# Patient Record
Sex: MALE | Race: BLACK OR AFRICAN AMERICAN | NOT HISPANIC OR LATINO | Employment: UNEMPLOYED | URBAN - METROPOLITAN AREA
[De-identification: names, ages, dates, MRNs, and addresses within clinical notes are randomized per-mention and may not be internally consistent; named-entity substitution may affect disease eponyms.]

---

## 2018-02-18 ENCOUNTER — HOSPITAL ENCOUNTER (INPATIENT)
Facility: HOSPITAL | Age: 58
LOS: 2 days | Discharge: HOME | DRG: 897 | End: 2018-02-21
Attending: FAMILY MEDICINE | Admitting: FAMILY MEDICINE

## 2018-02-18 DIAGNOSIS — D72.829 LEUKOCYTOSIS, UNSPECIFIED TYPE: ICD-10-CM

## 2018-02-18 DIAGNOSIS — I50.9 CONGESTIVE HEART FAILURE, UNSPECIFIED CONGESTIVE HEART FAILURE CHRONICITY, UNSPECIFIED CONGESTIVE HEART FAILURE TYPE: ICD-10-CM

## 2018-02-18 DIAGNOSIS — N17.9 AKI (ACUTE KIDNEY INJURY) (HCC): ICD-10-CM

## 2018-02-18 DIAGNOSIS — R56.9 SEIZURE (HCC): Primary | ICD-10-CM

## 2018-02-18 DIAGNOSIS — R56.9 SEIZURE-LIKE ACTIVITY (HCC): ICD-10-CM

## 2018-02-18 DIAGNOSIS — I10 HYPERTENSION, UNSPECIFIED TYPE: ICD-10-CM

## 2018-02-18 DIAGNOSIS — R74.8 ELEVATED CREATINE KINASE: ICD-10-CM

## 2018-02-18 DIAGNOSIS — I10 BENIGN ESSENTIAL HTN: ICD-10-CM

## 2018-02-18 DIAGNOSIS — Z72.89 ALCOHOL USE: ICD-10-CM

## 2018-02-18 DIAGNOSIS — E78.5 HYPERLIPIDEMIA, UNSPECIFIED HYPERLIPIDEMIA TYPE: ICD-10-CM

## 2018-02-18 PROCEDURE — 36415 COLL VENOUS BLD VENIPUNCTURE: CPT | Performed by: NURSE PRACTITIONER

## 2018-02-18 PROCEDURE — 93005 ELECTROCARDIOGRAM TRACING: CPT | Performed by: NURSE PRACTITIONER

## 2018-02-18 PROCEDURE — 85025 COMPLETE CBC W/AUTO DIFF WBC: CPT | Performed by: NURSE PRACTITIONER

## 2018-02-18 PROCEDURE — 80320 DRUG SCREEN QUANTALCOHOLS: CPT | Performed by: NURSE PRACTITIONER

## 2018-02-18 PROCEDURE — 85730 THROMBOPLASTIN TIME PARTIAL: CPT | Performed by: NURSE PRACTITIONER

## 2018-02-18 PROCEDURE — 85610 PROTHROMBIN TIME: CPT | Performed by: NURSE PRACTITIONER

## 2018-02-19 ENCOUNTER — APPOINTMENT (EMERGENCY)
Dept: CT IMAGING | Facility: HOSPITAL | Age: 58
DRG: 897 | End: 2018-02-19

## 2018-02-19 PROBLEM — D72.829 LEUKOCYTOSIS: Status: ACTIVE | Noted: 2018-02-19

## 2018-02-19 PROBLEM — E78.5 HYPERLIPIDEMIA: Status: ACTIVE | Noted: 2018-02-19

## 2018-02-19 PROBLEM — E86.0 DEHYDRATION: Status: ACTIVE | Noted: 2018-02-19

## 2018-02-19 PROBLEM — I10 HYPERTENSION: Status: ACTIVE | Noted: 2018-02-19

## 2018-02-19 PROBLEM — F12.90 MARIJUANA USE: Status: ACTIVE | Noted: 2018-02-19

## 2018-02-19 PROBLEM — E87.6 HYPOKALEMIA: Status: ACTIVE | Noted: 2018-02-19

## 2018-02-19 PROBLEM — N17.9 AKI (ACUTE KIDNEY INJURY) (HCC): Status: ACTIVE | Noted: 2018-02-19

## 2018-02-19 PROBLEM — I50.9 CHF (CONGESTIVE HEART FAILURE) (HCC): Status: ACTIVE | Noted: 2018-02-19

## 2018-02-19 PROBLEM — F10.10 ETOH ABUSE: Status: ACTIVE | Noted: 2018-02-19

## 2018-02-19 PROBLEM — R56.9 SEIZURE-LIKE ACTIVITY (HCC): Status: ACTIVE | Noted: 2018-02-19

## 2018-02-19 PROBLEM — Z72.0 TOBACCO ABUSE: Status: ACTIVE | Noted: 2018-02-19

## 2018-02-19 LAB
ALBUMIN SERPL BCP-MCNC: 3.6 G/DL (ref 3.5–5)
ALP SERPL-CCNC: 88 U/L (ref 46–116)
ALT SERPL W P-5'-P-CCNC: 74 U/L (ref 12–78)
AMPHETAMINES SERPL QL SCN: NEGATIVE
ANION GAP SERPL CALCULATED.3IONS-SCNC: 11 MMOL/L (ref 4–13)
ANION GAP SERPL CALCULATED.3IONS-SCNC: 13 MMOL/L (ref 4–13)
APAP SERPL-MCNC: <2 UG/ML (ref 10–30)
APTT PPP: 26 SECONDS (ref 23–35)
AST SERPL W P-5'-P-CCNC: 69 U/L (ref 5–45)
ATRIAL RATE: 87 BPM
BARBITURATES UR QL: NEGATIVE
BASOPHILS # BLD AUTO: 0.04 THOUSANDS/ΜL (ref 0–0.1)
BASOPHILS NFR BLD AUTO: 0 % (ref 0–1)
BENZODIAZ UR QL: NEGATIVE
BILIRUB SERPL-MCNC: 0.46 MG/DL (ref 0.2–1)
BUN SERPL-MCNC: 24 MG/DL (ref 5–25)
BUN SERPL-MCNC: 27 MG/DL (ref 5–25)
CALCIUM SERPL-MCNC: 8.5 MG/DL (ref 8.3–10.1)
CALCIUM SERPL-MCNC: 9 MG/DL (ref 8.3–10.1)
CHLORIDE SERPL-SCNC: 100 MMOL/L (ref 100–108)
CHLORIDE SERPL-SCNC: 95 MMOL/L (ref 100–108)
CK MB SERPL-MCNC: 1 NG/ML (ref 0–5)
CK MB SERPL-MCNC: <1 % (ref 0–2.5)
CK SERPL-CCNC: 484 U/L (ref 39–308)
CO2 SERPL-SCNC: 23 MMOL/L (ref 21–32)
CO2 SERPL-SCNC: 25 MMOL/L (ref 21–32)
COCAINE UR QL: NEGATIVE
CREAT SERPL-MCNC: 1.91 MG/DL (ref 0.6–1.3)
CREAT SERPL-MCNC: 2.24 MG/DL (ref 0.6–1.3)
EOSINOPHIL # BLD AUTO: 0.12 THOUSAND/ΜL (ref 0–0.61)
EOSINOPHIL NFR BLD AUTO: 1 % (ref 0–6)
ERYTHROCYTE [DISTWIDTH] IN BLOOD BY AUTOMATED COUNT: 13.1 % (ref 11.6–15.1)
ERYTHROCYTE [DISTWIDTH] IN BLOOD BY AUTOMATED COUNT: 13.2 % (ref 11.6–15.1)
ETHANOL SERPL-MCNC: 30 MG/DL (ref 0–3)
FOLATE SERPL-MCNC: >20 NG/ML (ref 3.1–17.5)
GFR SERPL CREATININE-BSD FRML MDRD: 37 ML/MIN/1.73SQ M
GFR SERPL CREATININE-BSD FRML MDRD: 44 ML/MIN/1.73SQ M
GLUCOSE SERPL-MCNC: 109 MG/DL (ref 65–140)
GLUCOSE SERPL-MCNC: 98 MG/DL (ref 65–140)
HCT VFR BLD AUTO: 39.3 % (ref 36.5–49.3)
HCT VFR BLD AUTO: 41.2 % (ref 36.5–49.3)
HGB BLD-MCNC: 13.7 G/DL (ref 12–17)
HGB BLD-MCNC: 14.6 G/DL (ref 12–17)
INR PPP: 0.98 (ref 0.86–1.16)
LDLC SERPL DIRECT ASSAY-MCNC: 125 MG/DL (ref 0–100)
LYMPHOCYTES # BLD AUTO: 2.74 THOUSANDS/ΜL (ref 0.6–4.47)
LYMPHOCYTES NFR BLD AUTO: 20 % (ref 14–44)
MAGNESIUM SERPL-MCNC: 2.1 MG/DL (ref 1.6–2.6)
MCH RBC QN AUTO: 32.2 PG (ref 26.8–34.3)
MCH RBC QN AUTO: 33.2 PG (ref 26.8–34.3)
MCHC RBC AUTO-ENTMCNC: 34.9 G/DL (ref 31.4–37.4)
MCHC RBC AUTO-ENTMCNC: 35.4 G/DL (ref 31.4–37.4)
MCV RBC AUTO: 93 FL (ref 82–98)
MCV RBC AUTO: 94 FL (ref 82–98)
METHADONE UR QL: NEGATIVE
MONOCYTES # BLD AUTO: 0.96 THOUSAND/ΜL (ref 0.17–1.22)
MONOCYTES NFR BLD AUTO: 7 % (ref 4–12)
NEUTROPHILS # BLD AUTO: 9.67 THOUSANDS/ΜL (ref 1.85–7.62)
NEUTS SEG NFR BLD AUTO: 72 % (ref 43–75)
NRBC BLD AUTO-RTO: 0 /100 WBCS
NT-PROBNP SERPL-MCNC: 76 PG/ML
OPIATES UR QL SCN: NEGATIVE
P AXIS: 40 DEGREES
PCP UR QL: NEGATIVE
PLATELET # BLD AUTO: 173 THOUSANDS/UL (ref 149–390)
PLATELET # BLD AUTO: 188 THOUSANDS/UL (ref 149–390)
PMV BLD AUTO: 10.2 FL (ref 8.9–12.7)
PMV BLD AUTO: 10.2 FL (ref 8.9–12.7)
POTASSIUM SERPL-SCNC: 3.2 MMOL/L (ref 3.5–5.3)
POTASSIUM SERPL-SCNC: 3.5 MMOL/L (ref 3.5–5.3)
PR INTERVAL: 170 MS
PROT SERPL-MCNC: 7.5 G/DL (ref 6.4–8.2)
PROTHROMBIN TIME: 13 SECONDS (ref 12.1–14.4)
QRS AXIS: 48 DEGREES
QRSD INTERVAL: 88 MS
QT INTERVAL: 356 MS
QTC INTERVAL: 428 MS
RBC # BLD AUTO: 4.25 MILLION/UL (ref 3.88–5.62)
RBC # BLD AUTO: 4.4 MILLION/UL (ref 3.88–5.62)
SALICYLATES SERPL-MCNC: 3.9 MG/DL (ref 3–20)
SODIUM SERPL-SCNC: 133 MMOL/L (ref 136–145)
SODIUM SERPL-SCNC: 134 MMOL/L (ref 136–145)
T WAVE AXIS: 22 DEGREES
THC UR QL: POSITIVE
TSH SERPL DL<=0.05 MIU/L-ACNC: 0.66 UIU/ML (ref 0.36–3.74)
VENTRICULAR RATE: 87 BPM
VIT B12 SERPL-MCNC: 533 PG/ML (ref 100–900)
WBC # BLD AUTO: 10.94 THOUSAND/UL (ref 4.31–10.16)
WBC # BLD AUTO: 13.53 THOUSAND/UL (ref 4.31–10.16)

## 2018-02-19 PROCEDURE — 99254 IP/OBS CNSLTJ NEW/EST MOD 60: CPT | Performed by: NURSE PRACTITIONER

## 2018-02-19 PROCEDURE — 82746 ASSAY OF FOLIC ACID SERUM: CPT | Performed by: NURSE PRACTITIONER

## 2018-02-19 PROCEDURE — 80329 ANALGESICS NON-OPIOID 1 OR 2: CPT | Performed by: NURSE PRACTITIONER

## 2018-02-19 PROCEDURE — 99285 EMERGENCY DEPT VISIT HI MDM: CPT

## 2018-02-19 PROCEDURE — 36415 COLL VENOUS BLD VENIPUNCTURE: CPT | Performed by: NURSE PRACTITIONER

## 2018-02-19 PROCEDURE — 84443 ASSAY THYROID STIM HORMONE: CPT | Performed by: NURSE PRACTITIONER

## 2018-02-19 PROCEDURE — 83735 ASSAY OF MAGNESIUM: CPT | Performed by: NURSE PRACTITIONER

## 2018-02-19 PROCEDURE — 83880 ASSAY OF NATRIURETIC PEPTIDE: CPT | Performed by: NURSE PRACTITIONER

## 2018-02-19 PROCEDURE — 83721 ASSAY OF BLOOD LIPOPROTEIN: CPT | Performed by: NURSE PRACTITIONER

## 2018-02-19 PROCEDURE — 82550 ASSAY OF CK (CPK): CPT | Performed by: NURSE PRACTITIONER

## 2018-02-19 PROCEDURE — 80307 DRUG TEST PRSMV CHEM ANLYZR: CPT | Performed by: NURSE PRACTITIONER

## 2018-02-19 PROCEDURE — 99233 SBSQ HOSP IP/OBS HIGH 50: CPT | Performed by: INTERNAL MEDICINE

## 2018-02-19 PROCEDURE — 80048 BASIC METABOLIC PNL TOTAL CA: CPT | Performed by: PHYSICIAN ASSISTANT

## 2018-02-19 PROCEDURE — 93010 ELECTROCARDIOGRAM REPORT: CPT | Performed by: INTERNAL MEDICINE

## 2018-02-19 PROCEDURE — 82607 VITAMIN B-12: CPT | Performed by: NURSE PRACTITIONER

## 2018-02-19 PROCEDURE — 80053 COMPREHEN METABOLIC PANEL: CPT | Performed by: NURSE PRACTITIONER

## 2018-02-19 PROCEDURE — 99223 1ST HOSP IP/OBS HIGH 75: CPT | Performed by: PHYSICIAN ASSISTANT

## 2018-02-19 PROCEDURE — 99254 IP/OBS CNSLTJ NEW/EST MOD 60: CPT | Performed by: INTERNAL MEDICINE

## 2018-02-19 PROCEDURE — 82553 CREATINE MB FRACTION: CPT | Performed by: NURSE PRACTITIONER

## 2018-02-19 PROCEDURE — 85027 COMPLETE CBC AUTOMATED: CPT | Performed by: PHYSICIAN ASSISTANT

## 2018-02-19 PROCEDURE — 70450 CT HEAD/BRAIN W/O DYE: CPT

## 2018-02-19 RX ORDER — PANTOPRAZOLE SODIUM 40 MG/1
40 TABLET, DELAYED RELEASE ORAL
Status: DISCONTINUED | OUTPATIENT
Start: 2018-02-20 | End: 2018-02-21 | Stop reason: HOSPADM

## 2018-02-19 RX ORDER — SPIRONOLACTONE 25 MG/1
25 TABLET ORAL DAILY
COMMUNITY
End: 2018-02-21 | Stop reason: HOSPADM

## 2018-02-19 RX ORDER — ASPIRIN 81 MG/1
81 TABLET ORAL DAILY
Status: DISCONTINUED | OUTPATIENT
Start: 2018-02-19 | End: 2018-02-21 | Stop reason: HOSPADM

## 2018-02-19 RX ORDER — LISINOPRIL 20 MG/1
20 TABLET ORAL DAILY
Status: ON HOLD | COMMUNITY
End: 2018-02-21

## 2018-02-19 RX ORDER — THIAMINE MONONITRATE (VIT B1) 100 MG
100 TABLET ORAL DAILY
Status: DISCONTINUED | OUTPATIENT
Start: 2018-02-20 | End: 2018-02-21 | Stop reason: HOSPADM

## 2018-02-19 RX ORDER — FOLIC ACID 1 MG/1
1 TABLET ORAL DAILY
Status: DISCONTINUED | OUTPATIENT
Start: 2018-02-20 | End: 2018-02-21 | Stop reason: HOSPADM

## 2018-02-19 RX ORDER — HEPARIN SODIUM 5000 [USP'U]/ML
5000 INJECTION, SOLUTION INTRAVENOUS; SUBCUTANEOUS EVERY 8 HOURS SCHEDULED
Status: DISCONTINUED | OUTPATIENT
Start: 2018-02-19 | End: 2018-02-21 | Stop reason: HOSPADM

## 2018-02-19 RX ORDER — AMLODIPINE BESYLATE 5 MG/1
5 TABLET ORAL DAILY
Status: DISCONTINUED | OUTPATIENT
Start: 2018-02-19 | End: 2018-02-21 | Stop reason: HOSPADM

## 2018-02-19 RX ORDER — SODIUM CHLORIDE 9 MG/ML
125 INJECTION, SOLUTION INTRAVENOUS CONTINUOUS
Status: DISCONTINUED | OUTPATIENT
Start: 2018-02-19 | End: 2018-02-20

## 2018-02-19 RX ORDER — SIMVASTATIN 40 MG
40 TABLET ORAL
COMMUNITY
End: 2018-02-21 | Stop reason: HOSPADM

## 2018-02-19 RX ORDER — ACETAMINOPHEN 325 MG/1
650 TABLET ORAL EVERY 6 HOURS PRN
Status: DISCONTINUED | OUTPATIENT
Start: 2018-02-19 | End: 2018-02-21 | Stop reason: HOSPADM

## 2018-02-19 RX ORDER — POTASSIUM CHLORIDE 20 MEQ/1
40 TABLET, EXTENDED RELEASE ORAL ONCE
Status: COMPLETED | OUTPATIENT
Start: 2018-02-19 | End: 2018-02-19

## 2018-02-19 RX ORDER — CARVEDILOL 25 MG/1
25 TABLET ORAL 2 TIMES DAILY WITH MEALS
Status: ON HOLD | COMMUNITY
End: 2022-02-04 | Stop reason: SDUPTHER

## 2018-02-19 RX ORDER — NICOTINE 21 MG/24HR
1 PATCH, TRANSDERMAL 24 HOURS TRANSDERMAL DAILY
Status: DISCONTINUED | OUTPATIENT
Start: 2018-02-19 | End: 2018-02-21 | Stop reason: HOSPADM

## 2018-02-19 RX ORDER — LISINOPRIL 20 MG/1
20 TABLET ORAL DAILY
Status: DISCONTINUED | OUTPATIENT
Start: 2018-02-19 | End: 2018-02-21 | Stop reason: HOSPADM

## 2018-02-19 RX ORDER — AMLODIPINE BESYLATE 5 MG/1
5 TABLET ORAL DAILY
Status: ON HOLD | COMMUNITY
End: 2018-02-21

## 2018-02-19 RX ORDER — CARVEDILOL 25 MG/1
25 TABLET ORAL 2 TIMES DAILY WITH MEALS
Status: DISCONTINUED | OUTPATIENT
Start: 2018-02-19 | End: 2018-02-21 | Stop reason: HOSPADM

## 2018-02-19 RX ORDER — MAGNESIUM HYDROXIDE/ALUMINUM HYDROXICE/SIMETHICONE 120; 1200; 1200 MG/30ML; MG/30ML; MG/30ML
30 SUSPENSION ORAL EVERY 6 HOURS PRN
Status: DISCONTINUED | OUTPATIENT
Start: 2018-02-19 | End: 2018-02-21 | Stop reason: HOSPADM

## 2018-02-19 RX ORDER — LORAZEPAM 2 MG/ML
1 INJECTION INTRAMUSCULAR EVERY 4 HOURS PRN
Status: DISCONTINUED | OUTPATIENT
Start: 2018-02-19 | End: 2018-02-21 | Stop reason: HOSPADM

## 2018-02-19 RX ORDER — ONDANSETRON 2 MG/ML
4 INJECTION INTRAMUSCULAR; INTRAVENOUS EVERY 6 HOURS PRN
Status: DISCONTINUED | OUTPATIENT
Start: 2018-02-19 | End: 2018-02-21 | Stop reason: HOSPADM

## 2018-02-19 RX ORDER — ASPIRIN 81 MG/1
81 TABLET ORAL DAILY
Status: ON HOLD | COMMUNITY
End: 2022-02-04 | Stop reason: SDUPTHER

## 2018-02-19 RX ORDER — PRAVASTATIN SODIUM 80 MG/1
80 TABLET ORAL
Status: DISCONTINUED | OUTPATIENT
Start: 2018-02-19 | End: 2018-02-21 | Stop reason: HOSPADM

## 2018-02-19 RX ORDER — FUROSEMIDE 40 MG/1
40 TABLET ORAL DAILY
Status: ON HOLD | COMMUNITY
End: 2018-02-21

## 2018-02-19 RX ADMIN — LISINOPRIL 20 MG: 20 TABLET ORAL at 09:18

## 2018-02-19 RX ADMIN — SODIUM CHLORIDE 125 ML/HR: 0.9 INJECTION, SOLUTION INTRAVENOUS at 21:35

## 2018-02-19 RX ADMIN — CARVEDILOL 25 MG: 25 TABLET, FILM COATED ORAL at 09:18

## 2018-02-19 RX ADMIN — SODIUM CHLORIDE 125 ML/HR: 0.9 INJECTION, SOLUTION INTRAVENOUS at 14:02

## 2018-02-19 RX ADMIN — HEPARIN SODIUM 5000 UNITS: 5000 INJECTION, SOLUTION INTRAVENOUS; SUBCUTANEOUS at 05:40

## 2018-02-19 RX ADMIN — ASPIRIN 81 MG: 81 TABLET, COATED ORAL at 09:18

## 2018-02-19 RX ADMIN — AMLODIPINE BESYLATE 5 MG: 5 TABLET ORAL at 09:18

## 2018-02-19 RX ADMIN — PRAVASTATIN SODIUM 80 MG: 80 TABLET ORAL at 17:44

## 2018-02-19 RX ADMIN — CARVEDILOL 25 MG: 25 TABLET, FILM COATED ORAL at 17:44

## 2018-02-19 RX ADMIN — THIAMINE HYDROCHLORIDE 100 MG: 100 INJECTION, SOLUTION INTRAMUSCULAR; INTRAVENOUS at 05:21

## 2018-02-19 RX ADMIN — FOLIC ACID 1 MG: 5 INJECTION, SOLUTION INTRAMUSCULAR; INTRAVENOUS; SUBCUTANEOUS at 04:36

## 2018-02-19 RX ADMIN — SODIUM CHLORIDE 1000 ML: 0.9 INJECTION, SOLUTION INTRAVENOUS at 02:38

## 2018-02-19 RX ADMIN — SODIUM CHLORIDE 125 ML/HR: 0.9 INJECTION, SOLUTION INTRAVENOUS at 04:36

## 2018-02-19 RX ADMIN — POTASSIUM CHLORIDE 40 MEQ: 1500 TABLET, EXTENDED RELEASE ORAL at 11:18

## 2018-02-19 NOTE — CONSULTS
2           Consultation - Nephrology   Kristin Joya 65 y o  male MRN: [id number]  Unit/Bed#: E4 -01 Encounter: [id number]      Assessment/Plan     Assessment / Plan:  1  Renal     Patient was admitted had acute renal failure and from the history it sounds as if he had not been feeling now well for few days may not been eating that great  With IV fluids overnight creatinine is actually declined some so that confirm some sort of pre renal component  Urinalysis is pending  This is important because if it is normal with respect to no blood no protein will go against having intrinsic process and would spare significant serologic workup  Monitor on IV fluids and hopefully creatinine will declined to normal  Await urinalysis    2  Electrolyte abnormalities  The patient has mild hyponatremia which could be related to pre renal azotemia causing osmotic ADH release and then also had acute renal failure impairing ability to excrete water  Stable on isotonic fluids will continue to monitor his renal function improves  With respect to hypokalemia there appears to be a big history of alcohol use at home potentially has poor intake  Will replete and monitor  History of Present Illness   Physician Requesting Consult: Cinthya Hubbard MD  Reason for Consult / Principal Problem:  Acute renal failure  Hx and PE limited by:   HPI: Kristin Joya is a 65y o  year old male who presents with possible seizure  According to the patient and review of the chart the patient has been having some weird activity which is girlfriend thought related to seizures since December  Apparently he has been feeling well for if few days and at home he was sitting in a chair and his eyes rolled back in his head and he was not responsive  He was brought into the hospital for evaluation found have a creatinine above 2 with no history of kidney disease and we were asked to see him regarding this    History obtained from chart review and the patient  Inpatient consult to Nephrology  Consult performed by: Nathanael Hernandez ordered by: Maria A Gil          Review of Systems   Constitutional: Positive for activity change  Negative for chills and fever  HENT: Negative  Eyes: Negative  Respiratory: Negative  Negative for chest tightness and shortness of breath  Cardiovascular: Negative  Gastrointestinal: Negative  Genitourinary: Negative  Neurological: Negative for dizziness  Possible seizure       Historical Information   Patient Active Problem List   Diagnosis    [facility] ([facility] )    Hypertension    CHF (congestive heart failure) ([place] [address] )    ETOH abuse    ALEKS (acute kidney injury) ([facility] )     Past Medical History:   Diagnosis Date    CHF (congestive heart failure) ([place] [address] )     Hypertension      History reviewed  No pertinent surgical history  Social History   History   Alcohol Use    6 0 oz/week    10 Cans of beer per week     Comment: every day drinker     History   Drug Use    Types: Marijuana     Comment: occasionally     History   Smoking Status    Current Every Day Smoker    Packs/day: 1 00    Years: 30 00   Smokeless Tobacco    Never Used     History reviewed  No pertinent family history      Meds/Allergies   current meds:   Current Facility-Administered Medications   Medication Dose Route Frequency    acetaminophen (TYLENOL) tablet 650 mg  650 mg Oral Q6H PRN    aluminum-magnesium hydroxide-simethicone (MYLANTA) 200-200-20 mg/5 mL oral suspension 30 mL  30 mL Oral Q6H PRN    amLODIPine (NORVASC) tablet 5 mg  5 mg Oral Daily    aspirin (ECOTRIN LOW STRENGTH) EC tablet 81 mg  81 mg Oral Daily    carvedilol (COREG) tablet 25 mg  25 mg Oral BID With Meals    heparin (porcine) subcutaneous injection 5,000 Units  5,000 Units Subcutaneous Q8H [facility]    lisinopril (ZESTRIL) tablet 20 mg  20 mg Oral Daily    LORazepam (ATIVAN) 2 mg/mL injection 1 mg  1 mg Intravenous Q4H PRN    nicotine (NICODERM CQ) 14 mg/24hr TD 24 hr patch 1 patch  1 patch Transdermal Daily    ondansetron (ZOFRAN) injection 4 mg  4 mg Intravenous Q6H PRN    pravastatin (PRAVACHOL) tablet 80 mg  80 mg Oral Daily With Dinner    sodium chloride 0 9 % infusion  125 mL/hr Intravenous Continuous     No Known Allergies    Objective     Intake/Output Summary (Last 24 hours) at 02/19/18 1028  Last data filed at 02/19/18 6597   Gross per 24 hour   Intake          1322 91 ml   Output                0 ml   Net          1322 91 ml     Body mass index is 37 31 kg/m²  Invasive Devices:        PHYSICAL EXAM:  /89 (BP Location: Right arm)   Pulse 86   Temp 97 9 °F (36 6 °C) (Temporal)   Resp 17   Ht 5' 8" (1 727 m)   Wt 111 kg (245 lb 6 oz)   SpO2 98%   BMI 37 31 kg/m²     Physical Exam   Constitutional: He is oriented to person, place, and time  No distress  HENT:   Mouth/Throat: No oropharyngeal exudate  Eyes: No scleral icterus  Neck: Neck supple  No JVD present  Cardiovascular: Normal rate  Exam reveals no friction rub  No edema  Pulmonary/Chest: Effort normal and breath sounds normal  No respiratory distress  He has no wheezes  He has no rales  Abdominal: Soft  Bowel sounds are normal  He exhibits no distension  There is no tenderness  There is no rebound  Neurological: He is alert and oriented to person, place, and time     No focal motor findings         Current Weight: Weight - Scale: 111 kg (245 lb 6 oz)  First Weight: Weight - Scale: 111 kg (244 lb 7 8 oz)    Lab Results:      Results from last 7 days  Lab Units 02/19/18  0604   WBC Thousand/uL 10 94*   HEMOGLOBIN g/dL 13 7   HEMATOCRIT % 39 3   PLATELETS Thousands/uL 173       Results from last 7 days  Lab Units 02/19/18  0604   SODIUM mmol/L 134*   POTASSIUM mmol/L 3 2*   CHLORIDE mmol/L 100   CO2 mmol/L 23   BUN mg/dL 24   CREATININE mg/dL 1 91*   GLUCOSE RANDOM mg/dL 109   CALCIUM mg/dL 8 5       Results from last 7 days  Lab Units 02/19/18  0604 02/19/18  0128   SODIUM mmol/L 134* 133*   POTASSIUM mmol/L 3 2* 3 5   CHLORIDE mmol/L 100 95*   CO2 mmol/L 23 25   BUN mg/dL 24 27*   CREATININE mg/dL 1 91* 2 24*   CALCIUM mg/dL 8 5 9 0   TOTAL PROTEIN g/dL  --  7 5   BILIRUBIN TOTAL mg/dL  --  0 46   ALK PHOS U/L  --  88   ALT U/L  --  74   AST U/L  --  69*   GLUCOSE RANDOM mg/dL 109 98

## 2018-02-19 NOTE — CONSULTS
Consultation - Neurology   Modesta Keller 65 y o  male MRN: [id number]  Unit/Bed#: E4 -01 Encounter: [id number]      Physician Requesting Consult: Victor Hugo Koch MD  Reason for Consult / Principal Problem: seizure-like activity  Hx and PE limited by: none  Review of previous medical records  completed  Family, not present at the bedside for history and examination    Assessment/Plan:  1  Seizure-like activity: likely provoked  Etiology likely metabolic in the setting of alcohol withdraw and abuse, marijuana use,  recent infection, and dehydration  Patient reported recent illness with fever, chills, productive cough, and poor po intake including alcohol  Patient normally consumes 8-10 drinks a day and stopped for a few days prior to seizure like activity  UDS positive for THC  Ethanol 30  Can not rule out vascular cause given increased cardiac risk factors, HTN, HLD, obesity,  tobacco use, and alcohol abuse  CT Head demonstrating chronic sinusitis and microangiopathic changes but no acute intracranial abnormality  2  HTN: reported non compliance with home medication 2/2 no insurance  3  HLD  4  ETOH abuse: reported 10 cans of beer/day  5  Tobacco use    Plan:  -Seizure precautions  -EEG pending  -f/u labs B12, thiamine, folate, Lipid panel, HgA1C  -continue IV fluids  -monitor for alcohol withdraw and continue as per protocol  -ongoing smoking cessation education      HPI: Modesta Keller is a  65 y o  male With pmh of HTN, HLD, CHF, ETOH abuse, and recent URI x 3-4 days who was brought in via EMS on 2/18/18 for seizure like activity witnessed by his girlfriend  Patient reports that on Thursday 2/15/18 he started with an upper respiratory illness with productive cough, fever and chills   He made himself a home remedy of honey, bicardi, and lemon that he "heats on the stove and helps you sweat the cold out " He attributes his "shaking" to fever and chills but his girlfriend told him he had 3 episodes of "shaking" with his eyes rolled back in his head followed by confusion lasting about 10 minutes  She called EMS and made him go the hospital  He states that he does not recall the episodes, but does remember feeling "sweaty" just prior  He did not bite his tongue or lose control of his bladder  Of note, patient consumes 8-10 cans of beer a day, never hard alcohol  Since he became ill on Thursday, he had not drank any alcohol until Sunday afternoon when he had the first episode  Per report, His girlfriend reports other episodes of seizure-like episodes since christmas  When queried, patient reports those episodes happened during a time of binge drinking "my birthday is at the end of [birth year] and with the holidays I party a lot and have a good time "        ROS:  History obtained from chart review and the patient  General ROS: positive for  - chills, fever and diaphoresis from cold that started on 2/15/18  negative for - fatigue  Ophthalmic ROS: negative for - blurry vision, decreased vision, double vision, loss of vision or photophobia  Respiratory ROS: positive for - cough, shortness of breath and sputum changes (green/brown with recent respiratory illness)  Cardiovascular ROS: no chest pain or dyspnea on exertion  Gastrointestinal ROS: positive for- diarrhea and decreased appetite negative for- abdominal pain, nausea/ vomiting  Musculoskeletal ROS: negative for - gait disturbance or muscular weakness  Neurological ROS: positive for - seizure-like activity  negative for - confusion, dizziness, gait disturbance, memory loss, speech problems or visual changes  Dermatological ROS: negative for dry skin and rash        Historical Information     Past Medical History:   Diagnosis Date    [facility]estive [facility] failure) [facility] )     Hypertension      History reviewed  No pertinent surgical history      Social History   Current everyday smoker with 1 pack/day; 30 pack year history  Current alcohol use: 10 cans of beer/day  Occasional drug use: marijuana       Family History: History reviewed  No pertinent family history  No Known Allergies  Meds:  all current active meds have been reviewed and PTA meds:   Prior to Admission Medications   Prescriptions Last Dose Informant Patient Reported? Taking?    Multiple Vitamin (DAILY-ROB PO)   Yes Yes   Sig: Take 1 tablet by mouth daily   amLODIPine (NORVASC) 5 mg tablet   Yes Yes   Sig: Take 5 mg by mouth daily   aspirin (ECOTRIN LOW STRENGTH) 81 mg EC tablet   Yes Yes   Sig: Take 81 mg by mouth daily   carvedilol (COREG) 25 mg tablet   Yes Yes   Sig: Take 25 mg by mouth 2 (two) times a day with meals   furosemide (LASIX) 40 mg tablet   Yes Yes   Sig: Take 40 mg by mouth daily   lisinopril (ZESTRIL) 20 mg tablet   Yes Yes   Sig: Take 20 mg by mouth daily   simvastatin (ZOCOR) 40 mg tablet   Yes Yes   Sig: Take 40 mg by mouth daily at bedtime   spironolactone (ALDACTONE) 25 mg tablet   Yes Yes   Sig: Take 25 mg by mouth daily      Facility-Administered Medications: None       Scheduled Meds:  Current Facility-Administered Medications:  acetaminophen 650 mg Oral Q6H PRN Melissa Saldana PA-C    aluminum-magnesium hydroxide-simethicone 30 mL Oral Q6H PRN Melissa Saldana PA-C    amLODIPine 5 mg Oral Daily Melissa Saldana PA-C    aspirin 81 mg Oral Daily Melissa Saldana PA-C    carvedilol 25 mg Oral BID With Meals Melissa Saldana PA-C    heparin (porcine) 5,000 Units Subcutaneous Q8H [address] Melissa Saldana PA-C    lisinopril 20 mg Oral Daily Melissa Saldana PA-C    LORazepam 1 mg Intravenous Q4H PRN Melissa Saldana PA-C    nicotine 1 patch Transdermal Daily Melissa Saldana PA-C    ondansetron 4 mg Intravenous Q6H PRN Melissa Saldana PA-C    pravastatin 80 mg Oral Daily With [employer]    sodium chloride 125 mL/hr Intravenous Continuous Melissa Saldana PA-C Last Rate: 125 mL/hr (02/19/18 0551)     PRN Meds:   acetaminophen    aluminum-magnesium hydroxide-simethicone    LORazepam    ondansetron        Physical Exam:   Objective   Vitals:Blood pressure 142/89, pulse 86, temperature 97 9 °F (36 6 °C), temperature source Temporal, resp  rate 17, height 5' 8" (1 727 m), weight 111 kg (245 lb 6 oz), SpO2 98 %  ,Body mass index is 37 31 kg/m²  Physical Exam   Constitutional: He is oriented to person, place, and time  He appears well-nourished  No distress  HENT:   Head: Normocephalic and atraumatic  Mouth/Throat: Oropharynx is clear and moist    Eyes: EOM are normal  Pupils are equal, round, and reactive to light  Neck: Normal range of motion  Neck supple  Cardiovascular: Normal rate, regular rhythm and normal heart sounds  Pulmonary/Chest: Effort normal and breath sounds normal  No respiratory distress  Abdominal: Soft  Bowel sounds are normal  He exhibits no distension  Musculoskeletal: Normal range of motion  He exhibits no edema  Neurological: He is alert and oriented to person, place, and time  Reflex Scores:       Tricep reflexes are 1+ on the right side and 1+ on the left side  Bicep reflexes are 1+ on the right side and 1+ on the left side  Brachioradialis reflexes are 1+ on the right side and 1+ on the left side  Patellar reflexes are 1+ on the right side and 1+ on the left side  Achilles reflexes are 1+ on the right side and 1+ on the left side  Skin: Skin is warm and dry  Psychiatric: His speech is normal    Vitals reviewed  Neurologic Exam     Mental Status   Oriented to person, place, and time  Disoriented to person: states full name and   Oriented to city  ([address] [place] [address])  Oriented to year, month, date and day  Registration: recalls 3 of 3 objects  Recall at 5 minutes: recalls 3 of 3 objects  Follows 3 step commands  Attention: normal  Concentration: normal    Speech: speech is normal   Knowledge: good  Able to name object  Able to read  Able to repeat       Cranial Nerves   Cranial nerves II through XII intact  CN III, IV, VI   Pupils are equal, round, and reactive to light  Extraocular motions are normal    Right pupil: Size: 2 mm  Shape: regular  Reactivity: brisk  Left pupil: Size: 2 mm  Shape: regular  Reactivity: brisk  Motor Exam   Muscle bulk: normal  Overall muscle tone: normal    Strength   Right neck flexion: 5/5  Left neck flexion: 5/5  Right neck extension: 5/5  Left neck extension: 5/5  Right deltoid: 5/5  Left deltoid: 5/5  Right biceps: 5/5  Left biceps: 5/5  Right triceps: 5/5  Left triceps: 5/5  Right wrist flexion: 5/5  Left wrist flexion: 5/5  Right wrist extension: 5/5  Left wrist extension: 5/5  Right interossei: 5/5  Left interossei: 5/5  Right abdominals: 5/5  Left abdominals: 5/5  Right iliopsoas: 5/5  Left iliopsoas: 5/5  Right quadriceps: 5/5  Left quadriceps: 5/5  Right hamstrin/5  Left hamstrin/5  Right glutei: 5/5  Left glutei: 5/5  Right anterior tibial: 5/5  Left anterior tibial: 5/5  Right posterior tibial: 5/5  Left posterior tibial: 5/5  Right peroneal: 5/5  Left peroneal: 5/5  Right gastroc: 5/5  Left gastroc: 5/5    Sensory Exam   Light touch normal    Vibration normal    Proprioception normal      Gait, Coordination, and Reflexes     Reflexes   Right brachioradialis: 1+  Left brachioradialis: 1+  Right biceps: 1+  Left biceps: 1+  Right triceps: 1+  Left triceps: 1+  Right patellar: 1+  Left patellar: 1+  Right achilles: 1+  Left achilles: 1+  Right : 1+  Left : 1+  Right plantar: normal  Left plantar: normalBaseline tremor but no asterixis on exam         Lab Results:   I have personally reviewed pertinent reports    , CBC:   Results from last 7 days  Lab Units 18  0618  2358   WBC Thousand/uL 10 94* 13 53*   RBC Million/uL 4 25 4 40   HEMOGLOBIN g/dL 13 7 14 6   HEMATOCRIT % 39 3 41 2   MCV fL 93 94   PLATELETS Thousands/uL 173 188     BMP/CMP:   Results from last 7 days  Lab Units 18  0604 02/19/18  0128   SODIUM mmol/L 134* 133*   POTASSIUM mmol/L 3 2* 3 5   CHLORIDE mmol/L 100 95*   CO2 mmol/L 23 25   ANION GAP mmol/L 11 13   BUN mg/dL 24 27*   CREATININE mg/dL 1 91* 2 24*   GLUCOSE RANDOM mg/dL 109 98   CALCIUM mg/dL 8 5 9 0   AST U/L  --  69*   ALT U/L  --  74   ALK PHOS U/L  --  88   TOTAL PROTEIN g/dL  --  7 5   BILIRUBIN TOTAL mg/dL  --  0 46   EGFR ml/min/1 73sq m 44 37     TSH:   Results from last 7 days  Lab Units 02/19/18  0128   TSH 3RD GENERATON uIU/mL 0 663   , Coagulation:   Results from last 7 days  Lab Units 02/18/18  2358   INR  0 98       Imaging Studies: I have personally reviewed pertinent reports  and I have personally reviewed pertinent films in PACS    2/18/18 CT Head wo contrast: PARENCHYMA:  No intracranial mass, mass effect or midline shift  No CT signs of acute infarction  There is no parenchymal hemorrhage  Microangiopathic change  VENTRICLES AND EXTRA-AXIAL SPACES:  Normal for patient's age  VISUALIZED ORBITS AND PARANASAL SINUSES:  Chronic ethmoid, maxillary and sphenoid sinusitis  CALVARIUM AND EXTRACRANIAL SOFT TISSUES:   Normal  IMPRESSION:No acute intracranial abnormality  Microangiopathic change  Chronic sinusitis  Findings are consistent with the preliminary report from [facility] which was provided shortly after completion of the exam      EEG, Echo, Pathology, and Other Studies: I have personally reviewed pertinent reports     and I have personally reviewed pertinent films in PACS    VTE Prophylaxis: Sequential compression device (Venodyne)  and Heparin

## 2018-02-19 NOTE — PLAN OF CARE
Problem: Potential for Falls  Goal: Patient will remain free of falls  INTERVENTIONS:  - Assess patient frequently for physical needs  -  Identify cognitive and physical deficits and behaviors that affect risk of falls    -  [place] fall precautions as indicated by assessment   - Educate patient/family on patient safety including physical limitations  - Instruct patient to call for assistance with activity based on assessment  - Modify environment to reduce risk of injury  - Consider OT/PT consult to assist with strengthening/mobility   Outcome: Progressing      Problem: PAIN - ADULT  Goal: Verbalizes/displays adequate comfort level or baseline comfort level  Interventions:  - Encourage patient to monitor pain and request assistance  - Assess pain using appropriate pain scale  - Administer analgesics based on type and severity of pain and evaluate response  - Implement non-pharmacological measures as appropriate and evaluate response  - Consider cultural and social influences on pain and pain management  - Notify physician/advanced practitioner if interventions unsuccessful or patient reports new pain   Outcome: Progressing      Problem: SAFETY ADULT  Goal: Maintain or return to baseline ADL function  INTERVENTIONS:  -  Assess patient's ability to carry out ADLs; assess patient's baseline for ADL function and identify physical deficits which impact ability to perform ADLs (bathing, care of mouth/teeth, toileting, grooming, dressing, etc )  - Assess/evaluate cause of self-care deficits   - Assess range of motion  - Assess patient's mobility; develop plan if impaired  - Assess patient's need for assistive devices and provide as appropriate  - Encourage maximum independence but intervene and supervise when necessary  ¯ Provide patient education as appropriate   Outcome: Progressing    Goal: Maintain or return mobility status to optimal level  INTERVENTIONS:  - Assess patient's baseline mobility status (ambulation, transfers, stairs, etc )    - Identify cognitive and physical deficits and behaviors that affect mobility  - Identify mobility aids required to assist with transfers and/or ambulation (gait belt, sit-to-stand, lift, walker, cane, etc )  - [place] fall precautions as indicated by assessment  - Record patient progress and toleration of activity level on Mobility SBAR; progress patient to next Phase/Stage  - Instruct patient to call for assistance with activity based on assessment     Outcome: Progressing      Problem: DISCHARGE PLANNING  Goal: Discharge to home or other facility with appropriate resources  INTERVENTIONS:  - Identify barriers to discharge w/patient and caregiver  - Arrange for needed discharge resources and transportation as appropriate  - Identify discharge learning needs (meds, wound care, etc )  - Refer to Case Management Department for coordinating discharge planning if the patient needs post-hospital services based on physician/advanced practitioner order or complex needs related to functional status, cognitive ability, or social support system   Outcome: Progressing      Problem: Knowledge Deficit  Goal: Patient/family/caregiver demonstrates understanding of disease process, treatment plan, medications, and discharge instructions  Complete learning assessment and assess knowledge base  Interventions:  - Provide teaching at level of understanding  - Provide teaching via preferred learning methods   Outcome: Progressing      Problem: NEUROSENSORY - ADULT  Goal: Achieves stable or improved neurological status  INTERVENTIONS  - Monitor and report changes in neurological status  - Initiate measures to prevent increased intracranial pressure  - Maintain blood pressure and fluid volume within ordered parameters to optimize cerebral perfusion  - Monitor temperature, glucose, and sodium or any other associated labs   Initiate appropriate interventions as ordered  - Monitor for seizure activity   - Administer anti-seizure medications as ordered   Outcome: Progressing    Goal: Absence of seizures  INTERVENTIONS  - Monitor for seizure activity  - Administer anti-seizure medications as ordered  - Monitor neurological status   Outcome: Progressing    Goal: Remains free of injury related to seizures activity  INTERVENTIONS  - Maintain airway, patient safety  and administer oxygen as ordered  - Monitor patient for seizure activity, document and report duration and description of seizure to physician/advanced practitioner  - If seizure occurs,  ensure patient safety during seizure  - Reorient patient post seizure  - Seizure pads on all 4 side rails  - Instruct patient/family to notify RN of any seizure activity including if an aura is experienced  - Instruct patient/family to call for assistance with activity based on nursing assessment  - Administer anti-seizure medications as ordered  - Monitor fetal well being   Outcome: Progressing    Goal: Achieves maximal functionality and self care  INTERVENTIONS  - Monitor swallowing and airway patency with patient fatigue and changes in neurological status  - Encourage and assist patient to increase activity and self care with guidance from rehab services  - Encourage visually impaired, hearing impaired and aphasic patients to use assistive/communication devices   Outcome: Progressing

## 2018-02-19 NOTE — H&P
History and Physical - [facility] Internal Medicine    Patient Information: Stefanie Girard 65 y o  male MRN: [id number]  Unit/Bed#: E4 -01 Encounter: [id number]  Admitting Physician: Kobe Melendez PA-C  PCP: No primary care provider on file  Date of Admission:  02/19/18    Assessment/Plan:    Hospital Problem List:     Principal Problem:    Seizure-like activity [facility])  Active Problems:    Hypertension    CHF (congestive heart failure) ([facility])    ETOH abuse    ALEKS (acute kidney injury) ([facility] )      Plan for the Primary Problem(s):  · Seizure like activity  · Admit to med/surg  Seizure precautions ordered  Ativan ordered PRN  Consult neurology  Patient denies history of seizures in the past and is adamant that these are not seizures despite the report from his girlfriend  Check UDS    Plan for Additional Problems:   · HTN- continue norvasc, lisinopril and coreg  · HX CHF- continue lasix  · ETOH abuse- per girlfriend drinks 10 beers daily  Ativan ordered for withdrawal symptoms  ethanol level        30 on presentation to ED  ALEKS- continue IV fluids  Consult nephrology  VTE Prophylaxis: Heparin  / sequential compression device   Code Status: full code  POLST: There is no POLST form on file for this patient (pre-hospital)    Anticipated Length of Stay:  Patient will be admitted on an Inpatient basis with an anticipated length of stay of  Greater than 2 midnights  Justification for Hospital Stay: patient requires IV fluids and nephrology/neurology consult    Total Time for Visit, including Counseling / Coordination of Care: 45 minutes  Greater than 50% of this total time spent on direct patient counseling and coordination of care  Chief Complaint:   Seizure like activity    History of Present Illness:    Stefanie Girard is a 65 y o  male who presents with seizure like activity reported by girlfriend  Patient states that he has had a cold for the past 3-4 days and has had diaphoresis and chills  He states that the "shaking episodes are actually the chills"  His girlfriend on the other hand states that he has been having seizure like activity since Christmas but it occurred twice yesterday which concerned her  She had called EMS in the past as well but patient refused to come to the hospital  She states that the patient was sitting in a chair when he started shaking and his eyes rolled back in his head  He is confused afterward  No incontinence or tongue biting  Patient has not been seen by a medical doctor for at least a year due to lack of insurance  He states he gets his medications from his cousin who is a pharmacist  He routinely goes for weeks at a times without taking his medications because "he has to make them last longer"  Review of Systems:    Review of Systems   Constitutional: Positive for chills and diaphoresis  HENT: Negative  Eyes: Negative  Respiratory: Positive for cough  Cardiovascular: Negative  Gastrointestinal: Negative  Endocrine: Negative  Genitourinary: Negative  Musculoskeletal: Negative  Skin: Negative  Allergic/Immunologic: Negative  Neurological: Negative  Seizure like activity reported by girlfriend but patient denies   Hematological: Negative  Psychiatric/Behavioral: Negative  Past Medical and Surgical History:     Past Medical History:   Diagnosis Date    CHF (congestive heart failure) ([facility] )     Hypertension        History reviewed  No pertinent surgical history  Meds/Allergies:    Prior to Admission medications    Medication Sig Start Date End Date Taking?  Authorizing Provider   amLODIPine (NORVASC) 5 mg tablet Take 5 mg by mouth daily   Yes Historical Provider, MD   aspirin (ECOTRIN LOW STRENGTH) 81 mg EC tablet Take 81 mg by mouth daily   Yes Historical Provider, MD   carvedilol (COREG) 25 mg tablet Take 25 mg by mouth 2 (two) times a day with meals   Yes Historical Provider, MD   furosemide (LASIX) 40 mg tablet Take 40 mg by mouth daily   Yes Historical Provider, MD   lisinopril (ZESTRIL) 20 mg tablet Take 20 mg by mouth daily   Yes Historical Provider, MD   Multiple Vitamin (DAILY-ROB PO) Take 1 tablet by mouth daily   Yes Historical Provider, MD   simvastatin (ZOCOR) 40 mg tablet Take 40 mg by mouth daily at bedtime   Yes Historical Provider, MD   spironolactone (ALDACTONE) 25 mg tablet Take 25 mg by mouth daily   Yes Historical Provider, MD     I have reviewed home medications with patient personally  Allergies: No Known Allergies    Social History:     Marital Status: Significant Other   Occupation: unemployed  Patient Pre-hospital Living Situation: lives in [place], girlfriend lives in [place]  Patient Pre-hospital Level of Mobility: ambulatory  Patient Pre-hospital Diet Restrictions: none  Substance Use History:   History   Alcohol Use    6 0 oz/week    10 Cans of beer per week     Comment: every day drinker     History   Smoking Status    Current Every Day Smoker    Packs/day: 1 00    Years: 30 00   Smokeless Tobacco    Never Used     History   Drug Use    Types: Marijuana     Comment: occasionally       Family History:    non-contributory    Physical Exam:     Vitals:   Blood Pressure: 130/76 (02/19/18 0555)  Pulse: 86 (02/19/18 0555)  Temperature: 99 4 °F (37 4 °C) (02/19/18 0555)  Temp Source: Temporal (02/19/18 0555)  Respirations: 18 (02/19/18 0555)  Height: 5' 8" (172 7 cm) (02/19/18 0419)  Weight - Scale: 111 kg (244 lb 7 8 oz) (02/18/18 2339)  SpO2: 95 % (02/19/18 0555)    Physical Exam   Constitutional: He is oriented to person, place, and time  He appears well-developed and well-nourished  No distress  HENT:   Head: Normocephalic and atraumatic  Eyes: Conjunctivae are normal  Pupils are equal, round, and reactive to light  Neck: Normal range of motion  Neck supple  No JVD present  No tracheal deviation present  No thyromegaly present  Cardiovascular: Normal rate and regular rhythm  Pulmonary/Chest: Effort normal and breath sounds normal  No respiratory distress  He has no wheezes  Abdominal: Soft  Bowel sounds are normal  He exhibits no distension and no mass  There is no tenderness  There is no rebound and no guarding  Musculoskeletal: Normal range of motion  He exhibits no edema, tenderness or deformity  Lymphadenopathy:     He has no cervical adenopathy  Neurological: He is alert and oriented to person, place, and time  No cranial nerve deficit  Skin: Skin is warm and dry  No rash noted  He is not diaphoretic  No erythema  No pallor  Psychiatric: He has a normal mood and affect  His behavior is normal    Vitals reviewed  Additional Data:     Lab Results: I have personally reviewed pertinent reports  Results from last 7 days  Lab Units 02/18/18  2358   WBC Thousand/uL 13 53*   HEMOGLOBIN g/dL 14 6   HEMATOCRIT % 41 2   PLATELETS Thousands/uL 188   NEUTROS PCT % 72   LYMPHS PCT % 20   MONOS PCT % 7   EOS PCT % 1       Results from last 7 days  Lab Units 02/19/18  0128   SODIUM mmol/L 133*   POTASSIUM mmol/L 3 5   CHLORIDE mmol/L 95*   CO2 mmol/L 25   BUN mg/dL 27*   CREATININE mg/dL 2 24*   CALCIUM mg/dL 9 0   TOTAL PROTEIN g/dL 7 5   BILIRUBIN TOTAL mg/dL 0 46   ALK PHOS U/L 88   ALT U/L 74   AST U/L 69*   GLUCOSE RANDOM mg/dL 98       Results from last 7 days  Lab Units 02/18/18  2358   INR  0 98       Imaging: I have personally reviewed pertinent reports  No results found  EKG, Pathology, and Other Studies Reviewed on Admission:   · EKG: NSR    Allscripts / Epic Records Reviewed: Yes     ** Please Note: This note has been constructed using a voice recognition system   **

## 2018-02-19 NOTE — ED PROVIDER NOTES
History  Chief Complaint   Patient presents with    Seizure - New Onset     Per EMS Pt's girlfriend reports seizure like activity x3 over last 2 hours; Pt had 10 minute postdictal state each time following the seizure; Pt's girlfriend reports this has been occurring over last 2 months; Pt remembers feeling sweaty and does not recall anything after that; Pt states he did not see any other provider for symptoms d/t not having insurance     This is a 65year old male who girlfriend states is here in [facility] temporarily living with her and states pt has been having "seizure like activity" since Christmas  She states she had called EMS at that time and pt refused to come to ED  She states that tonight while sitting in a chair with wheels he began to "shake all over, his eyes rolled back in his head" and appeared to be having a seizure  GF and pt deny any incontinence or tongue biting  GF states that pt had 3 seizures in the last 2 hours  She states that pt is not seeing a PCP due to insurance issues  Pt tells me that he is not aware of seizures and states "that's what she tells me"  GF states he is confused after the seizures and takes a while to get back to normal   Pt states his brother is a pharmacist and gets his medications from him  Pt denies SOB, CP, weight gain  Pt denies any hx of head trauma or brain lesions or family hx of cancer  BS per   GF states pt drinks "10 beers per day" and has had at least 6 today    Pt admits to marijuana use    PMH CHF, hypertension           History provided by:  Patient, medical records and friend   used: No    Seizure - New Onset   Seizure type:  Unable to specify  Episode characteristics: abnormal movements and confusion    Postictal symptoms: confusion    Return to baseline: yes    Severity:  Unable to specify  Timing:  Intermittent  Context: drug use    Context: not previous head injury        Prior to Admission Medications Prescriptions Last Dose Informant Patient Reported? Taking? Multiple Vitamin (DAILY-ROB PO)   Yes Yes   Sig: Take 1 tablet by mouth daily   amLODIPine (NORVASC) 5 mg tablet   Yes Yes   Sig: Take 5 mg by mouth daily   aspirin (ECOTRIN LOW STRENGTH) 81 mg EC tablet   Yes Yes   Sig: Take 81 mg by mouth daily   carvedilol (COREG) 25 mg tablet   Yes Yes   Sig: Take 25 mg by mouth 2 (two) times a day with meals   furosemide (LASIX) 40 mg tablet   Yes Yes   Sig: Take 40 mg by mouth daily   lisinopril (ZESTRIL) 20 mg tablet   Yes Yes   Sig: Take 20 mg by mouth daily   simvastatin (ZOCOR) 40 mg tablet   Yes Yes   Sig: Take 40 mg by mouth daily at bedtime   spironolactone (ALDACTONE) 25 mg tablet   Yes Yes   Sig: Take 25 mg by mouth daily      Facility-Administered Medications: None       Past Medical History:   Diagnosis Date    [facility]) [facility])     Hypertension        History reviewed  No pertinent surgical history  History reviewed  No pertinent family history  I have reviewed and agree with the history as documented  Social History   Substance Use Topics    Smoking status: Current Every Day Smoker    Smokeless tobacco: Never Used    Alcohol use 6 0 oz/week     10 Cans of beer per week        Review of Systems   Constitutional: Negative  HENT: Negative  Eyes: Negative  Respiratory: Negative  Cardiovascular: Negative  Gastrointestinal: Negative  Endocrine: Negative  Genitourinary: Negative  Musculoskeletal: Negative  Skin: Negative  Allergic/Immunologic: Negative  Neurological: Positive for seizures  Hematological: Negative  Psychiatric/Behavioral: Negative          Physical Exam  ED Triage Vitals [02/18/18 2339]   Temperature Pulse Respirations Blood Pressure SpO2   98 °F (36 7 °C) 88 16 116/64 97 %      Temp Source Heart Rate Source Patient Position - Orthostatic VS BP Location FiO2 (%)   Oral Monitor -- -- --      Pain Score       No Pain Orthostatic Vital Signs  Vitals:    02/18/18 2339   BP: 116/64   Pulse: 88       Physical Exam   Constitutional: He is oriented to person, place, and time  He appears well-developed and well-nourished  No distress  HENT:   Head: Normocephalic and atraumatic  Right Ear: External ear normal    Left Ear: External ear normal    Nose: Nose normal    Mouth/Throat: Oropharynx is clear and moist  No oropharyngeal exudate  Eyes: EOM are normal  Pupils are equal, round, and reactive to light  Neck: Normal range of motion  Neck supple  Cardiovascular: Normal rate, regular rhythm, normal heart sounds and intact distal pulses  No murmur heard  Pulmonary/Chest: Effort normal    Decreased breath sounds t/o     Abdominal: Soft  Bowel sounds are normal    Musculoskeletal: Normal range of motion  Neurological: He is alert and oriented to person, place, and time  He displays normal reflexes  No cranial nerve deficit or sensory deficit  He exhibits normal muscle tone  Coordination normal    Skin: Skin is warm and dry  Capillary refill takes less than 2 seconds  He is not diaphoretic  Psychiatric: He has a normal mood and affect  His behavior is normal  Judgment and thought content normal    Nursing note and vitals reviewed        ED Medications  Medications   sodium chloride 0 9 % bolus 1,000 mL (not administered)       Diagnostic Studies  Results Reviewed     Procedure Component Value Units Date/Time    CKMB [[id number]]  (Normal) Collected:  02/19/18 0128    Lab Status:  Final result Specimen:  Blood from Hand, Right Updated:  02/19/18 0225     CK-MB Index <1 0 %      CK-MB FRACTION 1 0 ng/mL     Acetaminophen level [[id number]]  (Abnormal) Collected:  02/19/18 0128    Lab Status:  Final result Specimen:  Blood from Hand, Right Updated:  02/19/18 0224     Acetaminophen Level <2 (L) ug/mL     TSH [[id number]]  (Normal) Collected:  02/19/18 0128    Lab Status:  Final result Specimen:  Blood from Hand, Right Updated: 02/19/18 0216     TSH 3RD GENERATON 0 663 uIU/mL     Narrative:         Patients undergoing fluorescein dye angiography may retain small amounts of fluorescein in the body for 48-72 hours post procedure  Samples containing fluorescein can produce falsely depressed TSH values  If the patient had this procedure,a specimen should be resubmitted post fluorescein clearance  B-type natriuretic peptide [[id number]]  (Normal) Collected:  02/19/18 0128    Lab Status:  Final result Specimen:  Blood from Hand, Right Updated:  02/19/18 0212     NT-proBNP 76 pg/mL     Salicylate level [[id number]]  (Normal) Collected:  02/19/18 0128    Lab Status:  Final result Specimen:  Blood from Hand, Right Updated:  14/73/32 9079     Salicylate Lvl 3 9 mg/dL     CK Total with Reflex CKMB [[id number]]  (Abnormal) Collected:  02/19/18 0128    Lab Status:  Final result Specimen:  Blood from Hand, Right Updated:  02/19/18 0208     Total  (H) U/L     Magnesium [[id number]]  (Normal) Collected:  02/19/18 0128    Lab Status:  Final result Specimen:  Blood from Hand, Right Updated:  02/19/18 0208     Magnesium 2 1 mg/dL     Comprehensive metabolic panel [[id number]]  (Abnormal) Collected:  02/19/18 0128    Lab Status:  Final result Specimen:  Blood from Hand, Right Updated:  02/19/18 0208     Sodium 133 (L) mmol/L      Potassium 3 5 mmol/L      Chloride 95 (L) mmol/L      CO2 25 mmol/L      Anion Gap 13 mmol/L      BUN 27 (H) mg/dL      Creatinine 2 24 (H) mg/dL      Glucose 98 mg/dL      Calcium 9 0 mg/dL      AST 69 (H) U/L      ALT 74 U/L      Alkaline Phosphatase 88 U/L      Total Protein 7 5 g/dL      Albumin 3 6 g/dL      Total Bilirubin 0 46 mg/dL      eGFR 37 ml/min/1 73sq m     Narrative:         National Kidney Disease Education Program recommendations are as follows:  GFR calculation is accurate only with a steady state creatinine  Chronic Kidney disease less than 60 ml/min/1 73 sq  meters  Kidney failure less than 15 ml/min/1 73 sq  meters  Ethanol [[id number]]  (Abnormal) Collected:  02/18/18 2358    Lab Status:  Final result Specimen:  Blood from Arm, Right Updated:  02/19/18 0013     Ethanol Lvl 30 (H) mg/dL     Protime-INR [[id number]]  (Normal) Collected:  02/18/18 2358    Lab Status:  Final result Specimen:  Blood from Arm, Right Updated:  02/19/18 0012     Protime 13 0 seconds      INR 0 98    APTT [[id number]]  (Normal) Collected:  02/18/18 2358    Lab Status:  Final result Specimen:  Blood from Arm, Right Updated:  02/19/18 0012     PTT 26 seconds     Narrative: Therapeutic Heparin Range = 60-90 seconds    CBC and differential [[id number]]  (Abnormal) Collected:  02/18/18 2358    Lab Status:  Final result Specimen:  Blood from Arm, Right Updated:  02/19/18 0004     WBC 13 53 (H) Thousand/uL      RBC 4 40 Million/uL      Hemoglobin 14 6 g/dL      Hematocrit 41 2 %      MCV 94 fL      MCH 33 2 pg      MCHC 35 4 g/dL      RDW 13 1 %      MPV 10 2 fL      Platelets 858 Thousands/uL      nRBC 0 /100 WBCs      Neutrophils Relative 72 %      Lymphocytes Relative 20 %      Monocytes Relative 7 %      Eosinophils Relative 1 %      Basophils Relative 0 %      Neutrophils Absolute 9 67 (H) Thousands/µL      Lymphocytes Absolute 2 74 Thousands/µL      Monocytes Absolute 0 96 Thousand/µL      Eosinophils Absolute 0 12 Thousand/µL      Basophils Absolute 0 04 Thousands/µL     POCT urinalysis dipstick [[id number]]     Lab Status:  No result Specimen:  Urine     Rapid drug screen, urine [[id number]]     Lab Status:  No result Specimen:  Urine                  CT head without contrast    (Results Pending)              Procedures  Procedures       Phone Contacts  ED Phone Contact    ED Course  ED Course as of Feb 19 0237   Mon Feb 19, 2018   0224 Labs and radiology results reviewed and discussed with pt  WBV 13 53 which could be stress reaction from possible seizure, possible sinusitis or urine abnormality  Waiting on urine  Alcohol 30    Cr  2 24 unknown baseline    will admit to IM  Will give IVF       0227 Pt agrees with POC        8668 Spoke with [facility] and they will admit  MDM  Number of Diagnoses or Management Options  Diagnosis management comments: Differential diagnosis:   New onset seizure  Hx ETOH use ? Withdrawal  Brain cancer, mass, lesion, CVA    Labs  IV  UDS  UA  CT head        Amount and/or Complexity of Data Reviewed  Clinical lab tests: ordered and reviewed  Tests in the radiology section of CPT®: ordered and reviewed  Review and summarize past medical records: yes      CritCare Time    Disposition  Final diagnoses:   Seizure ([facility] )   Elevated creatine kinase   ALEKS (acute kidney injury) ([facility] )   Hypertension, unspecified type   Congestive heart failure, unspecified congestive heart failure chronicity, unspecified congestive heart failure type ([place] [address] )   Alcohol use     Time reflects when diagnosis was documented in both MDM as applicable and the Disposition within this note     Time User Action Codes Description Comment    2/19/2018  2:28 AM Fredda Homme Add [R56 9] Seizure ([facility] )     2/19/2018  2:28 AM Fredda Homme Add [R74 8] Elevated creatine kinase     2/19/2018  2:28 AM Fredda Homme Add [N17 9] ALEKS (acute kidney injury) ([facility] )     2/19/2018  2:29 AM Fredda Homme Add [I10] Hypertension, unspecified type     2/19/2018  2:29 AM Fredda Homme Add [I50 9] Congestive heart failure, unspecified congestive heart failure chronicity, unspecified congestive heart failure type ([facility] )     2/19/2018  2:29 AM Fredda Homme Add [Z78 9] Alcohol use       ED Disposition     ED Disposition Condition Comment    Admit  Case was discussed with EDILSON and the patient's admission status was agreed to be Admission Status: inpatient status to the service of Dr Laura Mendoza           Follow-up Information    None       Patient's Medications   Discharge Prescriptions    No medications on file     No discharge procedures on file  ED Provider  Electronically Signed by    [address]  Preliminary Radiology Report  Call: [phone]  assistance Online chat: [email or web address]  [employer]  Patient Name: Justin Hope MRN: [id number]   (Age): 1960 65 Gender: M  Date of Exam: 2018 Accession: [id number]  Referring Physician: Luz Maria Serrano # of Images: 197  Ordered As: CT HEAD WO  Page 1 of 2  EXAM:  CT Head Without Intravenous Contrast  CLINICAL HISTORY:  65years old, male; Condition or disease; Convulsions or seizures; Unspecified; Patient HX: New  onset seizure, fall  TECHNIQUE:  Axial computed tomography images of the head/brain without intravenous contrast   Coronal reformatted images were created and reviewed  COMPARISON:  No relevant prior studies available  FINDINGS:  Brain: No intracranial hemorrhage  Age related cerebral volume loss and findings likely related to  chronic white matter ischemic disease  Ventricles: Unremarkable  No ventriculomegaly  Bones/joints: Unremarkable  No acute fracture  Soft tissues: Unremarkable  Sinuses: Mucosal thickening/partial opacification of the maxillary, sphenoid and ethmoid sinuses  which may be on the basis of sinusitis  Mastoid air cells: Unremarkable as visualized  No mastoid effusion  IMPRESSION:  1  No intracranial hemorrhage  2  Age related cerebral volume loss and findings likely related to chronic white matter ischemic  disease  3  Mucosal thickening/partial opacification of the maxillary, sphenoid and ethmoid sinuses which may  be on the basis of sinusitis  Please note that a non-emergent brain MRI is recommended for further evaluation of first-time  seizures         BHANU Blanchard  [id number]

## 2018-02-19 NOTE — CASE MANAGEMENT
Initial Clinical Review    Admission: Date/Time/Statement: 2/19/18 @ 0237     Orders Placed This Encounter   Procedures    Inpatient Admission (expected length of stay for this patient is greater than two midnights)     Standing Status:   Standing     Number of Occurrences:   1     Order Specific Question:   Admitting Physician     Answer:   Ebony Sharma [[id number]]     Order Specific Question:   Level of Care     Answer:   Med Surg [16]     Order Specific Question:   Estimated length of stay     Answer:   More than 2 Midnights     Order Specific Question:   Certification     Answer:   I certify that inpatient services are medically necessary for this patient for a duration of greater than two midnights  See H&P and MD Progress Notes for additional information about the patient's course of treatment  ED: Date/Time/Mode of Arrival:   ED Arrival Information     Expected Arrival Acuity Means of Arrival Escorted By Service Admission Type    - 2/18/2018 23:35 Urgent Ambulance [facility] Urgent    Arrival Complaint    Shaking          Chief Complaint:   Chief Complaint   Patient presents with    Seizure - New Onset     Per EMS Pt's girlfriend reports seizure like activity x3 over last 2 hours; Pt had 10 minute postdictal state each time following the seizure; Pt's girlfriend reports this has been occurring over last 2 months; Pt remembers feeling sweaty and does not recall anything after that; Pt states he did not see any other provider for symptoms d/t not having insurance       History of Illness: 65year old male who girlfriend states is here in [facility] temporarily living with her and states pt has been having "seizure like activity" since Christmas  She states she had called EMS at that time and pt refused to come to ED  She states that tonight while sitting in a chair with wheels he began to "shake all over, his eyes rolled back in his head" and appeared to be having a seizure    GF and pt deny any incontinence or tongue biting  GF states that pt had 3 seizures in the last 2 hours  She states that pt is not seeing a PCP due to insurance issues  Pt tells me that he is not aware of seizures and states "that's what she tells me"  GF states he is confused after the seizures and takes a while to get back to normal   Pt states his brother is a pharmacist and gets his medications from him  Pt denies SOB, CP, weight gain  Pt denies any hx of head trauma or brain lesions or family hx of cancer  BS per   GF states pt drinks "10 beers per day" and has had at least 6 today  Pt admits to marijuana use     PMH CHF, hypertension     ED Vital Signs:   ED Triage Vitals   Temperature Pulse Respirations Blood Pressure SpO2   02/18/18 2339 02/18/18 2339 02/18/18 2339 02/18/18 2339 02/18/18 2339   98 °F (36 7 °C) 88 16 116/64 97 %      Temp Source Heart Rate Source Patient Position - Orthostatic VS BP Location FiO2 (%)   02/18/18 2339 02/18/18 2339 02/19/18 0555 02/19/18 0555 --   Oral Monitor Lying Left arm       Pain Score       02/18/18 2339       No Pain        Wt Readings from Last 1 Encounters:   02/19/18 111 kg (245 lb 6 oz)       Abnormal Labs/Diagnostic Test Results: Total ,   Na 133,   Cl 95,   BUN 27,   Cr 2 24,   AST 69,   WBC's 13 53,   ANC 9 67  Ethanol level  30    CT Head: No acute intracranial abnormality  Microangiopathic change  Chronic sinusitis  EKG:  EKG: NSR    ED Treatment:   Medication Administration from 02/18/2018 2334 to 02/19/2018 0340       Date/Time Order Dose Route Action Action by Comments     02/19/2018 0238 sodium chloride 0 9 % bolus 1,000 mL 1,000 mL Intravenous Prabhjot Lam RN           Past Medical/Surgical History:    Active Ambulatory Problems     Diagnosis Date Noted    No Active Ambulatory Problems     Resolved Ambulatory Problems     Diagnosis Date Noted    No Resolved Ambulatory Problems     Past Medical History:   Diagnosis Date    CHF (congestive heart failure) ([place] [address] )     Hypertension        Admitting Diagnosis: Seizure ([place] [address] ) [R56 9]  Shaking chills [R68 83]  Alcohol use [Z78 9]  Elevated creatine kinase [R74 8]  ALEKS (acute kidney injury) ([place] [address] ) [N17 9]  Seizure-like activity (HCC) [R56 9]  Congestive heart failure, unspecified congestive heart failure chronicity, unspecified congestive heart failure type ([place] [address] ) [I50 9]  Leukocytosis, unspecified type [D72 829]  Hypertension, unspecified type [I10]    Age/Sex: 65 y o  male    Assessment/Plan:   Principal Problem:    Seizure-like activity ([place] [address] )  Active Problems:    Hypertension    CHF (congestive heart failure) ([place] [address] )    ETOH abuse    ALEKS (acute kidney injury) ([place] [address] )     Plan for the Primary Problem(s):  · Seizure like activity  ? Admit to med/surg  Seizure precautions ordered  Ativan ordered PRN  Consult neurology  Patient denies history of seizures in the past and is adamant that these are not seizures despite the report from his girlfriend  Check UDS     Plan for Additional Problems:   · HTN- continue norvasc, lisinopril and coreg  · HX CHF- continue lasix  · ETOH abuse- per girlfriend drinks 10 beers daily  Ativan ordered for withdrawal symptoms  ethanol level        30 on presentation to ED  ALEKS- continue IV fluids  Consult nephrology       VTE Prophylaxis: Heparin  / sequential compression device   Code Status: full code  POLST: There is no POLST form on file for this patient (pre-hospital)     Anticipated Length of Stay:  Patient will be admitted on an Inpatient basis with an anticipated length of stay of  Greater than 2 midnights     Justification for Hospital Stay: patient requires IV fluids and nephrology/neurology consult    Admission Orders:  IP  Seizure Precautions  Neuro Checks Y3S  Folic acid IV X 1  Thiamine IV x 1  Consult Neuro  Consult Nephrology  SCD's  EEG  KDUR 40 po x 1  2/19    Scheduled Meds:   Current Facility-Administered Medications:  acetaminophen 650 mg Oral Q6H PRN Jagdish Malone PA-C    aluminum-magnesium hydroxide-simethicone 30 mL Oral Q6H PRN Jagdish Malone PA-C    amLODIPine 5 mg Oral Daily Jagdish Malone PA-C    aspirin 81 mg Oral Daily Jagdish Malone PA-C    carvedilol 25 mg Oral BID With Meals Jagdish Malone PA-C    heparin (porcine) 5,000 Units Subcutaneous Q8H [address] Jagdish Malone PA-C    lisinopril 20 mg Oral Daily Jagdish Malone PA-C    LORazepam 1 mg Intravenous Q4H PRN Jagdish Malone PA-C    nicotine 1 patch Transdermal Daily Jagdish Malone PA-C    ondansetron 4 mg Intravenous Q6H PRN Jagdish Malone PA-C    pravastatin 80 mg Oral Daily With ComcastMICHAEL    sodium chloride 125 mL/hr Intravenous Continuous Jagdish Malone PA-C Last Rate: 125 mL/hr (02/19/18 0551)     Continuous Infusions:   sodium chloride 125 mL/hr Last Rate: 125 mL/hr (02/19/18 0551)     PRN Meds:   acetaminophen    aluminum-magnesium hydroxide-simethicone    LORazepam    ondansetron     2/19:  Na 134,  k 3 2,   Cr 1 91,   Wbc's 120 94      Thank you,  [facility] in the [facility] by Arian Barclay for 2017  Network Utilization Review Department  Phone: [phone]; Fax [phone]  ATTENTION: The Network Utilization Review Department is now centralized for our 7 Facilities  Make a note that we have a new phone and fax numbers for our Department  Please call with any questions or concerns to [phone] and carefully follow the prompts so that you are directed to the right person  All voicemails are confidential  Fax any determinations, approvals, denials, and requests for initial or continue stay review clinical to [phone]  Due to HIGH CALL volume, it would be easier if you could please send faxed requests to expedite your requests and in part, help us provide discharge notifications faster

## 2018-02-19 NOTE — PROGRESS NOTES
Progress Note - Jez Browne 65 y o  male MRN: [id number]    Unit/Bed#: E4 -01 Encounter: [id number]      Assessment/Plan:  1-probable seizure:  Patient was admitted to the hospital for evaluation of a possible seizure episode  Patient was evaluated by the neurology service who relates that his episode was consistent with alcohol withdrawal seizure  However as underlying newly developed epilepsy could not be ruled out they recommend EEG  -EEG pending   -if any focal abnormalities, MRI would be recommended   -alcohol cessation recommended   -antiepileptics not recommended illnesses EEG revealed epileptiform focus    -continue seizure precautions  2-alcohol dependence with probable withdrawal seizures:  Patient relates a history of previous daily alcohol use, drinking 8-10 cans of beer a day  He stated he stopped drinking alcohol on Thursday, 3 days prior to admission, however on the day of admission he started feeling better so he drank 2 cans of beer  He had his 1st unresponsive episode, awakened, felt better, and drank a 3rd can of beer  He then had a 2nd unresponsive episode    -his alcohol level on admission was 30    -continue thiamine, folate  Continue Ativan p r n    -S10 folic acid levels without deficiency  -no current signs or symptoms of alcohol withdrawal   Patient had been absent for 3 days prior to yesterday    -his syncopal/unresponsive episodes are felt to be most likely secondary to alcohol withdrawal seizure  He is currently undergoing an evaluation as above  3-marijuana use:  Cessation counseled    4-dehydration:  Present on admission  Due to recent illness with decreased p o  intake  Continue IV fluids  Continue p o  hydration    5-infectious disease:  Patient relates recent illness with fever, chills, productive cough  He notes that lasted approximately 3 days and then resolved  He denies any current symptoms    Patient's symptoms are most likely secondary to a viral illness  As his symptoms have resolved will not check it is influenza screen at this time as he would not receive any benefit from Tamiflu  6-essential hypertension:  Continue home medications  Patient relates difficulty with access to care and medications  Will consult case management for further assistance  7-hyperlipidemia:  Continue statin    8-tobacco abuse:  Smoking cessation counseled  Patient refuses a patch    9-hypokalemia:  Replete and recheck    10-leukocytosis:  Patient presented with a white blood cell count of 13 5  He has had a recent viral upper respiratory tract like illness  His white blood cell count improved to 10 9 without intervention  11-history of congestive heart failure:  Unknown type  No previous records in the computer  No prior echocardiogram available for my review  Patient was currently hypovolemic so his Lasix and spironolactone were held  Continue Coreg  12-probable acute kidney injury:  Initial creatinine was 2 24  Had improved to 1 9 after admission  Continue to hold Lasix and spironolactone  Continue gentle IV fluids  Monitor closely with reported history of congestive heart failure    -appreciate Nephrology evaluation    -creatinine baseline unclear  13-chronic sinusitis:  Incidental finding on CT scan:  Patient denies any symptoms    14-family:  Called pt's CHAVO Pfeifferfo Lisa and gave update    VTE Pharmacologic Prophylaxis: Heparin  VTE Mechanical Prophylaxis: sequential compression device    Certification Statement: The patient will continue to require additional inpatient hospital stay due to need for further acute intervention for seizure eval   EEG    Status: inpatient     Total time spent including reviewing chart, records, interview/exam: 40 min     ===================================================================    Subjective:  Pt notes he feels back to normal   Denies any pain anywhere    Denies any HA, cp, back pain, abd pain or extremity pain  Denies any sob/cough/chest congestion  Notes last week he had fever, chills, productive cough which has all resolved  He notes he began to get sick Thursday night, so he did not drink his usual beers that night  He usually drinks 8-10 beers a day  He notes he did not drink any beer fri, or sat  He denies any alcohol withdrawal those days  He notes Sunday afternoon he began to feel better so he drank 2 beers  He then had an episode of unresponsiveness that was witnessed by his GF  He fell out of his computer chair  He then felt better and drank another beer  He then had a 2nd episode where he was unresponsive  His girlfriend then insisted he come to the ER for further evaluation  Patient denies any alcohol withdrawal symptoms at this time  He denies feeling jittery or anxious  He denies any tremors  He denies any shortness of breath, chest congestion or cough  He denies any nausea, vomiting, diarrhea  He denies any abdominal pain  He denies hurting himself or injuring himself when he slid out of his computer chair and had the 2 episodes of unresponsiveness  He denies any back pain  He denies any extremity pain  He denies any numbness or weakness  Patient notes he had a similar episode back in December, at that time he had also been drinking alcohol heavily and then abruptly stopped  Physical Exam:   Temp:  [97 9 °F (36 6 °C)-99 4 °F (37 4 °C)] 98 °F (36 7 °C)  HR:  [77-88] 77  Resp:  [16-18] 18  BP: (116-157)/(64-89) 157/85    Gen:  Pleasant, non-tachypnic, non-dyspnic  Conversant  Sitting up in bed  Smiling and joking  Heart: regular rate and rhythm, S1S2 present, no murmur, rub or gallop  Lungs: clear to ausculatation bilaterally  No wheezing, crackles, or rhonchi  No accessory muscle use or respiratory distress  Abd: soft, non-tender, non-distended  NABS, no guarding, rebound or peritoneal signs  Extremities: no clubbing, cyanosis or edema    2+pedal pulses bilaterally  Full range of motion  Neuro: awake, alert and oriented  Cranial nerves 2-12 intact  Strength grossly intact  Fluent speech  Skin: warm and dry: no petechiae, purpura and rash  LABS:     Results from last 7 days  Lab Units 02/19/18  0604 02/18/18  2358   WBC Thousand/uL 10 94* 13 53*   HEMOGLOBIN g/dL 13 7 14 6   HEMATOCRIT % 39 3 41 2   PLATELETS Thousands/uL 173 188       Results from last 7 days  Lab Units 02/19/18  0604 02/19/18  0128   SODIUM mmol/L 134* 133*   POTASSIUM mmol/L 3 2* 3 5   CHLORIDE mmol/L 100 95*   CO2 mmol/L 23 25   BUN mg/dL 24 27*   CREATININE mg/dL 1 91* 2 24*   GLUCOSE RANDOM mg/dL 109 98   CALCIUM mg/dL 8 5 9 0       Hospital Data:    Tylenol level less than 2  Salicylate level 3 9  TSH 0 663    Medical alcohol 30  Urine drug screen positive THC        CT brain:  No acute intracranial abnormality  Microangiopathic change  Chronic sinusitis    ---------------------------------------------------------------------------------------------------------------  This note has been constructed using a voice recognition system

## 2018-02-20 ENCOUNTER — APPOINTMENT (INPATIENT)
Dept: NEUROLOGY | Facility: HOSPITAL | Age: 58
DRG: 897 | End: 2018-02-20
Attending: PSYCHIATRY & NEUROLOGY

## 2018-02-20 LAB
ALBUMIN SERPL BCP-MCNC: 3 G/DL (ref 3.5–5)
ALP SERPL-CCNC: 72 U/L (ref 46–116)
ALT SERPL W P-5'-P-CCNC: 73 U/L (ref 12–78)
ANION GAP SERPL CALCULATED.3IONS-SCNC: 13 MMOL/L (ref 4–13)
AST SERPL W P-5'-P-CCNC: 63 U/L (ref 5–45)
BACTERIA UR QL AUTO: ABNORMAL /HPF
BILIRUB DIRECT SERPL-MCNC: 0.2 MG/DL (ref 0–0.2)
BILIRUB SERPL-MCNC: 0.76 MG/DL (ref 0.2–1)
BILIRUB UR QL STRIP: NEGATIVE
BUN SERPL-MCNC: 13 MG/DL (ref 5–25)
CALCIUM SERPL-MCNC: 8.3 MG/DL (ref 8.3–10.1)
CHLORIDE SERPL-SCNC: 107 MMOL/L (ref 100–108)
CHOLEST SERPL-MCNC: 185 MG/DL (ref 50–200)
CLARITY UR: CLEAR
CO2 SERPL-SCNC: 22 MMOL/L (ref 21–32)
COLOR UR: YELLOW
CREAT SERPL-MCNC: 1.31 MG/DL (ref 0.6–1.3)
ERYTHROCYTE [DISTWIDTH] IN BLOOD BY AUTOMATED COUNT: 13.2 % (ref 11.6–15.1)
EST. AVERAGE GLUCOSE BLD GHB EST-MCNC: 111 MG/DL
GFR SERPL CREATININE-BSD FRML MDRD: 70 ML/MIN/1.73SQ M
GLUCOSE SERPL-MCNC: 83 MG/DL (ref 65–140)
GLUCOSE UR STRIP-MCNC: NEGATIVE MG/DL
HBA1C MFR BLD: 5.5 % (ref 4.2–6.3)
HCT VFR BLD AUTO: 38.8 % (ref 36.5–49.3)
HDLC SERPL-MCNC: 41 MG/DL (ref 40–60)
HGB BLD-MCNC: 13.3 G/DL (ref 12–17)
HGB UR QL STRIP.AUTO: NEGATIVE
KETONES UR STRIP-MCNC: NEGATIVE MG/DL
LDLC SERPL CALC-MCNC: 123 MG/DL (ref 0–100)
LEUKOCYTE ESTERASE UR QL STRIP: NEGATIVE
MCH RBC QN AUTO: 32.4 PG (ref 26.8–34.3)
MCHC RBC AUTO-ENTMCNC: 34.3 G/DL (ref 31.4–37.4)
MCV RBC AUTO: 94 FL (ref 82–98)
NITRITE UR QL STRIP: NEGATIVE
NON-SQ EPI CELLS URNS QL MICRO: ABNORMAL /HPF
PH UR STRIP.AUTO: 6 [PH] (ref 4.5–8)
PLATELET # BLD AUTO: 177 THOUSANDS/UL (ref 149–390)
PMV BLD AUTO: 10.1 FL (ref 8.9–12.7)
POTASSIUM SERPL-SCNC: 3.5 MMOL/L (ref 3.5–5.3)
PROT SERPL-MCNC: 7.1 G/DL (ref 6.4–8.2)
PROT UR STRIP-MCNC: ABNORMAL MG/DL
RBC # BLD AUTO: 4.11 MILLION/UL (ref 3.88–5.62)
RBC #/AREA URNS AUTO: ABNORMAL /HPF
SODIUM SERPL-SCNC: 142 MMOL/L (ref 136–145)
SP GR UR STRIP.AUTO: 1.02 (ref 1–1.03)
TRIGL SERPL-MCNC: 106 MG/DL
UROBILINOGEN UR QL STRIP.AUTO: 0.2 E.U./DL
WBC # BLD AUTO: 7.53 THOUSAND/UL (ref 4.31–10.16)
WBC #/AREA URNS AUTO: ABNORMAL /HPF

## 2018-02-20 PROCEDURE — 81001 URINALYSIS AUTO W/SCOPE: CPT | Performed by: INTERNAL MEDICINE

## 2018-02-20 PROCEDURE — 80076 HEPATIC FUNCTION PANEL: CPT | Performed by: INTERNAL MEDICINE

## 2018-02-20 PROCEDURE — 84425 ASSAY OF VITAMIN B-1: CPT | Performed by: NURSE PRACTITIONER

## 2018-02-20 PROCEDURE — 95816 EEG AWAKE AND DROWSY: CPT

## 2018-02-20 PROCEDURE — 95816 EEG AWAKE AND DROWSY: CPT | Performed by: PSYCHIATRY & NEUROLOGY

## 2018-02-20 PROCEDURE — 85027 COMPLETE CBC AUTOMATED: CPT | Performed by: INTERNAL MEDICINE

## 2018-02-20 PROCEDURE — 99232 SBSQ HOSP IP/OBS MODERATE 35: CPT | Performed by: INTERNAL MEDICINE

## 2018-02-20 PROCEDURE — 80048 BASIC METABOLIC PNL TOTAL CA: CPT | Performed by: INTERNAL MEDICINE

## 2018-02-20 PROCEDURE — 80061 LIPID PANEL: CPT | Performed by: NURSE PRACTITIONER

## 2018-02-20 PROCEDURE — 83036 HEMOGLOBIN GLYCOSYLATED A1C: CPT | Performed by: NURSE PRACTITIONER

## 2018-02-20 RX ADMIN — ASPIRIN 81 MG: 81 TABLET, COATED ORAL at 08:04

## 2018-02-20 RX ADMIN — CARVEDILOL 25 MG: 25 TABLET, FILM COATED ORAL at 08:05

## 2018-02-20 RX ADMIN — CARVEDILOL 25 MG: 25 TABLET, FILM COATED ORAL at 17:23

## 2018-02-20 RX ADMIN — FOLIC ACID 1 MG: 1 TABLET ORAL at 08:05

## 2018-02-20 RX ADMIN — SODIUM CHLORIDE 125 ML/HR: 0.9 INJECTION, SOLUTION INTRAVENOUS at 13:46

## 2018-02-20 RX ADMIN — AMLODIPINE BESYLATE 5 MG: 5 TABLET ORAL at 08:04

## 2018-02-20 RX ADMIN — LISINOPRIL 20 MG: 20 TABLET ORAL at 08:04

## 2018-02-20 RX ADMIN — PRAVASTATIN SODIUM 80 MG: 80 TABLET ORAL at 17:23

## 2018-02-20 RX ADMIN — SODIUM CHLORIDE 125 ML/HR: 0.9 INJECTION, SOLUTION INTRAVENOUS at 05:51

## 2018-02-20 RX ADMIN — Medication 100 MG: at 08:04

## 2018-02-20 RX ADMIN — PANTOPRAZOLE SODIUM 40 MG: 40 TABLET, DELAYED RELEASE ORAL at 05:52

## 2018-02-20 NOTE — SOCIAL WORK
Rec a message from the HOST representative that pt refused the referral and started frightening with his spouse

## 2018-02-20 NOTE — SOCIAL WORK
Met with pt to complete assessment  Pt is currently out of work, has no PCP and insurance  Pt lives in [place] with his mother in apartment on the 6 th floor with elevator assess  Pt comes to [place] to stay with his girlfriend in a 2 story house with 4 steps to enter  Pt is alert, independent with ADLs, drives a car and amb with no devices  Pt uses [place]  Discuss with pt about [employer] $4 00 plan for 30 pills  Pt was not aware of this  Pt refused information about a Living Will  Pt's  is his girlfriend Elvie Curry at [phone]  Pt stated someone will pick him up when discharge

## 2018-02-20 NOTE — PLAN OF CARE
Problem: DISCHARGE PLANNING - CARE MANAGEMENT  Goal: Discharge to post-acute care or home with appropriate resources  INTERVENTIONS:  - Conduct assessment to determine patient/family and health care team treatment goals, and need for post-acute services based on payer coverage, community resources, and patient preferences, and barriers to discharge  - Address psychosocial, clinical, and financial barriers to discharge as identified in assessment in conjunction with the patient/family and health care team  - Arrange appropriate level of post-acute services according to patients   needs and preference and payer coverage in collaboration with the physician and health care team  - Communicate with and update the patient/family, physician, and health care team regarding progress on the discharge plan  - Arrange appropriate transportation to post-acute venues  Outcome: Progressing  It was reported in rounds that pt's seizure-like activity could be alcohol related  RN reports pt drinks 10 beers a day  A referral was made to HOST by phone and fax the following information to [phone] (face sheet, Med list and H&P)

## 2018-02-20 NOTE — PLAN OF CARE
Problem: DISCHARGE PLANNING - CARE MANAGEMENT  Goal: Discharge to post-acute care or home with appropriate resources  INTERVENTIONS:  - Conduct assessment to determine patient/family and health care team treatment goals, and need for post-acute services based on payer coverage, community resources, and patient preferences, and barriers to discharge  - Address psychosocial, clinical, and financial barriers to discharge as identified in assessment in conjunction with the patient/family and health care team  - Arrange appropriate level of post-acute services according to patients   needs and preference and payer coverage in collaboration with the physician and health care team  - Communicate with and update the patient/family, physician, and health care team regarding progress on the discharge plan  - Arrange appropriate transportation to post-acute venues   Outcome: Progressing  Met with pt to complete assessment  Pt is currently out of work, has no PCP and insurance  Pt lives in [place] with his mother in apartment on the 6 th floor with elevator assess  [place] comes to [place] to stay with his girlfriend in a 2 story house with 4 steps to enter  Pt is alert, independent with ADLs, drives a car and amb with no devices  Pt uses [employer]  Discuss with pt about [address] $4 00 plan for 30 pills  Pt was not aware of this  Pt refused information about a Living Will  Pt's  is his girlfriend Elvie Curry at [phone]  Pt stated someone will pick him up when discharge

## 2018-02-20 NOTE — SOCIAL WORK
It was reported in rounds that pt's seizure-like activity could be alcohol related  RN reports pt drinks 10 beers a day  A referral was made to [facility]OST by phone and fax the following information to [phone] (face sheet, Med list and H&P)

## 2018-02-20 NOTE — PHYSICIAN ADVISOR
Current patient class: Inpatient  The patient is currently on Hospital Day: 2      The patient was admitted to the hospital at [phone] on 2/19/18 for the following diagnosis:  [facility] ) [R56 9]  Shaking chills [R68 83]  Alcohol use [Z78 9]  Elevated creatine kinase [R74 8]  ALEKS (acute kidney injury) ([facility] ) [N17 9]  Seizure-like activity ([facility] ) [R56 9]  Congestive heart failure, unspecified congestive heart failure chronicity, unspecified congestive heart failure type ([facility] ) [I50 9]  Leukocytosis, unspecified type [D72 829]  Hypertension, unspecified type [I10]       There is documentation in the medical record of an expected length of stay of at least 2 midnights  The patient is therefore expected to satisfy the 2 midnight benchmark and given the 2 midnight presumption is appropriate for INPATIENT ADMISSION  Given this expectation of a satisfying stay, CMS instructs us that the patient is most often appropriate for inpatient admission under part A provided medical necessity is documented in the chart  After review of the relevant documentation, labs, vital signs and test results, the patient is appropriate for INPATIENT ADMISSION  Admission to the hospital as an inpatient is a complex decision making process which requires the practitioner to consider the patients presenting complaint, history and physical examination and all relevant testing  With this in mind, in this case, the patient was deemed appropriate for INPATIENT ADMISSION  After review of the documentation and testing available at the time of the admission I concur with this clinical determination of medical necessity  Rationale is as follows: The patient is a 65 yrs old Male who presented to the ED at 2/18/2018 11:35 PM with a chief complaint of Seizure - New Onset (Per EMS Pt's girlfriend reports seizure like activity x3 over last 2 hours;  Pt had 10 minute postdictal state each time following the seizure; Pt's girlfriend reports this has been occurring over last 2 months; Pt remembers feeling sweaty and does not recall anything after that; Pt states he did not see any other provider for symptoms d/t not having insurance)    The patients vitals on arrival were ED Triage Vitals   Temperature Pulse Respirations Blood Pressure SpO2   02/18/18 2339 02/18/18 2339 02/18/18 2339 02/18/18 2339 02/18/18 2339   98 °F (36 7 °C) 88 16 116/64 97 %      Temp Source Heart Rate Source Patient Position - Orthostatic VS BP Location FiO2 (%)   02/18/18 2339 02/18/18 2339 02/19/18 0555 02/19/18 0555 --   Oral Monitor Lying Left arm       Pain Score       02/18/18 2339       No Pain           Past Medical History:   Diagnosis Date    [facility]congestive [facility] failure) [facility] )     Hypertension      History reviewed  No pertinent surgical history  Consults have been placed to:   IP CONSULT TO NEUROLOGY  IP CONSULT TO CASE MANAGEMENT  IP CONSULT TO NEPHROLOGY    Vitals:    02/19/18 0419 02/19/18 0555 02/19/18 0731 02/19/18 1531   BP:  130/76 142/89 157/85   BP Location:  Left arm Right arm Left arm   Pulse:  86 86 77   Resp:  18 17 18   Temp:  99 4 °F (37 4 °C) 97 9 °F (36 6 °C) 98 °F (36 7 °C)   TempSrc:  Temporal Temporal Temporal   SpO2:  95% 98% 97%   Weight:  111 kg (245 lb 6 oz)     Height: 5' 8" (1 727 m) 5' 8" (1 727 m)         Most recent labs:    Recent Labs      02/18/18   2358   02/19/18   0128  02/19/18   0604   WBC  13 53*   --    --   10 94*   HGB  14 6   --    --   13 7   HCT  41 2   --    --   39 3   PLT  188   --    --   173   K   --    < >  3 5  3 2*   NA   --    < >  133*  134*   CALCIUM   --    < >  9 0  8 5   BUN   --    < >  27*  24   CREATININE   --    < >  2 24*  1 91*   INR  0 98   --    --    --    CKTOTAL   --    --   484*   --    AST   --    --   69*   --    ALT   --    --   74   --    ALKPHOS   --    --   88   --    BILITOT   --    --   0 46   --     < > = values in this interval not displayed         Scheduled Meds:  Current Facility-Administered Medications:  acetaminophen 650 mg Oral Q6H PRN Marcellina Shell, MICHAEL    aluminum-magnesium hydroxide-simethicone 30 mL Oral Q6H PRN Marcellina Shell, MICHAEL    amLODIPine 5 mg Oral Daily Marcellina Shell, MICHAEL    aspirin 81 mg Oral Daily Marcellina Shell, MICHAEL    carvedilol 25 mg Oral BID With Meals Marcenzoa Maribel, MICHAEL    [START ON 6/40/6860] folic acid 1 mg Oral Daily Tyshawn Hardwick MD    heparin (porcine) 5,000 Units Subcutaneous [facility] Marcellina Shell, MICHAEL    lisinopril 20 mg Oral Daily Marcellina Shell, MICHAEL    LORazepam 1 mg Intravenous Q4H PRN Marcellina Shell, MICHAEL    nicotine 1 patch Transdermal Daily Marcellina Shell, MICHAEL    ondansetron 4 mg Intravenous Q6H PRN Marcellina Shell, MICHAEL    [START ON 2/20/2018] pantoprazole 40 mg Oral Early Morning Tyshawn Hardwick MD    pravastatin 80 mg Oral Daily With [employer], MICHAEL    sodium chloride 125 mL/hr Intravenous Continuous Litzya Maribel, MICHAEL Last Rate: 125 mL/hr (02/19/18 1402)   [START ON 2/20/2018] thiamine 100 mg Oral Daily Tyshawn Hardwick MD      Continuous Infusions:  sodium chloride 125 mL/hr Last Rate: 125 mL/hr (02/19/18 1402)     PRN Meds:   acetaminophen    aluminum-magnesium hydroxide-simethicone    LORazepam    ondansetron    Surgical procedures (if appropriate):

## 2018-02-20 NOTE — PROGRESS NOTES
NEPHROLOGY PROGRESS NOTE    Lew Moore 65 y o  male MRN: [id number]  Unit/Bed#: E4 -01 Encounter: [id number]  Reason for Consult:  Acute renal failure    ASSESSMENT/PLAN:  1  Renal     Patient acute renal failure due to pre renal azotemia creatinine is now declined to 1 3  Sodium concentration is improving as well and electrolytes are normal   There is no active renal issue he is on some IV fluids which she can continue until his oral intake is back to normal     Will sign off please call if there is anything we can do in the future  SUBJECTIVE:  Review of Systems   Constitution: Negative for chills and fever  HENT: Negative  Eyes: Negative  Cardiovascular: Negative  Respiratory: Negative  Gastrointestinal: Negative  Genitourinary: Negative  OBJECTIVE:  Current Weight: Weight - Scale: 111 kg (245 lb 6 oz)  Vitals:Temp (24hrs), Av 5 °F (36 9 °C), Min:97 2 °F (36 2 °C), Max:100 5 °F (38 1 °C)  Current: Temperature: (!) 97 2 °F (36 2 °C)   Blood pressure 162/77, pulse 68, temperature (!) 97 2 °F (36 2 °C), temperature source Tympanic, resp  rate 18, height 5' 8" (1 727 m), weight 111 kg (245 lb 6 oz), SpO2 98 %  , Body mass index is 37 31 kg/m²  Intake/Output Summary (Last 24 hours) at 18 1123  Last data filed at 18 0900   Gross per 24 hour   Intake             1240 ml   Output             1000 ml   Net              240 ml       Physical Exam: /77 (BP Location: Left arm)   Pulse 68   Temp (!) 97 2 °F (36 2 °C) (Tympanic)   Resp 18   Ht 5' 8" (1 727 m)   Wt 111 kg (245 lb 6 oz)   SpO2 98%   BMI 37 31 kg/m²   Physical Exam   Constitutional: No distress  HENT:   Mouth/Throat: No oropharyngeal exudate  Eyes: No scleral icterus  Neck: Neck supple  No JVD present  Cardiovascular: Normal rate  Exam reveals no friction rub  Pulmonary/Chest: Effort normal and breath sounds normal  No respiratory distress  He has no wheezes  He has no rales  Abdominal: Soft  Bowel sounds are normal  He exhibits no distension  There is no tenderness  There is no rebound         Medications:    Current Facility-Administered Medications:     acetaminophen (TYLENOL) tablet 650 mg, 650 mg, Oral, Q6H PRN, Brandy Marie PA-C    aluminum-magnesium hydroxide-simethicone (MYLANTA) 200-200-20 mg/5 mL oral suspension 30 mL, 30 mL, Oral, Q6H PRN, Brandy Marie PA-C    amLODIPine (NORVASC) tablet 5 mg, 5 mg, Oral, Daily, Brandy Marie PA-C, 5 mg at 02/20/18 0804    aspirin (ECOTRIN LOW STRENGTH) EC tablet 81 mg, 81 mg, Oral, Daily, Brandy Marie PA-C, 81 mg at 02/20/18 0804    carvedilol (COREG) tablet 25 mg, 25 mg, Oral, BID With Meals, Brandy Marie PA-C, 25 mg at 69/35/88 9513    folic acid (FOLVITE) tablet 1 mg, 1 mg, Oral, Daily, Israel Gaviria MD, 1 mg at 02/20/18 0805    heparin (porcine) subcutaneous injection 5,000 Units, 5,000 Units, Subcutaneous, Q8H [address], 5,000 Units at 02/19/18 0540 **AND** Platelet count, , , Once, Brandy Marie PA-C    lisinopril (ZESTRIL) tablet 20 mg, 20 mg, Oral, Daily, Brandy Marie PA-C, 20 mg at 02/20/18 0804    LORazepam (ATIVAN) 2 mg/mL injection 1 mg, 1 mg, Intravenous, Q4H PRN, Brandy Marie PA-C    nicotine (NICODERM CQ) 14 mg/24hr TD 24 hr patch 1 patch, 1 patch, Transdermal, Daily, Brandy Marie PA-C    ondansetron (ZOFRAN) injection 4 mg, 4 mg, Intravenous, Q6H PRN, Brandy Marie PA-C    pantoprazole (PROTONIX) EC tablet 40 mg, 40 mg, Oral, Early Morning, Israel Gaviria MD, 40 mg at 02/20/18 8307    pravastatin (PRAVACHOL) tablet 80 mg, 80 mg, Oral, Daily With Dinner, Brandy Marie PA-C, 80 mg at 02/19/18 1744    sodium chloride 0 9 % infusion, 125 mL/hr, Intravenous, Continuous, Brandy Marie PA-C, Last Rate: 125 mL/hr at 02/20/18 0551, 125 mL/hr at 02/20/18 0551    thiamine (VITAMIN B1) tablet 100 mg, 100 mg, Oral, Daily, Israel Gaviria MD, 100 mg at 02/20/18 0804    Laboratory Results:  Lab Results   Component Value Date    WBC 7 53 02/20/2018    HGB 13 3 02/20/2018    HCT 38 8 02/20/2018    MCV 94 02/20/2018     02/20/2018     Lab Results   Component Value Date    GLUCOSE 83 02/20/2018    CALCIUM 8 3 02/20/2018     02/20/2018    K 3 5 02/20/2018    CO2 22 02/20/2018     02/20/2018    BUN 13 02/20/2018    CREATININE 1 31 (H) 02/20/2018     Lab Results   Component Value Date    CALCIUM 8 3 02/20/2018     No results found for: LABPROT

## 2018-02-20 NOTE — PLAN OF CARE
Problem: DISCHARGE PLANNING - CARE MANAGEMENT  Goal: Discharge to post-acute care or home with appropriate resources  INTERVENTIONS:  - Conduct assessment to determine patient/family and health care team treatment goals, and need for post-acute services based on payer coverage, community resources, and patient preferences, and barriers to discharge  - Address psychosocial, clinical, and financial barriers to discharge as identified in assessment in conjunction with the patient/family and health care team  - Arrange appropriate level of post-acute services according to patients   needs and preference and payer coverage in collaboration with the physician and health care team  - Communicate with and update the patient/family, physician, and health care team regarding progress on the discharge plan  - Arrange appropriate transportation to post-acute venues   Outcome: Progressing  This CM wrote name and phone number on whiteboard in pt's room

## 2018-02-20 NOTE — PLAN OF CARE
Problem: DISCHARGE PLANNING - CARE MANAGEMENT  Goal: Discharge to post-acute care or home with appropriate resources  INTERVENTIONS:  - Conduct assessment to determine patient/family and health care team treatment goals, and need for post-acute services based on payer coverage, community resources, and patient preferences, and barriers to discharge  - Address psychosocial, clinical, and financial barriers to discharge as identified in assessment in conjunction with the patient/family and health care team  - Arrange appropriate level of post-acute services according to patients   needs and preference and payer coverage in collaboration with the physician and health care team  - Communicate with and update the patient/family, physician, and health care team regarding progress on the discharge plan  - Arrange appropriate transportation to post-acute venues   Outcome: Progressing  Rec a message from the HOST representative that pt refused the referral and started frightening with his spouse

## 2018-02-21 VITALS
DIASTOLIC BLOOD PRESSURE: 90 MMHG | OXYGEN SATURATION: 98 % | SYSTOLIC BLOOD PRESSURE: 157 MMHG | BODY MASS INDEX: 37.19 KG/M2 | HEART RATE: 68 BPM | RESPIRATION RATE: 18 BRPM | HEIGHT: 68 IN | TEMPERATURE: 98.8 F | WEIGHT: 245.37 LBS

## 2018-02-21 PROBLEM — F10.230 SEIZURE DUE TO ALCOHOL WITHDRAWAL, UNCOMPLICATED (HCC): Status: ACTIVE | Noted: 2018-02-21

## 2018-02-21 PROBLEM — E86.0 DEHYDRATION: Status: RESOLVED | Noted: 2018-02-19 | Resolved: 2018-02-21

## 2018-02-21 PROBLEM — E87.6 HYPOKALEMIA: Status: RESOLVED | Noted: 2018-02-19 | Resolved: 2018-02-21

## 2018-02-21 PROBLEM — R56.9 SEIZURE DUE TO ALCOHOL WITHDRAWAL, UNCOMPLICATED (HCC): Status: ACTIVE | Noted: 2018-02-21

## 2018-02-21 PROBLEM — F10.930 SEIZURE DUE TO ALCOHOL WITHDRAWAL, UNCOMPLICATED (HCC): Status: ACTIVE | Noted: 2018-02-21

## 2018-02-21 PROBLEM — D72.829 LEUKOCYTOSIS: Status: RESOLVED | Noted: 2018-02-19 | Resolved: 2018-02-21

## 2018-02-21 PROBLEM — N17.9 AKI (ACUTE KIDNEY INJURY) (HCC): Status: RESOLVED | Noted: 2018-02-19 | Resolved: 2018-02-21

## 2018-02-21 PROCEDURE — 99239 HOSP IP/OBS DSCHRG MGMT >30: CPT | Performed by: INTERNAL MEDICINE

## 2018-02-21 RX ORDER — LISINOPRIL 40 MG/1
40 TABLET ORAL DAILY
Qty: 30 TABLET | Refills: 0 | Status: SHIPPED | OUTPATIENT
Start: 2018-02-21 | End: 2022-02-04 | Stop reason: HOSPADM

## 2018-02-21 RX ORDER — FUROSEMIDE 40 MG/1
20 TABLET ORAL DAILY
Qty: 30 TABLET | Refills: 0 | Status: SHIPPED | OUTPATIENT
Start: 2018-02-21 | End: 2022-02-04 | Stop reason: HOSPADM

## 2018-02-21 RX ORDER — PRAVASTATIN SODIUM 80 MG/1
80 TABLET ORAL
Qty: 30 TABLET | Refills: 0 | Status: ON HOLD | OUTPATIENT
Start: 2018-02-21 | End: 2022-02-04 | Stop reason: SDUPTHER

## 2018-02-21 RX ORDER — AMLODIPINE BESYLATE 5 MG/1
5 TABLET ORAL DAILY
Qty: 30 TABLET | Refills: 0 | Status: ON HOLD | OUTPATIENT
Start: 2018-02-21 | End: 2022-02-04 | Stop reason: SDUPTHER

## 2018-02-21 RX ADMIN — ASPIRIN 81 MG: 81 TABLET, COATED ORAL at 08:13

## 2018-02-21 RX ADMIN — Medication 100 MG: at 08:13

## 2018-02-21 RX ADMIN — LISINOPRIL 20 MG: 20 TABLET ORAL at 08:13

## 2018-02-21 RX ADMIN — CARVEDILOL 25 MG: 25 TABLET, FILM COATED ORAL at 08:13

## 2018-02-21 RX ADMIN — PANTOPRAZOLE SODIUM 40 MG: 40 TABLET, DELAYED RELEASE ORAL at 06:31

## 2018-02-21 RX ADMIN — AMLODIPINE BESYLATE 5 MG: 5 TABLET ORAL at 08:13

## 2018-02-21 RX ADMIN — FOLIC ACID 1 MG: 1 TABLET ORAL at 08:13

## 2018-02-21 NOTE — PROGRESS NOTES
Progress Note - Birgit Velasquez 65 y o  male MRN: [id number]    Unit/Bed#: E4 -01 Encounter: [id number]      Assessment/Plan:  1-probable seizure:  Patient was admitted to the hospital for evaluation of a possible seizure episode  Patient was evaluated by the neurology service who relates that his episode was consistent with alcohol withdrawal seizure  However as underlying newly developed epilepsy could not be ruled out they recommend EEG  -EEG pending-was performed this morning however apart not yet back              -if any focal abnormalities, MRI would be recommended              -alcohol cessation recommended              -antiepileptics not recommended illnesses EEG revealed epileptiform focus               -continue seizure precautions    -discussed with patient the need for complete alcohol cessation, as this may recur her if he goes to alcohol withdrawal again      2-alcohol dependence with probable withdrawal seizures:  Patient relates a history of previous daily alcohol use, drinking 8-10 cans of beer a day  He stated he stopped drinking alcohol on Thursday, 3 days prior to admission, however on the day of admission he started feeling better so he drank 2 cans of beer  He had his 1st unresponsive episode, awakened, felt better, and drank a 3rd can of beer  He then had a 2nd unresponsive episode               -his alcohol level on admission was 30               -continue thiamine, folate  Continue Ativan p r n               -K27 folic acid levels without deficiency  -no current signs or symptoms of alcohol withdrawal   Patient had been abstinent for 3 days prior to yesterday               -his syncopal/unresponsive episodes are felt to be most likely secondary to alcohol withdrawal seizure  He is currently undergoing an evaluation as above      3-marijuana use:  Cessation counseled      4-dehydration:  Present on admission    Due to recent illness with decreased p o  intake  patient is given IV fluids  His dehydration resolved  Currently tolerating p   O  We will discontinue his IV      5-infectious disease:  Patient relates recent illness with fever, chills, productive cough  He notes that lasted approximately 3 days and then resolved  He denies any current symptoms  Patient's symptoms are most likely secondary to a viral illness  As his symptoms have resolved will not check it is influenza screen at this time as he would not receive any benefit from Tamiflu      6-essential hypertension:  Continue home medications  blood pressure currently adequate with a systolic blood pressure ranging 150/100 60s     7-hyperlipidemia:  Continue statin     8-tobacco abuse:  Smoking cessation counseled  Patient refuses a patch     9-hypokalemia:  Replete and recheck     10-leukocytosis:  Patient presented with a white blood cell count of 13 5  He has had a recent viral upper respiratory tract like illness  His white blood cell count normalized without antibiotic intervention      11-history of congestive heart failure:  Unknown type  No previous records in the computer  No prior echocardiogram available for my review  Patient was currently hypovolemic so his Lasix and spironolactone were held  Continue Coreg    -anticipate restarting Lasix prior to discharge      12-probable acute kidney injury:  Initial creatinine was 2 24  Had improved to 1 9 after admission  Continue to hold Lasix and spironolactone  Continue gentle IV fluids    Monitor closely with reported history of congestive heart failure               -appreciate Nephrology evaluation               -creatinine baseline unclear, however creatinine improved to 1 3 today      13-chronic sinusitis:  Incidental finding on CT scan:  Patient denies any symptoms          VTE Pharmacologic Prophylaxis: Heparin  VTE Mechanical Prophylaxis: sequential compression device    Certification Statement: The patient will continue to require additional inpatient hospital stay due to need for further acute intervention for seizure workup:  Await EEG will and further workup    Status: inpatient     Discussed with neurolgist, dR Schuler:  Awaiting EEG    ===================================================================    Subjective:  Patient denies any pain anywhere at all  He denies any shortness of breath  He denies any chest congestion or cough  He denies any abdominal pain, nausea, vomiting, diarrhea  He denies any dizziness, lightheadedness, weakness, numbness, balance problems  He notes with the IV fluids he is urinating copious amounts of light yellow urine  He is ambulating without any difficulty  He notes he feels back to his baseline and is requesting discharge home  Physical Exam:   Temp:  [97 2 °F (36 2 °C)-100 5 °F (38 1 °C)] 98 4 °F (36 9 °C)  HR:  [68-76] 68  Resp:  [18] 18  BP: (151-162)/(77-92) 153/79    Gen:  Pleasant, non-tachypnic, non-dyspnic  Conversant  Sitting up at the bedside  Heart: regular rate and rhythm, S1S2 present, no murmur, rub or gallop  Lungs: clear to ausculatation bilaterally  No wheezing, crackles, or rhonchi  No accessory muscle use or respiratory distress  Abd: soft, non-tender, non-distended  NABS, no guarding, rebound or peritoneal signs  Extremities: no clubbing, cyanosis or edema  2+pedal pulses bilaterally  Full range of motion  Neuro: awake, alert and oriented  Cranial nerves 2-12 intact  Strength and sensation grossly intact  Skin: warm and dry: no petechiae, purpura and rash      LABS:     Results from last 7 days  Lab Units 02/20/18  0550 02/19/18  0604 02/18/18  2358   WBC Thousand/uL 7 53 10 94* 13 53*   HEMOGLOBIN g/dL 13 3 13 7 14 6   HEMATOCRIT % 38 8 39 3 41 2   PLATELETS Thousands/uL 177 173 188       Results from last 7 days  Lab Units 02/20/18  0551 02/19/18  0604 02/19/18  0128   SODIUM mmol/L 142 134* 133*   POTASSIUM mmol/L 3 5 3 2* 3 5   CHLORIDE mmol/L 107 100 95*   CO2 mmol/L 22 23 25   BUN mg/dL 13 24 27*   CREATININE mg/dL 1 31* 1 91* 2 24*   GLUCOSE RANDOM mg/dL 83 109 98   CALCIUM mg/dL 8 3 8 5 9 0       Hospital Data:    Tylenol level less than 2  Salicylate level 3 9  TSH 0 663     Medical alcohol 30  Urine drug screen positive THC          CT brain:  No acute intracranial abnormality  Microangiopathic change  Chronic sinusitis        ---------------------------------------------------------------------------------------------------------------  This note has been constructed using a voice recognition system

## 2018-02-21 NOTE — DISCHARGE INSTRUCTIONS
Please take your medication as directed  Please see your family doctor in [place], in 1 week for a checkup  If you wish to change her care to a local physician, you have been provided with the name of a local physician  Please return to the ER with any problems at all, especially with any pain, fever, chills, dizziness, difficulty breathing, or any other problems  Your episodes of passing out were likely due to alcohol withdrawal seizures  You must not drink any more alcohol at all, as this can happen in the future  You must never drive if you have been drinking alcohol, or if you have abruptly stopped drinking alcohol  Abuse of Alcohol   WHAT YOU NEED TO KNOW:   · Alcohol abuse is unhealthy drinking behavior  You may drink too much at one time once a week, or continue to drink too much daily  You continue to drink even though it causes problems  The problems can be alcohol related legal problems, or problems with work or relationships with family  · If you drink too much at one time, you are binge drinking  Binge drinking is when you have a large amount of alcohol in a short time  Your blood alcohol concentrations (SUKHJINDER) goes above 0 08 g/dLlevel during binge drinking  For men, this usually happens with more than 4 drinks in 2 hours  For women, it is more than 3 drinks in 2 hours  A drink is 12 ounces of beer, 4 ounces of wine, or 1½ ounces of liquor  DISCHARGE INSTRUCTIONS:   Call 911 for any of the following:   · You have sudden chest pain or trouble breathing  · You have a seizure or have shaking or trembling  · You were in an accident because of alcohol  Return to the emergency department if:   · You want to harm yourself or others  · You have hallucinations (you see or hear things that are not real)  · You cannot stop vomiting or you vomit blood  Contact your healthcare provider if:   · You need help to stop drinking alcohol       · You have questions or concerns about your condition or care  Medicines:   · Vitamin supplements  may be given to treat low vitamin levels  Alcohol can make it hard for your body to absorb enough vitamins such as B1  Vitamin supplements may also be given to prevent alcohol related brain damage  · Take your medicine as directed  Contact your healthcare provider if you think your medicine is not helping or if you have side effects  Tell him or her if you are allergic to any medicine  Keep a list of the medicines, vitamins, and herbs you take  Include the amounts, and when and why you take them  Bring the list or the pill bottles to follow-up visits  Carry your medicine list with you in case of an emergency  Treatments or therapies you may need:   · Detoxification (detox) and withdrawal  is a program that helps you to safely get alcohol out of your body  Detox can also help get rid of the physical need to drink  Healthcare providers monitor the physical symptoms of withdrawal  They may give you medicines to help decrease nausea, dehydration, and seizures  Healthcare providers will also monitor your blood pressure, heart and breathing rates, and your temperature  Symptoms of anxiety, depression, and suicidal thoughts are also monitored and managed during detox  Healthcare providers may give you medicines for these symptoms and therapy sessions will be available to you  Detox is usually done at a detox center or in a hospital  Healthcare providers do not recommend that you try to detox at home or by yourself  Withdrawal symptoms may become life-threatening  The center can help you find 12 step programs or an individual therapist to help with emotional support after detox  · Inpatient and outpatient treatment  focus on your personal needs to help you stop drinking  Treatment helps you understand the reasons you abuse alcohol  Counselors and therapists provide you with support and help you find ways to cope instead of drinking   You may need inpatient treatment to provide a controlled environment  You may need outpatient treatment after your inpatient treatment is complete  · Alcohol aversion therapy  takes away the desire to drink by causing a negative reaction when you drink  Healthcare providers may give you medicines that cause nausea and vomiting when you drink alcohol  They may instead give you a medicine that decreases your urge to drink alcohol  These medicines are used to help you stop drinking or reduce the amount you drink  They can also help you avoid relapse  Follow up with your healthcare provider as directed:  Write down your questions so you remember to ask them during your visits  Avoid alcohol:  You should stop drinking entirely  Alcohol can damage your brain, heart, and liver  It also increases your risk for injury, high blood pressure, and certain types of cancer  Alcohol is dangerous when you combine it with certain medicines  Do not drive if you have had alcohol:  Make sure someone who has not been drinking can help you get home  Get support:  Most people need support to stop drinking alcohol  Mental health providers, support groups, rehabilitation centers, and your healthcare provider can provide support  For more information:   · Alcoholics Anonymous  Web Address: http://www [employer]/  · Substance Abuse and [address] , [address] [place]  Web Address: https://[employer]/  © 2017 [facility] [place] Information is for End User's use only and may not be sold, redistributed or otherwise used for commercial purposes  All illustrations and images included in CareNotes® are the copyrighted property of [employer]  or Arian Barclay  The above information is an  only  It is not intended as medical advice for individual conditions or treatments   Talk to your doctor, nurse or pharmacist before following any medical regimen to see if it is safe and effective for you  How to Stop Smoking   AMBULATORY CARE:   You will improve your health and the health of others around you  if you stop smoking  Your risk for heart and lung disease, cancer, stroke, heart attack, and vision problems will also decrease  You can benefit from quitting no matter how long you have smoked  Prepare to stop smoking:  Nicotine is a highly addictive drug found in cigarettes  Withdrawal symptoms can happen when you stop smoking and make it hard to quit  These include anxiety, depression, irritability, trouble sleeping, and increased appetite  You increase your chances of success if you prepare to quit  · Set a quit date  Avelino Torres a date that is within the next 2 weeks  Do not pick a day that you think may be stressful or busy  Write down the day or [place] it on your calender  · Tell friends and family that you plan to quit  Explain that you may have withdrawal symptoms when you try to quit  Ask them to support you  They may be able to encourage you and help reduce your stress to make it easier for you to quit  · Make a list of your reasons for quitting  Put the list somewhere you will see it every day, such as your refrigerator  You can look at the list when you have a craving  · Remove all tobacco and nicotine products from your home, car, and workplace  Also, remove anything else that will tempt you to smoke, such as lighters, matches, or ashtrays  Clean your car, home, and places at work that smell like smoke  The smell of smoke can trigger a craving  · Identify triggers that make you want to smoke  This may include activities, feelings, or people  Also write down 1 way you can deal with each of your triggers  For example, if you want to smoke as soon as you wake up, plan another activity during this time, such as exercise  · Make a plan for how you will quit  Learn about the tools that can help you quit, such as medicine, counseling, or nicotine replacement therapy  Choose at least 2 options to help you quit  Tools to help you stop smoking:   · Counseling  from a trained healthcare provider can provide you with support and skills to quit smoking  The provider will also teach you to manage your withdrawal symptoms and cravings  You may receive counseling from one counselor, in group therapy, or through phone therapy called a quit line  · Nicotine replacement therapy (NRT)  such as nicotine patches, gum, or lozenges may help reduce your nicotine cravings  You may get these without a doctor's order  Do not use e-cigarettes or smokeless tobacco in place of cigarettes or to help you quit  They still contain nicotine  · Prescription medicines  such as nasal sprays or nicotine inhalers may help reduce your withdrawal symptoms  Other medicines may also be used to reduce your urge to smoke  Ask your healthcare provider about these medicines  You may need to start certain medicines 2 weeks before your quit date for them to work well  · Hypnosis  is a practice that helps guide you through thoughts and feelings  Hypnosis may help decrease your cravings and make you more willing to quit  · Acupuncture therapy  uses very thin needles to balance energy channels in the body  This is thought to help decrease cravings and symptoms of nicotine withdrawal      · Support groups  let you talk to others who are trying to quit or have already quit  It may be helpful to speak with others about how they quit  Manage your cravings:   · Avoid situations, people, and places that tempt you to smoke  Go to nonsmoking places, such as libraries or restaurants  Understand what tempts you and try to avoid these things  · Keep your hands busy  Hold things such as a stress ball or pen  · Put candy or toothpicks in your mouth  Keep lollipops, sugarless gum, or toothpicks with you at all times  · Do not have alcohol or caffeine  These drinks may tempt you to smoke   Drink healthy liquids such as water or juice instead  · Reward yourself when you resist your cravings  Rewards will motivate you and help you stay positive  · Do an activity that distracts you from your craving  Examples include going for a walk, exercising, or cleaning  Prevent weight gain after you quit:  You may gain a few pounds after you quit smoking  It is healthier for you to gain a few pounds than to continue to smoke  The following can help you prevent weight gain:  · Eat healthy foods  These include fruits, vegetables, whole-grain breads, low-fat dairy products, beans, lean meats, and fish  Eat healthy snacks, such as low-fat yogurt, if you get hungry between meals  · Drink water before, during, and between meals  This will make your stomach feel full and help prevent you from overeating  Ask your healthcare provider how much liquid to drink each day and which liquids are best for you  · Exercise  Take a walk or do some kind of exercise every day  Ask your healthcare provider what exercise is right for you  This may help reduce your cravings and reduce stress  For more support and information:   · [employer]  Phone: 3- 613 - 398-0918  Web Address: www [employer]  © 2017 [address] Information is for End User's use only and may not be sold, redistributed or otherwise used for commercial purposes  All illustrations and images included in CareNotes® are the copyrighted property of [employer]  or Arian Barclay  The above information is an  only  It is not intended as medical advice for individual conditions or treatments  Talk to your doctor, nurse or pharmacist before following any medical regimen to see if it is safe and effective for you

## 2018-02-21 NOTE — DISCHARGE SUMMARY
Discharge Summary - Irena Larose 65 y o  male MRN: [id number]    [address] [address] MED SURG Room / Bed: [facility] [address]/E4 -* Encounter: [id number]    BRIEF OVERVIEW      Admission Date: 2/18/2018       Discharge Date:  02/21/2018    Primary Diagnoses  Principal problem  Seizure due to alcohol withdrawal, uncomplicated  Secondary problem  Acute kidney injury  Marijuana use  Benign essential hypertension  Congestive heart failure:  Unknown type  Alcohol use  Hyperlipidemia  Tobacco abuse    Service:  [facility]  Consulting Providers   Neurology:  Dr Boris Shaikh    Procedures Performed  None    Hospital Studies:  Tylenol level less than 2  Salicylate level 3 9  TSH 0 663     Medical alcohol 30  Urine drug screen positive THC          CT brain:  No acute intracranial abnormality  Microangiopathic change  Chronic sinusitis    EEG:  Per the neurology service:  No epileptiform discharges      Results from last 7 days  Lab Units 02/20/18  0550 02/19/18  0604 02/18/18  2358   WBC Thousand/uL 7 53 10 94* 13 53*   HEMOGLOBIN g/dL 13 3 13 7 14 6   HEMATOCRIT % 38 8 39 3 41 2   PLATELETS Thousands/uL 177 173 188       Results from last 7 days  Lab Units 02/20/18  0551 02/19/18  0604 02/19/18  0128   SODIUM mmol/L 142 134* 133*   POTASSIUM mmol/L 3 5 3 2* 3 5   CHLORIDE mmol/L 107 100 95*   CO2 mmol/L 22 23 25   BUN mg/dL 13 24 27*   CREATININE mg/dL 1 31* 1 91* 2 24*   GLUCOSE RANDOM mg/dL 83 109 98   CALCIUM mg/dL 8 3 8 5 9 0       History and Physical Exam:  Please refer to the Admission H&P note    Hospital Course by Problem  1-probable alcohol withdrawal seizures:  Patient was admitted to the hospital for evaluation of possible seizure episodes   Patient was evaluated by the neurology service who relates that his episode was consistent with alcohol withdrawal seizure  Jacob Paiz as underlying newly developed epilepsy could not be ruled out they recommend EEG  Neurology service today related that the EEG was unremarkable  No further testing or MRI is recommended  Epileptic medication not currently indicated               -this diagnosis was discussed with patient in great detail  I discussed with him that he may not drink alcohol again, as he is likely to have an identical episode  Patient currently relates that he has been abstinent of alcohol for approximately 5 days  He denies any signs or symptoms of alcohol withdrawal   He notes he had been abstinent for 2 years in the past, and states that he intends to do that again  He refuses any rehab referrals      2-alcohol dependence with probable withdrawal seizures:  Patient relates a history of previous daily alcohol use, drinking 8-10 cans of beer a day   He stated he stopped drinking alcohol on Thursday, 3 days prior to admission, however on the day of admission he started feeling better so he drank 2 cans of beer  [facility] had his 1st unresponsive episode, awakened, felt better, and drank a 3rd can of beer   He then had a 2nd unresponsive episode               -his alcohol level on admission was 30               -he was placed on thiamine, folate                 -H70 folic acid levels without deficiency              -MAEGAN did not have any signs or symptoms of alcohol withdrawal during his hospital stay  He was evaluated by the case management team   Patient currently adamantly refuses any rehab referrals for inpatient out patient alcohol cessation      3-marijuana use:  Cessation counseled      4-dehydration:  Present on admission   Due to recent illness with decreased p o  intake    patient is given IV fluids  His dehydration resolved  Currently tolerating p   O    We will discontinue his IV      5-infectious disease:  Patient relates recent illness with fever, chills, productive cough   He notes that lasted approximately 3 days and then resolved  [facility] denies any current symptoms   Patient's symptoms are most likely secondary to a viral illness   As his symptoms have resolved will not check it is influenza screen at this time as he would not receive any benefit from Tamiflu      6-essential hypertension:  Patient's medications were titrated during his hospital stay  Patient relates that he has no insurance and pays cash for his medications, so that was also kept in mind at the time of discharge  Patient will be continued on his home Norvasc 5 mg tablet daily  His spironolactone is currently being held  He will restart his home Lasix at 20 mg daily, and increase his home lisinopril to 40 mg daily  He relates he still has 1 and half month supply of Coreg at home so he will continue this at his home dose of 25 mg b i d        7-hyperlipidemia:  Continue statin-changed to pravastatin at the time of discharge due to interaction with amlodipine      8-tobacco abuse:  Smoking cessation counseled  Ilia Bingham refuses a patch     9-hypokalemia:  Repleted and normalized     10-leukocytosis:  Patient presented with a white blood cell count of 13 5  [facility] has had a recent viral upper respiratory tract like illness   His white blood cell count normalized without antibiotic intervention      11-history of congestive heart failure:  Unknown type   No previous records in the computer   No prior echocardiogram available for my review  Patient's he follows with a physician [place]  Patient was previously on Lasix, spironolactone, and Coreg  His Lasix was held and he was hypovolemic  He is currently euvolemic, will restarted on his home medications at discharge      12-probable acute kidney injury:  Initial creatinine was 2 24   On admission patient is given gentle IV fluids as he was felt to be dehydrated  His Lasix, and spironolactone were held  Patient's creatinine improved to 1 31 prior to discharge, which is probably his baseline    Patient follow up with his primary care provider in [place]   His Lasix was restarted at 20 mg tablet daily at discharge  He will continue his Coreg 25 mg b i d  He will continue his home lisinopril at a higher dose of 40 mg daily  He will hold his spironolactone       13-chronic sinusitis:  Incidental finding on CT scan:  Patient denies any symptoms        VTE Pharmacologic Prophylaxis: Heparin  VTE Mechanical Prophylaxis: sequential compression device    Discharge Condition: Improved  Discharge Disposition:  Home    Discharge Note and Physical Exam:   Patient denies any complaints at this time  He denies any pain anywhere at all  He denies any headache, chest pain, back pain, abdominal pain  He denies any shortness of breath, chest congestion or cough  He denies any abdominal pain, nausea, vomiting, diarrhea  He is tolerating p   O  He denies any dizziness or lightheadedness  Patient relates he does not have any symptoms of alcohol withdrawal   He notes in the past he was abstinent for 2 years, after he was 1st diagnosed with congestive heart failure  He relates he does not wish any alcohol cessation referrals  He does not wish rehab  He relates he has been without alcohol for 5 days and he thinks he can stop drinking on his own  Vitals:    02/21/18 0750   BP: 157/90   Pulse: 68   Resp: 18   Temp: 98 8 °F (37 1 °C)   SpO2: 98%     General:  Pleasant, non-tachypnic, non-dyspnic  Conversant  Heart: Regular rate and rhythm, S1S2 present  No murmur, rub or gallop  Lungs: Clear to auscultation bilaterally, no wheezing, rhonchi, or crackles  Good air movement  No accessory muscle use or respiratory distress  Abdomen: soft, non-tender, non-distended, NABS  No rebound or guarding  No mass or peritoneal signs  Extremities: no clubbing, cyanosis or edema  2+ pedal pulses bilaterally  Neurologic: cranial nerves II-XII intact  Strength and sensation intact globally  Speech fluent and goal directed  Awake, alert and oriented x 3    Skin: warm and dry  No petechiae, purpura or rash  Discharge Medications   Please see Medical Reconciliation Discharge Form    Discharge Follow Up Appointments:   PCP in [place]:  1 week  Or Dr Serge Reynolds: if wishes local physician    Discharge  Statement   Total Time Spent today including physical exam, discussion with patient and Neurology team, and patient's nurse, as well as extensive counseling regarding the need for complete alcohol avoidance, discharge arrangements/care = 44 minutes      This note has been constructed using a voice recognition system

## 2018-02-22 LAB — VIT B1 BLD-SCNC: 152.1 NMOL/L (ref 66.5–200)

## 2022-01-31 ENCOUNTER — APPOINTMENT (EMERGENCY)
Dept: CT IMAGING | Facility: HOSPITAL | Age: 62
DRG: 115 | End: 2022-01-31
Payer: COMMERCIAL

## 2022-01-31 ENCOUNTER — HOSPITAL ENCOUNTER (INPATIENT)
Facility: HOSPITAL | Age: 62
LOS: 3 days | Discharge: HOME | DRG: 115 | End: 2022-02-04
Attending: EMERGENCY MEDICINE | Admitting: INTERNAL MEDICINE
Payer: COMMERCIAL

## 2022-01-31 DIAGNOSIS — N17.9 ACUTE RENAL FAILURE (ARF) (HCC): ICD-10-CM

## 2022-01-31 DIAGNOSIS — Z72.0 TOBACCO ABUSE: ICD-10-CM

## 2022-01-31 DIAGNOSIS — I50.9 CONGESTIVE HEART FAILURE (HCC): ICD-10-CM

## 2022-01-31 DIAGNOSIS — I10 CHRONIC HYPERTENSION: ICD-10-CM

## 2022-01-31 DIAGNOSIS — E78.5 HYPERLIPIDEMIA, UNSPECIFIED HYPERLIPIDEMIA TYPE: ICD-10-CM

## 2022-01-31 DIAGNOSIS — J39.2 PHARYNGEAL EDEMA: ICD-10-CM

## 2022-01-31 DIAGNOSIS — J02.9 PHARYNGITIS: Primary | ICD-10-CM

## 2022-01-31 DIAGNOSIS — I10 BENIGN ESSENTIAL HTN: ICD-10-CM

## 2022-01-31 DIAGNOSIS — E87.5 HYPERKALEMIA: ICD-10-CM

## 2022-01-31 LAB
ALBUMIN SERPL BCP-MCNC: 2.6 G/DL (ref 3.5–5)
ALP SERPL-CCNC: 120 U/L (ref 46–116)
ALT SERPL W P-5'-P-CCNC: 36 U/L (ref 12–78)
ANION GAP SERPL CALCULATED.3IONS-SCNC: 19 MMOL/L (ref 4–13)
AST SERPL W P-5'-P-CCNC: 43 U/L (ref 5–45)
BASOPHILS # BLD AUTO: 0.1 THOUSANDS/ΜL (ref 0–0.1)
BASOPHILS NFR BLD AUTO: 1 % (ref 0–1)
BILIRUB SERPL-MCNC: 0.89 MG/DL (ref 0.2–1)
BUN SERPL-MCNC: 35 MG/DL (ref 5–25)
CALCIUM ALBUM COR SERPL-MCNC: 8.5 MG/DL (ref 8.3–10.1)
CALCIUM SERPL-MCNC: 7.4 MG/DL (ref 8.3–10.1)
CHLORIDE SERPL-SCNC: 102 MMOL/L (ref 100–108)
CO2 SERPL-SCNC: 13 MMOL/L (ref 21–32)
CREAT SERPL-MCNC: 4.5 MG/DL (ref 0.6–1.3)
EOSINOPHIL # BLD AUTO: 0.18 THOUSAND/ΜL (ref 0–0.61)
EOSINOPHIL NFR BLD AUTO: 2 % (ref 0–6)
ERYTHROCYTE [DISTWIDTH] IN BLOOD BY AUTOMATED COUNT: 13.8 % (ref 11.6–15.1)
GFR SERPL CREATININE-BSD FRML MDRD: 13 ML/MIN/1.73SQ M
GLUCOSE SERPL-MCNC: 81 MG/DL (ref 65–140)
HCT VFR BLD AUTO: 35.2 % (ref 36.5–49.3)
HGB BLD-MCNC: 11.5 G/DL (ref 12–17)
IMM GRANULOCYTES # BLD AUTO: 0.06 THOUSAND/UL (ref 0–0.2)
IMM GRANULOCYTES NFR BLD AUTO: 1 % (ref 0–2)
LYMPHOCYTES # BLD AUTO: 2.69 THOUSANDS/ΜL (ref 0.6–4.47)
LYMPHOCYTES NFR BLD AUTO: 25 % (ref 14–44)
MCH RBC QN AUTO: 31.6 PG (ref 26.8–34.3)
MCHC RBC AUTO-ENTMCNC: 32.7 G/DL (ref 31.4–37.4)
MCV RBC AUTO: 97 FL (ref 82–98)
MONOCYTES # BLD AUTO: 0.71 THOUSAND/ΜL (ref 0.17–1.22)
MONOCYTES NFR BLD AUTO: 7 % (ref 4–12)
NEUTROPHILS # BLD AUTO: 6.95 THOUSANDS/ΜL (ref 1.85–7.62)
NEUTS SEG NFR BLD AUTO: 64 % (ref 43–75)
NRBC BLD AUTO-RTO: 0 /100 WBCS
PLATELET # BLD AUTO: 294 THOUSANDS/UL (ref 149–390)
PMV BLD AUTO: 11.3 FL (ref 8.9–12.7)
POTASSIUM SERPL-SCNC: 5.6 MMOL/L (ref 3.5–5.3)
PROT SERPL-MCNC: 7.3 G/DL (ref 6.4–8.2)
RBC # BLD AUTO: 3.64 MILLION/UL (ref 3.88–5.62)
SODIUM SERPL-SCNC: 134 MMOL/L (ref 136–145)
WBC # BLD AUTO: 10.69 THOUSAND/UL (ref 4.31–10.16)

## 2022-01-31 PROCEDURE — 70490 CT SOFT TISSUE NECK W/O DYE: CPT

## 2022-01-31 PROCEDURE — G1004 CDSM NDSC: HCPCS

## 2022-01-31 PROCEDURE — 85025 COMPLETE CBC W/AUTO DIFF WBC: CPT | Performed by: EMERGENCY MEDICINE

## 2022-01-31 PROCEDURE — P9017 PLASMA 1 DONOR FRZ W/IN 8 HR: HCPCS

## 2022-01-31 PROCEDURE — 80053 COMPREHEN METABOLIC PANEL: CPT | Performed by: EMERGENCY MEDICINE

## 2022-01-31 PROCEDURE — 96375 TX/PRO/DX INJ NEW DRUG ADDON: CPT

## 2022-01-31 PROCEDURE — 96361 HYDRATE IV INFUSION ADD-ON: CPT

## 2022-01-31 PROCEDURE — 99285 EMERGENCY DEPT VISIT HI MDM: CPT

## 2022-01-31 PROCEDURE — 99292 CRITICAL CARE ADDL 30 MIN: CPT | Performed by: EMERGENCY MEDICINE

## 2022-01-31 PROCEDURE — 99291 CRITICAL CARE FIRST HOUR: CPT | Performed by: EMERGENCY MEDICINE

## 2022-01-31 PROCEDURE — 36415 COLL VENOUS BLD VENIPUNCTURE: CPT | Performed by: EMERGENCY MEDICINE

## 2022-01-31 PROCEDURE — 30233K1 TRANSFUSION OF NONAUTOLOGOUS FROZEN PLASMA INTO PERIPHERAL VEIN, PERCUTANEOUS APPROACH: ICD-10-PCS | Performed by: EMERGENCY MEDICINE

## 2022-01-31 RX ORDER — ONDANSETRON 2 MG/ML
4 INJECTION INTRAMUSCULAR; INTRAVENOUS ONCE
Status: COMPLETED | OUTPATIENT
Start: 2022-01-31 | End: 2022-01-31

## 2022-01-31 RX ORDER — DIPHENHYDRAMINE HYDROCHLORIDE 50 MG/ML
25 INJECTION INTRAMUSCULAR; INTRAVENOUS ONCE
Status: COMPLETED | OUTPATIENT
Start: 2022-01-31 | End: 2022-01-31

## 2022-01-31 RX ORDER — DEXAMETHASONE SODIUM PHOSPHATE 4 MG/ML
10 INJECTION, SOLUTION INTRA-ARTICULAR; INTRALESIONAL; INTRAMUSCULAR; INTRAVENOUS; SOFT TISSUE ONCE
Status: COMPLETED | OUTPATIENT
Start: 2022-01-31 | End: 2022-01-31

## 2022-01-31 RX ADMIN — DEXAMETHASONE SODIUM PHOSPHATE 10 MG: 4 INJECTION INTRA-ARTICULAR; INTRALESIONAL; INTRAMUSCULAR; INTRAVENOUS; SOFT TISSUE at 22:22

## 2022-01-31 RX ADMIN — SODIUM CHLORIDE 1000 ML: 0.9 INJECTION, SOLUTION INTRAVENOUS at 22:21

## 2022-01-31 RX ADMIN — ONDANSETRON 4 MG: 2 INJECTION INTRAMUSCULAR; INTRAVENOUS at 22:21

## 2022-01-31 RX ADMIN — RACEPINEPHRINE HYDROCHLORIDE 0.5 ML: 11.25 SOLUTION RESPIRATORY (INHALATION) at 22:26

## 2022-01-31 RX ADMIN — DIPHENHYDRAMINE HYDROCHLORIDE 25 MG: 50 INJECTION, SOLUTION INTRAMUSCULAR; INTRAVENOUS at 23:42

## 2022-01-31 RX ADMIN — RACEPINEPHRINE HYDROCHLORIDE 0.5 ML: 11.25 SOLUTION RESPIRATORY (INHALATION) at 23:50

## 2022-02-01 ENCOUNTER — APPOINTMENT (INPATIENT)
Dept: NON INVASIVE DIAGNOSTICS | Facility: HOSPITAL | Age: 62
DRG: 115 | End: 2022-02-01
Payer: COMMERCIAL

## 2022-02-01 ENCOUNTER — APPOINTMENT (INPATIENT)
Dept: ULTRASOUND IMAGING | Facility: HOSPITAL | Age: 62
DRG: 115 | End: 2022-02-01
Payer: COMMERCIAL

## 2022-02-01 PROBLEM — E87.29 HIGH ANION GAP METABOLIC ACIDOSIS: Status: ACTIVE | Noted: 2022-02-01

## 2022-02-01 PROBLEM — J96.01 ACUTE RESPIRATORY FAILURE WITH HYPOXIA (HCC): Status: RESOLVED | Noted: 2022-02-01 | Resolved: 2022-02-01

## 2022-02-01 PROBLEM — E87.5 HYPERKALEMIA: Status: ACTIVE | Noted: 2022-02-01

## 2022-02-01 PROBLEM — E87.2 HIGH ANION GAP METABOLIC ACIDOSIS: Status: ACTIVE | Noted: 2022-02-01

## 2022-02-01 PROBLEM — R56.9 WITHDRAWAL SEIZURES (HCC): Status: ACTIVE | Noted: 2022-02-01

## 2022-02-01 PROBLEM — F19.939 WITHDRAWAL SEIZURES (HCC): Status: ACTIVE | Noted: 2022-02-01

## 2022-02-01 PROBLEM — J39.2 PHARYNGEAL EDEMA: Status: ACTIVE | Noted: 2022-02-01

## 2022-02-01 PROBLEM — F19.239 WITHDRAWAL SEIZURES (HCC): Status: ACTIVE | Noted: 2022-02-01

## 2022-02-01 PROBLEM — J96.01 ACUTE RESPIRATORY FAILURE WITH HYPOXIA (HCC): Status: ACTIVE | Noted: 2022-02-01

## 2022-02-01 LAB
ABO GROUP BLD: NORMAL
ABO GROUP BLD: NORMAL
ANION GAP SERPL CALCULATED.3IONS-SCNC: 14 MMOL/L (ref 4–13)
ANION GAP SERPL CALCULATED.3IONS-SCNC: 16 MMOL/L (ref 4–13)
ANION GAP SERPL CALCULATED.3IONS-SCNC: 18 MMOL/L (ref 4–13)
AORTIC ROOT: 3.6 CM
AORTIC VALVE MEAN VELOCITY: 11.4 M/S
APICAL FOUR CHAMBER EJECTION FRACTION: 68 %
ASCENDING AORTA: 3.5 CM (ref 2.16–3.24)
ATRIAL RATE: 86 BPM
AV LVOT MEAN GRADIENT: 2 MMHG
AV LVOT PEAK GRADIENT: 3 MMHG
AV MEAN GRADIENT: 6 MMHG
AV PEAK GRADIENT: 12 MMHG
AV VELOCITY RATIO: 0.47
BASOPHILS # BLD MANUAL: 0.11 THOUSAND/UL (ref 0–0.1)
BASOPHILS NFR MAR MANUAL: 1 % (ref 0–1)
BLD GP AB SCN SERPL QL: NEGATIVE
BUN SERPL-MCNC: 36 MG/DL (ref 5–25)
BUN SERPL-MCNC: 38 MG/DL (ref 5–25)
BUN SERPL-MCNC: 40 MG/DL (ref 5–25)
CALCIUM SERPL-MCNC: 7.6 MG/DL (ref 8.3–10.1)
CALCIUM SERPL-MCNC: 7.7 MG/DL (ref 8.3–10.1)
CALCIUM SERPL-MCNC: 8 MG/DL (ref 8.3–10.1)
CHLORIDE SERPL-SCNC: 101 MMOL/L (ref 100–108)
CHLORIDE SERPL-SCNC: 102 MMOL/L (ref 100–108)
CHLORIDE SERPL-SCNC: 103 MMOL/L (ref 100–108)
CO2 SERPL-SCNC: 14 MMOL/L (ref 21–32)
CO2 SERPL-SCNC: 15 MMOL/L (ref 21–32)
CO2 SERPL-SCNC: 17 MMOL/L (ref 21–32)
CREAT SERPL-MCNC: 4.34 MG/DL (ref 0.6–1.3)
CREAT SERPL-MCNC: 4.46 MG/DL (ref 0.6–1.3)
CREAT SERPL-MCNC: 4.72 MG/DL (ref 0.6–1.3)
DOP CALC AO PEAK VEL: 1.73 M/S
DOP CALC AO VTI: 35.52 CM
DOP CALC LVOT PEAK VEL VTI: 17.78 CM
DOP CALC LVOT PEAK VEL: 0.81 M/S
EOSINOPHIL # BLD MANUAL: 0 THOUSAND/UL (ref 0–0.4)
EOSINOPHIL NFR BLD MANUAL: 0 % (ref 0–6)
ERYTHROCYTE [DISTWIDTH] IN BLOOD BY AUTOMATED COUNT: 13.4 % (ref 11.6–15.1)
FLUAV RNA RESP QL NAA+PROBE: NEGATIVE
FLUBV RNA RESP QL NAA+PROBE: NEGATIVE
FRACTIONAL SHORTENING: 30 % (ref 28–44)
GFR SERPL CREATININE-BSD FRML MDRD: 12 ML/MIN/1.73SQ M
GFR SERPL CREATININE-BSD FRML MDRD: 13 ML/MIN/1.73SQ M
GFR SERPL CREATININE-BSD FRML MDRD: 13 ML/MIN/1.73SQ M
GLUCOSE SERPL-MCNC: 123 MG/DL (ref 65–140)
GLUCOSE SERPL-MCNC: 184 MG/DL (ref 65–140)
GLUCOSE SERPL-MCNC: 93 MG/DL (ref 65–140)
HCT VFR BLD AUTO: 31.9 % (ref 36.5–49.3)
HGB BLD-MCNC: 10.6 G/DL (ref 12–17)
INTERVENTRICULAR SEPTUM IN DIASTOLE (PARASTERNAL SHORT AXIS VIEW): 1.2 CM (ref 0.56–1.05)
LAAS-AP2: 16.9 CM2
LAAS-AP4: 18.5 CM2
LACTATE SERPL-SCNC: 0.8 MMOL/L (ref 0.5–2)
LEFT ATRIUM SIZE: 3.9 CM
LEFT INTERNAL DIMENSION IN SYSTOLE: 3.5 CM (ref 2.1–4)
LEFT VENTRICULAR INTERNAL DIMENSION IN DIASTOLE: 5 CM (ref 7.04–10.49)
LEFT VENTRICULAR POSTERIOR WALL IN END DIASTOLE: 1.3 CM (ref 0.55–1.04)
LEFT VENTRICULAR STROKE VOLUME: 68 ML
LG PLATELETS BLD QL SMEAR: PRESENT
LYMPHOCYTES # BLD AUTO: 0.44 THOUSAND/UL (ref 0.6–4.47)
LYMPHOCYTES # BLD AUTO: 4 % (ref 14–44)
MCH RBC QN AUTO: 32.1 PG (ref 26.8–34.3)
MCHC RBC AUTO-ENTMCNC: 33.2 G/DL (ref 31.4–37.4)
MCV RBC AUTO: 97 FL (ref 82–98)
MONOCYTES # BLD AUTO: 0.11 THOUSAND/UL (ref 0–1.22)
MONOCYTES NFR BLD: 1 % (ref 4–12)
MV E'TISSUE VEL-LAT: 6 CM/S
MV E'TISSUE VEL-SEP: 6 CM/S
MV PEAK A VEL: 0.96 M/S
MV PEAK E VEL: 57 CM/S
NEUTROPHILS # BLD MANUAL: 10.27 THOUSAND/UL (ref 1.85–7.62)
NEUTS BAND NFR BLD MANUAL: 3 % (ref 0–8)
NEUTS SEG NFR BLD AUTO: 90 % (ref 43–75)
P AXIS: 32 DEGREES
PLATELET # BLD AUTO: 266 THOUSANDS/UL (ref 149–390)
PLATELET BLD QL SMEAR: ADEQUATE
PMV BLD AUTO: 9.3 FL (ref 8.9–12.7)
POTASSIUM SERPL-SCNC: 4 MMOL/L (ref 3.5–5.3)
POTASSIUM SERPL-SCNC: 4.1 MMOL/L (ref 3.5–5.3)
POTASSIUM SERPL-SCNC: 4.1 MMOL/L (ref 3.5–5.3)
PR INTERVAL: 162 MS
PROCALCITONIN SERPL-MCNC: 0.24 NG/ML
PROCALCITONIN SERPL-MCNC: 0.26 NG/ML
QRS AXIS: 40 DEGREES
QRSD INTERVAL: 88 MS
QT INTERVAL: 396 MS
QTC INTERVAL: 473 MS
RBC # BLD AUTO: 3.3 MILLION/UL (ref 3.88–5.62)
RBC MORPH BLD: NORMAL
RH BLD: POSITIVE
RH BLD: POSITIVE
RIGHT ATRIAL 2D VOLUME: 41 ML
RIGHT ATRIUM AREA SYSTOLE A4C: 16.2 CM2
RIGHT VENTRICLE ID DIMENSION: 3.3 CM
RSV RNA RESP QL NAA+PROBE: NEGATIVE
SARS-COV-2 RNA RESP QL NAA+PROBE: NEGATIVE
SL CV LEFT ATRIUM LENGTH A2C: 5.1 CM
SL CV LV EF: 60
SL CV PED ECHO LEFT VENTRICLE DIASTOLIC VOLUME (MOD BIPLANE) 2D: 120 ML
SL CV PED ECHO LEFT VENTRICLE SYSTOLIC VOLUME (MOD BIPLANE) 2D: 52 ML
SODIUM SERPL-SCNC: 132 MMOL/L (ref 136–145)
SODIUM SERPL-SCNC: 133 MMOL/L (ref 136–145)
SODIUM SERPL-SCNC: 135 MMOL/L (ref 136–145)
SPECIMEN EXPIRATION DATE: NORMAL
T WAVE AXIS: 14 DEGREES
VARIANT LYMPHS # BLD AUTO: 1 %
VENTRICULAR RATE: 86 BPM
WBC # BLD AUTO: 11.04 THOUSAND/UL (ref 4.31–10.16)
Z-SCORE OF ASCENDING AORTA: 2.93
Z-SCORE OF INTERVENTRICULAR SEPTUM IN END DIASTOLE: 3.14
Z-SCORE OF LEFT VENTRICULAR DIMENSION IN END SYSTOLE: -5.41
Z-SCORE OF LEFT VENTRICULAR POSTERIOR WALL IN END DIASTOLE: 4.05

## 2022-02-01 PROCEDURE — 82785 ASSAY OF IGE: CPT | Performed by: INTERNAL MEDICINE

## 2022-02-01 PROCEDURE — 96375 TX/PRO/DX INJ NEW DRUG ADDON: CPT

## 2022-02-01 PROCEDURE — 86003 ALLG SPEC IGE CRUDE XTRC EA: CPT | Performed by: INTERNAL MEDICINE

## 2022-02-01 PROCEDURE — 80048 BASIC METABOLIC PNL TOTAL CA: CPT

## 2022-02-01 PROCEDURE — 76770 US EXAM ABDO BACK WALL COMP: CPT

## 2022-02-01 PROCEDURE — 80048 BASIC METABOLIC PNL TOTAL CA: CPT | Performed by: NURSE PRACTITIONER

## 2022-02-01 PROCEDURE — 93010 ELECTROCARDIOGRAM REPORT: CPT

## 2022-02-01 PROCEDURE — 99291 CRITICAL CARE FIRST HOUR: CPT | Performed by: NURSE PRACTITIONER

## 2022-02-01 PROCEDURE — 80048 BASIC METABOLIC PNL TOTAL CA: CPT | Performed by: EMERGENCY MEDICINE

## 2022-02-01 PROCEDURE — 0CJS8ZZ INSPECTION OF LARYNX, VIA NATURAL OR ARTIFICIAL OPENING ENDOSCOPIC: ICD-10-PCS | Performed by: OTOLARYNGOLOGY

## 2022-02-01 PROCEDURE — 93005 ELECTROCARDIOGRAM TRACING: CPT

## 2022-02-01 PROCEDURE — 85007 BL SMEAR W/DIFF WBC COUNT: CPT | Performed by: NURSE PRACTITIONER

## 2022-02-01 PROCEDURE — 84145 PROCALCITONIN (PCT): CPT | Performed by: NURSE PRACTITIONER

## 2022-02-01 PROCEDURE — 93306 TTE W/DOPPLER COMPLETE: CPT

## 2022-02-01 PROCEDURE — 86850 RBC ANTIBODY SCREEN: CPT | Performed by: EMERGENCY MEDICINE

## 2022-02-01 PROCEDURE — 86900 BLOOD TYPING SEROLOGIC ABO: CPT | Performed by: EMERGENCY MEDICINE

## 2022-02-01 PROCEDURE — 96365 THER/PROPH/DIAG IV INF INIT: CPT

## 2022-02-01 PROCEDURE — 93306 TTE W/DOPPLER COMPLETE: CPT | Performed by: INTERNAL MEDICINE

## 2022-02-01 PROCEDURE — 83605 ASSAY OF LACTIC ACID: CPT | Performed by: NURSE PRACTITIONER

## 2022-02-01 PROCEDURE — 86160 COMPLEMENT ANTIGEN: CPT | Performed by: OTOLARYNGOLOGY

## 2022-02-01 PROCEDURE — 85027 COMPLETE CBC AUTOMATED: CPT | Performed by: NURSE PRACTITIONER

## 2022-02-01 PROCEDURE — 36415 COLL VENOUS BLD VENIPUNCTURE: CPT | Performed by: EMERGENCY MEDICINE

## 2022-02-01 PROCEDURE — 86901 BLOOD TYPING SEROLOGIC RH(D): CPT | Performed by: EMERGENCY MEDICINE

## 2022-02-01 PROCEDURE — 0241U HB NFCT DS VIR RESP RNA 4 TRGT: CPT | Performed by: EMERGENCY MEDICINE

## 2022-02-01 RX ORDER — DEXAMETHASONE SODIUM PHOSPHATE 4 MG/ML
6 INJECTION, SOLUTION INTRA-ARTICULAR; INTRALESIONAL; INTRAMUSCULAR; INTRAVENOUS; SOFT TISSUE EVERY 8 HOURS SCHEDULED
Status: DISCONTINUED | OUTPATIENT
Start: 2022-02-01 | End: 2022-02-01

## 2022-02-01 RX ORDER — SODIUM CHLORIDE, SODIUM LACTATE, POTASSIUM CHLORIDE, CALCIUM CHLORIDE 600; 310; 30; 20 MG/100ML; MG/100ML; MG/100ML; MG/100ML
75 INJECTION, SOLUTION INTRAVENOUS CONTINUOUS
Status: DISCONTINUED | OUTPATIENT
Start: 2022-02-01 | End: 2022-02-01

## 2022-02-01 RX ORDER — AMLODIPINE BESYLATE 10 MG/1
10 TABLET ORAL DAILY
Status: DISCONTINUED | OUTPATIENT
Start: 2022-02-02 | End: 2022-02-02

## 2022-02-01 RX ORDER — DEXAMETHASONE SODIUM PHOSPHATE 4 MG/ML
6 INJECTION, SOLUTION INTRA-ARTICULAR; INTRALESIONAL; INTRAMUSCULAR; INTRAVENOUS; SOFT TISSUE EVERY 8 HOURS SCHEDULED
Status: COMPLETED | OUTPATIENT
Start: 2022-02-01 | End: 2022-02-01

## 2022-02-01 RX ORDER — CLINDAMYCIN PHOSPHATE 600 MG/50ML
600 INJECTION INTRAVENOUS EVERY 8 HOURS
Status: DISCONTINUED | OUTPATIENT
Start: 2022-02-01 | End: 2022-02-01

## 2022-02-01 RX ORDER — HEPARIN SODIUM 5000 [USP'U]/ML
5000 INJECTION, SOLUTION INTRAVENOUS; SUBCUTANEOUS EVERY 8 HOURS SCHEDULED
Status: CANCELLED | OUTPATIENT
Start: 2022-02-01

## 2022-02-01 RX ORDER — NICOTINE 21 MG/24HR
14 PATCH, TRANSDERMAL 24 HOURS TRANSDERMAL DAILY
Status: DISCONTINUED | OUTPATIENT
Start: 2022-02-01 | End: 2022-02-04 | Stop reason: HOSPADM

## 2022-02-01 RX ORDER — FUROSEMIDE 10 MG/ML
40 INJECTION INTRAMUSCULAR; INTRAVENOUS ONCE
Status: COMPLETED | OUTPATIENT
Start: 2022-02-01 | End: 2022-02-01

## 2022-02-01 RX ORDER — DEXAMETHASONE SODIUM PHOSPHATE 4 MG/ML
6 INJECTION, SOLUTION INTRA-ARTICULAR; INTRALESIONAL; INTRAMUSCULAR; INTRAVENOUS; SOFT TISSUE EVERY 12 HOURS SCHEDULED
Status: DISCONTINUED | OUTPATIENT
Start: 2022-02-03 | End: 2022-02-01

## 2022-02-01 RX ORDER — AMLODIPINE BESYLATE 5 MG/1
5 TABLET ORAL DAILY
Status: DISCONTINUED | OUTPATIENT
Start: 2022-02-01 | End: 2022-02-01

## 2022-02-01 RX ORDER — AMLODIPINE BESYLATE 5 MG/1
5 TABLET ORAL ONCE
Status: COMPLETED | OUTPATIENT
Start: 2022-02-01 | End: 2022-02-01

## 2022-02-01 RX ORDER — DEXAMETHASONE SODIUM PHOSPHATE 4 MG/ML
6 INJECTION, SOLUTION INTRA-ARTICULAR; INTRALESIONAL; INTRAMUSCULAR; INTRAVENOUS; SOFT TISSUE EVERY 12 HOURS SCHEDULED
Status: DISCONTINUED | OUTPATIENT
Start: 2022-02-02 | End: 2022-02-01

## 2022-02-01 RX ORDER — DEXAMETHASONE SODIUM PHOSPHATE 4 MG/ML
6 INJECTION, SOLUTION INTRA-ARTICULAR; INTRALESIONAL; INTRAMUSCULAR; INTRAVENOUS; SOFT TISSUE EVERY 12 HOURS SCHEDULED
Status: COMPLETED | OUTPATIENT
Start: 2022-02-02 | End: 2022-02-02

## 2022-02-01 RX ORDER — DEXAMETHASONE SODIUM PHOSPHATE 4 MG/ML
10 INJECTION, SOLUTION INTRA-ARTICULAR; INTRALESIONAL; INTRAMUSCULAR; INTRAVENOUS; SOFT TISSUE EVERY 8 HOURS SCHEDULED
Status: DISCONTINUED | OUTPATIENT
Start: 2022-02-01 | End: 2022-02-01

## 2022-02-01 RX ORDER — CALCIUM GLUCONATE 20 MG/ML
1 INJECTION, SOLUTION INTRAVENOUS ONCE
Status: COMPLETED | OUTPATIENT
Start: 2022-02-01 | End: 2022-02-01

## 2022-02-01 RX ORDER — DEXAMETHASONE SODIUM PHOSPHATE 4 MG/ML
6 INJECTION, SOLUTION INTRA-ARTICULAR; INTRALESIONAL; INTRAMUSCULAR; INTRAVENOUS; SOFT TISSUE ONCE
Status: COMPLETED | OUTPATIENT
Start: 2022-02-03 | End: 2022-02-03

## 2022-02-01 RX ORDER — CLINDAMYCIN PHOSPHATE 600 MG/50ML
600 INJECTION INTRAVENOUS ONCE
Status: COMPLETED | OUTPATIENT
Start: 2022-02-01 | End: 2022-02-01

## 2022-02-01 RX ORDER — LABETALOL 20 MG/4 ML (5 MG/ML) INTRAVENOUS SYRINGE
10 EVERY 4 HOURS PRN
Status: DISCONTINUED | OUTPATIENT
Start: 2022-02-01 | End: 2022-02-04

## 2022-02-01 RX ORDER — ASPIRIN 81 MG/1
81 TABLET ORAL DAILY
Status: DISCONTINUED | OUTPATIENT
Start: 2022-02-01 | End: 2022-02-04 | Stop reason: HOSPADM

## 2022-02-01 RX ORDER — PRAVASTATIN SODIUM 80 MG/1
80 TABLET ORAL
Status: DISCONTINUED | OUTPATIENT
Start: 2022-02-01 | End: 2022-02-04 | Stop reason: HOSPADM

## 2022-02-01 RX ADMIN — DEXAMETHASONE SODIUM PHOSPHATE 10 MG: 4 INJECTION INTRA-ARTICULAR; INTRALESIONAL; INTRAMUSCULAR; INTRAVENOUS; SOFT TISSUE at 06:10

## 2022-02-01 RX ADMIN — TRANEXAMIC ACID 1000 MG: 1 INJECTION, SOLUTION INTRAVENOUS at 00:21

## 2022-02-01 RX ADMIN — TRANEXAMIC ACID 1000 MG: 1 INJECTION, SOLUTION INTRAVENOUS at 00:10

## 2022-02-01 RX ADMIN — AMLODIPINE BESYLATE 5 MG: 5 TABLET ORAL at 14:59

## 2022-02-01 RX ADMIN — CLINDAMYCIN PHOSPHATE 600 MG: 600 INJECTION, SOLUTION INTRAVENOUS at 09:57

## 2022-02-01 RX ADMIN — Medication 14 MG: at 14:53

## 2022-02-01 RX ADMIN — ALBUTEROL SULFATE 10 MG: 2.5 SOLUTION RESPIRATORY (INHALATION) at 01:14

## 2022-02-01 RX ADMIN — DEXAMETHASONE SODIUM PHOSPHATE 6 MG: 4 INJECTION INTRA-ARTICULAR; INTRALESIONAL; INTRAMUSCULAR; INTRAVENOUS; SOFT TISSUE at 22:55

## 2022-02-01 RX ADMIN — CLINDAMYCIN PHOSPHATE 600 MG: 600 INJECTION, SOLUTION INTRAVENOUS at 01:52

## 2022-02-01 RX ADMIN — SODIUM CHLORIDE, SODIUM LACTATE, POTASSIUM CHLORIDE, AND CALCIUM CHLORIDE 75 ML/HR: .6; .31; .03; .02 INJECTION, SOLUTION INTRAVENOUS at 09:50

## 2022-02-01 RX ADMIN — AMLODIPINE BESYLATE 5 MG: 5 TABLET ORAL at 09:44

## 2022-02-01 RX ADMIN — PRAVASTATIN SODIUM 80 MG: 80 TABLET ORAL at 17:58

## 2022-02-01 RX ADMIN — FUROSEMIDE 40 MG: 10 INJECTION, SOLUTION INTRAMUSCULAR; INTRAVENOUS at 01:19

## 2022-02-01 RX ADMIN — CALCIUM GLUCONATE 1 G: 20 INJECTION, SOLUTION INTRAVENOUS at 01:25

## 2022-02-01 RX ADMIN — DEXAMETHASONE SODIUM PHOSPHATE 6 MG: 4 INJECTION INTRA-ARTICULAR; INTRALESIONAL; INTRAMUSCULAR; INTRAVENOUS; SOFT TISSUE at 14:53

## 2022-02-01 RX ADMIN — ASPIRIN 81 MG: 81 TABLET, COATED ORAL at 09:44

## 2022-02-01 NOTE — PROGRESS NOTES
Pastoral Care Progress Note    2022  Patient: Gary Huynh : 1960  Admission Date & Time: 20225  MRN: [id number] [facility]: [id number]                     Chaplaincy Interventions Utilized:   Exploration: Explored emotional needs & resources    Relationship Building: Cultivated a relationship of care and support and Listened empathically    Chaplaincy Outcomes Achieved:  Expressed gratitude      Spiritual Coping Strategies Utilized:   Spiritual gratitude

## 2022-02-01 NOTE — ASSESSMENT & PLAN NOTE
· Patient has a history of ETOH withdrawal seizures  · Patient denies recent ETOH usage  · Will monitor for withdrawal  · Will add thiamine and folic acid

## 2022-02-01 NOTE — PROGRESS NOTES
02/01/22 1200   Clinical Encounter Type   Visited With Patient; Health care provider   Routine Visit Introduction   Continue Visiting Yes

## 2022-02-01 NOTE — H&P
[address]an 72 1960, 62 y o  male MRN: [id number]  Unit/Bed#: ICU 14 Encounter: [id number]  Primary Care Provider: No primary care provider on file  Date and time admitted to hospital: 1/31/2022  9:45 PM    * Pharyngeal edema  Assessment & Plan  · Patient presents via EMS for trouble swallowing  · He states that it started late last night into early this morning with a slight tickle in the back of his throat  He states that this progressed throughout the day to feeling fullness in his throat, change in voice and then difficulty swallowing  · Patient states that he was able to swallow both food and liquid without a problem yesterday  · Patient coughs and feels short of breath at times when he swallowsPatient denies fevers, chills, recent dental infections, recent viral illness  · CT soft tissue neck: diffuse swelling noted involving the uvula, the mucosa of the oropharynx and hypopharynx compatible with diffuse pharyngitis  There is no evidence for peritonsillar or pharyngeal abscess  The epiglottis does not appear significantly thickened  No retropharyngeal abscess is present although an effusion is present  · Received benadryl, racemic epi x2, decadron, tranexamic acid and FFP x1  · Continue decadron  · Continue clindamycin, received first dose in ED  · ENT recommendations appreciated    ALEKS (acute kidney injury) ([facility] [address] )  Assessment & Plan  · Admission creatinine, 4 5      · Last known creatinine was in 2018, 1 31  · Potassium 5 6  · Start LR at 125cc/hr  · Trend renal indices  · Avoid nephrotoxic medications  · Trend BMP    CHF (congestive heart failure) ([facility])  Assessment & Plan  Wt Readings from Last 3 Encounters:   02/19/18 111 kg (245 lb 6 oz)       · History of CHF  · Has been without medication x1 year due to no health insurance  · No signs of fluid overload  · Will restart 5mg Amlodipine  daily  · Hold beta lisinopril in setting of pharyngeal edema and his ALEKS  · ECHO ordered      Benign essential HTN  Assessment & Plan  · History of hypertension  · Has been without medication x1 year due to no health insurance  · Will restart 5mg Amlodipine  daily  · Hold beta blockers in setting of angioedema and ALEKS    Hyperkalemia  Assessment & Plan  · Admission potassium, 5 6  · Received albuterol and calcium gluconate in the ED  · Trend BMP    Hyperlipidemia  Assessment & Plan  · History of hyperlipidemia  · Has been without medication x1 year due to no health insurance  · Will restart 80 mg pravastatin    Withdrawal seizures ([address] )  Assessment & Plan  · Patient has a history of ETOH withdrawal seizures  · Patient denies recent ETOH usage  · Will monitor for withdrawal  · Will add thiamine and folic acid    High anion gap metabolic acidosis  Assessment & Plan  · AG = 19  · CO2 = 13  · BUN =  35  · Trend BMP  · Procal - pending  · Lactic acid - pending  · Likely related to his ALEKS and elevated creatinine      -------------------------------------------------------------------------------------------------------------  Chief Complaint: trouble swallowing    History of Present Illness   HX and PE limited by:     Mary Jo Foss is a 62 y o  male with a past medical history of hypertension, hyperlipidemia and congestive heart failure who presents via EMS for trouble swallowing  The patient states he ate normal yesterday without difficulty swallow food and liquids  He states late last night into early this morning he started with a slight tickle in the back of his throat which progressed throughout the day to feeling fullness in his throat, change in voice and then difficulty swallowing  Patient coughs and feels short of breath at times when he swallows   Patient denies fevers, chills, recent dental infections, recent viral infections, history of esophageal stricture food bolus  CT soft tissue neck showed pharyngeal edema with possible effusion   ENT consulted and no acute interventions at this time  Patient given  benadryl, racemic epi x2, decadron, TXA, FFP x1 and clindamycin in ED       Patient has ALEKS with creatinine of 4 5 and BUN 35, anion gap metabolic acidosis with CO 12 and AG 19, and hyperkalemia with potassium 5 6  Patient has not seen a physician in over 3 years  Patient was admitted here in 2018 with a creatinine of 1 31  He admits that he has has not taking medications for HTN, CHF or HLD in approximately 1 year because he is without health insurance  Patient has history of ETOH withdraw seizures but denies recent ETOH use  Patient does smoke marijuana occasionally and cigarettes daily  Saida Tobin is not vaccinated for COVID    History obtained from chart review and the patient   -------------------------------------------------------------------------------------------------------------  Dispo: Admit to Critical Care     Code Status: Prior  --------------------------------------------------------------------------------------------------------------  Review of Systems    A 12-point, complete review of systems was reviewed and negative except as stated above     Physical Exam  HENT:      Head: Normocephalic  Mouth/Throat:      Mouth: Mucous membranes are moist       Comments: Hoarse voice  Eyes:      Pupils: Pupils are equal, round, and reactive to light  Neck:      Comments: Soft neck, trachea midline, no stridor, posterior pharynx was not visible secondary to edema  Cardiovascular:      Rate and Rhythm: Normal rate  Pulmonary:      Effort: Pulmonary effort is normal    Abdominal:      General: Bowel sounds are normal  There is no distension  Tenderness: There is no abdominal tenderness  Musculoskeletal:         General: No swelling  Cervical back: Neck supple  Lymphadenopathy:      Cervical: No cervical adenopathy  Skin:     General: Skin is warm and dry  Neurological:      General: No focal deficit present        Mental Status: He is alert and oriented to person, place, and time  Mental status is at baseline        --------------------------------------------------------------------------------------------------------------  Vitals:   Vitals:    02/01/22 0037 02/01/22 0241 02/01/22 0330 02/01/22 0445   BP: (!) 188/104 146/67 151/67 (!) 177/95   BP Location: Left arm Left arm Right arm Left arm   Pulse: 86 87 88 78   Resp:  18 18 18   Temp:       TempSrc:       SpO2: 100% 99% 100% 97%   Weight:    109 kg (240 lb)   Height:    5' 8" (1 727 m)     Temp  Min: 97 8 °F (36 6 °C)  Max: 97 8 °F (36 6 °C)  IBW (Ideal Body Weight): 68 4 kg  Height: 5' 8" (172 7 cm)  Body mass index is 36 49 kg/m²  Laboratory and Diagnostics:  Results from last 7 days   Lab Units 01/31/22  2220   WBC Thousand/uL 10 69*   HEMOGLOBIN g/dL 11 5*   HEMATOCRIT % 35 2*   PLATELETS Thousands/uL 294   NEUTROS PCT % 64   MONOS PCT % 7     Results from last 7 days   Lab Units 02/01/22  0204 01/31/22  2220   SODIUM mmol/L 135* 134*   POTASSIUM mmol/L 4 1 5 6*   CHLORIDE mmol/L 102 102   CO2 mmol/L 15* 13*   ANION GAP mmol/L 18* 19*   BUN mg/dL 36* 35*   CREATININE mg/dL 4 34* 4 50*   CALCIUM mg/dL 7 7* 7 4*   GLUCOSE RANDOM mg/dL 93 81   ALT U/L  --  36   AST U/L  --  43   ALK PHOS U/L  --  120*   ALBUMIN g/dL  --  2 6*   TOTAL BILIRUBIN mg/dL  --  0 89                   Results from last 7 days   Lab Units 02/01/22  0413   LACTIC ACID mmol/L 0 8     ABG:    VBG:          Micro:        EKG: NSR  Imaging: I have personally reviewed pertinent reports  and I have personally reviewed pertinent films in PACS      Historical Information   Past Medical History:   Diagnosis Date    CHF (congestive heart failure) ([facility] [address] )     Hypertension      History reviewed  No pertinent surgical history    Social History   Social History     Substance and Sexual Activity   Alcohol Use Yes    Alcohol/week: 10 0 standard drinks    Types: 10 Cans of beer per week    Comment: every day drinker     Social History     Substance and Sexual Activity   Drug Use Yes    Types: Marijuana    Comment: occasionally     Social History     Tobacco Use   Smoking Status Current Every Day Smoker    Packs/day: 1 00    Years: 30 00    Pack years: 30 00   Smokeless Tobacco Never Used     Exercise History:   Family History:   History reviewed  No pertinent family history  Family history unknown and I have reviewed this patient's family history and commented on sigificant items within the HPI      Medications:  Current Facility-Administered Medications   Medication Dose Route Frequency    amLODIPine (NORVASC) tablet 5 mg  5 mg Oral Daily    clindamycin (CLEOCIN) IVPB (premix in dextrose) 600 mg 50 mL  600 mg Intravenous Q8H    dexamethasone (DECADRON) injection 10 mg  10 mg Intravenous Q8H [address]    pravastatin (PRAVACHOL) tablet 80 mg  80 mg Oral Daily With Dinner     Home medications:  Prior to Admission Medications   Prescriptions Last Dose Informant Patient Reported? Taking?    Multiple Vitamin (DAILY-ROB PO) Not Taking at Unknown time  Yes No   Sig: Take 1 tablet by mouth daily   Patient not taking: Reported on 2/1/2022    amLODIPine (NORVASC) 5 mg tablet Not Taking at Unknown time  No No   Sig: Take 1 tablet (5 mg total) by mouth daily   Patient not taking: Reported on 2/1/2022    aspirin (ECOTRIN LOW STRENGTH) 81 mg EC tablet Not Taking at Unknown time  Yes No   Sig: Take 81 mg by mouth daily   Patient not taking: Reported on 2/1/2022    carvedilol (COREG) 25 mg tablet Not Taking at Unknown time  Yes No   Sig: Take 25 mg by mouth 2 (two) times a day with meals   Patient not taking: Reported on 2/1/2022    furosemide (LASIX) 40 mg tablet Not Taking at Unknown time  No No   Sig: Take 0 5 tablets (20 mg total) by mouth daily   Patient not taking: Reported on 2/1/2022    lisinopril (ZESTRIL) 40 mg tablet Not Taking at Unknown time  No No   Sig: Take 1 tablet (40 mg total) by mouth daily   Patient not taking: Reported on 2/1/2022    pravastatin (PRAVACHOL) 80 mg tablet Not Taking at Unknown time  No No   Sig: Take 1 tablet (80 mg total) by mouth daily with dinner   Patient not taking: Reported on 2/1/2022       Facility-Administered Medications: None     Allergies:  No Known Allergies    ------------------------------------------------------------------------------------------------------------  Advance Directive and Living Will:      Power of :    POLST:    ------------------------------------------------------------------------------------------------------------  Anticipated Length of Stay is > 2 midnights    Care Time Delivered:   Upon my evaluation, this patient had a high probability of imminent or life-threatening deterioration due to acute pharyngitis with potential for airway compromise, which required my direct attention, intervention, and personal management  I have personally provided 40 minutes (0400 to 0440) of critical care time, exclusive of procedures, teaching, family meetings, and any prior time recorded by providers other than myself  BHANU Edmond        Portions of the record may have been created with voice recognition software  Occasional wrong word or "sound a like" substitutions may have occurred due to the inherent limitations of voice recognition software    Read the chart carefully and recognize, using context, where substitutions have occurred

## 2022-02-01 NOTE — ASSESSMENT & PLAN NOTE
· AG = 19  · CO2 = 13  · BUN =  35  · Trend BMP  · Procal - pending  · Lactic acid - pending  · Likely related to his ALEKS and elevated creatinine

## 2022-02-01 NOTE — ASSESSMENT & PLAN NOTE
· AG = 19  · CO2 = 13  · BUN =  35  · Procal - 0 24  · Lactic acid - 0 8  · Trend BMP  · Likely related to his ALEKS and elevated creatinine

## 2022-02-01 NOTE — APP STUDENT NOTE
History & Physical Exam - Critical Care   Antoinette Na 62 y o  male MRN: [id number]  Unit/Bed#: ED 16 Encounter: [id number]      Assessment/Plan:  Pharyngeal edema  · Patient presents via EMS for trouble swallowing  · He states that it started late last night into early this morning with a slight tickle in the back of his throat  He states that this progressed throughout the day to feeling fullness in his throat, change in voice and then difficulty swallowing  · Patient states that he was able to swallow both food and liquid without a problem yesterday  · Patient coughs and feels short of breath at times when he swallowsPatient denies fevers, chills, recent dental infections, recent viral illness  · CT soft tissue neck: diffuse swelling noted involving the uvula, the mucosa of the oropharynx and hypopharynx compatible with diffuse pharyngitis  There is no evidence for peritonsillar or pharyngeal abscess  The epiglottis does not appear significantly thickened  No retropharyngeal abscess is present although an effusion is present  · Received benadryl, racemic epi x2, decadron, tranexamic acid and FFP x1  · Continue decadron  · Continue clindamycin, received first dose in ED  · ENT recommendations appreciated    Acute Kidney Injury  · Admission creatinine, 4 5      · Last known creatinine was in 2018, 1 31  · Potassium 5 6  · Trend renal indices  · Avoid nephrotoxic medications  · Trend BMP    High Anion Gap Metabolic Acidosis  · AG = 19  · CO2 = 13  · BUN =  35  · Trend BMP  · Procal - pending  · Lactic acid - pending    Hyperkalemia  · Admission potassium, 5 6  · Received albuterol and calcium gluconate in the ED  · Trend BMP    Congestive Heart Failure  Wt Readings from Last 3 Encounters:   02/19/18 111 kg (245 lb 6 oz)     · History of CHF  · Has been without medication x1 year due to no health insurance  · No signs of fluid overload  · Will restart 5mg Amlodipine 5mg daily  · Hold beta blockers in setting of pharyngeal edema  · ECHO ordered    Hyperlipidemia  · History of hyperlipidemia  · Has been without medication x1 year due to no health insurance  · Will restart 80 mg pravastatin    Hypertension  · History of hypertension  · Has been without medication x1 year due to no health insurance  · Will restart 5mg Amlodipine 5mg daily  · Hold beta blockers in setting of angioedema and ALEKS    Withdrawal Seizures  · Patient has a history of ETOH withdrawal seizures  · Patient denies recent ETOH usage    Critical Care Time: 0 minutes  Documented critical care time excludes any procedures documented elsewhere  It also excludes any family updates    _____________________________________________________________________      HPI:    Rika Main is a 62 y o  male with a past medical history of hypertension, hyperlipidemia and congestive heart failure who presents via EMS for trouble swallowing  The patient states he ate normal yesterday without difficulty swallow food and liquids  He states late last night into early this morning he started with a slight tickle in the back of his throat which progressed throughout the day to feeling fullness in his throat, change in voice and then difficulty swallowing  Patient coughs and feels short of breath at times when he swallows  Patient denies fevers, chills, recent dental infections, recent viral infections, history of esophageal stricture food bolus  CT soft tissue neck showed pharyngeal edema with possible effusion  ENT consulted and no acute interventions at this time  Patient given  benadryl, racemic epi x2, decadron, tranexamic acid, FFP x1 and clindamycin in ED  Patient has ALEKS with creatinine of 4 5 and BUN 35, anion gap metabolic acidosis with CO 12 and AG 19, and hyperkalemia with potassium 5 6  Patient has not seen a physician in over 3 years  Patient was admitted here in 2018 with a creatinine of 1 31     He admits that he has has not taking medications for HTN, CHF or HLD in approximately 1 year because he is without health insurance  Patient has history of ETOH withdraw seizures but denies recent ETOH use  Patient does smoke marijuana occasionally and cigarettes daily  Patient is not vaccinated for COVID  Review of Systems:  Constitutional: Negative for chills and fever  HENT: Positive for sore throat, trouble swallowing and voice change  Negative for stridor, congestion, dental problem, ear pain, facial swelling, mouth sores, nosebleeds, postnasal drip, rhinorrhea, sinus pressure and sinus pain  Eyes: Negative for pain and visual disturbance  Respiratory: Positive for shortness of breath (only after swallowing) and cough   Cardiovascular: Negative for chest pain and palpitations  Gastrointestinal: Negative for abdominal pain, nausea and vomiting  Musculoskeletal: Negative for neck pain and neck stiffness  Skin: Negative for color change, pallor, rash and wound  Allergic/Immunologic: Negative for immunocompromised state  Neurological: Negative for dizziness and light-headedness  All other systems reviewed and are negative  Historical Information   Past Medical History:   Diagnosis Date    [facility]congestive heart failure) ([facility] [address] )     Hypertension      History reviewed  No pertinent surgical history  Social History   Social History     Substance and Sexual Activity   Alcohol Use Yes    Alcohol/week: 10 0 standard drinks    Types: 10 Cans of beer per week    Comment: every day drinker     Social History     Substance and Sexual Activity   Drug Use Yes    Types: Marijuana    Comment: occasionally     Social History     Tobacco Use   Smoking Status Current Every Day Smoker    Packs/day: 1 00    Years: 30 00    Pack years: 30 00   Smokeless Tobacco Never Used       Family History:   History reviewed  No pertinent family history      Medications:  Pertinent medications were reviewed  Current Facility-Administered Medications   Medication Dose Route Frequency Provider Last Rate    amLODIPine  5 mg Oral Daily BHANU Pedroza      clindamycin  600 mg Intramuscular Q8H BHANU Pedroza      dexamethasone  10 mg Intravenous [facility] BHANU Pedroza      pravastatin  80 mg Oral Daily With BHANU Bee           No Known Allergies      Vitals:   /67 (BP Location: Right arm)   Pulse 88   Temp 97 8 °F (36 6 °C) (Oral)   Resp 18   SpO2 100%   There is no height or weight on file to calculate BMI  SpO2: 100 %,   SpO2 Activity: At Rest,   O2 Device: None (Room air)      Intake/Output Summary (Last 24 hours) at 2/1/2022 0339  Last data filed at 2/1/2022 0224  Gross per 24 hour   Intake 1200 ml   Output --   Net 1200 ml     Invasive Devices  Report    Peripheral Intravenous Line            Peripheral IV 01/31/22 Dorsal (posterior); Left Hand 1 day    Peripheral IV 02/01/22 Right Antecubital <1 day                Physical Exam:  Constitutional:       General: He is not in acute distress  Appearance: He is well-developed  He is not ill-appearing, toxic-appearing or diaphoretic  HENT:      Head: Normocephalic and atraumatic  Jaw: No trismus, tenderness, swelling, pain on movement or malocclusion  Right Ear: External ear normal       Left Ear: External ear normal       Nose: No congestion or rhinorrhea  Mouth/Throat:      Mouth: Mucous membranes are moist  Angioedema present  Tongue: No lesions  Tongue does not deviate from midline  Pharynx: Pharyngeal swelling and uvula swelling present  No posterior oropharyngeal erythema  Comments: Unable to fully see the uvula due to swelling and posterior pharyngeal edema  Tongue does not appear to be swollen at this time  No sublingual swelling or dental abscess noted  Eyes:      General: No scleral icterus  Conjunctiva/sclera: Conjunctivae normal       Right eye: Right conjunctiva is not injected  Left eye: Left conjunctiva is not injected  Neck:      Trachea: Trachea normal  No abnormal tracheal secretions or tracheal deviation  Comments: Neck does appear full but soft  No palpable lymphadenopathy  No stridor at this time  Patient handling his own secretions  Cardiovascular:      Rate and Rhythm: Normal rate and regular rhythm  Pulses: Normal pulses  Heart sounds: No murmur heard  Pulmonary:      Effort: Pulmonary effort is normal  No tachypnea, accessory muscle usage, respiratory distress or retractions  Breath sounds: Normal breath sounds  No stridor, decreased air movement or transmitted upper airway sounds  No decreased breath sounds, wheezing, rhonchi or rales  Abdominal:      Palpations: Abdomen is soft  Tenderness: There is no abdominal tenderness  Musculoskeletal:      Cervical back: Normal range of motion and neck supple  No edema, erythema, rigidity or crepitus  Normal range of motion  Right lower leg: No edema  Left lower leg: No edema  Lymphadenopathy:      Cervical: No cervical adenopathy  Skin:     General: Skin is warm and dry  Capillary Refill: Capillary refill takes less than 2 seconds  Coloration: Skin is not pale  Findings: No erythema or rash  Neurological:      Mental Status: He is alert and oriented to person, place, and time  Motor: No abnormal muscle tone  Psychiatric:         Mood and Affect: Mood normal          Behavior: Behavior normal      Diagnostic Data:  Lab: I have personally reviewed pertinent lab results  ,   CBC:  Results from last 7 days   Lab Units 01/31/22  2220   WBC Thousand/uL 10 69*   HEMOGLOBIN g/dL 11 5*   HEMATOCRIT % 35 2*   PLATELETS Thousands/uL 294      CMP:   Lab Results   Component Value Date    SODIUM 135 (L) 02/01/2022    K 4 1 02/01/2022     02/01/2022    CO2 15 (L) 02/01/2022    BUN 36 (H) 02/01/2022    CREATININE 4 34 (H) 02/01/2022    CALCIUM 7 7 (L) 02/01/2022    AST 43 01/31/2022    ALT 36 01/31/2022    ALKPHOS 120 (H) 01/31/2022    EGFR 13 02/01/2022       Imaging: I have personally reviewed the pertinent imaging studies on the PACS system    CT Soft tissue neck: diffuse swelling noted involving the uvula, the mucosa of the oropharynx and hypopharynx compatible with diffuse pharyngitis  There is no evidence for peritonsillar or pharyngeal abscess  The epiglottis does not appear significantly thickened  No retropharyngeal abscess is present although an effusion is present  VTE Prophylaxis: Heparin SQ q 8hrs    Code Status: FULL CODE    Araceli Pleas    Portions of the record may have been created with voice recognition software  Occasional wrong word or "sound a like" substitutions may have occurred due to the inherent limitations of voice recognition software  Read the chart carefully and recognize, using context, where substitutions have occurred

## 2022-02-01 NOTE — ASSESSMENT & PLAN NOTE
· History of hypertension  · Has been without medication x1 year due to no health insurance  · Will restart 5mg Amlodipine  daily  · Hold beta blockers in setting of angioedema and ALEKS

## 2022-02-01 NOTE — ASSESSMENT & PLAN NOTE
· Patient presents via EMS for trouble swallowing  · He states that it started late last night into early this morning with a slight tickle in the back of his throat  He states that this progressed throughout the day to feeling fullness in his throat, change in voice and then difficulty swallowing  · Patient states that he was able to swallow both food and liquid without a problem yesterday  · Patient coughs and feels short of breath at times when he swallowsPatient denies fevers, chills, recent dental infections, recent viral illness  · CT soft tissue neck: diffuse swelling noted involving the uvula, the mucosa of the oropharynx and hypopharynx compatible with diffuse pharyngitis  There is no evidence for peritonsillar or pharyngeal abscess  The epiglottis does not appear significantly thickened  No retropharyngeal abscess is present although an effusion is present    · Received benadryl, racemic epi x2, decadron, tranexamic acid and FFP x1  · Continue decadron  · Continue clindamycin, received first dose in ED  · ENT recommendations appreciated

## 2022-02-01 NOTE — ASSESSMENT & PLAN NOTE
Wt Readings from Last 3 Encounters:   02/01/22 109 kg (240 lb)   02/19/18 111 kg (245 lb 6 oz)       · History of CHF  · Has been without medication x1 year due to no health insurance  · No signs of fluid overload  · Will restart 5mg Amlodipine  daily  · Hold beta lisinopril in setting of pharyngeal edema and his ALEKS  · ECHO ordered

## 2022-02-01 NOTE — ASSESSMENT & PLAN NOTE
· Admission creatinine, 4 5      · Last known creatinine was in 2018, 1 31  · Potassium 5 6  · Start LR at 125cc/hr  · Trend renal indices  · Avoid nephrotoxic medications  · Trend BMP

## 2022-02-01 NOTE — ASSESSMENT & PLAN NOTE
· History of hyperlipidemia  · Has been without medication x1 year due to no health insurance  · Will restart 80 mg pravastatin

## 2022-02-01 NOTE — ASSESSMENT & PLAN NOTE
Wt Readings from Last 3 Encounters:   02/19/18 111 kg (245 lb 6 oz)       · History of CHF  · Has been without medication x1 year due to no health insurance  · No signs of fluid overload  · Will restart 5mg Amlodipine  daily  · Hold beta lisinopril in setting of pharyngeal edema and his ALEKS  · ECHO ordered

## 2022-02-01 NOTE — ED PROVIDER NOTES
History  Chief Complaint   Patient presents with    Difficulty Swallowing     patient arrives via ems coming from home c/o difficulty swallowing, and like he can't catch his breath  patient states started this morning with a tickle in his throat and has gotten worse the last 2 hours  patient hx of chf     Patient presents via EMS for trouble swallowing  He states that it started late last night into early this morning with a slight tickle in the back of his throat  Patient states that he ate just normally yesterday  Had no problems digesting food  Patient was able to swallow both food and liquid without a problem  This morning he states that he had some trouble getting water down but was able to keep it down  He states that this progressed throughout the day to feeling fullness in his throat, change in voice and then difficulty swallowing  He states that he has to force himself to try to get any liquid down  It does take a while but then does go down  Patient coughs and feels short of breath at times when he swallows but then it improves quickly after  Patient denies any other primary symptoms such as chest pain, fevers, chills, recent dental infections, etcetera  History provided by:  Patient and EMS personnel   used: No    GI Problem  Location:  Throat  Quality:  Trouble swallowing  Severity:  Moderate  Onset quality:  Gradual  Duration:  1 day  Timing:  Constant  Progression:  Worsening  Chronicity:  New  Associated symptoms: shortness of breath (only after swallowing) and sore throat    Associated symptoms: no abdominal pain, no chest pain, no congestion, no cough, no ear pain, no fever, no nausea, no rash, no rhinorrhea and no vomiting        Prior to Admission Medications   Prescriptions Last Dose Informant Patient Reported? Taking?    Multiple Vitamin (DAILY-ROB PO)   Yes No   Sig: Take 1 tablet by mouth daily   amLODIPine (NORVASC) 5 mg tablet   No No   Sig: Take 1 tablet (5 mg total) by mouth daily   aspirin (ECOTRIN LOW STRENGTH) 81 mg EC tablet   Yes No   Sig: Take 81 mg by mouth daily   carvedilol (COREG) 25 mg tablet   Yes No   Sig: Take 25 mg by mouth 2 (two) times a day with meals   furosemide (LASIX) 40 mg tablet   No No   Sig: Take 0 5 tablets (20 mg total) by mouth daily   lisinopril (ZESTRIL) 40 mg tablet   No No   Sig: Take 1 tablet (40 mg total) by mouth daily   pravastatin (PRAVACHOL) 80 mg tablet   No No   Sig: Take 1 tablet (80 mg total) by mouth daily with dinner      Facility-Administered Medications: None       Past Medical History:   Diagnosis Date    [facility]     Hypertension        History reviewed  No pertinent surgical history  History reviewed  No pertinent family history  I have reviewed and agree with the history as documented  E-Cigarette/Vaping     E-Cigarette/Vaping Substances     Social History     Tobacco Use    Smoking status: Current Every Day Smoker     Packs/day: 1 00     Years: 30 00     Pack years: 30 00    Smokeless tobacco: Never Used   Substance Use Topics    Alcohol use: Yes     Alcohol/week: 10 0 standard drinks     Types: 10 Cans of beer per week     Comment: every day drinker    Drug use: Yes     Types: Marijuana     Comment: occasionally       Review of Systems   Constitutional: Negative for chills and fever  HENT: Positive for sore throat, trouble swallowing and voice change  Negative for congestion, dental problem, ear pain, facial swelling, mouth sores, nosebleeds, postnasal drip, rhinorrhea, sinus pressure and sinus pain  Eyes: Negative for pain and visual disturbance  Respiratory: Positive for shortness of breath (only after swallowing)  Negative for cough  Cardiovascular: Negative for chest pain and palpitations  Gastrointestinal: Negative for abdominal pain, nausea and vomiting  Musculoskeletal: Negative for neck pain and neck stiffness     Skin: Negative for color change, pallor, rash and wound  Allergic/Immunologic: Negative for immunocompromised state  Neurological: Negative for dizziness and light-headedness  All other systems reviewed and are negative  Physical Exam  Physical Exam  Vitals and nursing note reviewed  Constitutional:       General: He is not in acute distress  Appearance: He is well-developed  He is not ill-appearing, toxic-appearing or diaphoretic  HENT:      Head: Normocephalic and atraumatic  Jaw: No trismus, tenderness, swelling, pain on movement or malocclusion  Right Ear: External ear normal       Left Ear: External ear normal       Nose: No congestion or rhinorrhea  Mouth/Throat:      Mouth: Mucous membranes are moist  Angioedema present  Tongue: No lesions  Tongue does not deviate from midline  Pharynx: Pharyngeal swelling and uvula swelling present  No posterior oropharyngeal erythema  Comments: Unable to fully see the uvula due to swelling and posterior pharyngeal edema  Tongue does not appear to be swollen at this time  Localized angioedema to the posterior pharynx  No sublingual swelling or dental abscess noted  Eyes:      General: No scleral icterus  Conjunctiva/sclera: Conjunctivae normal       Right eye: Right conjunctiva is not injected  Left eye: Left conjunctiva is not injected  Neck:      Trachea: Trachea normal  No abnormal tracheal secretions or tracheal deviation  Comments: Neck does appear full but soft  No palpable lymphadenopathy  Trachea appears normal   No stridor at this time  Patient id is laying back in bed, well-appearing comfortable handling his own secretions  Cardiovascular:      Rate and Rhythm: Normal rate and regular rhythm  Pulses: Normal pulses  Heart sounds: No murmur heard  Pulmonary:      Effort: Pulmonary effort is normal  No tachypnea, accessory muscle usage, respiratory distress or retractions  Breath sounds: Normal breath sounds  No stridor, decreased air movement or transmitted upper airway sounds  No decreased breath sounds, wheezing, rhonchi or rales  Abdominal:      Palpations: Abdomen is soft  Tenderness: There is no abdominal tenderness  Musculoskeletal:      Cervical back: Normal range of motion and neck supple  No edema, erythema, rigidity or crepitus  Normal range of motion  Right lower leg: No edema  Left lower leg: No edema  Lymphadenopathy:      Cervical: No cervical adenopathy  Skin:     General: Skin is warm and dry  Capillary Refill: Capillary refill takes less than 2 seconds  Coloration: Skin is not pale  Findings: No erythema or rash  Neurological:      Mental Status: He is alert and oriented to person, place, and time  Motor: No abnormal muscle tone     Psychiatric:         Mood and Affect: Mood normal          Behavior: Behavior normal          Vital Signs  ED Triage Vitals   Temperature Pulse Respirations Blood Pressure SpO2   01/31/22 2153 01/31/22 2153 01/31/22 2153 01/31/22 2153 01/31/22 2153   97 8 °F (36 6 °C) 95 18 (!) 202/96 99 %      Temp Source Heart Rate Source Patient Position - Orthostatic VS BP Location FiO2 (%)   01/31/22 2153 01/31/22 2153 01/31/22 2153 01/31/22 2153 --   Oral Monitor Lying Right arm       Pain Score       02/01/22 0037       No Pain           Vitals:    01/31/22 2153 02/01/22 0037   BP: (!) 202/96 (!) 188/104   Pulse: 95 86   Patient Position - Orthostatic VS: Lying Lying         Visual Acuity      ED Medications  Medications   calcium gluconate 1 g in sodium chloride 0 9% 50 mL (premix) (1 g Intravenous New Bag 2/1/22 0125)   clindamycin (CLEOCIN) IVPB (premix in dextrose) 600 mg 50 mL (has no administration in time range)   sodium chloride 0 9 % bolus 1,000 mL (0 mL Intravenous Stopped 2/1/22 0019)   ondansetron (ZOFRAN) injection 4 mg (4 mg Intravenous Given 1/31/22 2221)   racepinephrine 2 25 % inhalation solution 0 5 mL (0 5 mL Nebulization Given 1/31/22 2226)   dexamethasone (DECADRON) injection 10 mg (10 mg Intravenous Given 1/31/22 2222)   diphenhydrAMINE (BENADRYL) injection 25 mg (25 mg Intravenous Given 1/31/22 2342)   tranexamic Acid 1,000 mg in sodium chloride 0 9 % 100 mL IVPB Loading Dose (0 mg Intravenous Stopped 2/1/22 0020)   racepinephrine 2 25 % inhalation solution 0 5 mL (0 5 mL Nebulization Given 1/31/22 2350)   furosemide (LASIX) injection 40 mg (40 mg Intravenous Given 2/1/22 0119)   albuterol inhalation solution 10 mg (10 mg Nebulization Given 2/1/22 0114)       Diagnostic Studies  Results Reviewed     Procedure Component Value Units Date/Time    COVID/FLU/RSV - 2 hour TAT [[id number]] Collected: 02/01/22 0041    Lab Status:  In process Specimen: Nares from Nose Updated: 02/01/22 0056    Comprehensive metabolic panel [[id number]]  (Abnormal) Collected: 01/31/22 2220    Lab Status: Final result Specimen: Blood from Hand, Left Updated: 01/31/22 2302     Sodium 134 mmol/L      Potassium 5 6 mmol/L      Chloride 102 mmol/L      CO2 13 mmol/L      ANION GAP 19 mmol/L      BUN 35 mg/dL      Creatinine 4 50 mg/dL      Glucose 81 mg/dL      Calcium 7 4 mg/dL      Corrected Calcium 8 5 mg/dL      AST 43 U/L      ALT 36 U/L      Alkaline Phosphatase 120 U/L      Total Protein 7 3 g/dL      Albumin 2 6 g/dL      Total Bilirubin 0 89 mg/dL      eGFR 13 ml/min/1 73sq m     Narrative:      National Kid[employer] Disease Foundation guidelines for Chronic Kidney Disease (CKD):     Stage 1 with normal or high GFR (GFR > 90 mL/min/1 73 square meters)    Stage 2 Mild CKD (GFR = 60-89 mL/min/1 73 square meters)    Stage 3A Moderate CKD (GFR = 45-59 mL/min/1 73 square meters)    Stage 3B Moderate CKD (GFR = 30-44 mL/min/1 73 square meters)    Stage 4 Severe CKD (GFR = 15-29 mL/min/1 73 square meters)    Stage 5 End Stage CKD (GFR <15 mL/min/1 73 square meters)  Note: GFR calculation is accurate only with a steady state creatinine    CBC and differential [[id number]]  (Abnormal) Collected: 01/31/22 2220    Lab Status: Final result Specimen: Blood from Hand, Left Updated: 01/31/22 2233     WBC 10 69 Thousand/uL      RBC 3 64 Million/uL      Hemoglobin 11 5 g/dL      Hematocrit 35 2 %      MCV 97 fL      MCH 31 6 pg      MCHC 32 7 g/dL      RDW 13 8 %      MPV 11 3 fL      Platelets 562 Thousands/uL      nRBC 0 /100 WBCs      Neutrophils Relative 64 %      Immat GRANS % 1 %      Lymphocytes Relative 25 %      Monocytes Relative 7 %      Eosinophils Relative 2 %      Basophils Relative 1 %      Neutrophils Absolute 6 95 Thousands/µL      Immature Grans Absolute 0 06 Thousand/uL      Lymphocytes Absolute 2 69 Thousands/µL      Monocytes Absolute 0 71 Thousand/µL      Eosinophils Absolute 0 18 Thousand/µL      Basophils Absolute 0 10 Thousands/µL                  CT soft tissue neck wo contrast   ED Interpretation by Irma Dominique DO (02/01 0103)   Image read by virtual Radiology  They state that there is diffuse swelling noted involving the uvula, the mucosa of the oropharynx and hypopharynx compatible with diffuse pharyngitis  There is no evidence for peritonsillar or pharyngeal abscess  The epiglottis does not appear significantly thickened  No retropharyngeal abscess is present although an effusion is present                   Procedures  ECG 12 Lead Documentation Only    Date/Time: 2/1/2022 12:38 AM  Performed by: Irma Dominique DO  Authorized by: Irma Dominique DO     Indications / Diagnosis:  Hyperkalemia  ECG reviewed by me, the ED Provider: yes    Patient location:  ED  Previous ECG:     Previous ECG:  Compared to current    Similarity:  Changes noted  Interpretation:     Interpretation: abnormal    Rate:     ECG rate:  86    ECG rate assessment: normal    Rhythm:     Rhythm: sinus rhythm    Ectopy:     Ectopy: none    QRS:     QRS intervals:  Normal  Conduction:     Conduction: normal    ST segments:     ST segments:  Normal  T waves: T waves: peaked      CriticalCare Time  Performed by: Brooke Collins DO  Authorized by: Brooke Collins DO     Critical care provider statement:     Critical care time (minutes):  90    Critical care time was exclusive of:  Separately billable procedures and treating other patients and teaching time    Critical care was necessary to treat or prevent imminent or life-threatening deterioration of the following conditions:  Renal failure (airway compromise )    Critical care was time spent personally by me on the following activities:  Blood draw for specimens, obtaining history from patient or surrogate, development of treatment plan with patient or surrogate, discussions with consultants, evaluation of patient's response to treatment, examination of patient, interpretation of cardiac output measurements, ordering and performing treatments and interventions, ordering and review of laboratory studies, ordering and review of radiographic studies, review of old charts and re-evaluation of patient's condition    I assumed direction of critical care for this patient from another provider in my specialty: no               ED Course                                             MDM  Number of Diagnoses or Management Options  Acute renal failure (ARF) ([facility] [address] ): new and requires workup  Chronic hypertension: new and requires workup  Hyperkalemia: new and requires workup  Pharyngeal edema: new and requires workup  Pharyngitis: new and requires workup  Diagnosis management comments: Patient presents with trouble swallowing  Started yesterday that progressed today to where he can only taking small sips of liquids  Patient not vomiting afterwards  Has no history of esophageal stricture food bolus  Patient has not seen a physician in over 3 years  Patient was admitted here in 2018 and that is the only known medical record that we have    Patient is a known hypertensive and has a history of congestive heart failure but is not taking his medications  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 but patient is denying any recent alcohol ingestion  Patient does smoke marijuana occasionally and cigarettes daily  Patient is not vaccinated for COVID  Patient has no other infectious complaints today  Patient has no signs or symptoms of stroke on exam   Patient does have localized swelling to the posterior pharynx and uvula  Patient has a change in voice  He is handling his own secretions at this time and does not have stridor  Will treat as possible angioedema or deep space infection with racemic epi, Decadron and give a bolus of IV fluids and obtain a CT scan  Will check basic blood work and re-evaluate  11:27 PM  Patient is in acute renal failure with a creatinine of 4 5  Last known creatinine was in 2018 and was 1 31  Potassium is 5 6  Patient also has a CO2 of 13 and a anion gap of 19  Unable to get CT with contrast now  Given these abnormal results patient will need to be admitted  Will add on an EKG now for hyperkalemia  11:37 PM  Patient just got back from CT scan and is now having worsening breathing  He states that he laid back and held his breath per scan but then started getting symptoms that he could not breathe  Patient arrived back in the room and was extremely diaphoretic, oxygen saturation dropped down to the low 90s, patient had pretty significant stridor and what appears to be more posterior pharyngeal edema and now some tongue swelling  I am concerned for possible angioedema that is worsening at this point  I am concerned that he could be losing his airway any moment  Will have anesthesia come down to help evaluate for probable intubation  Will give another dose of racemic epi, add a dose of Benadryl, TXA, FFP, have patient sit upright, continue with oxygen support, test for COVID and prep for intubation  12:01 AM  Patient now feeling much better    Patient received another dose of racemic epi and Benadryl thus far  FFP is going to be hung in just a minute and TXA still coming from the pharmacy  CT scan is still pending  Anesthesia did come to evaluate the patient but since patient is no longer stridorous, diaphoretic or drooling will hold off on intubation at this time  Patient actually states that he is having improvement in his swallowing as well  Still unclear etiology  12:38 AM  EKG does show peaked T-waves compared to prior  Even though there is hemolysis on his CMP I will start treatment for hyperkalemia  1:05 AM  CT back  CT shows diffuse swelling involving the uvula, mucosa of the oropharynx and hypopharynx  There is also an effusion the retropharyngeal space but no identifiable abscess  Case discussed with Critical Care  They asked that I speak with ENT for possible emergent evaluation and operative treatment and management  Tiger text sent to ENT     1:27 AM  Spoke with Dr Jr Malcolm from ENT  We reviewed his scan  Patient is doing much better at this point  He is laying on his right-hand side at about a 70-80 degree angle  Patient is in no acute distress  At this point Dr Jr Malcolm does not see any need for any emergent operative management  I do agree with this since the patient is improving and handling his secretions and airway at this point  Advises serial steroids and admission with critical care and ENT evaluation in the morning         Amount and/or Complexity of Data Reviewed  Clinical lab tests: ordered and reviewed  Tests in the radiology section of CPT®: ordered and reviewed  Tests in the medicine section of CPT®: reviewed and ordered  Review and summarize past medical records: yes  Discuss the patient with other providers: yes    Patient Progress  Patient progress: stable      Disposition  Final diagnoses:   Pharyngitis   Pharyngeal edema   Acute renal failure (ARF) (HCC)   Hyperkalemia   Chronic hypertension     Time reflects when diagnosis was documented in both MDM as applicable and the Disposition within this note     Time User Action Codes Description Comment    2/1/2022  1:29 AM Elvia Reamer Add [J02 9] Pharyngitis     2/1/2022  1:30 AM Smeriglio, Alvah Rm Add [J39 2] Pharyngeal edema     2/1/2022  1:31 AM Elvia Reamer Add [N19] Renal failure     2/1/2022  1:31 AM Smeriglio, Alvah Rm Remove [N19] Renal failure     2/1/2022  1:31 AM Smeriglio, Alvah Rm Add [N17 9] Acute renal failure (ARF) ([facility] )     2/1/2022  1:32 AM Smeriglio, Alvah Rm Add [E87 5] Hyperkalemia     2/1/2022  1:32 AM Elvia Reamer Add [I10] Chronic hypertension       ED Disposition     ED Disposition Condition Date/Time Comment    Admit Stable Tue Feb 1, 2022  1:29 AM Case was discussed with Critical Care and the patient's admission status was agreed to be Admission Status: inpatient status to the service of Dr Alon Wallace   Follow-up Information    None         Patient's Medications   Discharge Prescriptions    No medications on file       No discharge procedures on file      PDMP Review     None          ED Provider  Electronically Signed by           Eilas Patel DO  02/01/22 0133

## 2022-02-01 NOTE — CONSULTS
Consultation - ENT   Gary Huynh 62 y o  male MRN: [id number]  Unit/Bed#: ICU 14 Encounter: [id number]      Assessment/Plan     Assessment:  1  Angioedema - etiology unknown  No evidence of infection at this time  2   Dysphonia - secondary to above etiology - this should resolve without any further therapy other than what is already recommended  Plan:  Patient presented with an acute history of upper airway edema in the area of the uvula  This was not secondary to trauma or snoring based upon history  Etiology at this point is unknown but infection is unlikely  He denies any new foods or medications and was not even taking any of his medications recently due to lack of insurance  This is the 1st event which she has ever had regarding upper airway obstruction  Extent of workup is questionable  C1 esterase to rule out deficiency can be completed while he is in the hospital   RAST testing can also be ordered  It is doubtful that he will follow-up quickly as an outpatient  He currently has no insurance so I would recommend obtaining these tests while in the hospital in orders have been written  Upon discharge an EpiPen should be provided and he should be instructed on its use in case there is any recurrent event  As stated by the primary service staying off the Ace inhibitor is advisable although this is likely not the etiology since he was not taking this medication  At this point I do not see any reason to continue with oral antibiotics  History of Present Illness   Physician Requesting Consult: Suzy Weir*  Reason for Consult / Principal Problem: upper airway obstruction  HPI: Gary Huynh is a 62y o  year old male who presents with a recent discharge from the hospital for seizure following alcohol withdrawal   He was admitted early this morning with complaints of difficulty swallowing    He states that he was eating normal yesterday and then late in the evening into the early morning hours he began to have a tickle in the back of his throat which progressed to a feeling of fullness, change in voice, and difficulty swallowing  At times when he coughs he would feel shortness of breath  Upon admission in the emergency room he had a CT of the soft tissues of the neck  This demonstrated pharyngeal edema with most that edema being in the area of the uvula  There is some mild edema of the epiglottis  Films were reviewed personally     No abscess was identified in the peritonsillar or retropharyngeal area  In the emergency room he was given a combination of Benadryl, racemic epi, Decadron, TXA, FFP, and clindamycin  He was admitted to the ICU for further evaluation and monitoring  He does have a history of high blood pressure but was not taking his Ace inhibitor that was prescribed to him due to the lack of health insurance  He denies any trauma to the oropharynx or oral cavity in terms of food or external trauma  He is not currently vaccinated against COVID  Since admission he is feeling much better on the medications  He is not having as much difficulty with swallowing  His voice is improved, and he is not having any shortness of breath  Consults    Review of Systems   Constitutional: Negative for activity change, appetite change, fatigue and unexpected weight change  HENT: Positive for voice change  Negative for congestion, ear discharge, ear pain, facial swelling, hearing loss, nosebleeds, postnasal drip, rhinorrhea, sinus pressure, sinus pain, sneezing, sore throat, tinnitus and trouble swallowing  Eyes: Negative for photophobia, pain, discharge, itching and visual disturbance  Respiratory: Negative for cough, chest tightness and shortness of breath  Musculoskeletal: Negative for gait problem, neck pain and neck stiffness  Skin: Negative for rash and wound  Allergic/Immunologic: Negative for environmental allergies     Neurological: Negative for dizziness, facial asymmetry and speech difficulty  Hematological: Negative for adenopathy  Psychiatric/Behavioral: Negative for sleep disturbance  The patient is not nervous/anxious  Historical Information   Past Medical History:   Diagnosis Date    CHF (congestive heart failure) ([facility] [address] )     Hypertension      History reviewed  No pertinent surgical history  Social History   Social History     Substance and Sexual Activity   Alcohol Use Yes    Alcohol/week: 10 0 standard drinks    Types: 10 Cans of beer per week    Comment: every day drinker     Social History     Substance and Sexual Activity   Drug Use Yes    Types: Marijuana    Comment: occasionally     E-Cigarette/Vaping     E-Cigarette/Vaping Substances     Social History     Tobacco Use   Smoking Status Current Every Day Smoker    Packs/day: 1 00    Years: 30 00    Pack years: 30 00   Smokeless Tobacco Never Used     Family History: History reviewed  No pertinent family history  Meds/Allergies   current meds:   Current Facility-Administered Medications   Medication Dose Route Frequency    amLODIPine (NORVASC) tablet 5 mg  5 mg Oral Daily    aspirin (ECOTRIN LOW STRENGTH) EC tablet 81 mg  81 mg Oral Daily    clindamycin (CLEOCIN) IVPB (premix in dextrose) 600 mg 50 mL  600 mg Intravenous Q8H    dexamethasone (DECADRON) injection 10 mg  10 mg Intravenous Q8H [address]    Labetalol HCl (NORMODYNE) injection 10 mg  10 mg Intravenous Q4H PRN    lactated ringers infusion  75 mL/hr Intravenous Continuous    pravastatin (PRAVACHOL) tablet 80 mg  80 mg Oral Daily With Dinner    and PTA meds:   Prior to Admission Medications   Prescriptions Last Dose Informant Patient Reported? Taking?    Multiple Vitamin (DAILY-ROB PO) Not Taking at Unknown time  Yes No   Sig: Take 1 tablet by mouth daily   Patient not taking: Reported on 2/1/2022    amLODIPine (NORVASC) 5 mg tablet Not Taking at Unknown time  No No   Sig: Take 1 tablet (5 mg total) by mouth daily Patient not taking: Reported on 2/1/2022    aspirin (ECOTRIN LOW STRENGTH) 81 mg EC tablet Not Taking at Unknown time  Yes No   Sig: Take 81 mg by mouth daily   Patient not taking: Reported on 2/1/2022    carvedilol (COREG) 25 mg tablet Not Taking at Unknown time  Yes No   Sig: Take 25 mg by mouth 2 (two) times a day with meals   Patient not taking: Reported on 2/1/2022    furosemide (LASIX) 40 mg tablet Not Taking at Unknown time  No No   Sig: Take 0 5 tablets (20 mg total) by mouth daily   Patient not taking: Reported on 2/1/2022    lisinopril (ZESTRIL) 40 mg tablet Not Taking at Unknown time  No No   Sig: Take 1 tablet (40 mg total) by mouth daily   Patient not taking: Reported on 2/1/2022    pravastatin (PRAVACHOL) 80 mg tablet Not Taking at Unknown time  No No   Sig: Take 1 tablet (80 mg total) by mouth daily with dinner   Patient not taking: Reported on 2/1/2022       Facility-Administered Medications: None       No Known Allergies    Objective       Intake/Output Summary (Last 24 hours) at 2/1/2022 1124  Last data filed at 2/1/2022 0224  Gross per 24 hour   Intake 1200 ml   Output --   Net 1200 ml       Invasive Devices  Report    Peripheral Intravenous Line            Peripheral IV 01/31/22 Dorsal (posterior); Left Hand 1 day    Peripheral IV 02/01/22 Right Antecubital <1 day                Physical Exam     PHYSICAL  EXAMINATION    CONSTITUTION:    appears appropriate for age  No evidence of any acute distress  Communicates normally  Voice quality is clear  Alert and oriented  HEAD/FACE:    atraumatic, normocephalic on inspection  No scars present  Salivary glands are normal in texture and size without any asymmetry  Facial nerve function is symmetric and normal     EYES:    Extraocular muscles intact in both eyes, normal gaze bilaterally and no evidence of nystagmus  Pupils equal, round, and accommodate to light bilaterally      EARS:    External ears normal     External canals are clear and dry  Tympanic membranes intact with normal mobility, no effusion, no retraction, no perforation  Post auricular area is normal    NOSE:    External nose without deformity  Internal mucosa pink and moist     Septum midline  Nasal turbinates normal in color  Left side with inferior turbinate hypertrophy  ORAL CAVITY:    lips normal and healthy in appearance  Dentition normal     Gums healthy, pink and moist     Tongue appears pink and moist with no lesions  Floor of mouth pink, moist, and smooth  Submandibular ducts patent with clear saliva  Parotid ducts patent with clear saliva  Oral mucosa pink and moist     Hard palate normal in appearance without any lesions  OROPHARYNX:    soft palate pink and moist without any lesions  Uvula midline and edematous without any lesions  Tonsils grade 3 bilaterally  Posterior pharynx pink and moist without any lesions    NECK:    supple and symmetric  No masses noted  Trachea midline  No thyromegaly or nodules noted  LYMPH:    No palpable adenopathy in left or right neck    SKIN:    No rashes  No lesions noted  Nasopharyngolaryngoscopy:    Verbal consent was obtained from the patient / parent  The patient was placed in the upright position in the examination chair  The scope was passed through the nasal cavity into the posterior nasopharynx  Evaluation of the pharynx and larynx was carried out with the following findings:    Eustachian Tube Orifice:  Patent bilaterally without edema or obstruction  Nasopharynx:  Smooth mucosa without adenoid bed or mass  Oropharynx:  No masses or lesions present  Edema noted from the posterior portion of the uvula    Vallecula:  No abnormalities, pink moist mucosa  Tongue Base:  Normal without masses or asymmetry  Epiglottis:  Normal mucosa on vallecular and laryngeal surfaces  Pyriform Aperture:  Mucosa appears pink and moist without masses      Arytenoid Mucosa/Aryepiglottic Folds:  Normal mucosa without evidence of edema, hyperemia, mass or ulcer  False Vocal cords:  Normal mucosa, no evidence of mass, lesion or edema  True Vocal cords: White in color, no masses, nodules, polyps, edema, paresis or paralysis of either cord  Subglottic Space: The airway is patent and there are no lesions  After careful evaluation of the above structures, the scope was removed from the nasal cavity  The patient tolerated the procedure well  Lab Results:   CBC:   Lab Results   Component Value Date    WBC 11 04 (H) 02/01/2022    HGB 10 6 (L) 02/01/2022    HCT 31 9 (L) 02/01/2022    MCV 97 02/01/2022     02/01/2022    MCH 32 1 02/01/2022    MCHC 33 2 02/01/2022    RDW 13 4 02/01/2022    MPV 9 3 02/01/2022    NRBC 0 01/31/2022   , CMP:   Lab Results   Component Value Date    SODIUM 133 (L) 02/01/2022    K 4 1 02/01/2022     02/01/2022    CO2 14 (L) 02/01/2022    BUN 38 (H) 02/01/2022    CREATININE 4 46 (H) 02/01/2022    CALCIUM 8 0 (L) 02/01/2022    AST 43 01/31/2022    ALT 36 01/31/2022    ALKPHOS 120 (H) 01/31/2022    EGFR 13 02/01/2022   , Coags: No results found for: PT, PTT, INR  Imaging Studies: I have personally reviewed pertinent reports      EKG, Pathology, and Other Studies: I have personally reviewed pertinent films in PACS    Code Status: Level 1 - Full Code  Advance Directive and Living Will:      Power of :    POLST:      None

## 2022-02-02 PROBLEM — E83.42 HYPOMAGNESEMIA: Status: ACTIVE | Noted: 2022-02-02

## 2022-02-02 PROBLEM — E87.5 HYPERKALEMIA: Status: RESOLVED | Noted: 2022-02-01 | Resolved: 2022-02-02

## 2022-02-02 PROBLEM — N18.9 ACUTE RENAL FAILURE SUPERIMPOSED ON CHRONIC KIDNEY DISEASE (HCC): Status: ACTIVE | Noted: 2018-02-19

## 2022-02-02 LAB
A ALTERNATA IGE QN: <0.1 KUA/I
A FUMIGATUS IGE QN: <0.1 KUA/I
ALLERGEN COMMENT: ABNORMAL
ANION GAP SERPL CALCULATED.3IONS-SCNC: 13 MMOL/L (ref 4–13)
BACTERIA UR QL AUTO: ABNORMAL /HPF
BASOPHILS # BLD AUTO: 0.01 THOUSANDS/ΜL (ref 0–0.1)
BASOPHILS NFR BLD AUTO: 0 % (ref 0–1)
BERMUDA GRASS IGE QN: 1.72 KUA/I
BILIRUB UR QL STRIP: NEGATIVE
BOXELDER IGE QN: 0.78 KUA/I
BUN SERPL-MCNC: 48 MG/DL (ref 5–25)
C HERBARUM IGE QN: <0.1 KUA/I
CALCIUM SERPL-MCNC: 7.4 MG/DL (ref 8.3–10.1)
CAT DANDER IGE QN: <0.1 KUA/I
CHLORIDE SERPL-SCNC: 103 MMOL/L (ref 100–108)
CLARITY UR: CLEAR
CMN PIGWEED IGE QN: 2.19 KUA/I
CO2 SERPL-SCNC: 18 MMOL/L (ref 21–32)
COLOR UR: YELLOW
COMMON RAGWEED IGE QN: 1.93 KUA/I
COTTONWOOD IGE QN: 0.71 KUA/I
CREAT SERPL-MCNC: 4.69 MG/DL (ref 0.6–1.3)
CREAT UR-MCNC: 84.6 MG/DL
D FARINAE IGE QN: 9.6 KUA/I
D PTERONYSS IGE QN: 6.55 KUA/I
DOG DANDER IGE QN: 0.35 KUA/I
EOSINOPHIL # BLD AUTO: 0 THOUSAND/ΜL (ref 0–0.61)
EOSINOPHIL NFR BLD AUTO: 0 % (ref 0–6)
ERYTHROCYTE [DISTWIDTH] IN BLOOD BY AUTOMATED COUNT: 13.4 % (ref 11.6–15.1)
GFR SERPL CREATININE-BSD FRML MDRD: 12 ML/MIN/1.73SQ M
GLUCOSE SERPL-MCNC: 150 MG/DL (ref 65–140)
GLUCOSE UR STRIP-MCNC: ABNORMAL MG/DL
HCT VFR BLD AUTO: 26.2 % (ref 36.5–49.3)
HGB BLD-MCNC: 9 G/DL (ref 12–17)
HGB UR QL STRIP.AUTO: ABNORMAL
IMM GRANULOCYTES # BLD AUTO: 0.12 THOUSAND/UL (ref 0–0.2)
IMM GRANULOCYTES NFR BLD AUTO: 1 % (ref 0–2)
KETONES UR STRIP-MCNC: NEGATIVE MG/DL
LEUKOCYTE ESTERASE UR QL STRIP: NEGATIVE
LONDON PLANE IGE QN: 5.37 KUA/I
LYMPHOCYTES # BLD AUTO: 0.61 THOUSANDS/ΜL (ref 0.6–4.47)
LYMPHOCYTES NFR BLD AUTO: 5 % (ref 14–44)
MAGNESIUM SERPL-MCNC: 1.3 MG/DL (ref 1.6–2.6)
MCH RBC QN AUTO: 32.6 PG (ref 26.8–34.3)
MCHC RBC AUTO-ENTMCNC: 34.4 G/DL (ref 31.4–37.4)
MCV RBC AUTO: 95 FL (ref 82–98)
MONOCYTES # BLD AUTO: 0.4 THOUSAND/ΜL (ref 0.17–1.22)
MONOCYTES NFR BLD AUTO: 3 % (ref 4–12)
MOUSE URINE PROT IGE QN: <0.1 KUA/I
MT JUNIPER IGE QN: 0.13 KUA/I
MUGWORT IGE QN: 2.14 KUA/I
NEUTROPHILS # BLD AUTO: 11.88 THOUSANDS/ΜL (ref 1.85–7.62)
NEUTS SEG NFR BLD AUTO: 91 % (ref 43–75)
NITRITE UR QL STRIP: NEGATIVE
NON-SQ EPI CELLS URNS QL MICRO: ABNORMAL /HPF
NRBC BLD AUTO-RTO: 0 /100 WBCS
P NOTATUM IGE QN: 0.11 KUA/I
PH UR STRIP.AUTO: 6 [PH]
PLATELET # BLD AUTO: 239 THOUSANDS/UL (ref 149–390)
PMV BLD AUTO: 9.4 FL (ref 8.9–12.7)
POTASSIUM SERPL-SCNC: 3.8 MMOL/L (ref 3.5–5.3)
PROT UR STRIP-MCNC: >=300 MG/DL
PROT UR-MCNC: 694 MG/DL
PROT/CREAT UR: 8.2 MG/G{CREAT} (ref 0–0.1)
RBC # BLD AUTO: 2.76 MILLION/UL (ref 3.88–5.62)
RBC #/AREA URNS AUTO: ABNORMAL /HPF
ROACH IGE QN: 1.12 KUA/I
SHEEP SORREL IGE QN: 0.16 KUA/I
SILVER BIRCH IGE QN: 8.61 KUA/I
SODIUM SERPL-SCNC: 134 MMOL/L (ref 136–145)
SP GR UR STRIP.AUTO: 1.02 (ref 1–1.03)
TIMOTHY IGE QN: 2.26 KUA/I
TOTAL IGE SMQN RAST: 1064 KU/L (ref 0–113)
UROBILINOGEN UR QL STRIP.AUTO: 0.2 E.U./DL
WALNUT IGE QN: 0.17 KUA/I
WBC # BLD AUTO: 13.02 THOUSAND/UL (ref 4.31–10.16)
WBC #/AREA URNS AUTO: ABNORMAL /HPF
WHITE ASH IGE QN: 0.15 KUA/I
WHITE ELM IGE QN: 4.35 KUA/I
WHITE MULBERRY IGE QN: 1.29 KUA/I
WHITE OAK IGE QN: 8.99 KUA/I

## 2022-02-02 PROCEDURE — 99255 IP/OBS CONSLTJ NEW/EST HI 80: CPT | Performed by: INTERNAL MEDICINE

## 2022-02-02 PROCEDURE — 84166 PROTEIN E-PHORESIS/URINE/CSF: CPT | Performed by: PATHOLOGY

## 2022-02-02 PROCEDURE — 85027 COMPLETE CBC AUTOMATED: CPT

## 2022-02-02 PROCEDURE — 82570 ASSAY OF URINE CREATININE: CPT | Performed by: NURSE PRACTITIONER

## 2022-02-02 PROCEDURE — 83735 ASSAY OF MAGNESIUM: CPT

## 2022-02-02 PROCEDURE — 81001 URINALYSIS AUTO W/SCOPE: CPT | Performed by: NURSE PRACTITIONER

## 2022-02-02 PROCEDURE — 84156 ASSAY OF PROTEIN URINE: CPT | Performed by: NURSE PRACTITIONER

## 2022-02-02 PROCEDURE — 80048 BASIC METABOLIC PNL TOTAL CA: CPT

## 2022-02-02 PROCEDURE — 84166 PROTEIN E-PHORESIS/URINE/CSF: CPT | Performed by: NURSE PRACTITIONER

## 2022-02-02 PROCEDURE — 99233 SBSQ HOSP IP/OBS HIGH 50: CPT | Performed by: STUDENT IN AN ORGANIZED HEALTH CARE EDUCATION/TRAINING PROGRAM

## 2022-02-02 RX ORDER — CARVEDILOL 12.5 MG/1
12.5 TABLET ORAL 2 TIMES DAILY WITH MEALS
Status: DISCONTINUED | OUTPATIENT
Start: 2022-02-02 | End: 2022-02-03

## 2022-02-02 RX ORDER — AMLODIPINE BESYLATE 10 MG/1
10 TABLET ORAL DAILY
Status: DISCONTINUED | OUTPATIENT
Start: 2022-02-03 | End: 2022-02-04 | Stop reason: HOSPADM

## 2022-02-02 RX ORDER — MAGNESIUM SULFATE HEPTAHYDRATE 40 MG/ML
4 INJECTION, SOLUTION INTRAVENOUS ONCE
Status: COMPLETED | OUTPATIENT
Start: 2022-02-02 | End: 2022-02-02

## 2022-02-02 RX ORDER — MAGNESIUM SULFATE 1 G/100ML
1 INJECTION INTRAVENOUS ONCE
Status: DISCONTINUED | OUTPATIENT
Start: 2022-02-02 | End: 2022-02-02

## 2022-02-02 RX ADMIN — MAGNESIUM SULFATE HEPTAHYDRATE 4 G: 40 INJECTION, SOLUTION INTRAVENOUS at 14:36

## 2022-02-02 RX ADMIN — PRAVASTATIN SODIUM 80 MG: 80 TABLET ORAL at 18:06

## 2022-02-02 RX ADMIN — AMLODIPINE BESYLATE 10 MG: 10 TABLET ORAL at 09:03

## 2022-02-02 RX ADMIN — ASPIRIN 81 MG: 81 TABLET, COATED ORAL at 09:03

## 2022-02-02 RX ADMIN — CARVEDILOL 12.5 MG: 12.5 TABLET, FILM COATED ORAL at 18:07

## 2022-02-02 RX ADMIN — Medication 14 MG: at 09:04

## 2022-02-02 RX ADMIN — DEXAMETHASONE SODIUM PHOSPHATE 6 MG: 4 INJECTION INTRA-ARTICULAR; INTRALESIONAL; INTRAMUSCULAR; INTRAVENOUS; SOFT TISSUE at 09:03

## 2022-02-02 RX ADMIN — DEXAMETHASONE SODIUM PHOSPHATE 6 MG: 4 INJECTION INTRA-ARTICULAR; INTRALESIONAL; INTRAMUSCULAR; INTRAVENOUS; SOFT TISSUE at 21:06

## 2022-02-02 NOTE — PROGRESS NOTES
[facility]  Progress Note - Nati Tim 1960, 62 y o  male MRN: [id number]  Unit/Bed#: E4 -01 Encounter: [id number]  Primary Care Provider: No primary care provider on file     Date and time admitted to hospital: 1/31/2022  9:45 PM    * Pharyngeal edema  Assessment & Plan  · Unclear etiology, but low suspicion for infectious process per ENT evaluation  · RAST and C1 esterase levels ordered  · Recommended for epi pen on discharge  · Patient is protecting his airway, otherwise stable  · Monitor     Chronic kidney disease  Assessment & Plan  Lab Results   Component Value Date    EGFR 12 02/02/2022    EGFR 12 02/01/2022    EGFR 13 02/01/2022    CREATININE 4 69 (H) 02/02/2022    CREATININE 4 72 (H) 02/01/2022    CREATININE 4 46 (H) 02/01/2022     · No care everywhere records noted  · Patient's last creatinine prior to current admission was in 2018 with creatinine of 1 31  · Renal US on current admission benign  · Patient has no insurance, will likely be lost to follow up   · Consult nephrology given above   · This is likely patient's new baseline   · Monitor urine output     Hypomagnesemia  Assessment & Plan  · Monitor and replete daily  · Given 4 grams today    Leukocytosis  Assessment & Plan  · Likely in setting of steroids for pharyngeal edema  · Monitor     Hyperlipidemia  Assessment & Plan  · Continue home dose statin     CHF (congestive heart failure) ([facility])  Assessment & Plan  Wt Readings from Last 3 Encounters:   02/02/22 103 kg (227 lb 11 8 oz)   02/19/18 111 kg (245 lb 6 oz)     · No in acute exacerbation  · Last ECHO on 2/2/22 with preserved EF  · Monitor lytes, weight, I/O    Benign essential HTN  Assessment & Plan  · Could be better controlled  · On amlodipine, increased to 10 mg daily   · Continue labetalol IV PRN  Monitor     Hyperkalemia-resolved as of 2/2/2022  Assessment & Plan  · Now resolved  · Monitor         VTE Pharmacologic Prophylaxis: VTE Score: 0 Low Risk (Score 0-2) - Encourage Ambulation  Patient Centered Rounds: I performed bedside rounds with nursing staff today  Discussions with Specialists or Other Care Team Provider: ENT      Time Spent for Care: 30 minutes  More than 50% of total time spent on counseling and coordination of care as described above  Current Length of Stay: 1 day(s)  Current Patient Status: Inpatient   Certification Statement: The patient will continue to require additional inpatient hospital stay due to nephrology evaluation  Discharge Plan: Anticipate discharge in 24-48 hrs to home  Code Status: Level 1 - Full Code    Subjective:   No chest pain or chest palpitations  No shortness of breath  Appetite is good  Objective:     Vitals:   Temp (24hrs), Av 3 °F (36 3 °C), Min:96 9 °F (36 1 °C), Max:98 1 °F (36 7 °C)    Temp:  [96 9 °F (36 1 °C)-98 1 °F (36 7 °C)] 97 3 °F (36 3 °C)  HR:  [66-92] 69  Resp:  [14-35] 18  BP: (141-164)/(54-91) 164/82  SpO2:  [93 %-100 %] 99 %  Body mass index is 34 63 kg/m²  Input and Output Summary (last 24 hours): Intake/Output Summary (Last 24 hours) at 2022 1512  Last data filed at 2022 1306  Gross per 24 hour   Intake 240 ml   Output 198 ml   Net 42 ml       Physical Exam:   Physical Exam  Vitals and nursing note reviewed  Constitutional:       Appearance: Normal appearance  HENT:      Head: Normocephalic and atraumatic  Right Ear: External ear normal       Left Ear: External ear normal       Nose: No congestion or rhinorrhea  Eyes:      General:         Right eye: No discharge  Left eye: No discharge  Cardiovascular:      Rate and Rhythm: Normal rate and regular rhythm  Pulmonary:      Effort: Pulmonary effort is normal  No respiratory distress  Abdominal:      General: Abdomen is flat  Palpations: Abdomen is soft  Musculoskeletal:      Cervical back: Normal range of motion and neck supple  Right lower leg: No edema        Left lower leg: No edema  Skin:     General: Skin is warm and dry  Neurological:      General: No focal deficit present  Mental Status: He is alert  Mental status is at baseline  Psychiatric:         Mood and Affect: Mood normal          Behavior: Behavior normal           Additional Data:     Labs:  Results from last 7 days   Lab Units 02/02/22  0522 02/01/22  0603 02/01/22  0603   WBC Thousand/uL 13 02*   < > 11 04*   HEMOGLOBIN g/dL 9 0*   < > 10 6*   HEMATOCRIT % 26 2*   < > 31 9*   PLATELETS Thousands/uL 239   < > 266   BANDS PCT %  --   --  3   NEUTROS PCT % 91*  --   --    LYMPHS PCT % 5*  --   --    LYMPHO PCT %  --   --  4*   MONOS PCT % 3*  --   --    MONO PCT %  --   --  1*   EOS PCT % 0  --  0    < > = values in this interval not displayed  Results from last 7 days   Lab Units 02/02/22  0522 02/01/22  0204 01/31/22  2220   SODIUM mmol/L 134*   < > 134*   POTASSIUM mmol/L 3 8   < > 5 6*   CHLORIDE mmol/L 103   < > 102   CO2 mmol/L 18*   < > 13*   BUN mg/dL 48*   < > 35*   CREATININE mg/dL 4 69*   < > 4 50*   ANION GAP mmol/L 13   < > 19*   CALCIUM mg/dL 7 4*   < > 7 4*   ALBUMIN g/dL  --   --  2 6*   TOTAL BILIRUBIN mg/dL  --   --  0 89   ALK PHOS U/L  --   --  120*   ALT U/L  --   --  36   AST U/L  --   --  43   GLUCOSE RANDOM mg/dL 150*   < > 81    < > = values in this interval not displayed  Results from last 7 days   Lab Units 02/01/22  0651 02/01/22  0413   LACTIC ACID mmol/L  --  0 8   PROCALCITONIN ng/ml 0 26* 0 24       Lines/Drains:  Invasive Devices  Report    Peripheral Intravenous Line            Peripheral IV 01/31/22 Dorsal (posterior); Left Hand 2 days                      Imaging: Reviewed radiology reports from this admission including: ultrasound(s)    Recent Cultures (last 7 days):         Last 24 Hours Medication List:   Current Facility-Administered Medications   Medication Dose Route Frequency Provider Last Rate    [START ON 2/3/2022] amLODIPine  10 mg Oral Daily BHANU Woods      aspirin  81 mg Oral Daily BHANU Pollard      carvedilol  12 5 mg Oral BID With Meals BHANU Woods      [START ON 2/3/2022] dexamethasone  6 mg Intravenous Once BHANU Che      Followed by   Zo Courser dexamethasone  6 mg Intravenous Q12H [address] BHANU Pollard      Labetalol HCl  10 mg Intravenous Q4H PRN BHANU Che      magnesium sulfate  4 g Intravenous Once Bianka Nixon MD      nicotine  14 mg Transdermal Daily BHANU Pollard      pravastatin  80 mg Oral Daily With NvestBHANU          Today, Patient Was Seen By: Mahamed James MD    **Please Note: This note may have been constructed using a voice recognition system  **

## 2022-02-02 NOTE — CONSULTS
Consultation - Nephrology   Rika Drafts 62 y o  male MRN: [id number]  Unit/Bed#: E4 -01 Encounter: [id number]    ASSESSMENT/PLAN:  Acute kidney injury (POA) vs progression of undying CKD:  Suspect more chronicity in the setting of longstanding hypertension, noncompliance with medications for the past 2 years, and tobacco abuse   -presents with creatinine of 4 5  Creatinine peaked at 4 7 on 02/01/2022   -previous creatinine in 2018 1 3  No recent labs for comparison   -initially received IV hydration   -received Lasix 40 mg IV x1 2/1  Would hold further diuretics  -will check urinalysis and UPCR  -will check bladder scan PVR  -renal ultrasound negative for obstruction  -recommend avoiding nephrotoxins, hypotension, IV contrast   -okay to discharge from Renal with outpatient follow-up once medically cleared and antihypertensives are obtained   -strict I/O  Hypertension:  Reports not taking antihypertensives for 1 year due to no health insurance  Initially presented with systolic blood pressure greater than 200, correcting appropriately  -started on amlodipine 10 mg po daily  Will add carvedilol 12 5 mg po BID  -previously taking lisinopril 40 mg daily, Lasix 20 mg daily, carvedilol 25 mg 2 times per day, and amlodipine 5 mg daily   -avoid aggressive lowering of blood pressure in the setting of acute kidney injury  -recommend avoiding hypotension or high fluctuations in blood pressure  CHF:  EF of 60% with grade 1 diastolic dysfunction  -outpatient diuretic:  Lasix 20 mg daily  Not taking for approximately 1 year   -received Lasix 40 mg x 1 02/01/2022   -no indication for diuretics a this time  Pharyngeal edema:  presenting with difficulties with swallowing, voice changes, cough with shortness of breath  -CT scan showed diffuse swelling involving the uvula, oropharynx, and hypopharynx compatible with pharyngitis  -ENT following      Other:  Hyperlipidemia      HISTORY OF PRESENT ILLNESS:  Requesting Physician: Simi Allen MD  Reason for Consult: ALEKS (POA) on underlying CKD  Germán Perez is a 62y o  year old male with past medical history of hypertension, likely underlying CKD, hyperlipidemia, CHF, who was admitted to [facility] after presenting with complaints of difficulty swallowing, voice changes, cough, and shortness of breath  He reports feeling normal on Sunday morning  He then started having a cough with difficulty swallowing  He reports vomiting x1  He denies any chest discomfort  He denies fevers or chills  He denies any issues with diarrhea or urination  He denies any NSAID use  The patient denies any renal history  He reports not having insurance for the past 2 years  He has not been taking his routine medication for 2 years  He reports that his mother was on dialysis late in her life due to diabetes  A renal consultation is requested today for assistance in the management of ALEKS  PAST MEDICAL HISTORY:  Past Medical History:   Diagnosis Date    CHF (congestive heart failure) ([facility] [address] )     Hyperkalemia 2/1/2022    Hypertension        PAST SURGICAL HISTORY:  History reviewed  No pertinent surgical history  ALLERGIES:  No Known Allergies    SOCIAL HISTORY:  Social History     Substance and Sexual Activity   Alcohol Use Yes    Alcohol/week: 10 0 standard drinks    Types: 10 Cans of beer per week    Comment: every day drinker     Social History     Substance and Sexual Activity   Drug Use Yes    Types: Marijuana    Comment: occasionally     Social History     Tobacco Use   Smoking Status Current Every Day Smoker    Packs/day: 1 00    Years: 30 00    Pack years: 30 00   Smokeless Tobacco Never Used       FAMILY HISTORY:  History reviewed  No pertinent family history      MEDICATIONS:    Current Facility-Administered Medications:     amLODIPine (NORVASC) tablet 10 mg, 10 mg, Oral, Daily, BHANU Che, 10 mg at 02/02/22 7246    aspirin (Dominga Gauthier LOW STRENGTH) EC tablet 81 mg, 81 mg, Oral, Daily, BHANU Che, 81 mg at 02/02/22 0903    [COMPLETED] dexamethasone (DECADRON) injection 6 mg, 6 mg, Intravenous, Q8H CRUZ, 6 mg at 02/01/22 2255 **FOLLOWED BY** dexamethasone (DECADRON) injection 6 mg, 6 mg, Intravenous, Q12H CRUZ, 6 mg at 02/02/22 0903 **FOLLOWED BY** [START ON 2/3/2022] dexamethasone (DECADRON) injection 6 mg, 6 mg, Intravenous, Once, BHANU Che    Labetalol HCl (NORMODYNE) injection 10 mg, 10 mg, Intravenous, Q4H PRN, BHANU Che    magnesium sulfate 4 g/100 mL IVPB (premix) 4 g, 4 g, Intravenous, Once, Balaji Abreu MD    nicotine (NICODERM CQ) 14 mg/24hr TD 24 hr patch 14 mg, 14 mg, Transdermal, Daily, BHANU Che, 14 mg at 02/02/22 4868    pravastatin (PRAVACHOL) tablet 80 mg, 80 mg, Oral, Daily With Dinner, [employer], NAVANP, 80 mg at 02/01/22 3258    REVIEW OF SYSTEMS:  A complete review of systems was performed and found to be negative unless otherwise noted in the history of present illness  General: No fevers, chills  Cardiovascular:  - chest pain, - leg edema  Respiratory: No cough, sputum production,  + shortness of breath  Gastrointestinal:  - nausea/vomiting,  - diarrhea,  - abdominal pain  Genitourinary: No hematuria  No foamy urine    No dysuria    PHYSICAL EXAM:  Current Weight: Weight - Scale: 103 kg (227 lb 11 8 oz)  First Weight: Weight - Scale: 109 kg (240 lb)  Vitals:    02/02/22 0400 02/02/22 0500 02/02/22 0618 02/02/22 0700   BP: 148/71 141/64 162/91 164/82   BP Location:   Left arm Right arm   Pulse: 68 66 79 69   Resp: 19 15 18 18   Temp:   (!) 97 °F (36 1 °C) (!) 97 3 °F (36 3 °C)   TempSrc:    Temporal   SpO2: 96% 97% 100% 99%   Weight:  103 kg (227 lb 11 8 oz)     Height:           Intake/Output Summary (Last 24 hours) at 2/2/2022 1217  Last data filed at 2/2/2022 7311  Gross per 24 hour   Intake 240 ml   Output 800 ml   Net -560 ml     General: NAD  Skin: warm, dry, intact, no rash  HEENT: Moist mucous membranes, sclera anicteric, normocephalic, atraumatic  Neck: No apparent JVD appreciated  Chest:lung sounds clear B/L, on RA   CVS:Regular rate and rhythm, no murmer   Abdomen: Soft, round, non-tender, +BS  Extremities: No B/L LE edema present  Neuro: alert and oriented  Psych: appropriate mood and affect     Invasive Devices:      Lab Results:   Results from last 7 days   Lab Units 02/02/22  0522 02/01/22  1554 02/01/22  0603 02/01/22  0204 01/31/22  2220   WBC Thousand/uL 13 02*  --  11 04*  --  10 69*   HEMOGLOBIN g/dL 9 0*  --  10 6*  --  11 5*   HEMATOCRIT % 26 2*  --  31 9*  --  35 2*   PLATELETS Thousands/uL 239  --  266  --  294   SODIUM mmol/L 134* 132* 133*   < > 134*   POTASSIUM mmol/L 3 8 4 0 4 1   < > 5 6*   CHLORIDE mmol/L 103 101 103   < > 102   CO2 mmol/L 18* 17* 14*   < > 13*   BUN mg/dL 48* 40* 38*   < > 35*   CREATININE mg/dL 4 69* 4 72* 4 46*   < > 4 50*   CALCIUM mg/dL 7 4* 7 6* 8 0*   < > 7 4*   MAGNESIUM mg/dL 1 3*  --   --   --   --    ALK PHOS U/L  --   --   --   --  120*   ALT U/L  --   --   --   --  36   AST U/L  --   --   --   --  43    < > = values in this interval not displayed

## 2022-02-02 NOTE — PROGRESS NOTES
Pt transfer from ICU to 436 via bed  Received report via phone, no further questions  Pt Ox4, no pain  On room air  Skin intact  Independent  Vital signs stable  Call bell within reach, pt resting comfortably in bed  Will continue to monitor

## 2022-02-02 NOTE — UTILIZATION REVIEW
Initial Clinical Review    Admission: Date/Time/Statement:   Admission Orders (From admission, onward)     Ordered        02/01/22 0133  INPATIENT ADMISSION  Once                      Orders Placed This Encounter   Procedures    INPATIENT ADMISSION     Standing Status:   Standing     Number of Occurrences:   1     Order Specific Question:   Level of Care     Answer:   Level 1 Stepdown [13]     Order Specific Question:   Estimated length of stay     Answer:   More than 2 Midnights     Order Specific Question:   Certification     Answer:   I certify that inpatient services are medically necessary for this patient for a duration of greater than two midnights  See H&P and MD Progress Notes for additional information about the patient's course of treatment  ED Arrival Information     Expected Arrival Acuity    - 1/31/2022 21:45 Urgent         Means of arrival Escorted by Service Admission type    Ambulance Lisa ([address]) Hospitalist Urgent         Arrival complaint    Trouble Swallowing        Chief Complaint   Patient presents with    Difficulty Swallowing     patient arrives via ems coming from home c/o difficulty swallowing, and like he can't catch his breath  patient states started this morning with a tickle in his throat and has gotten worse the last 2 hours  patient hx of chf     Initial Presentation:   Mr Ariana Villegas is a 62 yom who presents to the ED via EMS from home with c/o trouble swallowing  Ate w/o incident 1 day before admit and then in the evening developed a tickle in back of throat which continued til it felt like fullness in throat with voice change and difficulty swallowing  + coughing and SOB with swallowing  Imaging shows pharyngeal edema with possible effusion  ENT consult and no acute intervention at this time  In the ED was treated with benadryl, racemic epi x2, decadron, TXA, FFP x1 and clindamycin    Also found to have ALEKS - creat 4 5,  anion gap (19) metabolic acidosis - CO 12, Hyperkalemia 5  6  admits to med noncompliance  PMH: HTN, HLD, CHF, ETOH use with withdrawal seizures, THC and tobacco use, not vaccinated for Covid  On exam, he has hoarse voice but no stridor or visible edema  He is admitted to INPATIENT status to level 1 SD with Pharyngeal Edema - IV Decadron, IV antibiotics, ENT consult  ALEKS - IV fluids, avoid nephrotoxics  CHF - restart home Amlodipine  Hold BB, Echo  Hyperkalemia - Luis Felipe gluconate, trend  HLD - restart Pravastatin  Withdrawal seizure - monitor for withdrawal, thiamine, folic acid  High anion gap met acidosis - trend BMP, procal, lactic acid trending  2/1 ENT consult - angioedema, unknown cause - no infection at this time, dysphonia - should resolve with therapy  Should have EpiPen at d/c  Stay off ACEI  Can stop antibiotics  Date: 2/2   Day 2:   Pt is protecting his airway  Increasing Amlodipine today for improved BP Control  Hyperkalemia resolved  CHF not in exacerbation  No C/o CP, SOB       2/2 Nephrology Consult - ALEKS vs CKD - creat 4 5 amd stable in mid 4s  Suspect CKD given untreated and uncontrolled HTN in last several years  Low Na diet, no NSAIDs  HTN - agree with Amlodipine, add Carvedilol        ED Triage Vitals   Temperature Pulse Respirations Blood Pressure SpO2   01/31/22 2153 01/31/22 2153 01/31/22 2153 01/31/22 2153 01/31/22 2153   97 8 °F (36 6 °C) 95 18 (!) 202/96 99 %      Temp Source Heart Rate Source Patient Position - Orthostatic VS BP Location FiO2 (%)   01/31/22 2153 01/31/22 2153 01/31/22 2153 01/31/22 2153 --   Oral Monitor Lying Right arm       Pain Score       02/01/22 0037       No Pain          Wt Readings from Last 1 Encounters:   02/02/22 103 kg (227 lb 11 8 oz)     Additional Vital Signs:   02/02/22 0858 -- -- -- -- -- -- -- -- None (Room air) --   02/02/22 0700 97 3 °F (36 3 °C) Abnormal  69 18 164/82 115 99 % -- -- None (Room air) Lying   02/02/22 0618 97 °F (36 1 °C) Abnormal  79 18 162/91 -- 100 % -- -- None (Room air) Sitting   02/02/22 0500 -- 66 15 141/64 88 97 % -- -- -- --   02/02/22 0400 -- 68 19 148/71 88 96 % -- -- -- --   02/02/22 0300 96 9 °F (36 1 °C) Abnormal  74 17 160/78 111 97 % -- -- -- --   02/02/22 0200 -- 76 14 148/78 96 97 % -- -- -- --   02/02/22 0100 -- 72 20 156/83 112 97 % -- -- -- --   02/02/22 0000 -- 74 17 152/81 111 99 % -- -- -- --   02/01/22 2300 -- 86 35 Abnormal  149/82 110 99 % -- -- -- --   02/01/22 2200 -- 80 17 141/83 104 99 % -- -- -- --   02/01/22 2100 -- 82 21 163/75 106 98 % -- -- -- --   02/01/22 2000 -- 82 21 159/77 96 93 % -- -- -- --   02/01/22 1914 98 1 °F (36 7 °C) -- -- -- -- -- -- -- -- --   02/01/22 1900 -- 82 25 Abnormal  153/86 112 95 % -- -- -- --   02/01/22 1800 -- 86 22 152/80 106 97 % -- -- -- --   02/01/22 1700 -- 78 16 162/54 80 96 % -- -- -- --   02/01/22 1600 -- 92 21 154/63 92 95 % -- -- -- --   02/01/22 1505 -- 82 19 154/69 101 98 % -- -- -- --   02/01/22 1502 97 8 °F (36 6 °C) -- -- -- -- -- -- -- -- --   02/01/22 1500 -- 80 21 148/75 104 99 % -- -- -- --   02/01/22 1459 -- -- -- 148/75 -- -- -- -- -- --   02/01/22 1400 -- 80 17 169/92 122 97 % -- -- -- --   02/01/22 1300 -- 84 23 Abnormal  175/93 Abnormal  113 98 % -- -- -- --   02/01/22 1200 -- 86 22 168/56 78 99 % -- -- -- --   02/01/22 1152 97 1 °F (36 2 °C) Abnormal  -- -- -- -- -- -- -- -- --   02/01/22 1110 -- 80 21 177/80 Abnormal  113 100 % -- -- -- --   02/01/22 0824 -- 74 -- 177/88 Abnormal  -- -- -- -- -- --   02/01/22 0745 97 8 °F (36 6 °C) -- -- -- -- -- -- -- -- --   02/01/22 0735 -- 80 17 177/87 Abnormal  126 99 % -- -- -- --   02/01/22 0540 98 °F (36 7 °C) -- -- -- -- -- -- -- -- --   02/01/22 0515 -- 76 18 173/93 Abnormal  117 97 % -- -- -- --   02/01/22 0445 -- 78 18 177/95 Abnormal  134 97 % -- -- -- Lying   02/01/22 0330 -- 88 18 151/67 97 100 % -- -- None (Room air) Lying   02/01/22 0241 -- 87 18 146/67 -- 99 % 28 2 L/min Nasal cannula Lying   02/01/22 0037 -- 86 -- 188/104 Abnormal  140 100 % 32 3 L/min Nasal cannula Lying       Pertinent Labs/Diagnostic Test Results:     1/31 CT soft tissue neck  - There is diffuse soft tissue swelling and edema present within the soft palate, uvula and mid to inferior oropharynx/hypopharynx  Although no contrast was administered, there is no discrete mass or tonsillar/peritonsillar abscess identified  Mild thickening of the epiglottis and mild thickening in the retropharyngeal space  Overall there is mild airway compromise  No pathologic adenopathy identified  Findings may be reflective of infectious/inflammatory process or angioedema, possibly a result of allergic reaction  2/1 US Kidney - No evidence of obstruction, echogenic renal cortices in keeping with chronic medical renal disease  2/1 ECG- Normal sinus rhythm  2/1 Echo -   Left Ventricle: Left ventricular cavity size is normal  Wall thickness is normal  The left ventricular ejection fraction is 60%  Systolic function is normal  Wall motion is normal  Diastolic function is mildly abnormal, consistent with grade I (abnormal) relaxation    Aortic Valve: There is mild to moderate regurgitation    Mitral Valve: There is mild regurgitation      Results from last 7 days   Lab Units 02/01/22  0041   SARS-COV-2  Negative     Results from last 7 days   Lab Units 02/02/22  0522 02/01/22  0603 01/31/22  2220   WBC Thousand/uL 13 02* 11 04* 10 69*   HEMOGLOBIN g/dL 9 0* 10 6* 11 5*   HEMATOCRIT % 26 2* 31 9* 35 2*   PLATELETS Thousands/uL 239 266 294   NEUTROS ABS Thousands/µL 11 88*  --  6 95   BANDS PCT %  --  3  --          Results from last 7 days   Lab Units 02/02/22  0522 02/01/22  1554 02/01/22  0603 02/01/22  0204 01/31/22  2220   SODIUM mmol/L 134* 132* 133* 135* 134*   POTASSIUM mmol/L 3 8 4 0 4 1 4 1 5 6*   CHLORIDE mmol/L 103 101 103 102 102   CO2 mmol/L 18* 17* 14* 15* 13*   ANION GAP mmol/L 13 14* 16* 18* 19*   BUN mg/dL 48* 40* 38* 36* 35*   CREATININE mg/dL 4 69* 4 72* 4 46* 4 34* 4 50*   EGFR ml/min/1 73sq m 12 12 13 13 13   CALCIUM mg/dL 7 4* 7 6* 8 0* 7 7* 7 4*   MAGNESIUM mg/dL 1 3*  --   --   --   --      Results from last 7 days   Lab Units 01/31/22  2220   AST U/L 43   ALT U/L 36   ALK PHOS U/L 120*   TOTAL PROTEIN g/dL 7 3   ALBUMIN g/dL 2 6*   TOTAL BILIRUBIN mg/dL 0 89         Results from last 7 days   Lab Units 02/02/22  0522 02/01/22  1554 02/01/22  0603 02/01/22  0204 01/31/22  2220   GLUCOSE RANDOM mg/dL 150* 184* 123 93 81     Results from last 7 days   Lab Units 02/01/22  0651 02/01/22  0413   PROCALCITONIN ng/ml 0 26* 0 24     Results from last 7 days   Lab Units 02/01/22  0413   LACTIC ACID mmol/L 0 8     Results from last 7 days   Lab Units 02/01/22  0041   INFLUENZA A PCR  Negative   INFLUENZA B PCR  Negative   RSV PCR  Negative     ED Treatment:   Medication Administration from 01/31/2022 2145 to 02/01/2022 0430    Date/Time Order Dose Route Action   01/31/2022 2221 sodium chloride 0 9 % bolus 1,000 mL 1,000 mL Intravenous New Bag   01/31/2022 2221 ondansetron (ZOFRAN) injection 4 mg 4 mg Intravenous Given   01/31/2022 2226 racepinephrine 2 25 % inhalation solution 0 5 mL 0 5 mL Nebulization Given   01/31/2022 2222 dexamethasone (DECADRON) injection 10 mg 10 mg Intravenous Given   01/31/2022 2342 diphenhydrAMINE (BENADRYL) injection 25 mg 25 mg Intravenous Given   02/01/2022 0010 tranexamic Acid 1,000 mg in sodium chloride 0 9 % 100 mL IVPB Loading Dose 1,000 mg Intravenous New Bag   02/01/2022 0021 tranexamic Acid 1,000 mg in sodium chloride 0 9 % 500 mL IVPB 1,000 mg Intravenous New Bag   01/31/2022 2350 racepinephrine 2 25 % inhalation solution 0 5 mL 0 5 mL Nebulization Given   02/01/2022 0125 calcium gluconate 1 g in sodium chloride 0 9% 50 mL (premix) 1 g Intravenous New Bag   02/01/2022 0119 furosemide (LASIX) injection 40 mg 40 mg Intravenous Given   02/01/2022 0114 albuterol inhalation solution 10 mg 10 mg Nebulization Given   02/01/2022 0152 clindamycin (CLEOCIN) IVPB (premix in dextrose) 600 mg 50 mL 600 mg Intravenous New Bag        Past Medical History:   Diagnosis Date    CHF (congestive heart failure) (HCC)     Hyperkalemia 2/1/2022    Hypertension      Present on Admission:   CHF (congestive heart failure) (HCC)   Hyperlipidemia   Benign essential HTN      Admitting Diagnosis: Hyperkalemia [E87 5]  Pharyngitis [J02 9]  Pharyngeal edema [J39 2]  Trouble swallowing [R13 10]  Acute renal failure (ARF) (HCC) [N17 9]  Chronic hypertension [I10]  Age/Sex: 62 y o  male  Admission Orders:  Scheduled Medications:  [START ON 2/3/2022] amLODIPine, 10 mg, Oral, Daily  aspirin, 81 mg, Oral, Daily  carvedilol, 12 5 mg, Oral, BID With Meals  [START ON 2/3/2022] dexamethasone, 6 mg, Intravenous, Once   Followed by  dexamethasone, 6 mg, Intravenous, Q12H [facility]  magnesium sulfate, 4 g, Intravenous, Once  nicotine, 14 mg, Transdermal, Daily  pravastatin, 80 mg, Oral, Daily With Dinner      Continuous IV Infusions:    IV LR @ 75 ml/hr d/c 2/1 @ 1135     PRN Meds:  Labetalol HCl, 10 mg, Intravenous, Q4H PRN    level 1 SD to MS   SCDs  Up w/ assist   NC oxygen Prn   IP CONSULT TO ENT  IP CONSULT TO CASE MANAGEMENT  IP CONSULT TO NEPHROLOGY    Network Utilization Review Department  ATTENTION: Please call with any questions or concerns to [phone] and carefully listen to the prompts so that you are directed to the right person  All voicemails are confidential   Jes Verma all requests for admission clinical reviews, approved or denied determinations and any other requests to dedicated fax number below belonging to the campus where the patient is receiving treatment   List of dedicated fax numbers for the Facilities:  [address] [facility] [address] DENIALS (Administrative/Medical Necessity) [phone]   [address] [place] [address] (Maternity/NICU/Pediatrics) [phone]   [address] [place] [address] [id number] [place] [address] [address] [address] [place] [address] [phone]

## 2022-02-02 NOTE — ASSESSMENT & PLAN NOTE
· Could be better controlled  · On amlodipine, increased to 10 mg daily   · Continue labetalol IV PRN  Monitor

## 2022-02-02 NOTE — ASSESSMENT & PLAN NOTE
Lab Results   Component Value Date    EGFR 12 02/02/2022    EGFR 12 02/01/2022    EGFR 13 02/01/2022    CREATININE 4 69 (H) 02/02/2022    CREATININE 4 72 (H) 02/01/2022    CREATININE 4 46 (H) 02/01/2022     · No care everywhere records noted  · Patient's last creatinine prior to current admission was in 2018 with creatinine of 1 31  · Renal US on current admission benign  · Patient has no insurance, will likely be lost to follow up   · Consult nephrology given above   · This is likely patient's new baseline   · Monitor urine output

## 2022-02-02 NOTE — ASSESSMENT & PLAN NOTE
· Unclear etiology, but low suspicion for infectious process per ENT evaluation  · RAST and C1 esterase levels ordered  · Recommended for epi pen on discharge  · Patient is protecting his airway, otherwise stable  · Monitor

## 2022-02-02 NOTE — ASSESSMENT & PLAN NOTE
Wt Readings from Last 3 Encounters:   02/02/22 103 kg (227 lb 11 8 oz)   02/19/18 111 kg (245 lb 6 oz)     · No in acute exacerbation  · Last ECHO on 2/2/22 with preserved EF  · Monitor lytes, weight, I/O

## 2022-02-03 LAB
ANION GAP SERPL CALCULATED.3IONS-SCNC: 12 MMOL/L (ref 4–13)
BASOPHILS # BLD AUTO: 0.02 THOUSANDS/ΜL (ref 0–0.1)
BASOPHILS NFR BLD AUTO: 0 % (ref 0–1)
BUN SERPL-MCNC: 54 MG/DL (ref 5–25)
C1INH SERPL-MCNC: 54 MG/DL (ref 21–39)
C3 SERPL-MCNC: 109 MG/DL (ref 90–180)
C4 SERPL-MCNC: 29 MG/DL (ref 10–40)
CALCIUM SERPL-MCNC: 7.3 MG/DL (ref 8.3–10.1)
CHLORIDE SERPL-SCNC: 102 MMOL/L (ref 100–108)
CHLORIDE UR-SCNC: 54 MMOL/L
CO2 SERPL-SCNC: 20 MMOL/L (ref 21–32)
CREAT SERPL-MCNC: 4.89 MG/DL (ref 0.6–1.3)
CREAT UR-MCNC: 72.5 MG/DL
EOSINOPHIL # BLD AUTO: 0 THOUSAND/ΜL (ref 0–0.61)
EOSINOPHIL NFR BLD AUTO: 0 % (ref 0–6)
ERYTHROCYTE [DISTWIDTH] IN BLOOD BY AUTOMATED COUNT: 13.5 % (ref 11.6–15.1)
GFR SERPL CREATININE-BSD FRML MDRD: 11 ML/MIN/1.73SQ M
GLUCOSE SERPL-MCNC: 131 MG/DL (ref 65–140)
HBV CORE AB SER QL: NORMAL
HBV CORE IGM SER QL: NORMAL
HBV SURFACE AB SER-ACNC: 4.46 MIU/ML
HBV SURFACE AG SER QL: NORMAL
HCT VFR BLD AUTO: 25.6 % (ref 36.5–49.3)
HCV AB SER QL: NORMAL
HGB BLD-MCNC: 8.8 G/DL (ref 12–17)
IMM GRANULOCYTES # BLD AUTO: 0.11 THOUSAND/UL (ref 0–0.2)
IMM GRANULOCYTES NFR BLD AUTO: 1 % (ref 0–2)
LYMPHOCYTES # BLD AUTO: 0.67 THOUSANDS/ΜL (ref 0.6–4.47)
LYMPHOCYTES NFR BLD AUTO: 5 % (ref 14–44)
MAGNESIUM SERPL-MCNC: 2.1 MG/DL (ref 1.6–2.6)
MCH RBC QN AUTO: 32.2 PG (ref 26.8–34.3)
MCHC RBC AUTO-ENTMCNC: 34.4 G/DL (ref 31.4–37.4)
MCV RBC AUTO: 94 FL (ref 82–98)
MONOCYTES # BLD AUTO: 0.49 THOUSAND/ΜL (ref 0.17–1.22)
MONOCYTES NFR BLD AUTO: 4 % (ref 4–12)
NEUTROPHILS # BLD AUTO: 11.6 THOUSANDS/ΜL (ref 1.85–7.62)
NEUTS SEG NFR BLD AUTO: 90 % (ref 43–75)
NRBC BLD AUTO-RTO: 0 /100 WBCS
PLATELET # BLD AUTO: 232 THOUSANDS/UL (ref 149–390)
PMV BLD AUTO: 9.6 FL (ref 8.9–12.7)
POTASSIUM SERPL-SCNC: 3.7 MMOL/L (ref 3.5–5.3)
RBC # BLD AUTO: 2.73 MILLION/UL (ref 3.88–5.62)
SODIUM 24H UR-SCNC: 59 MOL/L
SODIUM SERPL-SCNC: 134 MMOL/L (ref 136–145)
WBC # BLD AUTO: 12.89 THOUSAND/UL (ref 4.31–10.16)

## 2022-02-03 PROCEDURE — 85027 COMPLETE CBC AUTOMATED: CPT | Performed by: STUDENT IN AN ORGANIZED HEALTH CARE EDUCATION/TRAINING PROGRAM

## 2022-02-03 PROCEDURE — 86160 COMPLEMENT ANTIGEN: CPT | Performed by: NURSE PRACTITIONER

## 2022-02-03 PROCEDURE — 87340 HEPATITIS B SURFACE AG IA: CPT | Performed by: NURSE PRACTITIONER

## 2022-02-03 PROCEDURE — 84300 ASSAY OF URINE SODIUM: CPT | Performed by: NURSE PRACTITIONER

## 2022-02-03 PROCEDURE — 82570 ASSAY OF URINE CREATININE: CPT | Performed by: NURSE PRACTITIONER

## 2022-02-03 PROCEDURE — 83735 ASSAY OF MAGNESIUM: CPT | Performed by: STUDENT IN AN ORGANIZED HEALTH CARE EDUCATION/TRAINING PROGRAM

## 2022-02-03 PROCEDURE — 80048 BASIC METABOLIC PNL TOTAL CA: CPT | Performed by: STUDENT IN AN ORGANIZED HEALTH CARE EDUCATION/TRAINING PROGRAM

## 2022-02-03 PROCEDURE — 86705 HEP B CORE ANTIBODY IGM: CPT | Performed by: NURSE PRACTITIONER

## 2022-02-03 PROCEDURE — 82436 ASSAY OF URINE CHLORIDE: CPT | Performed by: NURSE PRACTITIONER

## 2022-02-03 PROCEDURE — 86038 ANTINUCLEAR ANTIBODIES: CPT | Performed by: NURSE PRACTITIONER

## 2022-02-03 PROCEDURE — 99233 SBSQ HOSP IP/OBS HIGH 50: CPT | Performed by: INTERNAL MEDICINE

## 2022-02-03 PROCEDURE — 86803 HEPATITIS C AB TEST: CPT | Performed by: NURSE PRACTITIONER

## 2022-02-03 PROCEDURE — 99233 SBSQ HOSP IP/OBS HIGH 50: CPT | Performed by: STUDENT IN AN ORGANIZED HEALTH CARE EDUCATION/TRAINING PROGRAM

## 2022-02-03 PROCEDURE — 86704 HEP B CORE ANTIBODY TOTAL: CPT | Performed by: NURSE PRACTITIONER

## 2022-02-03 PROCEDURE — 86706 HEP B SURFACE ANTIBODY: CPT | Performed by: NURSE PRACTITIONER

## 2022-02-03 RX ORDER — CARVEDILOL 12.5 MG/1
12.5 TABLET ORAL 2 TIMES DAILY WITH MEALS
Status: DISCONTINUED | OUTPATIENT
Start: 2022-02-03 | End: 2022-02-03

## 2022-02-03 RX ORDER — CARVEDILOL 25 MG/1
25 TABLET ORAL 2 TIMES DAILY WITH MEALS
Status: DISCONTINUED | OUTPATIENT
Start: 2022-02-03 | End: 2022-02-04 | Stop reason: HOSPADM

## 2022-02-03 RX ADMIN — PRAVASTATIN SODIUM 80 MG: 80 TABLET ORAL at 18:09

## 2022-02-03 RX ADMIN — Medication 14 MG: at 09:34

## 2022-02-03 RX ADMIN — AMLODIPINE BESYLATE 10 MG: 10 TABLET ORAL at 09:33

## 2022-02-03 RX ADMIN — ASPIRIN 81 MG: 81 TABLET, COATED ORAL at 09:32

## 2022-02-03 RX ADMIN — CARVEDILOL 25 MG: 25 TABLET, FILM COATED ORAL at 18:10

## 2022-02-03 RX ADMIN — DEXAMETHASONE SODIUM PHOSPHATE 6 MG: 4 INJECTION INTRA-ARTICULAR; INTRALESIONAL; INTRAMUSCULAR; INTRAVENOUS; SOFT TISSUE at 09:33

## 2022-02-03 NOTE — CASE MANAGEMENT
Case Management Progress Note    Patient name Amy Monet  Location [place] 4 /E4 [place]-* MRN [id number]  : 1960 Date 2/3/2022       LOS (days): 2  Geometric Mean LOS (GMLOS) (days):   Days to GMLOS:        OBJECTIVE:        Current admission status: Inpatient  Preferred Pharmacy:   [facility] [address] MELINDA SUN [address] [place] [address]  Phone: [phone] Fax: [phone]    Primary Care Provider: No primary care provider on file  Primary Insurance: LAYLA BENSON PENDING  Secondary Insurance:     PROGRESS NOTE:    CM spoke with pt about the remaining cost for his Epi-pen  Pt reported that he would not be able to afford the $129 80 copay as he is not working at this time  CM will send the completed financial assistance eligible paper to the pharmacy so pt is able to receive his Epi-pen upon discharge  Pt is [place] pending at this time and is working with his gf to get the needed documentation to submit to [facility] for Medicaid here in [place]  CM provided pt with the information for [employer] along with the number for the [facility] to see if he could get himself a PCP appointment as he will need them to fill out paperwork to get his Epi-pen discounted moving forward

## 2022-02-03 NOTE — ASSESSMENT & PLAN NOTE
Wt Readings from Last 3 Encounters:   02/03/22 98 8 kg (217 lb 13 oz)   02/19/18 111 kg (245 lb 6 oz)     · No in acute exacerbation  · Last ECHO on 2/2/22 with preserved EF and grade 1 diastolic dysfunction  · Monitor lytes, weight, I/O

## 2022-02-03 NOTE — CASE MANAGEMENT
Case Management Assessment & Discharge Planning Note    Patient name Katy Gustafson  Location [address] [place] [address] [address] Devon Flores-* MRN [id number]  : 1960 Date 2/3/2022       Current Admission Date: 2022  Current Admission Diagnosis:Pharyngeal edema   Patient Active Problem List    Diagnosis Date Noted    Hypomagnesemia 2022    Pharyngeal edema 2022    High anion gap metabolic acidosis     Withdrawal seizures ([facility] [address] ) 2022    Seizure due to alcohol withdrawal, uncomplicated ([facility] [address] )     Benign essential HTN 2018    CHF (congestive heart failure) ([facility] [address] ) 2018    ETOH abuse 2018    Chronic kidney disease 2018    Marijuana use 2018    Hyperlipidemia 2018    Tobacco abuse 2018    Leukocytosis 2018      LOS (days): 2  Geometric Mean LOS (GMLOS) (days):   Days to GMLOS:     OBJECTIVE:    Risk of Unplanned Readmission Score: 21         Current admission status: Inpatient       Preferred Pharmacy:   [facility] [address] MELINDA SUN [address] [place] [address]  Phone: [phone] Fax: [phone]    Primary Care Provider: No primary care provider on file      Primary Insurance: LAYLA BENSON PENDING  Secondary Insurance:     ASSESSMENT:  [address] [facility],[address], [address] Other   Primary Phone: [phone] (Home)               Advance Directives  Does patient have a [address] [place] [address] Avenue?: No  Was patient offered paperwork?: Yes (Not interested)  Does patient currently have a Health Care decision maker?: Yes, please see Health Care Proxy section  Does patient have Advance Directives?: No  Was patient offered paperwork?: Yes (Not interested)  Primary Contact: Florecita Veras (S/O) [phone]              Patient Information  Admitted from[de-identified] Home  Mental Status: Alert  During Assessment patient was accompanied by: Not accompanied during assessment  Assessment information provided by[de-identified] Patient  Primary Caregiver: Self  Support Systems: Spouse/significant other  [place] of Residence: [address] [facility] do you live in?: [address] [facility] entry access options   Select all that apply : Stairs  Number of steps to enter home : 3  Do the steps have railings?: Yes  Type of Current Residence: 2 story home  Upon entering residence, is there a bedroom on the main floor (no further steps)?: No  A bedroom is located on the following floor levels of residence (select all that apply):: 2nd Floor  Upon entering residence, is there a bathroom on the main floor (no further steps)?: No  Indicate which floors of current residence have a bathroom (select all the apply):: 2nd Floor  Number of steps to 2nd floor from main floor: One Flight  In the last 12 months, was there a time when you were not able to pay the mortgage or rent on time?: No  In the last 12 months, how many places have you lived?: 2  In the last 12 months, was there a time when you did not have a steady place to sleep or slept in a shelter (including now)?: No  Homeless/housing insecurity resource given?: N/A  Living Arrangements: Lives w/ Spouse/significant other    Activities of Daily Living Prior to Admission  Functional Status: Independent  Completes ADLs independently?: Yes  Ambulates independently?: Yes  Does patient use assisted devices?: No  Does patient currently own DME?: No  Does patient have a history of Outpatient Therapy (PT/OT)?: No  Does the patient have a history of Short-Term Rehab?: No  Does patient have a history of HHC?: No  [facility]         Patient Information Continued  Income Source: Unemployed  Does patient have prescription coverage?: No ([place]ending)  Within the past 12 months, you worried that your food would run out before you got the money to buy more : Never true  Within the past 12 months, the food you bought just didnt last and you didnt have money to get more : Never true  Food insecurity resource given?: N/A  Does patient receive dialysis treatments?: No  Does patient have a history of substance abuse?: Yes  Historical substance use preference: Marijuana,Alcohol/ETOH  History of Withdrawal Symptoms: Seizures  Is patient currently in treatment for substance abuse?: N/A - sober  Does patient have a history of Mental Health Diagnosis?: No         Means of Transportation  Means of Transport to Appts[de-identified] Family transport  In the past 12 months, has lack of transportation kept you from medical appointments or from getting medications?: No  In the past 12 months, has lack of transportation kept you from meetings, work, or from getting things needed for daily living?: No  Was application for public transport provided?: N/A        DISCHARGE DETAILS:    Discharge planning discussed with[de-identified] Patient           Were Treatment Team discharge recommendations reviewed with patient/caregiver?: Yes  Did patient/caregiver verbalize understanding of patient care needs?: Yes  Were patient/caregiver advised of the risks associated with not following Treatment Team discharge recommendations?: Yes         [address]         Is the patient interested in [facility] at discharge?: No    DME Referral Provided  Referral made for DME?: No    Other Referral/Resources/Interventions Provided:  Financial Resources Provided: Financial Counselor,Indigent Medication,Medicaid         Treatment Team Recommendation: Home  Discharge Destination Plan[de-identified] Home                                         Additional Comments: CM spoke with pt about the remaining cost for his Epi-pen  Pt reported that he would not be able to afford the $129 80 copay as he is not working at this time  CM will send the completed financial assistance eligible paper to the pharmacy so pt is able to receive his Epi-pen upon discharge   Pt is [place] pending at this time and is working with his gf to get the needed documentation to submit to [employer] for Medicaid here in [place]  CM provided pt with the information for [employer] along with the number for the [facility] to see if he could get himself a PCP appointment as he will need them to fill out paperwork to get his Epi-pen discounted moving forward

## 2022-02-03 NOTE — ASSESSMENT & PLAN NOTE
· Unclear etiology, but low suspicion for infectious process per ENT evaluation  · RAST and C1 esterase levels ordered  · Recommended for epi pen on discharge  · Patient is protecting his airway, otherwise stable  · Monitor as an outpatient

## 2022-02-03 NOTE — PROGRESS NOTES
NEPHROLOGY PROGRESS NOTE   Amadeo Zimmer 62 y o  male MRN: [id number]  Unit/Bed#: E4 -01 Encounter: [id number]  Reason for Consult: ALEKS vs suspicion for CKD  ASSESSMENT/PLAN:  Acute kidney injury (POA) vs progression of undying CKD:  Suspect more chronicity in the setting of longstanding hypertension, noncompliance with medications for the past 2 years, and tobacco abuse   -presents with creatinine of 4 5    creatinine increased to 4 89, suspected to hemodynamics/hypertension  -previous creatinine in 2018 1 3  No recent labs for comparison   -initially received IV hydration    -received Lasix 40 mg IV x1 2/1   -UA:  Glucose, small blood, greater than 300 protein, 1-2 RBCs, 1-2 WBCs  -urine protein to creatinine ratio 8 2 g    -bladder scan insignificant   -renal ultrasound negative for obstruction, echogenic renal cortices in keeping with chronic medical renal disease   -will check SPEP/UPEP, compliments, hepatitis panel, and MAGDIEL  Will await results to determine if patient will need biopsy   -will need follow-up with Renal at discharge with repeat BMP  Patient currently does not have insurance  May have difficulty with follow-up  -recommend avoiding nephrotoxins, hypotension, IV contrast   -strict I/O      Hypertension:  Reports not taking antihypertensives for 1 year due to no health insurance  Initially presented with systolic blood pressure greater than 200, correcting appropriately  -started on amlodipine 10 mg po daily and carvedilol 12 5 mg po BID    -may increase carvedilol to 25 mg p o  B i d  If blood pressure remains above goal   -previously taking lisinopril 40 mg daily, Lasix 20 mg daily, carvedilol 25 mg 2 times per day, and amlodipine 5 mg daily   -avoid aggressive lowering of blood pressure in the setting of acute kidney injury  -recommend avoiding hypotension or high fluctuations in blood pressure      CHF:  EF of 60% with grade 1 diastolic dysfunction    -outpatient diuretic: Lasix 20 mg daily  Not taking for approximately 1 year   -received Lasix 40 mg x 1 02/01/2022   -no indication for diuretics a this time       Pharyngeal edema:  presenting with difficulties with swallowing, voice changes, cough with shortness of breath  -CT scan showed diffuse swelling involving the uvula, oropharynx, and hypopharynx compatible with pharyngitis  -ENT following      Other:  Hyperlipidemia    Disposition:  Requiring additional stay due to medical needs  Will recheck BMP in a m     If stable or improved, may discharge charge from Renal in a m  SUBJECTIVE:  The patient is resting in his bed  He denies chest discomfort or shortness of breath  He denies any issues with swallowing  He denies nausea, vomiting, diarrhea  He reports urinating without difficulty  We discussed his current renal function  Patient reports he does not have insurance  He may have difficulty with follow-up      OBJECTIVE:  Current Weight: Weight - Scale: 98 8 kg (217 lb 13 oz)  Vitals:    02/02/22 2308 02/02/22 2355 02/03/22 0541 02/03/22 0708   BP: (!) 173/94 126/69  151/87   BP Location: Right arm Right arm  Left arm   Pulse: 72   71   Resp: 18   18   Temp: 98 2 °F (36 8 °C)   (!) 97 3 °F (36 3 °C)   TempSrc: Temporal   Temporal   SpO2: 98%   98%   Weight:   98 8 kg (217 lb 13 oz)    Height:           Intake/Output Summary (Last 24 hours) at 2/3/2022 0810  Last data filed at 2/2/2022 1700  Gross per 24 hour   Intake 240 ml   Output 398 ml   Net -158 ml     General: NAD  Skin: warm, dry, intact, no rash  HEENT: Moist mucous membranes, sclera anicteric, normocephalic, atraumatic  Neck: No apparent JVD appreciated  Chest: lung sounds clear B/L, on RA   CVS:Regular rate and rhythm, no murmer   Abdomen: Soft, round, non-tender, +BS  Extremities: No B/L LE edema present  Neuro: alert and oriented  Psych: appropriate mood and affect     Medications:    Current Facility-Administered Medications:     amLODIPine (NORVASC) tablet 10 mg, 10 mg, Oral, Daily, Valjean Glimpse, CRNP    aspirin (ECOTRIN LOW STRENGTH) EC tablet 81 mg, 81 mg, Oral, Daily, BHANU Che, 81 mg at 02/02/22 0903    carvedilol (COREG) tablet 12 5 mg, 12 5 mg, Oral, BID With Meals, Valjean Glimpse, CRNP, 12 5 mg at 02/02/22 1807    [COMPLETED] dexamethasone (DECADRON) injection 6 mg, 6 mg, Intravenous, Q8H [address], 6 mg at 02/01/22 2255 **FOLLOWED BY** [COMPLETED] dexamethasone (DECADRON) injection 6 mg, 6 mg, Intravenous, Q12H CRUZ, 6 mg at 02/02/22 2106 **FOLLOWED BY** dexamethasone (DECADRON) injection 6 mg, 6 mg, Intravenous, Once, BHANU Che    Labetalol HCl (NORMODYNE) injection 10 mg, 10 mg, Intravenous, Q4H PRN, BHANU Che    nicotine (NICODERM CQ) 14 mg/24hr TD 24 hr patch 14 mg, 14 mg, Transdermal, Daily, BHANU Che, 14 mg at 02/02/22 1502    pravastatin (PRAVACHOL) tablet 80 mg, 80 mg, Oral, Daily With Little Bal, NAVANP, 80 mg at 02/02/22 1806    Laboratory Results:  Results from last 7 days   Lab Units 02/03/22  0501 02/02/22  0522 02/01/22  1554 02/01/22  0603 02/01/22  0603 02/01/22  0204 01/31/22  2220   WBC Thousand/uL 12 89* 13 02*  --   --  11 04*   < > 10 69*   HEMOGLOBIN g/dL 8 8* 9 0*  --   --  10 6*   < > 11 5*   HEMATOCRIT % 25 6* 26 2*  --   --  31 9*   < > 35 2*   PLATELETS Thousands/uL 232 239  --   --  266   < > 294   SODIUM mmol/L 134* 134* 132*   < > 133*   < > 134*   POTASSIUM mmol/L 3 7 3 8 4 0   < > 4 1   < > 5 6*   CHLORIDE mmol/L 102 103 101   < > 103   < > 102   CO2 mmol/L 20* 18* 17*   < > 14*   < > 13*   BUN mg/dL 54* 48* 40*   < > 38*   < > 35*   CREATININE mg/dL 4 89* 4 69* 4 72*   < > 4 46*   < > 4 50*   CALCIUM mg/dL 7 3* 7 4* 7 6*   < > 8 0*   < > 7 4*   MAGNESIUM mg/dL  --  1 3*  --   --   --   --   --    ALK PHOS U/L  --   --   --   --   --   --  120*   ALT U/L  --   --   --   --   --   --  36   AST U/L  --   --   --   --   --   --  43    < > = values in this interval not displayed

## 2022-02-03 NOTE — PLAN OF CARE
Problem: PAIN - ADULT  Goal: Verbalizes/displays adequate comfort level or baseline comfort level  Description: Interventions:  - Encourage patient to monitor pain and request assistance  - Assess pain using appropriate pain scale  - Administer analgesics based on type and severity of pain and evaluate response  - Implement non-pharmacological measures as appropriate and evaluate response  - Consider cultural and social influences on pain and pain management  - Notify physician/advanced practitioner if interventions unsuccessful or patient reports new pain  Outcome: Progressing     Problem: SAFETY ADULT  Goal: Patient will remain free of falls  Description: INTERVENTIONS:  - Educate patient/family on patient safety including physical limitations  - Instruct patient to call for assistance with activity   - Consult OT/PT to assist with strengthening/mobility   - Keep Call bell within reach  - Keep bed low and locked with side rails adjusted as appropriate  - Keep care items and personal belongings within reach  - Initiate and maintain comfort rounds  - Make Fall Risk Sign visible to staff  - Apply yellow socks and bracelet for high fall risk patients  - Consider moving patient to room near nurses station  Outcome: Progressing  Goal: Maintain or return to baseline ADL function  Description: INTERVENTIONS:  -  Assess patient's ability to carry out ADLs; assess patient's baseline for ADL function and identify physical deficits which impact ability to perform ADLs (bathing, care of mouth/teeth, toileting, grooming, dressing, etc )  - Assess/evaluate cause of self-care deficits   - Assess range of motion  - Assess patient's mobility; develop plan if impaired  - Assess patient's need for assistive devices and provide as appropriate  - Encourage maximum independence but intervene and supervise when necessary  - Involve family in performance of ADLs  - Assess for home care needs following discharge   - Consider OT consult to assist with ADL evaluation and planning for discharge  - Provide patient education as appropriate  Outcome: Progressing

## 2022-02-03 NOTE — PROGRESS NOTES
[address] [facility]  Progress Note - Tomas Estrada 1960, 62 y o  male MRN: [id number]  Unit/Bed#: E4 -01 Encounter: [id number]  Primary Care Provider: No primary care provider on file     Date and time admitted to hospital: 1/31/2022  9:45 PM    * Pharyngeal edema  Assessment & Plan  · Unclear etiology, but low suspicion for infectious process per ENT evaluation  · RAST and C1 esterase levels ordered  · Recommended for epi pen on discharge  · Patient is protecting his airway, otherwise stable  · Monitor as an outpatient     Chronic kidney disease  Assessment & Plan  Lab Results   Component Value Date    EGFR 11 02/03/2022    EGFR 12 02/02/2022    EGFR 12 02/01/2022    CREATININE 4 89 (H) 02/03/2022    CREATININE 4 69 (H) 02/02/2022    CREATININE 4 72 (H) 02/01/2022     · No care everywhere records noted, Likely hypertensive nephropathy   · Patient's last creatinine prior to current admission was in 2018 with creatinine of 1 31  · Renal US on current admission benign  · Nephrology was consulted, suspecting chronic kidney disease rather than acute pathology  · MAGDIEL, Complement, hepatitis panel, urine studies ordered, follow up   · Creatinine is trending upwards today, could be in setting of poorly controlled blood pressure  · Added coreg for better control, reassess creatinine in AM    Hypomagnesemia  Assessment & Plan  · Monitor and replete as needed  · Given 4 grams of magnesium on 2/2/22    Leukocytosis  Assessment & Plan  · Likely in setting of steroids for pharyngeal edema  · Monitor     Hyperlipidemia  Assessment & Plan  · Continue home dose statin     CHF (congestive heart failure) ([facility] [address] )  Assessment & Plan  Wt Readings from Last 3 Encounters:   02/03/22 98 8 kg (217 lb 13 oz)   02/19/18 111 kg (245 lb 6 oz)     · No in acute exacerbation  · Last ECHO on 2/2/22 with preserved EF and grade 1 diastolic dysfunction  · Monitor lytes, weight, I/O    Benign essential HTN  Assessment & Plan  · Could be better controlled  · On amlodipine, added coreg today  · Monitor         VTE Pharmacologic Prophylaxis: VTE Score: 0 Low Risk (Score 0-2) - Encourage Ambulation  Patient Centered Rounds: I performed bedside rounds with nursing staff today  Discussions with Specialists or Other Care Team Provider: nephrology     Education and Discussions with Family / Patient: Patient declined call to   Time Spent for Care: 30 minutes  More than 50% of total time spent on counseling and coordination of care as described above  Current Length of Stay: 2 day(s)  Current Patient Status: Inpatient   Certification Statement: The patient will continue to require additional inpatient hospital stay due to blood pressure control  Discharge Plan: Anticipate discharge in 24-48 hrs to home  Code Status: Level 1 - Full Code    Subjective:   No chest pain or chest palpitations  No shortness of breath  Appetite is good  Objective:     Vitals:   Temp (24hrs), Av 8 °F (36 6 °C), Min:97 3 °F (36 3 °C), Max:98 2 °F (36 8 °C)    Temp:  [97 3 °F (36 3 °C)-98 2 °F (36 8 °C)] 97 3 °F (36 3 °C)  HR:  [71-82] 71  Resp:  [18] 18  BP: (118-173)/(69-94) 151/87  SpO2:  [98 %-100 %] 98 %  Body mass index is 33 12 kg/m²  Input and Output Summary (last 24 hours): Intake/Output Summary (Last 24 hours) at 2/3/2022 1019  Last data filed at 2022 1700  Gross per 24 hour   Intake 240 ml   Output 398 ml   Net -158 ml       Physical Exam:   Physical Exam  Vitals and nursing note reviewed  Constitutional:       Appearance: Normal appearance  HENT:      Head: Normocephalic and atraumatic  Right Ear: External ear normal       Left Ear: External ear normal       Nose: No congestion or rhinorrhea  Mouth/Throat:      Mouth: Mucous membranes are dry  Pharynx: Oropharynx is clear  Eyes:      General:         Right eye: No discharge  Left eye: No discharge     Cardiovascular:      Rate and Rhythm: Normal rate and regular rhythm  Pulmonary:      Effort: Pulmonary effort is normal  No respiratory distress  Abdominal:      General: Abdomen is flat  Palpations: Abdomen is soft  Musculoskeletal:      Cervical back: Normal range of motion and neck supple  Right lower leg: No edema  Left lower leg: No edema  Skin:     General: Skin is warm and dry  Neurological:      General: No focal deficit present  Mental Status: He is alert  Mental status is at baseline  Psychiatric:         Mood and Affect: Mood normal          Behavior: Behavior normal           Additional Data:     Labs:  Results from last 7 days   Lab Units 02/03/22  0501 02/02/22  0522 02/01/22  0603   WBC Thousand/uL 12 89*   < > 11 04*   HEMOGLOBIN g/dL 8 8*   < > 10 6*   HEMATOCRIT % 25 6*   < > 31 9*   PLATELETS Thousands/uL 232   < > 266   BANDS PCT %  --   --  3   NEUTROS PCT % 90*   < >  --    LYMPHS PCT % 5*   < >  --    LYMPHO PCT %  --   --  4*   MONOS PCT % 4   < >  --    MONO PCT %  --   --  1*   EOS PCT % 0   < > 0    < > = values in this interval not displayed  Results from last 7 days   Lab Units 02/03/22  0501 02/01/22  0204 01/31/22  2220   SODIUM mmol/L 134*   < > 134*   POTASSIUM mmol/L 3 7   < > 5 6*   CHLORIDE mmol/L 102   < > 102   CO2 mmol/L 20*   < > 13*   BUN mg/dL 54*   < > 35*   CREATININE mg/dL 4 89*   < > 4 50*   ANION GAP mmol/L 12   < > 19*   CALCIUM mg/dL 7 3*   < > 7 4*   ALBUMIN g/dL  --   --  2 6*   TOTAL BILIRUBIN mg/dL  --   --  0 89   ALK PHOS U/L  --   --  120*   ALT U/L  --   --  36   AST U/L  --   --  43   GLUCOSE RANDOM mg/dL 131   < > 81    < > = values in this interval not displayed  Results from last 7 days   Lab Units 02/01/22  0651 02/01/22  0413   LACTIC ACID mmol/L  --  0 8   PROCALCITONIN ng/ml 0 26* 0 24       Lines/Drains:  Invasive Devices  Report    None                       Imaging: No pertinent imaging reviewed      Recent Cultures (last 7 days): Last 24 Hours Medication List:   Current Facility-Administered Medications   Medication Dose Route Frequency Provider Last Rate    amLODIPine  10 mg Oral Daily BHANU Adler      aspirin  81 mg Oral Daily BHANU Calvo      carvedilol  25 mg Oral BID With Meals BHANU Adler      Labetalol HCl  10 mg Intravenous Q4H PRN BHANU Calvo      nicotine  14 mg Transdermal Daily BHANU Calvo      pravastatin  80 mg Oral Daily With REEL QualifiedBHANU          Today, Patient Was Seen By: Elvira Ham MD    **Please Note: This note may have been constructed using a voice recognition system  **

## 2022-02-03 NOTE — ASSESSMENT & PLAN NOTE
Lab Results   Component Value Date    EGFR 11 02/03/2022    EGFR 12 02/02/2022    EGFR 12 02/01/2022    CREATININE 4 89 (H) 02/03/2022    CREATININE 4 69 (H) 02/02/2022    CREATININE 4 72 (H) 02/01/2022     · No care everywhere records noted, Likely hypertensive nephropathy   · Patient's last creatinine prior to current admission was in 2018 with creatinine of 1 31  · Renal US on current admission benign  · Nephrology was consulted, suspecting chronic kidney disease rather than acute pathology  · MAGDIEL, Complement, hepatitis panel, urine studies ordered, follow up   · Creatinine is trending upwards today, could be in setting of poorly controlled blood pressure  · Added coreg for better control, reassess creatinine in AM

## 2022-02-04 VITALS
TEMPERATURE: 98 F | WEIGHT: 218.7 LBS | BODY MASS INDEX: 33.15 KG/M2 | SYSTOLIC BLOOD PRESSURE: 169 MMHG | OXYGEN SATURATION: 99 % | HEIGHT: 68 IN | RESPIRATION RATE: 18 BRPM | HEART RATE: 69 BPM | DIASTOLIC BLOOD PRESSURE: 87 MMHG

## 2022-02-04 LAB
ABO GROUP BLD BPU: NORMAL
ABO GROUP BLD BPU: NORMAL
ANION GAP SERPL CALCULATED.3IONS-SCNC: 13 MMOL/L (ref 4–13)
BASOPHILS # BLD AUTO: 0.01 THOUSANDS/ΜL (ref 0–0.1)
BASOPHILS NFR BLD AUTO: 0 % (ref 0–1)
BPU ID: NORMAL
BPU ID: NORMAL
BUN SERPL-MCNC: 57 MG/DL (ref 5–25)
CALCIUM SERPL-MCNC: 7.3 MG/DL (ref 8.3–10.1)
CHLORIDE SERPL-SCNC: 103 MMOL/L (ref 100–108)
CO2 SERPL-SCNC: 19 MMOL/L (ref 21–32)
CREAT SERPL-MCNC: 4.73 MG/DL (ref 0.6–1.3)
EOSINOPHIL # BLD AUTO: 0 THOUSAND/ΜL (ref 0–0.61)
EOSINOPHIL NFR BLD AUTO: 0 % (ref 0–6)
ERYTHROCYTE [DISTWIDTH] IN BLOOD BY AUTOMATED COUNT: 13.4 % (ref 11.6–15.1)
GFR SERPL CREATININE-BSD FRML MDRD: 12 ML/MIN/1.73SQ M
GLUCOSE SERPL-MCNC: 100 MG/DL (ref 65–140)
HCT VFR BLD AUTO: 26.7 % (ref 36.5–49.3)
HGB BLD-MCNC: 8.7 G/DL (ref 12–17)
IMM GRANULOCYTES # BLD AUTO: 0.13 THOUSAND/UL (ref 0–0.2)
IMM GRANULOCYTES NFR BLD AUTO: 1 % (ref 0–2)
LYMPHOCYTES # BLD AUTO: 1.59 THOUSANDS/ΜL (ref 0.6–4.47)
LYMPHOCYTES NFR BLD AUTO: 13 % (ref 14–44)
MAGNESIUM SERPL-MCNC: 1.9 MG/DL (ref 1.6–2.6)
MCH RBC QN AUTO: 31 PG (ref 26.8–34.3)
MCHC RBC AUTO-ENTMCNC: 32.6 G/DL (ref 31.4–37.4)
MCV RBC AUTO: 95 FL (ref 82–98)
MONOCYTES # BLD AUTO: 0.77 THOUSAND/ΜL (ref 0.17–1.22)
MONOCYTES NFR BLD AUTO: 6 % (ref 4–12)
NEUTROPHILS # BLD AUTO: 9.71 THOUSANDS/ΜL (ref 1.85–7.62)
NEUTS SEG NFR BLD AUTO: 80 % (ref 43–75)
NRBC BLD AUTO-RTO: 0 /100 WBCS
PLATELET # BLD AUTO: 241 THOUSANDS/UL (ref 149–390)
PMV BLD AUTO: 9.8 FL (ref 8.9–12.7)
POTASSIUM SERPL-SCNC: 3.6 MMOL/L (ref 3.5–5.3)
RBC # BLD AUTO: 2.81 MILLION/UL (ref 3.88–5.62)
RYE IGE QN: NEGATIVE
SODIUM SERPL-SCNC: 135 MMOL/L (ref 136–145)
UNIT DISPENSE STATUS: NORMAL
UNIT DISPENSE STATUS: NORMAL
UNIT PRODUCT CODE: NORMAL
UNIT PRODUCT CODE: NORMAL
UNIT PRODUCT VOLUME: 199 ML
UNIT PRODUCT VOLUME: 205 ML
UNIT RH: NORMAL
UNIT RH: NORMAL
WBC # BLD AUTO: 12.21 THOUSAND/UL (ref 4.31–10.16)

## 2022-02-04 PROCEDURE — 84165 PROTEIN E-PHORESIS SERUM: CPT | Performed by: PATHOLOGY

## 2022-02-04 PROCEDURE — 99233 SBSQ HOSP IP/OBS HIGH 50: CPT | Performed by: INTERNAL MEDICINE

## 2022-02-04 PROCEDURE — 85025 COMPLETE CBC W/AUTO DIFF WBC: CPT | Performed by: STUDENT IN AN ORGANIZED HEALTH CARE EDUCATION/TRAINING PROGRAM

## 2022-02-04 PROCEDURE — 86334 IMMUNOFIX E-PHORESIS SERUM: CPT | Performed by: PATHOLOGY

## 2022-02-04 PROCEDURE — 99232 SBSQ HOSP IP/OBS MODERATE 35: CPT | Performed by: INTERNAL MEDICINE

## 2022-02-04 PROCEDURE — 80048 BASIC METABOLIC PNL TOTAL CA: CPT | Performed by: STUDENT IN AN ORGANIZED HEALTH CARE EDUCATION/TRAINING PROGRAM

## 2022-02-04 PROCEDURE — 84165 PROTEIN E-PHORESIS SERUM: CPT | Performed by: NURSE PRACTITIONER

## 2022-02-04 PROCEDURE — 86334 IMMUNOFIX E-PHORESIS SERUM: CPT | Performed by: NURSE PRACTITIONER

## 2022-02-04 PROCEDURE — 83735 ASSAY OF MAGNESIUM: CPT | Performed by: STUDENT IN AN ORGANIZED HEALTH CARE EDUCATION/TRAINING PROGRAM

## 2022-02-04 RX ORDER — ASPIRIN 81 MG/1
81 TABLET ORAL DAILY
Refills: 0
Start: 2022-02-04 | End: 2022-06-24 | Stop reason: SDUPTHER

## 2022-02-04 RX ORDER — PRAVASTATIN SODIUM 80 MG/1
80 TABLET ORAL
Qty: 30 TABLET | Refills: 0 | Status: SHIPPED | OUTPATIENT
Start: 2022-02-04 | End: 2022-06-24 | Stop reason: SDUPTHER

## 2022-02-04 RX ORDER — NICOTINE 21 MG/24HR
1 PATCH, TRANSDERMAL 24 HOURS TRANSDERMAL DAILY
Qty: 28 PATCH | Refills: 0 | Status: SHIPPED | OUTPATIENT
Start: 2022-02-05

## 2022-02-04 RX ORDER — AMLODIPINE BESYLATE 5 MG/1
5 TABLET ORAL DAILY
Qty: 90 TABLET | Refills: 0 | Status: SHIPPED | OUTPATIENT
Start: 2022-02-04 | End: 2022-06-24 | Stop reason: SDUPTHER

## 2022-02-04 RX ORDER — CARVEDILOL 25 MG/1
25 TABLET ORAL 2 TIMES DAILY WITH MEALS
Qty: 90 TABLET | Refills: 0 | Status: SHIPPED | OUTPATIENT
Start: 2022-02-04 | End: 2022-06-14

## 2022-02-04 RX ORDER — EPINEPHRINE 0.3 MG/.3ML
0.3 INJECTION SUBCUTANEOUS ONCE
Qty: 0.6 ML | Refills: 0 | Status: SHIPPED | OUTPATIENT
Start: 2022-02-04 | End: 2022-02-04

## 2022-02-04 RX ADMIN — ASPIRIN 81 MG: 81 TABLET, COATED ORAL at 09:50

## 2022-02-04 RX ADMIN — Medication 14 MG: at 09:52

## 2022-02-04 RX ADMIN — CARVEDILOL 25 MG: 25 TABLET, FILM COATED ORAL at 09:51

## 2022-02-04 RX ADMIN — AMLODIPINE BESYLATE 10 MG: 10 TABLET ORAL at 09:52

## 2022-02-04 NOTE — PLAN OF CARE
Problem: PAIN - ADULT  Goal: Verbalizes/displays adequate comfort level or baseline comfort level  Description: Interventions:  - Encourage patient to monitor pain and request assistance  - Assess pain using appropriate pain scale  - Administer analgesics based on type and severity of pain and evaluate response  - Implement non-pharmacological measures as appropriate and evaluate response  - Consider cultural and social influences on pain and pain management  - Notify physician/advanced practitioner if interventions unsuccessful or patient reports new pain  Outcome: Progressing     Problem: INFECTION - ADULT  Goal: Absence or prevention of progression during hospitalization  Description: INTERVENTIONS:  - Assess and monitor for signs and symptoms of infection  - Monitor lab/diagnostic results  - Monitor all insertion sites, i e  indwelling lines, tubes, and drains  - Monitor endotracheal if appropriate and nasal secretions for changes in amount and color  - [place] appropriate cooling/warming therapies per order  - Administer medications as ordered  - Instruct and encourage patient and family to use good hand hygiene technique  - Identify and instruct in appropriate isolation precautions for identified infection/condition  Outcome: Progressing     Problem: DISCHARGE PLANNING  Goal: Discharge to home or other facility with appropriate resources  Description: INTERVENTIONS:  - Identify barriers to discharge w/patient and caregiver  - Arrange for needed discharge resources and transportation as appropriate  - Identify discharge learning needs (meds, wound care, etc )  - Arrange for interpretive services to assist at discharge as needed  - Refer to Case Management Department for coordinating discharge planning if the patient needs post-hospital services based on physician/advanced practitioner order or complex needs related to functional status, cognitive ability, or social support system  Outcome: Progressing Problem: Knowledge Deficit  Goal: Patient/family/caregiver demonstrates understanding of disease process, treatment plan, medications, and discharge instructions  Description: Complete learning assessment and assess knowledge base    Interventions:  - Provide teaching at level of understanding  - Provide teaching via preferred learning methods  Outcome: Progressing

## 2022-02-04 NOTE — PROGRESS NOTES
NEPHROLOGY PROGRESS NOTE   Tracee Vicente 62 y o  male MRN: [id number]  Unit/Bed#: E4 -01 Encounter: [id number]  Reason for Consult: ALEKS (POA) versus underlying CKD  ASSESSMENT/PLAN:  Acute kidney injury (POA) vs progression of undying CKD:  Suspect more chronicity in the setting of longstanding hypertension, noncompliance with medications for the past 2 years, and tobacco abuse   -presents with creatinine of 4 5  Creatinine with slight fluctuation likely due to hemodynamics  -previous creatinine in 2018 1 3   No recent labs for comparison   -initially received IV hydration    -received Lasix 40 mg IV x1 2/1   -UA:  Glucose, small blood, greater than 300 protein, 1-2 RBCs, 1-2 WBCs  -urine protein to creatinine ratio 8 2 g    -bladder scan insignificant   -renal ultrasound negative for obstruction, echogenic renal cortices in keeping with chronic medical renal disease   -complements, hepatitis panel normal   SPEP to be collected  UPEP in [facility] pending  Will continue to follow as outpatient  Will await results to determine if patient will need biopsy   -will need follow-up with Renal at discharge with repeat BMP  Patient currently does not have insurance  May have difficulty with follow-up  -recommend avoiding nephrotoxins, hypotension, IV contrast   -strict I/O      Hypertension:  Reports not taking antihypertensives for 1 year due to no health insurance   Initially presented with systolic blood pressure greater than 200, correcting appropriately  -started on amlodipine 10 mg po daily and carvedilol  25 mg p o  B i d  Continue at discharge  Discussed with case management to assist with obtaining medications due to no insurance  -previously taking lisinopril 40 mg daily, Lasix 20 mg daily, carvedilol 25 mg 2 times per day, and amlodipine 5 mg daily   -avoid aggressive lowering of blood pressure in the setting of acute kidney injury    -recommend avoiding hypotension or high fluctuations in blood pressure      CHF:  EF of 60% with grade 1 diastolic dysfunction  -outpatient diuretic:  Lasix 20 mg daily   Not taking for approximately 1 year   -received Lasix 40 mg x 1 02/01/2022   -no indication for diuretics a this time       Pharyngeal edema:  presenting with difficulties with swallowing, voice changes, cough with shortness of breath  -CT scan showed diffuse swelling involving the uvula, oropharynx, and hypopharynx compatible with pharyngitis  -ENT following      Other:  Hyperlipidemia    Disposition:  Okay to discharge from Renal     SUBJECTIVE:  The patient states he feels great today  He denies any chest discomfort or shortness of breath  He denies any further issues with swallowing or coughing  We discussed following a low-salt diet at discharge  We discussed the importance of follow-up as well as taking his blood pressure medications      OBJECTIVE:  Current Weight: Weight - Scale: 99 2 kg (218 lb 11 1 oz)  Vitals:    02/03/22 1553 02/03/22 2250 02/04/22 0556 02/04/22 0742   BP: 147/76 136/90  144/80   BP Location: Right arm Right arm  Right arm   Pulse: 68 66  68   Resp: 18 18  18   Temp: (!) 97 4 °F (36 3 °C) 99 °F (37 2 °C)  98 °F (36 7 °C)   TempSrc: Temporal Temporal  Temporal   SpO2: 100% 99%  99%   Weight:   99 2 kg (218 lb 11 1 oz)    Height:           Intake/Output Summary (Last 24 hours) at 2/4/2022 0931  Last data filed at 2/3/2022 2736  Gross per 24 hour   Intake 480 ml   Output --   Net 480 ml     General: NAD  Skin: warm, dry, intact, no rash  HEENT: Moist mucous membranes, sclera anicteric, normocephalic, atraumatic  Neck: No apparent JVD appreciated  Chest: lung sounds clear B/L, on RA   CVS:Regular rate and rhythm, no murmer   Abdomen: Soft, round, non-tender, +BS  Extremities: No B/L LE edema present  Neuro: alert and oriented  Psych: appropriate mood and affect     Medications:    Current Facility-Administered Medications:     amLODIPine (NORVASC) tablet 10 mg, 10 mg, Oral, Daily, BHANU Martinez, 10 mg at 02/03/22 7201    aspirin (ECOTRIN LOW STRENGTH) EC tablet 81 mg, 81 mg, Oral, Daily, BHANU Che, 81 mg at 02/03/22 0932    carvedilol (COREG) tablet 25 mg, 25 mg, Oral, BID With Meals, BHANU Martinez, 25 mg at 02/03/22 1810    Labetalol HCl (NORMODYNE) injection 10 mg, 10 mg, Intravenous, Q4H PRN, BHANU Che    nicotine (NICODERM CQ) 14 mg/24hr TD 24 hr patch 14 mg, 14 mg, Transdermal, Daily, BHANU Che, 14 mg at 02/03/22 0934    pravastatin (PRAVACHOL) tablet 80 mg, 80 mg, Oral, Daily With Angela Face, BHANU, 80 mg at 02/03/22 1809    Laboratory Results:  Results from last 7 days   Lab Units 02/04/22  0450 02/03/22  1001 02/03/22  0501 02/02/22  0522 02/01/22  0204 01/31/22  2220   WBC Thousand/uL 12 21*  --  12 89* 13 02*   < > 10 69*   HEMOGLOBIN g/dL 8 7*  --  8 8* 9 0*   < > 11 5*   HEMATOCRIT % 26 7*  --  25 6* 26 2*   < > 35 2*   PLATELETS Thousands/uL 241  --  232 239   < > 294   SODIUM mmol/L 135*  --  134* 134*   < > 134*   POTASSIUM mmol/L 3 6  --  3 7 3 8   < > 5 6*   CHLORIDE mmol/L 103  --  102 103   < > 102   CO2 mmol/L 19*  --  20* 18*   < > 13*   BUN mg/dL 57*  --  54* 48*   < > 35*   CREATININE mg/dL 4 73*  --  4 89* 4 69*   < > 4 50*   CALCIUM mg/dL 7 3*  --  7 3* 7 4*   < > 7 4*   MAGNESIUM mg/dL 1 9 2 1  --  1 3*  --   --    ALK PHOS U/L  --   --   --   --   --  120*   ALT U/L  --   --   --   --   --  36   AST U/L  --   --   --   --   --  43    < > = values in this interval not displayed

## 2022-02-04 NOTE — DISCHARGE INSTRUCTIONS
Please continue to follow a low-salt diet at home  Also avoid taking any Motrin, ibuprofen, Advil, or Aleve  Our office will call you to arrange follow-up with a kidney doctor  It is important that he continue to take your blood pressure medications as prescribed  Acute Kidney Injury (ALEKS)  You have been diagnosed with Acute Kidney Injury (ALEKS)  The following information has been developed to provide you with information about ALEKS and treatment  What is Acute Kidney Injury (ALEKS)? ALEKS occurs when kidney function decreases over a short period of time  This condition causes a buildup of waste products in the blood and can cause fluid to build up causing swelling in the legs and shortness of breath  Sometimes called Acute Kidney Failure or Acute Renal Failure, ALEKS is often reversible if it is found and treated quickly  How do you know if you have ALEKS? ALEKS is diagnosed by assessing kidney function  This is done by obtaining a blood test to measure the blood level of creatinine  Decreased urine output can sometimes also indicate ALEKS  Who is at risk for ALEKS? ALEKS can happen to anyone but usually happens to people who are already sick and may be in the hospital  People are at higher risk for ALEKS if they have any of the following:   age 72 years or older   high or low blood pressure   underlying kidney disease (e g , Chronic Kidney Disease (CKD)   peripheral vascular disease (hardening of arteries)   chronic diseases such as liver disease, heart disease and diabetes   a single kidney    What are the symptoms of ALEKS? You may or may not have the symptoms to suggest you have kidney injury until the ALEKS has progressed  Some of the symptoms are listed below:   Not making enough urine   Increased swelling in legs    Feeling tired   Trouble breathing or shortness of breath   Nausea   New or worsening confusion    What causes ALEKS?   The causes are divided into three categories:   Not enough blood flowing to the kidneys (e g , low blood pressure, bleeding, diarrhea, dehydration)   Injury directly to the kidneys (e g , blood clots, severe infections such as sepsis, medicine toxicity, IV contrast dye used for cardiac catheterization or CT scans)   Blockage to the tubes (ureters) that drain the urine from the kidneys (e g , enlarged prostate, kidney stones, blood clots)    What is the treatment for ALEKS? The treatment for ALEKS depends on correcting what caused it  Treatment usually involves removing the cause and measures to prevent further injury to the kidneys  This may require the use of intravenous fluids or medications and/or temporary dialysis  Dialysis is a process using a machine that does the job of the kidneys to remove waste and help correct the electrolyte and fluid balance while the kidneys are recovering  If dialysis is needed to treat ALEKS, the doctor will assess daily to see if the kidneys are showing signs of recovery  The daily assessments determine how long dialysis needs to continue  Depending on the cause and the extent of damage, an episode of ALEKS may resolve in a few days to several weeks to several months  What are the long term effects of having an episode of ALEKS?  People who have one episode of ALEKS are at an increased risk of having another episode of ALEKS as well as other health problems such as kidney disease, stroke, and heart disease   In a small number of people who had unrecognized kidney disease, an ALEKS episode may result in Chronic Kidney Disease (CKD) which requires lifelong monitoring and treatment  How do you prevent future episodes of ALEKS?  Make sure to follow up with the kidney doctor after hospital discharge and obtain blood work to reassess and monitor kidney function     If you have diabetes, keep your blood sugar in goal range and keep appointments with your diabetes specialist    Johanny Anderson If you have high blood pressure, have your blood pressure checked regularly to make sure it is in target range  o If you take blood pressure medicine called ACEIs or ARBs (e g , Lisinopril, Enalapril, Diovan, Losartan), your doctor may tell you to skip a dose or two if you have severe dehydration and your blood pressure is running low   Avoid using medicines such as NSAIDs (Nonsteroidal Anti-inflammatory Drugs) and Flannery-2 Inhibitors (a type of NSAID) that may be harmful to kidney function  These may include the medicines listed in the table that follows  Examples of NSAIDS and Flannery-2 Inhibitors   Talk to your doctor or healthcare provider before stopping any medicine ordered for you  Celecoxib (CELEBREX) Ketoprofen (ORUDIS, ORUVAIL)   Diclofenac (VOLTAREN, CATAFLAM) Ketorolac (TORADOL)   Diflunisal (DOLOBID) Meloxicam (MOBIC)   Etodolac (LODINE) Nabumetone (RELAFEN)   Fenoprofen (NALFON) Naproxen (ALEVE, NAPROSYN,    NAPRELAN, ANAPROX)   Flurbiprofen (ANSAID) Oxaprozin (DAYPRO)   Ibuprofen (MOTRIN, ADVIL) Piroxicam (FELDENE)   Indomethacin (INDOCIN) Sulindac (CLINORIL)    Tolmetin (Kennth Romeo)     Is there a special diet for people with ALEKS? People with ALEKS and/or other kidney disease often have high potassium and phosphorus levels in their blood  To protect the kidneys from further injury and to avoid complications, most doctors recommend following a healthy diet choosing foods low potassium and low phosphorus   Limiting dietary potassium to 2 5 grams/day and phosphorus to 800 milligrams/day is recommended   A dietitian can help you with learning more about this type of diet   The tables on the following page may help you to choose lower potassium and phosphorus foods  The following websites are also good sources of information:  Juan at  org/nutrition/Kidney-Disease-Stages-1-4 Arlet stone  https://www niddk nih gov/health-information/kidney-disease/chronic-kidney-disease-ckd/eating-nutrition  https://[facility]/health/articles/15641-renal-diet-basics  [employer] cy    If you have any questions or concerns about your condition, please contact your doctor or healthcare provider  These tables may help you to choose lower potassium and phosphorus foods    AVOID these higher phosphorus* foods: CHOOSE these lower phosphorus* foods:   Milk, pudding , yogurt made from animals and from many soy varieties Rice milk (unfortified), nondairy creamer   Hard cheeses, ricotta, cottage cheese, fat-free cream cheese Regular and low-fat cream cheese   Ice cream, frozen yogurt Sherbet, frozen fruit pops, sorbet   Soups made with milk, dried peas, beans, lentils or other high phosphorus ingredients Soups made with broth, are water-based, or other lower phosphorus ingredients   Whole grains, including whole-grain breads, crackers, cereal, rice and pasta, corn tortillas Refined grains, including white bread, crackers, cereals, rice and pasta   Quick breads, biscuits, cornbread, muffins, pancakes, waffles, granola, wheat germ Homemade refined (white) dinner rolls, bagels, English muffins, sugar cookies, shortbread cookies, chinyere food cake   Dried peas (split, black-eyed), beans (black, garbanzo, lima, kidney, navy, paredes), lentils Green peas (canned, frozen), green beans, wax beans   Organ meats, walleye, [place], sardines Lean beef, pork, lamb, poultry, other fish   Nuts and seeds Popcorn   Peanut butter, other nut butters; tofu, veggie or soy burgers Jam, jelly, honey   Chocolate, including chocolate drinks Carob (chocolate-flavored) candy, hard candy,  gumdrops   Jennifer, pepper-type sodas, flavored new, bottled teas, beer Lemon-lime soda, ginger ale or root beer, plain water, cream soda, grape soda   *Always read labels and avoid foods with ingredients containing "phos"  AVOID these higher potassium foods: CHOOSE these lower potassium foods:      Milk (fat free, low fat, whole, buttermilk, Soy), yogurt    Regular and low-fat cream cheese      Beans (white, [place]), [place] sprouts, spinach Swiss       chard, broccoli, avocado, artichoke, potatoes, sweet      potatoes, tomatoes/tomato sauce, beet greens      Green beans, alfalfa sprouts, bamboo shoots (canned),       cabbage, carrots, cauliflower, corn, cucumber, eggplant,      endive, lettuce, mushrooms, onions, radishes,  watercress,       water chestnuts (canned), rice, peas      Halibut, tuna, cod, snapper, tuna fish, turkey    Egg, lean beef, pork, lamb, shellfish, chicken       Banana, papaya, orange, cantaloupe, dates, raisins and      other dried fruit, pomegranate, avocado      Apple, applesauce, blackberries, raspberries, pears,     watermelon, cucumbers, blueberries, cranberries,       peaches      Almonds, peanuts, hazelnuts, [place], cashew, mixed,      seeds (sunflower, pumpkin)     Homemade refined (white) dinner rolls, bagels,      English muffins, flour tortilla, crackers, [place]      crackers, popcorn, pretzels, spaghetti or macaroni,       hummus      Tomato or vegetable juice, prune juice    Papaya, maury, or pear nectar, cranberry juice cocktail      Molasses

## 2022-02-04 NOTE — PROGRESS NOTES
Progress Note - Germán Perez 62 y o  male MRN: [id number]    Unit/Bed#: E4 -01 Encounter: [id number]    Assessment/Plan:    Pharyngeal edema  appears resolved etiology not clear    CKD 4    discussed with nephrology most likely baseline 4 73 continue outpatient Nephrology follow-up    Anemia   related to chronic kidney disease hemoglobin 8 7      Dyslipidemia   continue statin for LDL control    Hypertension   control with amlodipine and Coreg    Tobacco abuse  cessation counseling provided continue nicotine patch    Subjective:   Denies chest pain shortness of breath no tongue swelling tolerating diet no nausea vomiting no fevers chills appetite good      Objective:     Vitals: Blood pressure 144/80, pulse 68, temperature 98 °F (36 7 °C), temperature source Temporal, resp  rate 18, height 5' 8" (1 727 m), weight 99 2 kg (218 lb 11 1 oz), SpO2 99 %  ,Body mass index is 33 25 kg/m²  Results from last 7 days   Lab Units 02/04/22  0450   WBC Thousand/uL 12 21*   HEMOGLOBIN g/dL 8 7*   HEMATOCRIT % 26 7*   PLATELETS Thousands/uL 241     Results from last 7 days   Lab Units 02/04/22  0450 02/01/22  0204 01/31/22  2220   POTASSIUM mmol/L 3 6   < > 5 6*   CHLORIDE mmol/L 103   < > 102   CO2 mmol/L 19*   < > 13*   BUN mg/dL 57*   < > 35*   CREATININE mg/dL 4 73*   < > 4 50*   CALCIUM mg/dL 7 3*   < > 7 4*   ALK PHOS U/L  --   --  120*   ALT U/L  --   --  36   AST U/L  --   --  43    < > = values in this interval not displayed         Scheduled Meds:  Current Facility-Administered Medications   Medication Dose Route Frequency Provider Last Rate    amLODIPine  10 mg Oral Daily BHANU Rogers      aspirin  81 mg Oral Daily BHANU Chatman      carvedilol  25 mg Oral BID With Meals BHANU Rogers      Labetalol HCl  10 mg Intravenous Q4H PRN BHANU Chatman      nicotine  14 mg Transdermal Daily BHANU Chatman      pravastatin  80 mg Oral Daily With [employer]ANU Continuous Infusions:         Physical exam:  General appearance:  Alert no distress interaction appropriate   Head/Eyes:  Nonicteric pale PERRL EOMI   Neck:  Supple  Lungs:  Decreased BS bilateral no wheezing rhonchi or rales  Heart: normal S1 S2 regular  Abdomen: Soft nontender with bowel sounds  Extremities: no edema  Skin: no rash    Invasive Devices  Report    None                     Counseling / Coordination of Care  Total floor / unit time spent today 30  minutes  Greater than 50% of total time was spent with the patient and / or family counseling and / or coordination of care    A description of the counseling / coordination of care:  Discussed with Nephrology

## 2022-02-04 NOTE — DISCHARGE SUMMARY
Discharge Summary - Medical Christi Melgoza 62 y o  male MRN: [id number]    [facility] [place] [address] MED SURG Room / Bed: [facility] [place] [address] [address] 436/E4 -* Encounter: [id number]    BRIEF OVERVIEW    Admitting Provider: Lobo Thao DO  Discharge Provider: Lamar Hutson DO  Admission Date: 1/31/2022     Discharge Date: No discharge date for patient encounter  Primary Care Physician at Discharge: No primary care provider on file  None    Primary Discharge Diagnosis    ALEKS  Pharyngeal edema    Other Problems Addressed  Patient Active Problem List    Diagnosis Date Noted    Hypomagnesemia 02/02/2022    Pharyngeal edema 02/01/2022    High anion gap metabolic acidosis 13/52/1911    Withdrawal seizures ([facility] [address] ) 02/01/2022    Seizure due to alcohol withdrawal, uncomplicated ([facility] [address] ) 36/37/5959    Benign essential HTN 02/19/2018    CHF (congestive heart failure) ([facility] [address] ) 02/19/2018    ETOH abuse 02/19/2018    Chronic kidney disease 02/19/2018    Marijuana use 02/19/2018    Hyperlipidemia 02/19/2018    Tobacco abuse 02/19/2018    Leukocytosis 02/19/2018       Consulting Providers   Nephrology  Therapeutic Operative Procedures Performed  Ultrasound kidneys cortex echogenic no suspicious masses no hydro    Discharge Disposition: home    Allergies  No Known Allergies  Diet restrictions:  Low-salt  Activity restrictions:  None  Discharge Condition:  [address] [facility]  Nephrology office will call appointment      [facility]       Presenting Problem/History of Present Illness  Pharyngeal edema  Hospital Course    16-year-old male presents with trouble swallowing    Pharyngeal edema  emergent CT of the neck revealed soft tissue swelling and edema uvula and mid to inferior oropharynx, received Benadryl epinephrine and Decadron, seen by ENT angioedema and no evidence of infection, etiology unknown  With above intervention symptoms admit edema resolved      A KI/CKD   patient noncompliant with diet and medications, creatinine on admission 4 5, ranged from 4 89 to 2 73 on discharge  Nephrology notation creatinine stable 4 7, nephrotic range proteinuria, SPEP MAGDIEL UPEP pending discussed with patient importance of low-salt diet avoid smoking and nonsteroidals and be compliant with blood pressure medications  Will follow up with Nephrology    Tobacco abuse  provided nicotine patches promises to stop smoking forever    Hypertension   controlled with Norvasc and Coreg stressed compliance with low-salt diet and medication      Discharge  Statement   I spent 35 minutes discharging the patient  This time was spent on the day of discharge  I had direct contact with the patient on the day of discharge  Additional documentation is required if more than 30 minutes were spent on discharge  Discussed discharge and follow-up with patient and Nephrology stressed compliance and no smoking    Discharge instructions/Information to patient and family:   See after visit summary for information provided to patient and family

## 2022-02-04 NOTE — NURSING NOTE
D/C home  Smoking cessation packet given to patient  Epinephrine education sheet given to, and reviewed with pt

## 2022-02-05 LAB
ALBUMIN UR ELPH-MCNC: 75.2 %
ALPHA1 GLOB MFR UR ELPH: 2.9 %
ALPHA2 GLOB MFR UR ELPH: 5.7 %
B-GLOBULIN MFR UR ELPH: 6.8 %
GAMMA GLOB MFR UR ELPH: 9.4 %
PROT PATTERN UR ELPH-IMP: ABNORMAL
PROT UR-MCNC: 663 MG/DL

## 2022-02-08 LAB
ALBUMIN SERPL ELPH-MCNC: 3.28 G/DL (ref 3.5–5)
ALBUMIN SERPL ELPH-MCNC: 51.3 % (ref 52–65)
ALPHA1 GLOB SERPL ELPH-MCNC: 0.3 G/DL (ref 0.1–0.4)
ALPHA1 GLOB SERPL ELPH-MCNC: 4.7 % (ref 2.5–5)
ALPHA2 GLOB SERPL ELPH-MCNC: 1.04 G/DL (ref 0.4–1.2)
ALPHA2 GLOB SERPL ELPH-MCNC: 16.3 % (ref 7–13)
BETA GLOB ABNORMAL SERPL ELPH-MCNC: 0.34 G/DL (ref 0.4–0.8)
BETA1 GLOB SERPL ELPH-MCNC: 5.3 % (ref 5–13)
BETA2 GLOB SERPL ELPH-MCNC: 6.6 % (ref 2–8)
BETA2+GAMMA GLOB SERPL ELPH-MCNC: 0.42 G/DL (ref 0.2–0.5)
GAMMA GLOB ABNORMAL SERPL ELPH-MCNC: 1.01 G/DL (ref 0.5–1.6)
GAMMA GLOB SERPL ELPH-MCNC: 15.8 % (ref 12–22)
IGG/ALB SER: 1.05 {RATIO} (ref 1.1–1.8)
INTERPRETATION UR IFE-IMP: NORMAL
M PROTEIN 1 MFR SERPL ELPH: 2.4 %
M PROTEIN 1 SERPL ELPH-MCNC: 0.15 G/DL
PROT PATTERN SERPL ELPH-IMP: ABNORMAL
PROT SERPL-MCNC: 6.4 G/DL (ref 6.4–8.2)

## 2022-02-11 ENCOUNTER — TELEPHONE (OUTPATIENT)
Dept: OTHER | Facility: HOSPITAL | Age: 62
End: 2022-02-11

## 2022-02-11 DIAGNOSIS — D47.2 MONOCLONAL GAMMOPATHY: Primary | ICD-10-CM

## 2022-02-11 NOTE — TELEPHONE ENCOUNTER
Called patient to review immunofixation results  I did place referral for Hematology/Oncology  Left message for him to return call

## 2022-02-11 NOTE — PROGRESS NOTES
Patient's immunofixation came back positive for IgG  Consult placed for Hematology/Oncology  Will have MA reach out to patient to let him know that he will need to see Hematology

## 2022-02-14 ENCOUNTER — TELEPHONE (OUTPATIENT)
Dept: HEMATOLOGY ONCOLOGY | Facility: CLINIC | Age: 62
End: 2022-02-14

## 2022-02-23 ENCOUNTER — TELEPHONE (OUTPATIENT)
Dept: HEMATOLOGY ONCOLOGY | Facility: CLINIC | Age: 62
End: 2022-02-23

## 2022-02-23 NOTE — TELEPHONE ENCOUNTER
Per patient he did not want to schedule at the moment due to not having insurance  Patient was given hopeline number to call us back to reschedule when ready

## 2022-02-24 ENCOUNTER — TELEPHONE (OUTPATIENT)
Dept: NEPHROLOGY | Facility: CLINIC | Age: 62
End: 2022-02-24

## 2022-02-24 NOTE — TELEPHONE ENCOUNTER
I called patient to change his appointment to virtual but patient requested the appointment be cancelled due to awaiting coverage  He will call back to reschedule
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
## 2022-06-10 ENCOUNTER — OFFICE VISIT (OUTPATIENT)
Dept: FAMILY MEDICINE CLINIC | Facility: CLINIC | Age: 62
End: 2022-06-10

## 2022-06-10 ENCOUNTER — APPOINTMENT (EMERGENCY)
Dept: RADIOLOGY | Facility: HOSPITAL | Age: 62
DRG: 469 | End: 2022-06-10
Payer: MEDICARE

## 2022-06-10 ENCOUNTER — APPOINTMENT (INPATIENT)
Dept: RADIOLOGY | Facility: HOSPITAL | Age: 62
DRG: 469 | End: 2022-06-10
Attending: RADIOLOGY
Payer: MEDICARE

## 2022-06-10 ENCOUNTER — TELEPHONE (OUTPATIENT)
Dept: NEPHROLOGY | Facility: CLINIC | Age: 62
End: 2022-06-10

## 2022-06-10 ENCOUNTER — HOSPITAL ENCOUNTER (INPATIENT)
Facility: HOSPITAL | Age: 62
LOS: 4 days | Discharge: HOME | DRG: 469 | End: 2022-06-14
Attending: EMERGENCY MEDICINE | Admitting: INTERNAL MEDICINE
Payer: MEDICARE

## 2022-06-10 ENCOUNTER — APPOINTMENT (INPATIENT)
Dept: DIALYSIS | Facility: HOSPITAL | Age: 62
DRG: 469 | End: 2022-06-10
Payer: MEDICARE

## 2022-06-10 VITALS
WEIGHT: 212.1 LBS | TEMPERATURE: 97.6 F | RESPIRATION RATE: 18 BRPM | HEART RATE: 73 BPM | HEIGHT: 68 IN | OXYGEN SATURATION: 99 % | BODY MASS INDEX: 32.15 KG/M2 | SYSTOLIC BLOOD PRESSURE: 144 MMHG | DIASTOLIC BLOOD PRESSURE: 82 MMHG

## 2022-06-10 DIAGNOSIS — Z12.11 COLON CANCER SCREENING: Primary | ICD-10-CM

## 2022-06-10 DIAGNOSIS — N17.9 ACUTE RENAL FAILURE, UNSPECIFIED ACUTE RENAL FAILURE TYPE (HCC): ICD-10-CM

## 2022-06-10 DIAGNOSIS — N17.9 ACUTE RENAL FAILURE (ARF) (HCC): ICD-10-CM

## 2022-06-10 DIAGNOSIS — E87.2 METABOLIC ACIDOSIS: ICD-10-CM

## 2022-06-10 DIAGNOSIS — E87.1 HYPONATREMIA: ICD-10-CM

## 2022-06-10 DIAGNOSIS — D64.9 ANEMIA, UNSPECIFIED TYPE: ICD-10-CM

## 2022-06-10 DIAGNOSIS — E83.51 HYPOCALCEMIA: ICD-10-CM

## 2022-06-10 DIAGNOSIS — I10 BENIGN ESSENTIAL HTN: ICD-10-CM

## 2022-06-10 DIAGNOSIS — N18.4 CKD (CHRONIC KIDNEY DISEASE) STAGE 4, GFR 15-29 ML/MIN (HCC): ICD-10-CM

## 2022-06-10 DIAGNOSIS — N19 RENAL FAILURE: Primary | ICD-10-CM

## 2022-06-10 DIAGNOSIS — N17.9 ACUTE RENAL FAILURE (HCC): ICD-10-CM

## 2022-06-10 LAB
ANION GAP SERPL CALCULATED.3IONS-SCNC: 23 MMOL/L (ref 4–13)
ATRIAL RATE: 62 BPM
BASE EX.OXY STD BLDV CALC-SCNC: 65.3 % (ref 60–80)
BASE EXCESS BLDV CALC-SCNC: -18.5 MMOL/L
BASOPHILS # BLD AUTO: 0.09 THOUSANDS/ΜL (ref 0–0.1)
BASOPHILS NFR BLD AUTO: 1 % (ref 0–1)
BUN SERPL-MCNC: 103 MG/DL (ref 5–25)
CA-I BLD-SCNC: 0.85 MMOL/L (ref 1.12–1.32)
CALCIUM SERPL-MCNC: 5.2 MG/DL (ref 8.3–10.1)
CHLORIDE SERPL-SCNC: 96 MMOL/L (ref 100–108)
CO2 SERPL-SCNC: 8 MMOL/L (ref 21–32)
CREAT SERPL-MCNC: 14.43 MG/DL (ref 0.6–1.3)
EOSINOPHIL # BLD AUTO: 0.14 THOUSAND/ΜL (ref 0–0.61)
EOSINOPHIL NFR BLD AUTO: 2 % (ref 0–6)
ERYTHROCYTE [DISTWIDTH] IN BLOOD BY AUTOMATED COUNT: 13.8 % (ref 11.6–15.1)
GFR SERPL CREATININE-BSD FRML MDRD: 3 ML/MIN/1.73SQ M
GLUCOSE SERPL-MCNC: 144 MG/DL (ref 65–140)
GLUCOSE SERPL-MCNC: 80 MG/DL (ref 65–140)
GLUCOSE SERPL-MCNC: 84 MG/DL (ref 65–140)
HCO3 BLDV-SCNC: 8.4 MMOL/L (ref 24–30)
HCT VFR BLD AUTO: 28.2 % (ref 36.5–49.3)
HGB BLD-MCNC: 9.1 G/DL (ref 12–17)
IMM GRANULOCYTES # BLD AUTO: 0.04 THOUSAND/UL (ref 0–0.2)
IMM GRANULOCYTES NFR BLD AUTO: 1 % (ref 0–2)
LACTATE SERPL-SCNC: <0.3 MMOL/L (ref 0.5–2)
LYMPHOCYTES # BLD AUTO: 1.26 THOUSANDS/ΜL (ref 0.6–4.47)
LYMPHOCYTES NFR BLD AUTO: 19 % (ref 14–44)
MCH RBC QN AUTO: 30.8 PG (ref 26.8–34.3)
MCHC RBC AUTO-ENTMCNC: 32.3 G/DL (ref 31.4–37.4)
MCV RBC AUTO: 96 FL (ref 82–98)
MONOCYTES # BLD AUTO: 0.56 THOUSAND/ΜL (ref 0.17–1.22)
MONOCYTES NFR BLD AUTO: 8 % (ref 4–12)
NEUTROPHILS # BLD AUTO: 4.71 THOUSANDS/ΜL (ref 1.85–7.62)
NEUTS SEG NFR BLD AUTO: 69 % (ref 43–75)
NRBC BLD AUTO-RTO: 0 /100 WBCS
NT-PROBNP SERPL-MCNC: 5766 PG/ML
O2 CT BLDV-SCNC: 8.9 ML/DL
P AXIS: 23 DEGREES
PCO2 BLDV: 23.8 MM HG (ref 42–50)
PH BLDV: 7.17 [PH] (ref 7.3–7.4)
PLATELET # BLD AUTO: 292 THOUSANDS/UL (ref 149–390)
PMV BLD AUTO: 9.7 FL (ref 8.9–12.7)
PO2 BLDV: 38.1 MM HG (ref 35–45)
POTASSIUM SERPL-SCNC: 3.3 MMOL/L (ref 3.5–5.3)
PR INTERVAL: 184 MS
QRS AXIS: 17 DEGREES
QRSD INTERVAL: 90 MS
QT INTERVAL: 470 MS
QTC INTERVAL: 477 MS
RBC # BLD AUTO: 2.95 MILLION/UL (ref 3.88–5.62)
SODIUM SERPL-SCNC: 127 MMOL/L (ref 136–145)
T WAVE AXIS: 20 DEGREES
VENTRICULAR RATE: 62 BPM
WBC # BLD AUTO: 6.8 THOUSAND/UL (ref 4.31–10.16)

## 2022-06-10 PROCEDURE — 83880 ASSAY OF NATRIURETIC PEPTIDE: CPT | Performed by: EMERGENCY MEDICINE

## 2022-06-10 PROCEDURE — 76937 US GUIDE VASCULAR ACCESS: CPT

## 2022-06-10 PROCEDURE — 93005 ELECTROCARDIOGRAM TRACING: CPT

## 2022-06-10 PROCEDURE — NC001 PR NO CHARGE: Performed by: INTERNAL MEDICINE

## 2022-06-10 PROCEDURE — 77001 FLUOROGUIDE FOR VEIN DEVICE: CPT

## 2022-06-10 PROCEDURE — 80048 BASIC METABOLIC PNL TOTAL CA: CPT | Performed by: EMERGENCY MEDICINE

## 2022-06-10 PROCEDURE — 85025 COMPLETE CBC W/AUTO DIFF WBC: CPT | Performed by: EMERGENCY MEDICINE

## 2022-06-10 PROCEDURE — 99285 EMERGENCY DEPT VISIT HI MDM: CPT

## 2022-06-10 PROCEDURE — 99223 1ST HOSP IP/OBS HIGH 75: CPT | Performed by: PHYSICIAN ASSISTANT

## 2022-06-10 PROCEDURE — NC001 PR NO CHARGE: Performed by: RADIOLOGY

## 2022-06-10 PROCEDURE — 86704 HEP B CORE ANTIBODY TOTAL: CPT | Performed by: NURSE PRACTITIONER

## 2022-06-10 PROCEDURE — 93010 ELECTROCARDIOGRAM REPORT: CPT | Performed by: INTERNAL MEDICINE

## 2022-06-10 PROCEDURE — 86705 HEP B CORE ANTIBODY IGM: CPT | Performed by: NURSE PRACTITIONER

## 2022-06-10 PROCEDURE — 86706 HEP B SURFACE ANTIBODY: CPT | Performed by: NURSE PRACTITIONER

## 2022-06-10 PROCEDURE — 77001 FLUOROGUIDE FOR VEIN DEVICE: CPT | Performed by: RADIOLOGY

## 2022-06-10 PROCEDURE — 99214 OFFICE O/P EST MOD 30 MIN: CPT | Performed by: FAMILY MEDICINE

## 2022-06-10 PROCEDURE — 86803 HEPATITIS C AB TEST: CPT | Performed by: NURSE PRACTITIONER

## 2022-06-10 PROCEDURE — 36558 INSERT TUNNELED CV CATH: CPT

## 2022-06-10 PROCEDURE — 99285 EMERGENCY DEPT VISIT HI MDM: CPT | Performed by: EMERGENCY MEDICINE

## 2022-06-10 PROCEDURE — 71046 X-RAY EXAM CHEST 2 VIEWS: CPT

## 2022-06-10 PROCEDURE — 82805 BLOOD GASES W/O2 SATURATION: CPT | Performed by: EMERGENCY MEDICINE

## 2022-06-10 PROCEDURE — 83605 ASSAY OF LACTIC ACID: CPT | Performed by: INTERNAL MEDICINE

## 2022-06-10 PROCEDURE — 0JH63XZ INSERTION OF TUNNELED VASCULAR ACCESS DEVICE INTO CHEST SUBCUTANEOUS TISSUE AND FASCIA, PERCUTANEOUS APPROACH: ICD-10-PCS | Performed by: RADIOLOGY

## 2022-06-10 PROCEDURE — 87340 HEPATITIS B SURFACE AG IA: CPT | Performed by: NURSE PRACTITIONER

## 2022-06-10 PROCEDURE — 76937 US GUIDE VASCULAR ACCESS: CPT | Performed by: RADIOLOGY

## 2022-06-10 PROCEDURE — 99255 IP/OBS CONSLTJ NEW/EST HI 80: CPT | Performed by: INTERNAL MEDICINE

## 2022-06-10 PROCEDURE — 99152 MOD SED SAME PHYS/QHP 5/>YRS: CPT

## 2022-06-10 PROCEDURE — 82948 REAGENT STRIP/BLOOD GLUCOSE: CPT

## 2022-06-10 PROCEDURE — C1750 CATH, HEMODIALYSIS,LONG-TERM: HCPCS

## 2022-06-10 PROCEDURE — 36415 COLL VENOUS BLD VENIPUNCTURE: CPT | Performed by: EMERGENCY MEDICINE

## 2022-06-10 PROCEDURE — 82330 ASSAY OF CALCIUM: CPT | Performed by: NURSE PRACTITIONER

## 2022-06-10 PROCEDURE — 99153 MOD SED SAME PHYS/QHP EA: CPT

## 2022-06-10 PROCEDURE — 36558 INSERT TUNNELED CV CATH: CPT | Performed by: RADIOLOGY

## 2022-06-10 PROCEDURE — 5A1D70Z PERFORMANCE OF URINARY FILTRATION, INTERMITTENT, LESS THAN 6 HOURS PER DAY: ICD-10-PCS | Performed by: INTERNAL MEDICINE

## 2022-06-10 RX ORDER — HEPARIN SODIUM 5000 [USP'U]/ML
5000 INJECTION, SOLUTION INTRAVENOUS; SUBCUTANEOUS EVERY 8 HOURS SCHEDULED
Status: DISCONTINUED | OUTPATIENT
Start: 2022-06-10 | End: 2022-06-14 | Stop reason: HOSPADM

## 2022-06-10 RX ORDER — SODIUM BICARBONATE 650 MG/1
1300 TABLET ORAL
Status: DISCONTINUED | OUTPATIENT
Start: 2022-06-10 | End: 2022-06-11

## 2022-06-10 RX ORDER — FENTANYL CITRATE 50 UG/ML
INJECTION, SOLUTION INTRAMUSCULAR; INTRAVENOUS CODE/TRAUMA/SEDATION MEDICATION
Status: COMPLETED | OUTPATIENT
Start: 2022-06-10 | End: 2022-06-10

## 2022-06-10 RX ORDER — CARVEDILOL 25 MG/1
25 TABLET ORAL 2 TIMES DAILY WITH MEALS
Status: DISCONTINUED | OUTPATIENT
Start: 2022-06-10 | End: 2022-06-11

## 2022-06-10 RX ORDER — MIDAZOLAM HYDROCHLORIDE 2 MG/2ML
INJECTION, SOLUTION INTRAMUSCULAR; INTRAVENOUS CODE/TRAUMA/SEDATION MEDICATION
Status: COMPLETED | OUTPATIENT
Start: 2022-06-10 | End: 2022-06-10

## 2022-06-10 RX ORDER — POTASSIUM CHLORIDE 14.9 MG/ML
20 INJECTION INTRAVENOUS
Status: DISPENSED | OUTPATIENT
Start: 2022-06-10 | End: 2022-06-10

## 2022-06-10 RX ORDER — NICOTINE 21 MG/24HR
1 PATCH, TRANSDERMAL 24 HOURS TRANSDERMAL DAILY
Status: DISCONTINUED | OUTPATIENT
Start: 2022-06-11 | End: 2022-06-14 | Stop reason: HOSPADM

## 2022-06-10 RX ORDER — AMLODIPINE BESYLATE 5 MG/1
5 TABLET ORAL DAILY
Status: DISCONTINUED | OUTPATIENT
Start: 2022-06-11 | End: 2022-06-11

## 2022-06-10 RX ORDER — PRAVASTATIN SODIUM 80 MG/1
80 TABLET ORAL
Status: DISCONTINUED | OUTPATIENT
Start: 2022-06-10 | End: 2022-06-14 | Stop reason: HOSPADM

## 2022-06-10 RX ORDER — CEFAZOLIN SODIUM 2 G/50ML
SOLUTION INTRAVENOUS
Status: COMPLETED | OUTPATIENT
Start: 2022-06-10 | End: 2022-06-10

## 2022-06-10 RX ORDER — ACETAMINOPHEN 325 MG/1
650 TABLET ORAL EVERY 6 HOURS PRN
Status: DISCONTINUED | OUTPATIENT
Start: 2022-06-10 | End: 2022-06-14 | Stop reason: HOSPADM

## 2022-06-10 RX ORDER — DOXERCALCIFEROL 2 UG/ML
2 INJECTION, SOLUTION INTRAVENOUS ONCE
Status: COMPLETED | OUTPATIENT
Start: 2022-06-10 | End: 2022-06-10

## 2022-06-10 RX ORDER — ASPIRIN 81 MG/1
81 TABLET ORAL DAILY
Status: DISCONTINUED | OUTPATIENT
Start: 2022-06-11 | End: 2022-06-14 | Stop reason: HOSPADM

## 2022-06-10 RX ORDER — CALCIUM GLUCONATE 20 MG/ML
2 INJECTION, SOLUTION INTRAVENOUS EVERY 4 HOURS
Status: DISCONTINUED | OUTPATIENT
Start: 2022-06-10 | End: 2022-06-10

## 2022-06-10 RX ADMIN — HEPARIN SODIUM 5000 UNITS: 5000 INJECTION INTRAVENOUS; SUBCUTANEOUS at 21:14

## 2022-06-10 RX ADMIN — DOXERCALCIFEROL 2 MCG: 4 INJECTION, SOLUTION INTRAVENOUS at 18:32

## 2022-06-10 RX ADMIN — MIDAZOLAM 0.5 MG: 1 INJECTION INTRAMUSCULAR; INTRAVENOUS at 16:25

## 2022-06-10 RX ADMIN — MIDAZOLAM 1 MG: 1 INJECTION INTRAMUSCULAR; INTRAVENOUS at 16:13

## 2022-06-10 RX ADMIN — FENTANYL CITRATE 25 MCG: 50 INJECTION INTRAMUSCULAR; INTRAVENOUS at 16:26

## 2022-06-10 RX ADMIN — FENTANYL CITRATE 50 MCG: 50 INJECTION INTRAMUSCULAR; INTRAVENOUS at 16:14

## 2022-06-10 RX ADMIN — ACETAMINOPHEN 650 MG: 325 TABLET, FILM COATED ORAL at 19:59

## 2022-06-10 RX ADMIN — Medication 20 ML: at 16:29

## 2022-06-10 RX ADMIN — HEPARIN SODIUM 5000 UNITS: 5000 INJECTION INTRAVENOUS; SUBCUTANEOUS at 21:15

## 2022-06-10 RX ADMIN — CEFAZOLIN SODIUM 2000 MG: 2 SOLUTION INTRAVENOUS at 16:21

## 2022-06-10 NOTE — PROCEDURES
RENAL HD NOTE   Breonna Hill 62 y o  male MRN: [id number]  Unit/Bed#: E4 -01 Encounter: [id number]    The patient was seen and examined on hemodialysis - 1st HD treatment  Time: 2 hours  Sodium: 135 Blood flow: 250   Dialyzer: F160 Potassium: 4 Dialysate flow: 500   Access: R IJ permcath Bicarbonate: 30 Ultrafiltration goal: Even   Medications on HD: none  Plan:  · HD tonight x 2 hours  · Plan for HD tomorrow x 3 hours  · Will hold off on more Ca gluconate for now since HD should help hypocalcemia and current phos level is not yet available  · Check phos in AM    · Check SPEP, KL ratio and UPEP

## 2022-06-10 NOTE — ASSESSMENT & PLAN NOTE
Secondary to uremia in setting of acute renal failure  · PH 7 168  · Bicarb 8 4  · Patient initiated on bicarbonate tablets 1300 p o  T i d   · Nephrology consulted, appreciate recommendations  · Patient undergo urgent dialysis today

## 2022-06-10 NOTE — ASSESSMENT & PLAN NOTE
Most recent serum calcium 5 2  · Will receive calcium gluconate IV x2 and recheck  · Placed on Hectorol 2 mcg IV q day  · Intact PTH and vitamin-D ordered

## 2022-06-10 NOTE — CONSULTS
Consultation    Nephrology   Marcia Mandel 62 y o  male MRN: [id number]  Unit/Bed#: HIZW79 Encounter: [id number]    History of Present Illness   Physician Requesting Consult: Alley Meehan MD  Reason for Consult : - acute kidney injury    ASSESSMENT/PLAN:   62 y o   male with pmh of hypertension, hyperlipidemia, CHF, CKD stage 5 and obesity who was admitted for shortness of breath fatigue  Nephrology has been consulted for evaluation management of acute kidney injury  Acute kidney injury (POA)  On CKD stage 5 with progression to ESRD:  ALEKS multifactorial most likely secondary to CKD progression to ESRD and possible component of cardiorenal syndrome  After review of records In [facility] as well as Care everywhere it appears that the patient has a baseline Creatinine of 4 5-4 8mg/dL since January 2022 prior to that labs are from 2018 with baseline creatinine 1 3-2 0 mg/dL  patient was admitted with a creatinine of 14 43 mg/dL  patient's creatinine today is at 14 43 mg/dL  Renal ultrasound from 02/01/2022 showing bilateral kidneys approximately 8 cm  No hydronephrosis simple cyst on left kidney  Echogenic cortex  clinically patient with uremic symptoms multiple electrolyte derangements   had a detailed discussion with the patient with regards to renal replacement therapy risks and benefits were discussed in depth consent obtained and placed in the chart on 06/10/2022  IR consultation for PermCath placement on 06/10/2022 patient going to IR now  Discussed with IR  First dialysis treatment for 2 hours on 06/10/2022 next planned treatment on 06/11/2022  check BMP in a m  Will need case management consultation for outpatient dialysis placement for ESRD  Optimize hemodynamic status to avoid delay in renal recovery  Avoid nephrotoxins, adjust meds to appropriate GFR  Strict I/O    Daily weights  Urinary retention protocol if patient does not have a Nolan  Most likely has underlying CKD secondary to cardiorenal syndrome plus hypertensive nephrosclerosis with possibly underlying paraproteinemia   will need to set up patient for follow up with Nephrology as an outpatient post hospitalization  as an outpatient for nephrology, patient follows up with no nephrologist     Blood pressure/hypertension:  home medications:  Norvasc 5 mg p o  Q day, Coreg 25 mg p o  B i d   current medications:  None  recommendations:  Hold diuretics for now  Optimize hemodynamics  Maintain MAP > 65mmHg  Avoid BP fluctuations  H/H/anemia:  most recent hemoglobin at 9 1 grams/deciliter  maintain hemoglobin greater than 8 grams/deciliter  Check iron studies to see patient may benefit from supplementation    Acid-base electrolytes:    Electrolytes:    Hyponatremia:  Most recent sodium at 127 mEq  Likely secondary to decreased excretion of free water  Place on fluid restriction   adjusted sodium bath  Check BMP in a m  Hypokalemia:  Most recent potassium of 3 3  Supplement cautiously with 40 mEq IV x1 and recheck      Hyperphosphatemia:  Likely hyperphosphatemia check phosphorus level    Hypocalcemia:  Most recent serum calcium 5 2  Supplement with calcium gluconate IV x2 and recheck  place on Hectorol 2 mcg IV q day for now and then will adjust with dialysis  Check intact PTH vitamin-D  Likely will need dialysis with hyper calcium bath  Likely secondary to ESRD  Check ionized calcium check albumin    Acid-base:    Most recent bicarb at 8  For now will place on sodium bicarb 1300 mg p o  t i d  and DC when acidosis improves with dialysis  To be corrected with dialysis bath adjusted  Will not initiate IV fluids with bicarb due to significant hypocalcemia      Other medical problems:  Proteinuria: Most recent protein creatinine ratio of 8 2 g as of February 2022  Abnormal immunofixation/IgG lambda monoclonal gammopathy:  Management per primary team   Recommend Hematology-Oncology consultation  CHF:  Management per primary team   Most recent echo from 02/01/2022 showing EF of 62% grade 1 diastolic dysfunction  Thanks for the consult  Will continue to follow  Please call with questions/ concerns  Above-mentioned orders and Plan in terms of acute kidney injury was discussed with the admitting team and emergency room in depth  In person    Mandy Reyes MD, [facility], 6/10/2022, 3:11 PM          HISTORY OF PRESENT ILLNESS:   Len Pruett is a 62 y o   male with pmh of hypertension, hyperlipidemia, CHF, CKD stage 5 and obesity who was admitted for shortness of breath fatigue  Nephrology has been consulted for evaluation management of acute kidney injury  Records reveal patient was admitted in February 2020 to and seen by Nephrology for acute kidney injury  Patient had no lab since 2018  During the course of the admission his creatinine stayed in the 4s  Unfortunately patient was not able to establish care with Nephrology as an outpatient due to lack of insurance  Records reveal baseline creatinine 4 5-4 8 mg/dL since January 2022  Prior to that no labs up until 2018 at which time creatinine was around 1 3-2 mg/dL  Decreased p o  intake for the last 2 weeks with nausea metallic taste no vomiting no diarrhea  Disappointed to hear that he needs dialysis and it will be long-term  No NSAID use no recent hospitalization family history of dialysis secondary to diabetes  History obtained from chart review and the patient    Inpatient consult to Nephrology  Consult performed by: Mandy Reyes MD  Consult ordered by: Michael Wilkes MD          Review of Systems   Constitutional: Positive for appetite change and fatigue  Negative for chills  HENT: Negative for congestion  Respiratory: Negative for cough, shortness of breath and wheezing  Cardiovascular: Negative for leg swelling  Gastrointestinal: Negative for abdominal pain, constipation, diarrhea, nausea and vomiting     Genitourinary: Negative for decreased urine volume and difficulty urinating  Musculoskeletal: Negative for back pain  Neurological: Negative for dizziness and headaches  Psychiatric/Behavioral: Negative for agitation and confusion  All other systems reviewed and are negative  Historical Information   Patient Active Problem List   Diagnosis    Benign essential HTN    [facility]    ETOH abuse    Chronic kidney disease    Marijuana use    Hyperlipidemia    Tobacco abuse    Leukocytosis    Seizure due to alcohol withdrawal, uncomplicated ([place] [address] )    Pharyngeal edema    High anion gap metabolic acidosis    Withdrawal seizures ([place] [address] )    Hypomagnesemia     Past Medical History:   Diagnosis Date    [facility]     Hyperkalemia 02/01/2022    Hypertension     Renal disorder      History reviewed  No pertinent surgical history  Social History   Social History     Substance and Sexual Activity   Alcohol Use Yes    Alcohol/week: 10 0 standard drinks    Types: 10 Cans of beer per week    Comment: socially     Social History     Substance and Sexual Activity   Drug Use Yes    Types: Marijuana    Comment: occasionally     Social History     Tobacco Use   Smoking Status Current Every Day Smoker    Packs/day: 1 00    Years: 30 00    Pack years: 30 00   Smokeless Tobacco Never Used     History reviewed  No pertinent family history  Meds/Allergies   current meds:   Current Facility-Administered Medications   Medication Dose Route Frequency    calcium gluconate 2 g in sodium chloride 0 9% 100 mL (premix)  2 g Intravenous Q4H    doxercalciferol (HECTOROL) injection 2 mcg  2 mcg Intravenous Once    potassium chloride 20 mEq IVPB (premix)  20 mEq Intravenous Q2H    sodium bicarbonate tablet 1,300 mg  1,300 mg Oral TID after meals    and PTA meds:   Prior to Admission Medications   Prescriptions Last Dose Informant Patient Reported? Taking?    EPINEPHrine (EPIPEN) 0 3 mg/0 3 mL SOAJ   No No   Sig: Inject 0 3 mL (0 3 mg total) into a muscle once for 1 dose   amLODIPine (NORVASC) 5 mg tablet   No No   Sig: Take 1 tablet (5 mg total) by mouth daily   aspirin (ECOTRIN LOW STRENGTH) 81 mg EC tablet   No No   Sig: Take 1 tablet (81 mg total) by mouth daily   carvedilol (COREG) 25 mg tablet   No No   Sig: Take 1 tablet (25 mg total) by mouth 2 (two) times a day with meals   nicotine (NICODERM CQ) 14 mg/24hr TD 24 hr patch   No No   Sig: Place 1 patch on the skin daily   pravastatin (PRAVACHOL) 80 mg tablet   No No   Sig: Take 1 tablet (80 mg total) by mouth daily with dinner      Facility-Administered Medications: None       Scheduled Meds:    PRN Meds:     Continuous Infusions:No current facility-administered medications for this encounter  No Known Allergies      Invasive Devices: Invasive Devices  Report    Peripheral Intravenous Line  Duration           Peripheral IV 06/10/22 Right Antecubital <1 day                  PHYSICAL EXAM  /57 (BP Location: Left arm)   Pulse 65   Temp 97 6 °F (36 4 °C) (Oral)   Resp 17   Ht 5' 8" (1 727 m)   Wt 97 kg (213 lb 13 5 oz)   SpO2 100%   BMI 32 52 kg/m²   Temp (24hrs), Av 6 °F (36 4 °C), Min:97 6 °F (36 4 °C), Max:97 6 °F (36 4 °C)      No intake or output data in the 24 hours ending 06/10/22 1511    No intake/output data recorded  Current Weight: Weight - Scale: 97 kg (213 lb 13 5 oz)  First Weight: Weight - Scale: 97 kg (213 lb 13 5 oz)  Physical Exam  Vitals reviewed  Constitutional:       General: He is not in acute distress  Appearance: Normal appearance  He is obese  He is ill-appearing  He is not toxic-appearing or diaphoretic  HENT:      Head: Normocephalic and atraumatic  Mouth/Throat:      Mouth: Mucous membranes are moist       Pharynx: Oropharynx is clear  No oropharyngeal exudate  Eyes:      General: No scleral icterus  Conjunctiva/sclera: Conjunctivae normal    Cardiovascular:      Rate and Rhythm: Normal rate  Heart sounds: Normal heart sounds  No murmur heard  No friction rub  Pulmonary:      Effort: Pulmonary effort is normal  No respiratory distress  Breath sounds: Normal breath sounds  No stridor  No wheezing  Abdominal:      General: There is no distension  Palpations: Abdomen is soft  There is no mass  Tenderness: There is no abdominal tenderness  Musculoskeletal:         General: No swelling  Cervical back: Normal range of motion and neck supple  No rigidity  Skin:     General: Skin is warm  Coloration: Skin is not jaundiced  Neurological:      General: No focal deficit present  Mental Status: He is alert and oriented to person, place, and time  Psychiatric:         Mood and Affect: Mood normal          Behavior: Behavior normal            LABORATORY:    Results from last 7 days   Lab Units 06/10/22  1213   WBC Thousand/uL 6 80   HEMOGLOBIN g/dL 9 1*   HEMATOCRIT % 28 2*   PLATELETS Thousands/uL 292   POTASSIUM mmol/L 3 3*   CHLORIDE mmol/L 96*   CO2 mmol/L 8*   BUN mg/dL 103*   CREATININE mg/dL 14 43*   CALCIUM mg/dL 5 2*      rest all reviewed    RADIOLOGY:  XR chest 2 views   Final Result by Janet White MD (06/10 1333)      No acute cardiopulmonary disease  Workstation performed: [id number]           Rest all reviewed    Portions of the record may have been created with voice recognition software  Occasional wrong word or "sound a like" substitutions may have occurred due to the inherent limitations of voice recognition software  Read the chart carefully and recognize, using context, where substitutions have occurred  If you have any questions, please contact the dictating provider

## 2022-06-10 NOTE — ED PROVIDER NOTES
History  Chief Complaint   Patient presents with    Medical Problem     Reports he was sent here from the "clinic" to have " some sort of kidney test done" Pt unsure of what is to be done  Denies pain  Reports decrease in urination  63 yo M with HTN, CHF, CKD, c/o increasing dyspnea, with OVERTON, orthopnea, increasing for several weeks, since being discharged in Feb 2022, when he was admitted for pharyngeal swelling, and found to have renal failure, since unintentional noncompliance on medications for over 1 year due to financial constraints  The goal was for Nephrology followup, but once again he had financial constraints and did not make an appt  He has been compliant on medications, and followed up with PCP for scheduled appt  The patient says he is in the ED because he was told he was supposed to get a "kidney test" and someone told him he had to go the ED  I spoke with his PCP Dr Alejandro Baker, who clarified she had ordered outpatient BMP, and was not directly referring him to the ED unless there was an acute abnormality  His CRT at time of discharge in 2/4/22 was 4 73, GFR 12  Prior to Admission Medications   Prescriptions Last Dose Informant Patient Reported? Taking?    EPINEPHrine (EPIPEN) 0 3 mg/0 3 mL SOAJ   No No   Sig: Inject 0 3 mL (0 3 mg total) into a muscle once for 1 dose   amLODIPine (NORVASC) 5 mg tablet   No No   Sig: Take 1 tablet (5 mg total) by mouth daily   aspirin (ECOTRIN LOW STRENGTH) 81 mg EC tablet   No No   Sig: Take 1 tablet (81 mg total) by mouth daily   carvedilol (COREG) 25 mg tablet   No No   Sig: Take 1 tablet (25 mg total) by mouth 2 (two) times a day with meals   nicotine (NICODERM CQ) 14 mg/24hr TD 24 hr patch   No No   Sig: Place 1 patch on the skin daily   pravastatin (PRAVACHOL) 80 mg tablet   No No   Sig: Take 1 tablet (80 mg total) by mouth daily with dinner      Facility-Administered Medications: None       Past Medical History:   Diagnosis Date    [facility] failure) ([facility])     Hyperkalemia 02/01/2022    Hypertension     Renal disorder        History reviewed  No pertinent surgical history  History reviewed  No pertinent family history  I have reviewed and agree with the history as documented  E-Cigarette/Vaping     E-Cigarette/Vaping Substances     Social History     Tobacco Use    Smoking status: Current Every Day Smoker     Packs/day: 1 00     Years: 30 00     Pack years: 30 00    Smokeless tobacco: Never Used   Substance Use Topics    Alcohol use: Yes     Alcohol/week: 10 0 standard drinks     Types: 10 Cans of beer per week     Comment: socially    Drug use: Yes     Types: Marijuana     Comment: occasionally       Review of Systems   Constitutional: Positive for fatigue  Negative for appetite change, chills and fever  HENT: Negative for sore throat  Respiratory: Positive for shortness of breath  Negative for cough and wheezing  Cardiovascular: Negative for chest pain and palpitations  Gastrointestinal: Negative for abdominal pain, diarrhea, nausea and vomiting  Genitourinary: Negative for dysuria and hematuria  Musculoskeletal: Negative for neck pain  Skin: Negative for rash  Neurological: Negative for dizziness, weakness and headaches  Psychiatric/Behavioral: Negative for suicidal ideas  All other systems reviewed and are negative  Physical Exam  Physical Exam  Vitals and nursing note reviewed  Constitutional:       Appearance: Normal appearance  He is well-developed  He is not toxic-appearing or diaphoretic  HENT:      Head: Normocephalic  Right Ear: Tympanic membrane and external ear normal       Left Ear: External ear normal       Nose: Nose normal    Eyes:      General: Lids are normal       Conjunctiva/sclera: Conjunctivae normal       Pupils: Pupils are equal, round, and reactive to light  Neck:      Vascular: No JVD  Meningeal: Brudzinski's sign and Kernig's sign absent  Cardiovascular:      Rate and Rhythm: Normal rate and regular rhythm  Heart sounds: Normal heart sounds  No murmur heard  Pulmonary:      Effort: Pulmonary effort is normal  No tachypnea, accessory muscle usage or respiratory distress  Breath sounds: Normal breath sounds  No wheezing  Abdominal:      General: Bowel sounds are normal  There is no distension  Palpations: Abdomen is soft  Abdomen is not rigid  There is no mass  Tenderness: There is no abdominal tenderness  There is no guarding or rebound  Musculoskeletal:         General: Normal range of motion  Cervical back: Normal range of motion and neck supple  Lymphadenopathy:      Head:      Right side of head: No submental, submandibular, preauricular or posterior auricular adenopathy  Left side of head: No submental, submandibular, preauricular or posterior auricular adenopathy  Cervical: No cervical adenopathy  Skin:     General: Skin is warm and dry  Capillary Refill: Capillary refill takes less than 2 seconds  Findings: No rash  Neurological:      Mental Status: He is alert and oriented to person, place, and time  GCS: GCS eye subscore is 4  GCS verbal subscore is 5  GCS motor subscore is 6  Cranial Nerves: No cranial nerve deficit  Sensory: No sensory deficit  Coordination: Coordination normal       Deep Tendon Reflexes: Reflexes are normal and symmetric  Psychiatric:         Speech: Speech normal          Behavior: Behavior normal          Thought Content:  Thought content normal          Vital Signs  ED Triage Vitals [06/10/22 1119]   Temperature Pulse Respirations Blood Pressure SpO2   97 6 °F (36 4 °C) 61 16 152/77 100 %      Temp Source Heart Rate Source Patient Position - Orthostatic VS BP Location FiO2 (%)   Oral Monitor Sitting Right arm --      Pain Score       No Pain           Vitals:    06/10/22 1119   BP: 152/77   Pulse: 61   Patient Position - Orthostatic VS: Sitting         Visual Acuity      ED Medications  Medications - No data to display    Diagnostic Studies  Results Reviewed     Procedure Component Value Units Date/Time    NT-BNP PRO [[id number]]  (Abnormal) Collected: 06/10/22 1213    Lab Status: Final result Specimen: Blood from Arm, Right Updated: 06/10/22 1252     NT-proBNP 5,766 pg/mL     CBC and differential [[id number]]  (Abnormal) Collected: 06/10/22 1213    Lab Status: Final result Specimen: Blood from Arm, Right Updated: 06/10/22 1222     WBC 6 80 Thousand/uL      RBC 2 95 Million/uL      Hemoglobin 9 1 g/dL      Hematocrit 28 2 %      MCV 96 fL      MCH 30 8 pg      MCHC 32 3 g/dL      RDW 13 8 %      MPV 9 7 fL      Platelets 611 Thousands/uL      nRBC 0 /100 WBCs      Neutrophils Relative 69 %      Immat GRANS % 1 %      Lymphocytes Relative 19 %      Monocytes Relative 8 %      Eosinophils Relative 2 %      Basophils Relative 1 %      Neutrophils Absolute 4 71 Thousands/µL      Immature Grans Absolute 0 04 Thousand/uL      Lymphocytes Absolute 1 26 Thousands/µL      Monocytes Absolute 0 56 Thousand/µL      Eosinophils Absolute 0 14 Thousand/µL      Basophils Absolute 0 09 Thousands/µL     Basic metabolic panel [[id number]] Collected: 06/10/22 1213    Lab Status: In process Specimen: Blood from Arm, Right Updated: 06/10/22 1220                 XR chest 2 views    (Results Pending)              Procedures  Procedures         ED Course  ED Course as of 06/10/22 1555   Fri [place] 10, 2022   1253 XR chest 2 views  No acute findings      1340 Creatinine(!): 14 43  Acute renal failure, Reviewed results with patient at bedside and updated on the plan that he will need to admitted for renal failure   1341 CO2(!!): 8  Will correlate with VBG   1511 pH, Tk(!): 7 168  C/w renal failure   1511 Patient is not in impending respiratory failure, he makes little urine but no retention    I have consult to Nephrology and will admit to [facility]                               SBIRT 22yo+    Flowsheet Row Most Recent Value   SBIRT (23 yo +)    In order to provide better care to our patients, we are screening all of our patients for alcohol and drug use  Would it be okay to ask you these screening questions? No Filed at: 06/10/2022 1214                    MDM    Disposition  Final diagnoses:   None     ED Disposition     None      Follow-up Information     Follow up With Specialties Details Why Contact Info    Oswald Ahumada, [address] [place] [address]  [place] [address] [facility]  [phone]            Patient's Medications   Discharge Prescriptions    No medications on file       No discharge procedures on file      PDMP Review     None          ED Provider  Electronically Signed by           Janice Gutierrez MD  06/10/22 8698

## 2022-06-10 NOTE — TELEPHONE ENCOUNTER
New Patient Intake Form   Patient Details   Shayla Chou     1960     [id number]     Appointment Information   Who is calling to schedule? If not patient, what is callers name? [facility]     Referring Provider  Dr Yon Boykin   Referring Provider Number [phone]   Reason for Appt (Diagnosis) CKD Stage 4 GFR 15-29ml/ min    Is patient aware of why they are being referred? yes   Does Patient have labs done at [place] [address]? If not, where do they go? yes   Has patient had labs / urine work done? List date of most recent lab / urine work yes   Has patient had a BMP & CBC done in the past 2 years? If so, list the date yes   Has patient been hospitalized recently? If yes, list name and location of hospital they were in no   Has patient been seen by a Nephrologist before? If yes, list name, location and phone number no   Has patient been see by another Specialty before (ex  Neurology, urology, cardiology)? If yes, please list name, and specialty Hem onc   Has the patient had imaging done? If so, list the most recent date and type of imagineg yes   Does the patient has a stone analysis report if history of kidney stones? n/a   Appointment Details   Is there a referral on file?  yes   Appointment Date 10/06   Location [place]   Miscellaneous

## 2022-06-10 NOTE — ASSESSMENT & PLAN NOTE
Sodium 127  · Likely secondary to decreased excretion of free water  · Fluid restriction  · Patient will undergo dialysis then adjusted sodium bath

## 2022-06-10 NOTE — ASSESSMENT & PLAN NOTE
Evaluated by outpatient Family Medicine today, was admitted to the hospital in March with CKD however has not been able to follow-up secondary to lack of insurance    · Suspect likely multifactorial in the setting of CKD progression to ESRD with a component of cardiorenal syndrome as well as hypertensive nephrosclerosis and possible underlying paraproteinemia  · With worsening fatigue, shortness of breath  · Stat labs were ordered revealing a creatinine of 14 43 and BUN of 103  · Also with associated anion gap metabolic acidosis  · Appears the patient's baseline creatinine was approximately 4  · Has been oliguric, bladder scanned in the emergency department for 18 cc  · Patient to undergo urgent dialysis catheter placement and dialysis session today for 2 hours with next planned treatment on 06/11/2022  · Bicarb tablets ordered  · IV calcium and potassium ordered  · PTH, vitamin-D, ionized calcium ordered   · case management consultation for outpatient dialysis sessions

## 2022-06-10 NOTE — HEMODIALYSIS
Post-Dialysis RN Treatment Note    Blood Pressure:  Pre-132/68 mm/Hg  Post-152/91 mmHg   EDW- TBD   Weight:  Pre-97 kg   Post- 97 kg   Mode of weight measurement: standing scale    Volume Removed- 0 ml, ran even    Treatment duration-120 minutes    NS given- NO    Treatment shortened?   NO    Medications given during Rx- Hectorol 2 mcg   Estimated Kt/V- NA    Access type: R IJ catheter    Access Issues: None noted    Report called to primary nurse- Юлия Carcamo RN

## 2022-06-10 NOTE — PROGRESS NOTES
Assessment/Plan:    No problem-specific Assessment & Plan notes found for this encounter  Diagnoses and all orders for this visit:    Colon cancer screening  -     Ambulatory Referral to Gastroenterology; Future    CKD (chronic kidney disease) stage 4, GFR 15-29 ml/min ([facility])  -     Ambulatory Referral to Nephrology; Future  -     Basic metabolic panel; Future  -     Basic metabolic panel        STAT BW ordered, depending on results will refer to ED/direct admission as needed  Prior to completing this note I was contacted by ED physician, patient went straight to ED  Will have BW done in the ED and if need be can be admitted  Subjective:      Patient ID: Donald Dash is a 62 y o  male  61 yo male, new patient to the office  Was inpatient in March with CKD  Has not been able to follow up due to lack of insurance  Here today for follow up  Complains of severe fatigue, SOB, decreased appetite  Have to stop 2 to 3 times in a flight of stairs   Breathing is worse since discharge from hospital   Complains of fatigue, decreased appetite  Denies LE edema, chest pain  The following portions of the patient's history were reviewed and updated as appropriate:   He  has a past medical history of CHF (congestive heart failure) ([facility] ), Hyperkalemia (02/01/2022), Hypertension, and Renal disorder  He   Patient Active Problem List    Diagnosis Date Noted    Hypomagnesemia 02/02/2022    Pharyngeal edema 02/01/2022    High anion gap metabolic acidosis 57/27/8535    Withdrawal seizures ([facility] ) 02/01/2022    Seizure due to alcohol withdrawal, uncomplicated ([facility] ) 29/75/7442    Benign essential HTN 02/19/2018    CHF (congestive heart failure) ([facility] ) 02/19/2018    ETOH abuse 02/19/2018    Chronic kidney disease 02/19/2018    Marijuana use 02/19/2018    Hyperlipidemia 02/19/2018    Tobacco abuse 02/19/2018    Leukocytosis 02/19/2018     He  has no past surgical history on file    His family history is not on file   He  reports that he has been smoking  He has a 30 00 pack-year smoking history  He has never used smokeless tobacco  He reports current alcohol use of about 10 0 standard drinks of alcohol per week  He reports current drug use  Drug: Marijuana  Current Outpatient Medications   Medication Sig Dispense Refill    amLODIPine (NORVASC) 5 mg tablet Take 1 tablet (5 mg total) by mouth daily 90 tablet 0    carvedilol (COREG) 25 mg tablet Take 1 tablet (25 mg total) by mouth 2 (two) times a day with meals 90 tablet 0    aspirin (ECOTRIN LOW STRENGTH) 81 mg EC tablet Take 1 tablet (81 mg total) by mouth daily  0    EPINEPHrine (EPIPEN) 0 3 mg/0 3 mL SOAJ Inject 0 3 mL (0 3 mg total) into a muscle once for 1 dose 0 6 mL 0    nicotine (NICODERM CQ) 14 mg/24hr TD 24 hr patch Place 1 patch on the skin daily 28 patch 0    pravastatin (PRAVACHOL) 80 mg tablet Take 1 tablet (80 mg total) by mouth daily with dinner 30 tablet 0     No current facility-administered medications for this visit  Current Outpatient Medications on File Prior to Visit   Medication Sig    amLODIPine (NORVASC) 5 mg tablet Take 1 tablet (5 mg total) by mouth daily    carvedilol (COREG) 25 mg tablet Take 1 tablet (25 mg total) by mouth 2 (two) times a day with meals    aspirin (ECOTRIN LOW STRENGTH) 81 mg EC tablet Take 1 tablet (81 mg total) by mouth daily    EPINEPHrine (EPIPEN) 0 3 mg/0 3 mL SOAJ Inject 0 3 mL (0 3 mg total) into a muscle once for 1 dose    nicotine (NICODERM CQ) 14 mg/24hr TD 24 hr patch Place 1 patch on the skin daily    pravastatin (PRAVACHOL) 80 mg tablet Take 1 tablet (80 mg total) by mouth daily with dinner     No current facility-administered medications on file prior to visit       Review of Systems   Constitutional: Positive for appetite change and fatigue  Respiratory: Positive for shortness of breath  All other systems reviewed and are negative          Objective:      /82 (BP Location: Right arm, Patient Position: Sitting, Cuff Size: Adult)   Pulse 73   Temp 97 6 °F (36 4 °C) (Temporal)   Resp 18   Ht 5' 8" (1 727 m)   Wt 96 2 kg (212 lb 1 6 oz)   SpO2 99%   BMI 32 25 kg/m²          Physical Exam  Vitals and nursing note reviewed  Constitutional:       Appearance: He is well-developed  HENT:      Head: Normocephalic  Right Ear: External ear normal       Left Ear: External ear normal       Nose: Nose normal    Eyes:      Conjunctiva/sclera: Conjunctivae normal       Pupils: Pupils are equal, round, and reactive to light  Neck:      Thyroid: No thyromegaly  Cardiovascular:      Rate and Rhythm: Normal rate and regular rhythm  Heart sounds: Normal heart sounds  Pulmonary:      Effort: Pulmonary effort is normal       Breath sounds: Normal breath sounds  Abdominal:      Palpations: Abdomen is soft  Tenderness: There is no abdominal tenderness  There is no guarding or rebound  Musculoskeletal:         General: Normal range of motion  Cervical back: Normal range of motion and neck supple  Skin:     General: Skin is dry  Neurological:      Mental Status: He is alert and oriented to person, place, and time  Deep Tendon Reflexes: Reflexes are normal and symmetric

## 2022-06-10 NOTE — H&P
Adriennevaishali 72 1960, 62 y o  male MRN: [id number]  Unit/Bed#: Osmel Christianson Encounter: [id number]  Primary Care Provider: Eamon Germain MD   Date and time admitted to hospital: 6/10/2022 11:20 AM    * Acute renal failure [facility])  Assessment & Plan  Evaluated by outpatient Family Medicine today, was admitted to the hospital in March with CKD however has not been able to follow-up secondary to lack of insurance    · Suspect likely multifactorial in the setting of CKD progression to ESRD with a component of cardiorenal syndrome as well as hypertensive nephrosclerosis and possible underlying paraproteinemia  · With worsening fatigue, shortness of breath  · Stat labs were ordered revealing a creatinine of 14 43 and BUN of 103  · Also with associated anion gap metabolic acidosis  · Appears the patient's baseline creatinine was approximately 4  · Has been oliguric, bladder scanned in the emergency department for 18 cc  · Patient to undergo urgent dialysis catheter placement and dialysis session today for 2 hours with next planned treatment on 06/11/2022  · Bicarb tablets ordered  · IV calcium and potassium ordered  · PTH, vitamin-D, ionized calcium ordered   · case management consultation for outpatient dialysis sessions    Hypocalcemia  Assessment & Plan  Most recent serum calcium 5 2  · Will receive calcium gluconate IV x2 and recheck  · Placed on Hectorol 2 mcg IV q day  · Intact PTH and vitamin-D ordered    Hyponatremia  Assessment & Plan  Sodium 127  · Likely secondary to decreased excretion of free water  · Fluid restriction  · Patient will undergo dialysis then adjusted sodium bath    Anemia  Assessment & Plan  Hemoglobin 9 1, no acute signs of bleeding  · Suspect secondary to chronic kidney disease    High anion gap metabolic acidosis  Assessment & Plan  Secondary to uremia in setting of acute renal failure  · PH 7 168  · Bicarb 8 4  · Patient initiated on bicarbonate tablets 1300 p o  T i d   · Nephrology consulted, appreciate recommendations  · Patient undergo urgent dialysis today    Hypokalemia  Assessment & Plan  Mildly hypokalemic at 3 3  · Cautiously replete and recheck    ETOH abuse  Assessment & Plan  No longer drinks on a daily basis  · Last drink a few days ago, no indication for CIWA at this time    Benign essential HTN  Assessment & Plan  Maintained on Coreg 25 mg b i d , amlodipine 5 mg daily      VTE Pharmacologic Prophylaxis:   Moderate Risk (Score 3-4) - Pharmacological DVT Prophylaxis Ordered: heparin  Code Status: Prior full code  Discussion with family: Patient declined call to   Anticipated Length of Stay: Patient will be admitted on an inpatient basis with an anticipated length of stay of greater than 2 midnights secondary to [facility]  Total Time for Visit, including Counseling / Coordination of Care: 70 minutes Greater than 50% of this total time spent on direct patient counseling and coordination of care  Chief Complaint:  Fatigue    History of Present Illness:  Hao Torre is a 62 y o  male with a PMH of CKD, hyperlipidemia, hypertension who presents with increased weakness and fatigue and associated shortness of breath  According to the patient, he was admitted to the hospital in March of 2022, at that time it was recommended he follow-up with outpatient Nephrology  However unable to secondary to lack insurance  The patient states that he finally got insurance and therefore he scheduled a PCP appointment for today  Upon arrival to his PCPs office, he expressed significant shortness of breath with exertion, worsening fatigue/malaise and stat labs were ordered  At that time, it was recommended he come to the emergency department for further evaluation  According to the patient, he has been noticing worsening shortness of breath especially with exertion    He states that he is typically able to walk up his stairs without stopping however he has recently had to stop about 2-3 times in order to climb his stairs  Additionally, he notes significant decrease in urine production  While in the ED, the patient was found to be in acute renal failure with a creatinine of 14  Was also noted that he had a elevated anion gap metabolic acidosis likely secondary to uremia  Nephrology was consulted, recommending urgent dialysis  Interventional Radiology will place urgent dialysis catheter  Review of Systems:  Review of Systems   Constitutional: Positive for fatigue  Negative for chills and fever  HENT: Negative for ear pain and sore throat  Eyes: Negative for pain and visual disturbance  Respiratory: Positive for shortness of breath  Negative for cough  Cardiovascular: Negative for chest pain and palpitations  Gastrointestinal: Negative for abdominal pain, diarrhea, nausea and vomiting  Genitourinary: Positive for decreased urine volume  Negative for dysuria and hematuria  Musculoskeletal: Negative for arthralgias and back pain  Skin: Negative for color change and rash  Neurological: Positive for weakness  Negative for seizures and syncope  Past Medical and Surgical History:   Past Medical History:   Diagnosis Date    [facility]congestive [facility] failure) ([facility] )     Hyperkalemia 02/01/2022    Hypertension     Renal disorder        History reviewed  No pertinent surgical history  Meds/Allergies:  Prior to Admission medications    Medication Sig Start Date End Date Taking?  Authorizing Provider   amLODIPine (NORVASC) 5 mg tablet Take 1 tablet (5 mg total) by mouth daily 2/4/22   Claudia Floyd MD   aspirin (ECOTRIN LOW STRENGTH) 81 mg EC tablet Take 1 tablet (81 mg total) by mouth daily 2/4/22   Alida Schwartz DO   carvedilol (COREG) 25 mg tablet Take 1 tablet (25 mg total) by mouth 2 (two) times a day with meals 2/4/22   Claudia Floyd MD   EPINEPHrine (EPIPEN) 0 3 mg/0 3 mL SOAJ Inject 0 3 mL (0 3 mg total) into a muscle once for 1 dose 2/4/22 2/4/22  Aj Gutierrez DO   nicotine (NICODERM CQ) 14 mg/24hr TD 24 hr patch Place 1 patch on the skin daily 2/5/22   Aj Gutierrez DO   pravastatin (PRAVACHOL) 80 mg tablet Take 1 tablet (80 mg total) by mouth daily with dinner 2/4/22   Aj Gutierrez DO     I have reviewed home medications with patient personally  Allergies: No Known Allergies    Social History:  Marital Status: Significant Other   Occupation:  Unknown  Patient Pre-hospital Living Situation: Home  Patient Pre-hospital Level of Mobility: walks  Patient Pre-hospital Diet Restrictions: none  Substance Use History:   Social History     Substance and Sexual Activity   Alcohol Use Yes    Alcohol/week: 10 0 standard drinks    Types: 10 Cans of beer per week    Comment: socially     Social History     Tobacco Use   Smoking Status Current Every Day Smoker    Packs/day: 1 00    Years: 30 00    Pack years: 30 00   Smokeless Tobacco Never Used     Social History     Substance and Sexual Activity   Drug Use Yes    Types: Marijuana    Comment: occasionally       Family History:  History reviewed  No pertinent family history  Physical Exam:     Vitals:   Blood Pressure: (!) 162/107 (06/10/22 1611)  Pulse: 63 (06/10/22 1611)  Temperature: 97 6 °F (36 4 °C) (06/10/22 1119)  Temp Source: Oral (06/10/22 1119)  Respirations: 22 (06/10/22 1611)  Height: 5' 8" (172 7 cm) (06/10/22 1115)  Weight - Scale: 97 kg (213 lb 13 5 oz) (06/10/22 1115)  SpO2: 100 % (06/10/22 1611)    Physical Exam  Vitals and nursing note reviewed  Constitutional:       General: He is not in acute distress  Appearance: Normal appearance  He is obese  He is not ill-appearing, toxic-appearing or diaphoretic  Eyes:      General: No scleral icterus  Conjunctiva/sclera: Conjunctivae normal    Cardiovascular:      Rate and Rhythm: Normal rate and regular rhythm  Heart sounds: No murmur heard  No friction rub  No gallop     Pulmonary: Effort: Pulmonary effort is normal       Breath sounds: Normal breath sounds  No stridor  No wheezing, rhonchi or rales  Chest:      Chest wall: No tenderness  Abdominal:      General: Abdomen is flat  Bowel sounds are normal  There is no distension  Palpations: Abdomen is soft  Tenderness: There is no abdominal tenderness  Musculoskeletal:      Right lower leg: No edema  Left lower leg: No edema  Skin:     General: Skin is warm and dry  Coloration: Skin is not jaundiced or pale  Neurological:      General: No focal deficit present  Mental Status: He is alert and oriented to person, place, and time  Mental status is at baseline  Additional Data:     Lab Results:  Results from last 7 days   Lab Units 06/10/22  1213   WBC Thousand/uL 6 80   HEMOGLOBIN g/dL 9 1*   HEMATOCRIT % 28 2*   PLATELETS Thousands/uL 292   NEUTROS PCT % 69   LYMPHS PCT % 19   MONOS PCT % 8   EOS PCT % 2     Results from last 7 days   Lab Units 06/10/22  1213   SODIUM mmol/L 127*   POTASSIUM mmol/L 3 3*   CHLORIDE mmol/L 96*   CO2 mmol/L 8*   BUN mg/dL 103*   CREATININE mg/dL 14 43*   ANION GAP mmol/L 23*   CALCIUM mg/dL 5 2*   GLUCOSE RANDOM mg/dL 80                       Imaging: Reviewed radiology reports from this admission including: chest xray  XR chest 2 views   Final Result by Chad Swann MD (06/10 1333)      No acute cardiopulmonary disease  Workstation performed: [id number]         IR tunneled dialysis catheter placement    (Results Pending)       EKG and Other Studies Reviewed on Admission:   · EKG: NSR  HR 62     ** Please Note: This note has been constructed using a voice recognition system   **

## 2022-06-10 NOTE — ED NOTES
Pt to go to IR for dialysis port  Per nephrology labs can be drawn at that time        Peterson Childs RN  06/10/22 2491

## 2022-06-10 NOTE — CONSULTS
e-Consult (IPC)  - Interventional Radiology  Emani Sanchez 62 y o  male MRN: [id number]  Unit/Bed#: Abhinav Vaca Encounter: [id number]          Interventional Radiology has been consulted to evaluate Emani Sanchez    We were consulted by Gerry Ang concerning this patient with renal failure  IP Consult to IR  Consult performed by: Chad Unger MD  Consult ordered by: BHANU Ro        06/10/22    Assessment/Recommendation:   63 yo male with chronic renal disease now presents to ED with worsening renal failure and need for long-term hemodialysis access  Will plan for tunneled catheter placement today  Total time spent in review of data, discussion with requesting provider and rendering advice was 10 minutes  Thank you for allowing Interventional Radiology to participate in the care of Emani Sanchez  Please don't hesitate to call or TigerText us with any questions       Chad Unger MD

## 2022-06-10 NOTE — ED NOTES
Requested urine sample from patient at this time  Pt denies being able to provide at this time        Tino Giles, CRUZ  06/10/22 0389

## 2022-06-11 ENCOUNTER — APPOINTMENT (INPATIENT)
Dept: DIALYSIS | Facility: HOSPITAL | Age: 62
DRG: 469 | End: 2022-06-11
Payer: MEDICARE

## 2022-06-11 LAB
25(OH)D3 SERPL-MCNC: 10.2 NG/ML (ref 30–100)
ALBUMIN SERPL BCP-MCNC: 1.5 G/DL (ref 3.5–5)
ALP SERPL-CCNC: 83 U/L (ref 46–116)
ALT SERPL W P-5'-P-CCNC: 10 U/L (ref 12–78)
ANION GAP SERPL CALCULATED.3IONS-SCNC: 11 MMOL/L (ref 4–13)
ANION GAP SERPL CALCULATED.3IONS-SCNC: 19 MMOL/L (ref 4–13)
AST SERPL W P-5'-P-CCNC: 18 U/L (ref 5–45)
BASOPHILS # BLD AUTO: 0.07 THOUSANDS/ΜL (ref 0–0.1)
BASOPHILS NFR BLD AUTO: 1 % (ref 0–1)
BILIRUB SERPL-MCNC: 0.32 MG/DL (ref 0.2–1)
BUN SERPL-MCNC: 50 MG/DL (ref 5–25)
BUN SERPL-MCNC: 74 MG/DL (ref 5–25)
CALCIUM ALBUM COR SERPL-MCNC: 7.5 MG/DL (ref 8.3–10.1)
CALCIUM SERPL-MCNC: 5.5 MG/DL (ref 8.3–10.1)
CALCIUM SERPL-MCNC: 5.9 MG/DL (ref 8.3–10.1)
CHLORIDE SERPL-SCNC: 102 MMOL/L (ref 100–108)
CHLORIDE SERPL-SCNC: 97 MMOL/L (ref 100–108)
CO2 SERPL-SCNC: 16 MMOL/L (ref 21–32)
CO2 SERPL-SCNC: 21 MMOL/L (ref 21–32)
CREAT SERPL-MCNC: 10.84 MG/DL (ref 0.6–1.3)
CREAT SERPL-MCNC: 7.34 MG/DL (ref 0.6–1.3)
EOSINOPHIL # BLD AUTO: 0.18 THOUSAND/ΜL (ref 0–0.61)
EOSINOPHIL NFR BLD AUTO: 3 % (ref 0–6)
ERYTHROCYTE [DISTWIDTH] IN BLOOD BY AUTOMATED COUNT: 13.5 % (ref 11.6–15.1)
FERRITIN SERPL-MCNC: 619 NG/ML (ref 8–388)
GFR SERPL CREATININE-BSD FRML MDRD: 4 ML/MIN/1.73SQ M
GFR SERPL CREATININE-BSD FRML MDRD: 7 ML/MIN/1.73SQ M
GLUCOSE SERPL-MCNC: 117 MG/DL (ref 65–140)
GLUCOSE SERPL-MCNC: 127 MG/DL (ref 65–140)
GLUCOSE SERPL-MCNC: 66 MG/DL (ref 65–140)
GLUCOSE SERPL-MCNC: 74 MG/DL (ref 65–140)
HBV CORE AB SER QL: NORMAL
HBV CORE IGM SER QL: NORMAL
HBV SURFACE AB SER-ACNC: <3.1 MIU/ML
HBV SURFACE AG SER QL: NORMAL
HCT VFR BLD AUTO: 26 % (ref 36.5–49.3)
HCV AB SER QL: NORMAL
HGB BLD-MCNC: 8.9 G/DL (ref 12–17)
IMM GRANULOCYTES # BLD AUTO: 0.02 THOUSAND/UL (ref 0–0.2)
IMM GRANULOCYTES NFR BLD AUTO: 0 % (ref 0–2)
IRON SATN MFR SERPL: 59 % (ref 20–50)
IRON SERPL-MCNC: 76 UG/DL (ref 65–175)
LYMPHOCYTES # BLD AUTO: 1.48 THOUSANDS/ΜL (ref 0.6–4.47)
LYMPHOCYTES NFR BLD AUTO: 24 % (ref 14–44)
MCH RBC QN AUTO: 30 PG (ref 26.8–34.3)
MCHC RBC AUTO-ENTMCNC: 34.2 G/DL (ref 31.4–37.4)
MCV RBC AUTO: 88 FL (ref 82–98)
MONOCYTES # BLD AUTO: 0.56 THOUSAND/ΜL (ref 0.17–1.22)
MONOCYTES NFR BLD AUTO: 9 % (ref 4–12)
NEUTROPHILS # BLD AUTO: 3.92 THOUSANDS/ΜL (ref 1.85–7.62)
NEUTS SEG NFR BLD AUTO: 63 % (ref 43–75)
NRBC BLD AUTO-RTO: 0 /100 WBCS
PHOSPHATE SERPL-MCNC: 8.5 MG/DL (ref 2.3–4.1)
PLATELET # BLD AUTO: 282 THOUSANDS/UL (ref 149–390)
PMV BLD AUTO: 9.8 FL (ref 8.9–12.7)
POTASSIUM SERPL-SCNC: 2.9 MMOL/L (ref 3.5–5.3)
POTASSIUM SERPL-SCNC: 3.1 MMOL/L (ref 3.5–5.3)
PROT SERPL-MCNC: 5.5 G/DL (ref 6.4–8.2)
PTH-INTACT SERPL-MCNC: 1103.3 PG/ML (ref 18.4–80.1)
RBC # BLD AUTO: 2.97 MILLION/UL (ref 3.88–5.62)
SODIUM SERPL-SCNC: 132 MMOL/L (ref 136–145)
SODIUM SERPL-SCNC: 134 MMOL/L (ref 136–145)
TIBC SERPL-MCNC: 128 UG/DL (ref 250–450)
WBC # BLD AUTO: 6.23 THOUSAND/UL (ref 4.31–10.16)

## 2022-06-11 PROCEDURE — 5A1D70Z PERFORMANCE OF URINARY FILTRATION, INTERMITTENT, LESS THAN 6 HOURS PER DAY: ICD-10-PCS | Performed by: INTERNAL MEDICINE

## 2022-06-11 PROCEDURE — 99232 SBSQ HOSP IP/OBS MODERATE 35: CPT | Performed by: INTERNAL MEDICINE

## 2022-06-11 PROCEDURE — 84165 PROTEIN E-PHORESIS SERUM: CPT | Performed by: INTERNAL MEDICINE

## 2022-06-11 PROCEDURE — 83521 IG LIGHT CHAINS FREE EACH: CPT | Performed by: INTERNAL MEDICINE

## 2022-06-11 PROCEDURE — 82306 VITAMIN D 25 HYDROXY: CPT | Performed by: NURSE PRACTITIONER

## 2022-06-11 PROCEDURE — 85025 COMPLETE CBC W/AUTO DIFF WBC: CPT | Performed by: PHYSICIAN ASSISTANT

## 2022-06-11 PROCEDURE — 80048 BASIC METABOLIC PNL TOTAL CA: CPT | Performed by: INTERNAL MEDICINE

## 2022-06-11 PROCEDURE — 83540 ASSAY OF IRON: CPT | Performed by: NURSE PRACTITIONER

## 2022-06-11 PROCEDURE — 83970 ASSAY OF PARATHORMONE: CPT | Performed by: NURSE PRACTITIONER

## 2022-06-11 PROCEDURE — 82948 REAGENT STRIP/BLOOD GLUCOSE: CPT

## 2022-06-11 PROCEDURE — 84100 ASSAY OF PHOSPHORUS: CPT | Performed by: INTERNAL MEDICINE

## 2022-06-11 PROCEDURE — 83550 IRON BINDING TEST: CPT | Performed by: NURSE PRACTITIONER

## 2022-06-11 PROCEDURE — 82728 ASSAY OF FERRITIN: CPT | Performed by: NURSE PRACTITIONER

## 2022-06-11 PROCEDURE — 80053 COMPREHEN METABOLIC PANEL: CPT | Performed by: NURSE PRACTITIONER

## 2022-06-11 PROCEDURE — 84165 PROTEIN E-PHORESIS SERUM: CPT | Performed by: PATHOLOGY

## 2022-06-11 RX ORDER — CALCIUM CARBONATE 200(500)MG
1000 TABLET,CHEWABLE ORAL 2 TIMES DAILY
Status: DISCONTINUED | OUTPATIENT
Start: 2022-06-11 | End: 2022-06-14 | Stop reason: HOSPADM

## 2022-06-11 RX ORDER — CALCIUM ACETATE 667 MG/1
2001 CAPSULE ORAL
Status: DISCONTINUED | OUTPATIENT
Start: 2022-06-11 | End: 2022-06-14 | Stop reason: HOSPADM

## 2022-06-11 RX ORDER — AMLODIPINE BESYLATE 5 MG/1
5 TABLET ORAL DAILY
Status: DISCONTINUED | OUTPATIENT
Start: 2022-06-12 | End: 2022-06-14 | Stop reason: HOSPADM

## 2022-06-11 RX ORDER — POTASSIUM CHLORIDE 20 MEQ/1
40 TABLET, EXTENDED RELEASE ORAL ONCE
Status: COMPLETED | OUTPATIENT
Start: 2022-06-11 | End: 2022-06-11

## 2022-06-11 RX ORDER — CHOLECALCIFEROL (VITAMIN D3) 10 MCG
1 TABLET ORAL
Status: DISCONTINUED | OUTPATIENT
Start: 2022-06-11 | End: 2022-06-14 | Stop reason: HOSPADM

## 2022-06-11 RX ORDER — CARVEDILOL 12.5 MG/1
12.5 TABLET ORAL 2 TIMES DAILY WITH MEALS
Status: DISCONTINUED | OUTPATIENT
Start: 2022-06-11 | End: 2022-06-14 | Stop reason: HOSPADM

## 2022-06-11 RX ADMIN — CALCIUM ACETATE 2001 MG: 667 CAPSULE ORAL at 12:47

## 2022-06-11 RX ADMIN — CALCIUM CARBONATE (ANTACID) CHEW TAB 500 MG 1000 MG: 500 CHEW TAB at 21:16

## 2022-06-11 RX ADMIN — ASPIRIN 81 MG: 81 TABLET, COATED ORAL at 11:31

## 2022-06-11 RX ADMIN — HEPARIN SODIUM 5000 UNITS: 5000 INJECTION INTRAVENOUS; SUBCUTANEOUS at 21:16

## 2022-06-11 RX ADMIN — ACETAMINOPHEN 650 MG: 325 TABLET, FILM COATED ORAL at 21:16

## 2022-06-11 RX ADMIN — HEPARIN SODIUM 5000 UNITS: 5000 INJECTION INTRAVENOUS; SUBCUTANEOUS at 16:02

## 2022-06-11 RX ADMIN — Medication 1 PATCH: at 09:17

## 2022-06-11 RX ADMIN — Medication 1 CAPSULE: at 16:02

## 2022-06-11 RX ADMIN — CALCIUM ACETATE 2001 MG: 667 CAPSULE ORAL at 16:07

## 2022-06-11 RX ADMIN — SODIUM BICARBONATE 650 MG TABLET 1300 MG: at 09:18

## 2022-06-11 RX ADMIN — POTASSIUM CHLORIDE 40 MEQ: 1500 TABLET, EXTENDED RELEASE ORAL at 11:30

## 2022-06-11 RX ADMIN — PRAVASTATIN SODIUM 80 MG: 80 TABLET ORAL at 16:02

## 2022-06-11 RX ADMIN — ACETAMINOPHEN 650 MG: 325 TABLET, FILM COATED ORAL at 03:23

## 2022-06-11 RX ADMIN — ACETAMINOPHEN 650 MG: 325 TABLET, FILM COATED ORAL at 11:30

## 2022-06-11 RX ADMIN — CARVEDILOL 12.5 MG: 12.5 TABLET, FILM COATED ORAL at 16:03

## 2022-06-11 NOTE — ASSESSMENT & PLAN NOTE
- known history of CKD lost to follow up due to lack of insurance  Presented to the ER at the recommendation of his PCP when they found him to be significantly dyspneic and fatigued  - laboratory workup in the ER showed a creatinine of 14 4 in BUN of 1 3  - this is suspected progression to ESRD secondary to hypertensive nephrosclerosis and possible underlying paraproteinemia    - temporary Vas-Cath placed in the right chest wall  Underwent 1st dialysis sessions on 06/10 and 6/11  He will be transitioned to a Monday/Wednesday/Friday schedule  Case Management consulted for outpatient HD arrangements    - cont to monitor lytes and volume status

## 2022-06-11 NOTE — PLAN OF CARE
Pre-tx:  Run even today  Today is pt's second HD tx  Labs drawn with HD  Will cont to monitor  Post-Dialysis RN Treatment Note    Blood Pressure:  Pre 140/81 mm/Hg  Post 153/75 mmHg   EDW  TBD kg    Weight:  Pre 98 kg   Post 98 kg   Mode of weight measurement: Bed Scale   Volume Removed  0 ml    Treatment duration 3 hours    NS given No   Treatment shortened?  No   Medications given during Rx Not Applicable   Estimated Kt/V  Not Applicable   Access type: Permacath/TDC   Access Issues: No    Report called to primary nurse   Yes       Problem: METABOLIC, FLUID AND ELECTROLYTES - ADULT  Goal: Electrolytes maintained within normal limits  Description: INTERVENTIONS:  - Monitor labs and assess patient for signs and symptoms of electrolyte imbalances  - Administer electrolyte replacement as ordered  - Monitor response to electrolyte replacements, including repeat lab results as appropriate  - Instruct patient on fluid and nutrition as appropriate  Outcome: Progressing  Goal: Fluid balance maintained  Description: INTERVENTIONS:  - Monitor labs   - Monitor I/O and WT  - Instruct patient on fluid and nutrition as appropriate  - Assess for signs & symptoms of volume excess or deficit  Outcome: Progressing

## 2022-06-11 NOTE — PROGRESS NOTES
[facility]  Progress Note - Maricruz Alfaro 1960, 62 y o  male MRN: [id number]  Unit/Bed#: E4 -01 Encounter: [id number]  Primary Care Provider: Nataly Estevez MD   Date and time admitted to hospital: 6/10/2022 11:20 AM    * Acute renal failure [facility])  Assessment & Plan  - known history of CKD lost to follow up due to lack of insurance  Presented to the ER at the recommendation of his PCP when they found him to be significantly dyspneic and fatigued  - laboratory workup in the ER showed a creatinine of 14 4 in BUN of 1 3  - this is suspected progression to ESRD secondary to hypertensive nephrosclerosis and possible underlying paraproteinemia    - temporary Vas-Cath placed in the right chest wall  Underwent 1st dialysis sessions on 06/10 and 6/11  He will be transitioned to a Monday/Wednesday/Friday schedule  Case Management consulted for outpatient HD arrangements  - cont to monitor lytes and volume status    Hypocalcemia  Assessment & Plan  - continue calcium supplementation  - daily BMP    Hyponatremia  Assessment & Plan  - secondary to decreased free water excretion due to ESRD  - monitor with HD    Anemia  Assessment & Plan  - secondary to ESRD  - daily hemoglobin  - EPO per Nephrology    High anion gap metabolic acidosis  Assessment & Plan  - should corrent with HD  - daily BMP    Hypokalemia  Assessment & Plan  - replace as necessary  - monitor for dysrhythmia  - recheck in a m  ETOH abuse  Assessment & Plan  No longer drinks on a daily basis  · Last drink a few days ago, no indication for CIWA at this time    Benign essential HTN  Assessment & Plan  - continue Coreg 25 mg b i d , amlodipine 5 mg daily        VTE Pharmacologic Prophylaxis:   Moderate Risk (Score 3-4) - Pharmacological DVT Prophylaxis Ordered: heparin  Patient Centered Rounds: I performed bedside rounds with nursing staff today    Discussions with Specialists or Other Care Team Provider: CM     Education and Discussions with Family / Patient: Patient declined call to   Time Spent for Care: 30 minutes  More than 50% of total time spent on counseling and coordination of care as described above  Current Length of Stay: 1 day(s)  Current Patient Status: Inpatient   Certification Statement: The patient will continue to require additional inpatient hospital stay due to ESRD, HD arrangements, electrolyte derangements  Discharge Plan: Anticipate discharge in >72 hrs to home  Code Status: Level 1 - Full Code    Subjective:   Patient seen and examined  Following up for new ESRD  Patient feeling well today  States his breathing is much improved  Has a better appetite as well  No events reported overnight  Had dialysis earlier this morning without complication  Objective:     Vitals:   Temp (24hrs), Av 9 °F (36 6 °C), Min:96 9 °F (36 1 °C), Max:98 5 °F (36 9 °C)    Temp:  [96 9 °F (36 1 °C)-98 5 °F (36 9 °C)] 98 1 °F (36 7 °C)  HR:  [59-79] 65  Resp:  [11-29] 18  BP: (109-162)/() 153/75  SpO2:  [100 %] 100 %  Body mass index is 32 52 kg/m²  Input and Output Summary (last 24 hours): Intake/Output Summary (Last 24 hours) at 2022 1241  Last data filed at 2022 1130  Gross per 24 hour   Intake 1000 ml   Output 1000 ml   Net 0 ml       PHYSICAL EXAM:    Vitals signs reviewed  Constitutional   Awake and cooperative  NAD  Head/Neck   Normocephalic  Atraumatic  HEENT   No scleral icterus  EOMI  Heart   Regular rate and rhythm  No murmers  Lungs   Clear to auscultation bilaterally  Respirations unlaboured  Abdomen   Soft  Nontender  Nondistended  Skin   Skin color normal  No rashes  Extremities   No deformities  No peripheral edema  Neuro   Alert and oriented  No new deficits  Psych   Mood stable   Affect normal          Additional Data:     Labs:  Results from last 7 days   Lab Units 22  0853   WBC Thousand/uL 6 23   HEMOGLOBIN g/dL 8 9* HEMATOCRIT % 26 0*   PLATELETS Thousands/uL 282   NEUTROS PCT % 63   LYMPHS PCT % 24   MONOS PCT % 9   EOS PCT % 3     Results from last 7 days   Lab Units 06/11/22  0853   SODIUM mmol/L 132*   POTASSIUM mmol/L 2 9*   CHLORIDE mmol/L 97*   CO2 mmol/L 16*   BUN mg/dL 74*   CREATININE mg/dL 10 84*   ANION GAP mmol/L 19*   CALCIUM mg/dL 5 5*   ALBUMIN g/dL 1 5*   TOTAL BILIRUBIN mg/dL 0 32   ALK PHOS U/L 83   ALT U/L 10*   AST U/L 18   GLUCOSE RANDOM mg/dL 74         Results from last 7 days   Lab Units 06/11/22  0724 06/10/22  2104 06/10/22  1740   POC GLUCOSE mg/dl 66 144* 84         Results from last 7 days   Lab Units 06/10/22  1548   LACTIC ACID mmol/L <0 3*       Lines/Drains:  Invasive Devices  Report    Peripheral Intravenous Line  Duration           Peripheral IV 06/10/22 Right Antecubital 1 day          Hemodialysis Catheter  Duration           HD Permanent Double Catheter <1 day                  Telemetry:  Telemetry Orders (From admission, onward)             24 Hour Telemetry Monitoring  Continuous x 24 Hours (Telem)        Expiring   References:    Telemetry Guidelines   Question:  Reason for 24 Hour Telemetry  Answer:  Metabolic/Electrolyte Disturbance with High Probability of Dysrhythmia (K level <3 or >6, or KCL infusion >=10mEq/hr)                 Telemetry Reviewed: Normal Sinus Rhythm  Indication for Continued Telemetry Use: Metabolic/electrolyte disturbance with high probability of dysrhythmia             Imaging: No pertinent imaging reviewed      Recent Cultures (last 7 days):         Last 24 Hours Medication List:   Current Facility-Administered Medications   Medication Dose Route Frequency Provider Last Rate    acetaminophen  650 mg Oral Q6H PRN Everette Heredia PA-C      [START ON 6/12/2022] amLODIPine  5 mg Oral Daily Katerin Tobin MD      aspirin  81 mg Oral Daily Everette Heredia PA-C      b complex-vitamin C-folic acid  1 capsule Oral Daily With Leslie Beltre MD      calcium acetate 2,001 mg Oral TID With Meals Olegario Michael MD      calcium carbonate  1,000 mg Oral BID Olegario Michael MD      carvedilol  12 5 mg Oral BID With Meals Olegario Michael MD      [START ON 6/13/2022] epoetin leyda  5,000 Units Intravenous Once per day on Mon Wed Fri Olegario Michael MD      heparin (porcine)  5,000 Units Subcutaneous [place], [place]      nicotine  1 patch Transdermal Daily [place], [place]      pravastatin  80 mg Oral Daily With [place], [place]          Today, Patient Was Seen By: Trudi Nissen Starsinic, DO    **Please Note: This note may have been constructed using a voice recognition system  **

## 2022-06-11 NOTE — PROGRESS NOTES
Progress Note - Nephrology   Kirk Cueto 62 y o  male MRN: [id number]  Unit/Bed#: E4 -01 Encounter: [id number]    ASSESSMENT AND PLAN:  62 y o   male with pmh of hypertension, hyperlipidemia, CHF, CKD stage 5 and obesity who was admitted for shortness of breath fatigue  Nephrology has been consulted for evaluation management of acute kidney  Patient was lost to follow-up did not follow with a nephrologist    1  ESRD:  CKD 5 most likely ESRD at this time  Prior ultrasound demonstrated small bilateral kidneys echogenic about 8 cm   Will place on MWF schedule   Will consult Case Management   Obtain hepatitis studies   Access:  Right TDC  Will need outpatient referral to vascular surgery   Next treatment:  Today      HEMODIALYSIS PROCEDURE NOTE  The patient was seen and examined while on hemodialysis  The patient is tolerating the procedure well  Time:  3  hours  Sodium:  138 Blood flow:  300   Dialyzer: F160 Potassium:  4  Dialysate flow:  500   Access:  Right TDC Bicarbonate:  25 Ultrafiltration goal:  Even   Medications on HD:  None at this time                 2  Volume/blood pressure:   Volume:  Appears euvolemic   Blood pressure:  Stable on carvedilol and amlodipine will place hold parameters to avoid hypotension/hypoperfusion      3  Electrolytes/acid base:   Sodium 132 will place on fluid restriction and HD   Potassium 2 9 will give oral potassium +4 potassium bath on HD   Metabolic acidosis improved at 16 will place on 20/5 bicarbonate bath on HD to avoid contributing to hypocalcemia/lowering ionized calcium    4  MBD of ESRD:   Hyperphosphatemia:  Hyperphosphatemia:  Will place on PhosLo 3 with each meal   Hypocalcemia:  Most likely related to hyperphosphatemia but I will check vitamin-D studies and a PTH intact   Secondary hyperparathyroidism:  Obtain PTH intact level    5   Anemia of ESRD:   Current hemoglobin:  8 9   Treatment:  Place on Epogen, check stool for occult blood/fecal immuno chemical testing and iron studies   Rule out multiple myeloma with an SPEP UPEP light chain ratio   Transfuse for hemoglobin less than 7 0 per primary service      6  Reason for hospitalization:   ESRD please see above    7  Other major problems:   Hypertension   Dyslipidemia   Tobacco abuse   Withdrawal seizures from alcohol/chronic ETOH abuse   ? History of CHF          Subjective:   Overall feeling much improved  No chest pain shortness of breath  No nausea vomiting or diarrhea  Urinating well  Objective:     Vitals: Blood pressure 139/73, pulse 70, temperature 98 2 °F (36 8 °C), resp  rate 18, height 5' 8" (1 727 m), weight 97 kg (213 lb 13 5 oz), SpO2 100 %  ,Body mass index is 32 52 kg/m²  Weight (last 2 days)     Date/Time Weight    06/10/22 1115 97 (213 85)            Intake/Output Summary (Last 24 hours) at 6/11/2022 0954  Last data filed at 6/11/2022 0830  Gross per 24 hour   Intake 700 ml   Output 500 ml   Net 200 ml       HD Permanent Double Catheter (Active)   Reasons to continue HD Cath Treatment Therapy 06/11/22 0830   Goal for Removal N/A- chronic HD catheter 06/11/22 0830   Line Necessity Reviewed Yes, reviewed with provider 06/11/22 0830   Site Assessment WDL 06/11/22 0830   Proximal Lumen Status Capped;Flushed;Blood return noted 06/11/22 0830   Distal Lumen Status Capped;Flushed;Blood return noted 06/11/22 0830   Dressing Type Chlorhexidine dressing 06/11/22 0830   Dressing Status Clean;Dry; Intact 06/11/22 0830   Dressing Change Due 06/17/22 06/11/22 0830       Physical Exam: General:  No acute distress  Skin:  No acute rash  Eyes:  No scleral icterus and noninjected  ENT:  Moist mucous membranes  Neck:  Supple, no jugular venous distention, trachea midline, overall appearance is normal  Chest:  Clear to auscultation; right TDC clean and dry  CVS:  Regular rate and rhythm, without a rub or gallops  Abdomen:  Normal bowel sounds, soft and nontender and nondistended  Extremities:  No edema, and no cyanosis, no significant arthritic changes  Neuro:  No gross focality  Psych:  Alert and oriented and appropriate                Medications:    Scheduled Meds:  Current Facility-Administered Medications   Medication Dose Route Frequency Provider Last Rate    acetaminophen  650 mg Oral Q6H PRN Giulia Ragsdale PA-C      amLODIPine  5 mg Oral Daily Giulia Ragsdale PA-C      aspirin  81 mg Oral Daily Cassidy Johnson      carvedilol  25 mg Oral BID With Meals Cassidy Johnson      heparin (porcine)  5,000 Units Subcutaneous [facility] Cassidy Johnson      nicotine  1 patch Transdermal Daily Cassidy Johnson      pravastatin  80 mg Oral Daily With SunMICHAEL Narvaez      sodium bicarbonate  1,300 mg Oral TID after meals BHANU Osullivan         PRN Meds:   acetaminophen    Continuous Infusions:     Lab, Imaging and other studies: I have personally reviewed pertinent labs  Laboratory Results:  Results from last 7 days   Lab Units 06/11/22  0853 06/10/22  1213   WBC Thousand/uL 6 23 6 80   HEMOGLOBIN g/dL 8 9* 9 1*   HEMATOCRIT % 26 0* 28 2*   PLATELETS Thousands/uL 282 292   POTASSIUM mmol/L 2 9* 3 3*   CHLORIDE mmol/L 97* 96*   CO2 mmol/L 16* 8*   BUN mg/dL 74* 103*   CREATININE mg/dL 10 84* 14 43*   CALCIUM mg/dL 5 5* 5 2*   PHOSPHORUS mg/dL 8 5*  --      Urinalysis:   Lab Results   Component Value Date    COLORU Yellow 02/02/2022    CLARITYU Clear 02/02/2022    SPECGRAV 1 020 02/02/2022    PHUR 6 0 02/02/2022    PHUR 6 0 02/20/2018    LEUKOCYTESUR Negative 02/02/2022    NITRITE Negative 02/02/2022    GLUCOSEU 100 (1/10%) (A) 02/02/2022    KETONESU Negative 02/02/2022    BILIRUBINUR Negative 02/02/2022    BLOODU Small (A) 02/02/2022     ABGs: No results found for: [facility]  Radiology review:     Portions of the record may have been created with voice recognition software    Occasional wrong word or "sound a like" substitutions may have occurred due to the inherent limitations of voice recognition software  Read the chart carefully and recognize, using context, where substitutions have occurred

## 2022-06-11 NOTE — PLAN OF CARE
Problem: Potential for Falls  Goal: Patient will remain free of falls  Description: INTERVENTIONS:  - Educate patient/family on patient safety including physical limitations  - Instruct patient to call for assistance with activity   - Consult OT/PT to assist with strengthening/mobility   - Keep Call bell within reach  - Keep bed low and locked with side rails adjusted as appropriate  - Keep care items and personal belongings within reach  - Initiate and maintain comfort rounds  - Make Fall Risk Sign visible to staff  - Offer Toileting every  Hours, in advance of need  - Initiate/Maintain alarm  - Obtain necessary fall risk management equipment:   - Apply yellow socks and bracelet for high fall risk patients  - Consider moving patient to room near nurses station  Outcome: Progressing     Problem: PAIN - ADULT  Goal: Verbalizes/displays adequate comfort level or baseline comfort level  Description: Interventions:  - Encourage patient to monitor pain and request assistance  - Assess pain using appropriate pain scale  - Administer analgesics based on type and severity of pain and evaluate response  - Implement non-pharmacological measures as appropriate and evaluate response  - Consider cultural and social influences on pain and pain management  - Notify physician/advanced practitioner if interventions unsuccessful or patient reports new pain  Outcome: Progressing     Problem: INFECTION - ADULT  Goal: Absence or prevention of progression during hospitalization  Description: INTERVENTIONS:  - Assess and monitor for signs and symptoms of infection  - Monitor lab/diagnostic results  - Monitor all insertion sites, i e  indwelling lines, tubes, and drains  - Monitor endotracheal if appropriate and nasal secretions for changes in amount and color  - [place] appropriate cooling/warming therapies per order  - Administer medications as ordered  - Instruct and encourage patient and family to use good hand hygiene technique  - Identify and instruct in appropriate isolation precautions for identified infection/condition  Outcome: Progressing  Goal: Absence of fever/infection during neutropenic period  Description: INTERVENTIONS:  - Monitor WBC    Outcome: Progressing     Problem: SAFETY ADULT  Goal: Patient will remain free of falls  Description: INTERVENTIONS:  - Educate patient/family on patient safety including physical limitations  - Instruct patient to call for assistance with activity   - Consult OT/PT to assist with strengthening/mobility   - Keep Call bell within reach  - Keep bed low and locked with side rails adjusted as appropriate  - Keep care items and personal belongings within reach  - Initiate and maintain comfort rounds  - Make Fall Risk Sign visible to staff  - Offer Toileting every  Hours, in advance of need  - Initiate/Maintain alarm  - Obtain necessary fall risk management equipment:   - Apply yellow socks and bracelet for high fall risk patients  - Consider moving patient to room near nurses station  Outcome: Progressing  Goal: Maintain or return to baseline ADL function  Description: INTERVENTIONS:  -  Assess patient's ability to carry out ADLs; assess patient's baseline for ADL function and identify physical deficits which impact ability to perform ADLs (bathing, care of mouth/teeth, toileting, grooming, dressing, etc )  - Assess/evaluate cause of self-care deficits   - Assess range of motion  - Assess patient's mobility; develop plan if impaired  - Assess patient's need for assistive devices and provide as appropriate  - Encourage maximum independence but intervene and supervise when necessary  - Involve family in performance of ADLs  - Assess for home care needs following discharge   - Consider OT consult to assist with ADL evaluation and planning for discharge  - Provide patient education as appropriate  Outcome: Progressing  Goal: Maintains/Returns to pre admission functional level  Description: INTERVENTIONS:  - Perform BMAT or MOVE assessment daily    - Set and communicate daily mobility goal to care team and patient/family/caregiver  - Collaborate with rehabilitation services on mobility goals if consulted  - Perform Range of Motion times a day  - Reposition patient every  hours  - Dangle patient  times a day  - Stand patient  times a day  - Ambulate patient  times a day  - Out of bed to chair  times a day   - Out of bed for meals  times a day  - Out of bed for toileting  - Record patient progress and toleration of activity level   Outcome: Progressing     Problem: DISCHARGE PLANNING  Goal: Discharge to home or other facility with appropriate resources  Description: INTERVENTIONS:  - Identify barriers to discharge w/patient and caregiver  - Arrange for needed discharge resources and transportation as appropriate  - Identify discharge learning needs (meds, wound care, etc )  - Arrange for interpretive services to assist at discharge as needed  - Refer to Case Management Department for coordinating discharge planning if the patient needs post-hospital services based on physician/advanced practitioner order or complex needs related to functional status, cognitive ability, or social support system  Outcome: Progressing     Problem: Knowledge Deficit  Goal: Patient/family/caregiver demonstrates understanding of disease process, treatment plan, medications, and discharge instructions  Description: Complete learning assessment and assess knowledge base    Interventions:  - Provide teaching at level of understanding  - Provide teaching via preferred learning methods  Outcome: Progressing

## 2022-06-11 NOTE — PLAN OF CARE
Problem: Potential for Falls  Goal: Patient will remain free of falls  Description: INTERVENTIONS:  - Educate patient/family on patient safety including physical limitations  - Instruct patient to call for assistance with activity   - Consult OT/PT to assist with strengthening/mobility   - Keep Call bell within reach  - Keep bed low and locked with side rails adjusted as appropriate  - Keep care items and personal belongings within reach  - Initiate and maintain comfort rounds  - Make Fall Risk Sign visible to staff  - Offer Toileting every  Hours, in advance of need  - Initiate/Maintain alarm  - Obtain necessary fall risk management equipment:   - Apply yellow socks and bracelet for high fall risk patients  - Consider moving patient to room near nurses station  6/11/2022 0755 by Key Ramirez RN  Outcome: Progressing  6/11/2022 0736 by Key Ramirez RN  Outcome: Progressing     Problem: PAIN - ADULT  Goal: Verbalizes/displays adequate comfort level or baseline comfort level  Description: Interventions:  - Encourage patient to monitor pain and request assistance  - Assess pain using appropriate pain scale  - Administer analgesics based on type and severity of pain and evaluate response  - Implement non-pharmacological measures as appropriate and evaluate response  - Consider cultural and social influences on pain and pain management  - Notify physician/advanced practitioner if interventions unsuccessful or patient reports new pain  6/11/2022 0755 by Key Ramirez RN  Outcome: Progressing  6/11/2022 0736 by Key Ramirez RN  Outcome: Progressing     Problem: INFECTION - ADULT  Goal: Absence or prevention of progression during hospitalization  Description: INTERVENTIONS:  - Assess and monitor for signs and symptoms of infection  - Monitor lab/diagnostic results  - Monitor all insertion sites, i e  indwelling lines, tubes, and drains  - Monitor endotracheal if appropriate and nasal secretions for changes in amount and color  - [place] appropriate cooling/warming therapies per order  - Administer medications as ordered  - Instruct and encourage patient and family to use good hand hygiene technique  - Identify and instruct in appropriate isolation precautions for identified infection/condition  6/11/2022 0755 by Del Olvera RN  Outcome: Progressing  6/11/2022 0736 by Del Olvera RN  Outcome: Progressing  Goal: Absence of fever/infection during neutropenic period  Description: INTERVENTIONS:  - Monitor WBC    6/11/2022 0755 by Del Olvera RN  Outcome: Progressing  6/11/2022 0736 by Del Olvera RN  Outcome: Progressing     Problem: SAFETY ADULT  Goal: Patient will remain free of falls  Description: INTERVENTIONS:  - Educate patient/family on patient safety including physical limitations  - Instruct patient to call for assistance with activity   - Consult OT/PT to assist with strengthening/mobility   - Keep Call bell within reach  - Keep bed low and locked with side rails adjusted as appropriate  - Keep care items and personal belongings within reach  - Initiate and maintain comfort rounds  - Make Fall Risk Sign visible to staff  - Offer Toileting every Hours, in advance of need  - Initiate/Maintain alarm  - Obtain necessary fall risk management equipment:   - Apply yellow socks and bracelet for high fall risk patients  - Consider moving patient to room near nurses station  6/11/2022 0755 by Del Olvera RN  Outcome: Progressing  6/11/2022 0736 by Del Olvera RN  Outcome: Progressing  Goal: Maintain or return to baseline ADL function  Description: INTERVENTIONS:  -  Assess patient's ability to carry out ADLs; assess patient's baseline for ADL function and identify physical deficits which impact ability to perform ADLs (bathing, care of mouth/teeth, toileting, grooming, dressing, etc )  - Assess/evaluate cause of self-care deficits   - Assess range of motion  - Assess patient's mobility; develop plan if impaired  - Assess patient's need for assistive devices and provide as appropriate  - Encourage maximum independence but intervene and supervise when necessary  - Involve family in performance of ADLs  - Assess for home care needs following discharge   - Consider OT consult to assist with ADL evaluation and planning for discharge  - Provide patient education as appropriate  6/11/2022 0755 by Alina Adler RN  Outcome: Progressing  6/11/2022 0736 by Alina Adler RN  Outcome: Progressing  Goal: Maintains/Returns to pre admission functional level  Description: INTERVENTIONS:  - Perform BMAT or MOVE assessment daily    - Set and communicate daily mobility goal to care team and patient/family/caregiver  - Collaborate with rehabilitation services on mobility goals if consulted  - Perform Range of Motion times a day  - Reposition patient every hours    - Dangle patient times a day  - Stand patient times a day  - Ambulate patient  times a day  - Out of bed to chair times a day   - Out of bed for meals  times a day  - Out of bed for toileting  - Record patient progress and toleration of activity level   6/11/2022 0755 by Alina Adler RN  Outcome: Progressing  6/11/2022 0736 by Alina Adler RN  Outcome: Progressing     Problem: DISCHARGE PLANNING  Goal: Discharge to home or other facility with appropriate resources  Description: INTERVENTIONS:  - Identify barriers to discharge w/patient and caregiver  - Arrange for needed discharge resources and transportation as appropriate  - Identify discharge learning needs (meds, wound care, etc )  - Arrange for interpretive services to assist at discharge as needed  - Refer to Case Management Department for coordinating discharge planning if the patient needs post-hospital services based on physician/advanced practitioner order or complex needs related to functional status, cognitive ability, or social support system  6/11/2022 0755 by Morgan Penn RN  Outcome: Progressing  6/11/2022 0736 by Morgan Penn RN  Outcome: Progressing     Problem: Knowledge Deficit  Goal: Patient/family/caregiver demonstrates understanding of disease process, treatment plan, medications, and discharge instructions  Description: Complete learning assessment and assess knowledge base    Interventions:  - Provide teaching at level of understanding  - Provide teaching via preferred learning methods  6/11/2022 0755 by Morgan Penn RN  Outcome: Progressing  6/11/2022 0736 by Morgan Penn RN  Outcome: Progressing

## 2022-06-12 LAB
ANION GAP SERPL CALCULATED.3IONS-SCNC: 11 MMOL/L (ref 4–13)
BUN SERPL-MCNC: 52 MG/DL (ref 5–25)
CA-I BLD-SCNC: 0.86 MMOL/L (ref 1.12–1.32)
CALCIUM SERPL-MCNC: 5.8 MG/DL (ref 8.3–10.1)
CHLORIDE SERPL-SCNC: 103 MMOL/L (ref 100–108)
CO2 SERPL-SCNC: 21 MMOL/L (ref 21–32)
CREAT SERPL-MCNC: 7.7 MG/DL (ref 0.6–1.3)
GFR SERPL CREATININE-BSD FRML MDRD: 6 ML/MIN/1.73SQ M
GLUCOSE SERPL-MCNC: 80 MG/DL (ref 65–140)
MAGNESIUM SERPL-MCNC: 1.7 MG/DL (ref 1.6–2.6)
PHOSPHATE SERPL-MCNC: 4.7 MG/DL (ref 2.3–4.1)
POTASSIUM SERPL-SCNC: 3.4 MMOL/L (ref 3.5–5.3)
SODIUM SERPL-SCNC: 135 MMOL/L (ref 136–145)

## 2022-06-12 PROCEDURE — 99232 SBSQ HOSP IP/OBS MODERATE 35: CPT | Performed by: INTERNAL MEDICINE

## 2022-06-12 PROCEDURE — 83735 ASSAY OF MAGNESIUM: CPT | Performed by: INTERNAL MEDICINE

## 2022-06-12 PROCEDURE — 80048 BASIC METABOLIC PNL TOTAL CA: CPT | Performed by: INTERNAL MEDICINE

## 2022-06-12 PROCEDURE — 84100 ASSAY OF PHOSPHORUS: CPT | Performed by: INTERNAL MEDICINE

## 2022-06-12 PROCEDURE — 82652 VIT D 1 25-DIHYDROXY: CPT | Performed by: INTERNAL MEDICINE

## 2022-06-12 PROCEDURE — 82330 ASSAY OF CALCIUM: CPT | Performed by: INTERNAL MEDICINE

## 2022-06-12 RX ORDER — MAGNESIUM SULFATE HEPTAHYDRATE 40 MG/ML
2 INJECTION, SOLUTION INTRAVENOUS ONCE
Status: COMPLETED | OUTPATIENT
Start: 2022-06-12 | End: 2022-06-12

## 2022-06-12 RX ORDER — POTASSIUM CHLORIDE 20 MEQ/1
40 TABLET, EXTENDED RELEASE ORAL ONCE
Status: COMPLETED | OUTPATIENT
Start: 2022-06-12 | End: 2022-06-12

## 2022-06-12 RX ADMIN — Medication 1 PATCH: at 09:21

## 2022-06-12 RX ADMIN — POTASSIUM CHLORIDE 40 MEQ: 1500 TABLET, EXTENDED RELEASE ORAL at 09:21

## 2022-06-12 RX ADMIN — ASPIRIN 81 MG: 81 TABLET, COATED ORAL at 09:21

## 2022-06-12 RX ADMIN — HEPARIN SODIUM 5000 UNITS: 5000 INJECTION INTRAVENOUS; SUBCUTANEOUS at 14:05

## 2022-06-12 RX ADMIN — CARVEDILOL 12.5 MG: 12.5 TABLET, FILM COATED ORAL at 16:25

## 2022-06-12 RX ADMIN — CALCIUM ACETATE 2001 MG: 667 CAPSULE ORAL at 12:23

## 2022-06-12 RX ADMIN — CALCIUM ACETATE 2001 MG: 667 CAPSULE ORAL at 16:25

## 2022-06-12 RX ADMIN — MAGNESIUM SULFATE HEPTAHYDRATE 2 G: 40 INJECTION, SOLUTION INTRAVENOUS at 09:22

## 2022-06-12 RX ADMIN — CARVEDILOL 12.5 MG: 12.5 TABLET, FILM COATED ORAL at 09:21

## 2022-06-12 RX ADMIN — CALCIUM ACETATE 2001 MG: 667 CAPSULE ORAL at 09:22

## 2022-06-12 RX ADMIN — AMLODIPINE BESYLATE 5 MG: 5 TABLET ORAL at 09:21

## 2022-06-12 RX ADMIN — HEPARIN SODIUM 5000 UNITS: 5000 INJECTION INTRAVENOUS; SUBCUTANEOUS at 05:12

## 2022-06-12 RX ADMIN — PRAVASTATIN SODIUM 80 MG: 80 TABLET ORAL at 16:25

## 2022-06-12 RX ADMIN — HEPARIN SODIUM 5000 UNITS: 5000 INJECTION INTRAVENOUS; SUBCUTANEOUS at 21:43

## 2022-06-12 RX ADMIN — CALCIUM CARBONATE (ANTACID) CHEW TAB 500 MG 1000 MG: 500 CHEW TAB at 21:43

## 2022-06-12 RX ADMIN — Medication 1 CAPSULE: at 16:25

## 2022-06-12 NOTE — PLAN OF CARE
Problem: Potential for Falls  Goal: Patient will remain free of falls  Description: INTERVENTIONS:  - Educate patient/family on patient safety including physical limitations  - Instruct patient to call for assistance with activity   - Consult OT/PT to assist with strengthening/mobility   - Keep Call bell within reach  - Keep bed low and locked with side rails adjusted as appropriate  - Keep care items and personal belongings within reach  - Initiate and maintain comfort rounds  - Make Fall Risk Sign visible to staff  - Offer Toileting every Hours, in advance of need  - Initiate/Maintain alarm  - Obtain necessary fall risk management equipment:   - Apply yellow socks and bracelet for high fall risk patients  - Consider moving patient to room near nurses station  Outcome: Progressing     Problem: PAIN - ADULT  Goal: Verbalizes/displays adequate comfort level or baseline comfort level  Description: Interventions:  - Encourage patient to monitor pain and request assistance  - Assess pain using appropriate pain scale  - Administer analgesics based on type and severity of pain and evaluate response  - Implement non-pharmacological measures as appropriate and evaluate response  - Consider cultural and social influences on pain and pain management  - Notify physician/advanced practitioner if interventions unsuccessful or patient reports new pain  Outcome: Progressing     Problem: INFECTION - ADULT  Goal: Absence or prevention of progression during hospitalization  Description: INTERVENTIONS:  - Assess and monitor for signs and symptoms of infection  - Monitor lab/diagnostic results  - Monitor all insertion sites, i e  indwelling lines, tubes, and drains  - Monitor endotracheal if appropriate and nasal secretions for changes in amount and color  - [place] appropriate cooling/warming therapies per order  - Administer medications as ordered  - Instruct and encourage patient and family to use good hand hygiene technique  - Identify and instruct in appropriate isolation precautions for identified infection/condition  Outcome: Progressing  Goal: Absence of fever/infection during neutropenic period  Description: INTERVENTIONS:  - Monitor WBC    Outcome: Progressing     Problem: SAFETY ADULT  Goal: Patient will remain free of falls  Description: INTERVENTIONS:  - Educate patient/family on patient safety including physical limitations  - Instruct patient to call for assistance with activity   - Consult OT/PT to assist with strengthening/mobility   - Keep Call bell within reach  - Keep bed low and locked with side rails adjusted as appropriate  - Keep care items and personal belongings within reach  - Initiate and maintain comfort rounds  - Make Fall Risk Sign visible to staff  - Offer Toileting every Hours, in advance of need  - Initiate/Maintain alarm  - Obtain necessary fall risk management equipment:   - Apply yellow socks and bracelet for high fall risk patients  - Consider moving patient to room near nurses station  Outcome: Progressing  Goal: Maintain or return to baseline ADL function  Description: INTERVENTIONS:  -  Assess patient's ability to carry out ADLs; assess patient's baseline for ADL function and identify physical deficits which impact ability to perform ADLs (bathing, care of mouth/teeth, toileting, grooming, dressing, etc )  - Assess/evaluate cause of self-care deficits   - Assess range of motion  - Assess patient's mobility; develop plan if impaired  - Assess patient's need for assistive devices and provide as appropriate  - Encourage maximum independence but intervene and supervise when necessary  - Involve family in performance of ADLs  - Assess for home care needs following discharge   - Consider OT consult to assist with ADL evaluation and planning for discharge  - Provide patient education as appropriate  Outcome: Progressing  Goal: Maintains/Returns to pre admission functional level  Description: INTERVENTIONS:  - Perform BMAT or MOVE assessment daily    - Set and communicate daily mobility goal to care team and patient/family/caregiver  - Collaborate with rehabilitation services on mobility goals if consulted  - Perform Range of Motion times a day  - Reposition patient every hours  - Dangle patient  times a day  - Stand patient  times a day  - Ambulate patient times a day  - Out of bed to chair times a day   - Out of bed for meals  times a day  - Out of bed for toileting  - Record patient progress and toleration of activity level   Outcome: Progressing     Problem: DISCHARGE PLANNING  Goal: Discharge to home or other facility with appropriate resources  Description: INTERVENTIONS:  - Identify barriers to discharge w/patient and caregiver  - Arrange for needed discharge resources and transportation as appropriate  - Identify discharge learning needs (meds, wound care, etc )  - Arrange for interpretive services to assist at discharge as needed  - Refer to Case Management Department for coordinating discharge planning if the patient needs post-hospital services based on physician/advanced practitioner order or complex needs related to functional status, cognitive ability, or social support system  Outcome: Progressing     Problem: Knowledge Deficit  Goal: Patient/family/caregiver demonstrates understanding of disease process, treatment plan, medications, and discharge instructions  Description: Complete learning assessment and assess knowledge base    Interventions:  - Provide teaching at level of understanding  - Provide teaching via preferred learning methods  Outcome: Progressing     Problem: METABOLIC, FLUID AND ELECTROLYTES - ADULT  Goal: Electrolytes maintained within normal limits  Description: INTERVENTIONS:  - Monitor labs and assess patient for signs and symptoms of electrolyte imbalances  - Administer electrolyte replacement as ordered  - Monitor response to electrolyte replacements, including repeat lab results as appropriate  - Instruct patient on fluid and nutrition as appropriate  Outcome: Progressing  Goal: Fluid balance maintained  Description: INTERVENTIONS:  - Monitor labs   - Monitor I/O and WT  - Instruct patient on fluid and nutrition as appropriate  - Assess for signs & symptoms of volume excess or deficit  Outcome: Progressing

## 2022-06-12 NOTE — PROGRESS NOTES
[facility]  Progress Note - Anais Coyne 1960, 62 y o  male MRN: [id number]  Unit/Bed#: E4 -01 Encounter: [id number]  Primary Care Provider: Veronica Sebastian MD   Date and time admitted to hospital: 6/10/2022 11:20 AM    * Acute renal failure [facility])  Assessment & Plan  - known history of CKD lost to follow up due to lack of insurance  Presented to the ER at the recommendation of his PCP when they found him to be significantly dyspneic and fatigued  - laboratory workup in the ER showed a creatinine of 14 4 in BUN of 1 3  - this is suspected progression to ESRD secondary to hypertensive nephrosclerosis and possible underlying paraproteinemia    - temporary Vas-Cath placed in the right chest wall  Underwent 1st dialysis sessions on 06/10 and 6/11  He will be transitioned to a Monday/Wednesday/Friday schedule  Case Management consulted for outpatient HD arrangements  - cont to monitor lytes and volume status  - f/u any additional Nephro recs    Hypocalcemia  Assessment & Plan  - continue calcium supplementation  - daily BMP    Hyponatremia  Assessment & Plan  - secondary to decreased free water excretion due to ESRD  - monitor with HD    Anemia  Assessment & Plan  - secondary to ESRD  - daily hemoglobin  - EPO per Nephrology    High anion gap metabolic acidosis  Assessment & Plan  - resolved off bicarb    Hypokalemia  Assessment & Plan  - replace as necessary  - monitor for dysrhythmia  - recheck in a m  ETOH abuse  Assessment & Plan  - no longer a daily drinker  - no evidence of withdrawal    Benign essential HTN  Assessment & Plan  - continue Coreg 25 mg b i d , amlodipine 5 mg daily      VTE Pharmacologic Prophylaxis:   Moderate Risk (Score 3-4) - Pharmacological DVT Prophylaxis Ordered: heparin  Patient Centered Rounds: I performed bedside rounds with nursing staff today    Discussions with Specialists or Other Care Team Provider: CM     Education and Discussions with Family / Patient: Patient declined call to   Time Spent for Care: 30 minutes  More than 50% of total time spent on counseling and coordination of care as described above  Current Length of Stay: 2 day(s)  Current Patient Status: Inpatient   Certification Statement: The patient will continue to require additional inpatient hospital stay due to ESRD, HD arrangements, electrolyte derangements  Discharge Plan: Anticipate discharge in >72 hrs to home  Code Status: Level 1 - Full Code    Subjective:   Patient seen and examined  Following up for new ESRD  No new complaints today  Denies any chest pain or shortness of breath  Tolerating diet  No events reported overnight  Objective:     Vitals:   Temp (24hrs), Av 9 °F (36 6 °C), Min:97 °F (36 1 °C), Max:98 4 °F (36 9 °C)    Temp:  [97 °F (36 1 °C)-98 4 °F (36 9 °C)] 98 1 °F (36 7 °C)  HR:  [64-85] 85  Resp:  [18] 18  BP: (120-153)/(67-89) 120/83  SpO2:  [100 %] 100 %  Body mass index is 32 52 kg/m²  Input and Output Summary (last 24 hours): Intake/Output Summary (Last 24 hours) at 2022 1045  Last data filed at 2022 1601  Gross per 24 hour   Intake 300 ml   Output 972 ml   Net -672 ml       PHYSICAL EXAM:    Vitals signs reviewed  Constitutional   Awake and cooperative  NAD  Head/Neck   Normocephalic  Atraumatic  HEENT   No scleral icterus  EOMI  Heart   Regular rate and rhythm  No murmers  Lungs   Clear to auscultation bilaterally  Respirations unlaboured  Abdomen   Soft  Nontender  Nondistended  Skin   Skin color normal  No rashes  Extremities   No deformities  No peripheral edema  Neuro   Alert and oriented  No new deficits  Psych   Mood stable   Affect normal          Additional Data:     Labs:  Results from last 7 days   Lab Units 22  0853   WBC Thousand/uL 6 23   HEMOGLOBIN g/dL 8 9*   HEMATOCRIT % 26 0*   PLATELETS Thousands/uL 282   NEUTROS PCT % 63   LYMPHS PCT % 24   MONOS PCT % 9   EOS PCT % 3     Results from last 7 days   Lab Units 06/12/22  0703 06/11/22  1914 06/11/22  0853   SODIUM mmol/L 135*   < > 132*   POTASSIUM mmol/L 3 4*   < > 2 9*   CHLORIDE mmol/L 103   < > 97*   CO2 mmol/L 21   < > 16*   BUN mg/dL 52*   < > 74*   CREATININE mg/dL 7 70*   < > 10 84*   ANION GAP mmol/L 11   < > 19*   CALCIUM mg/dL 5 8*   < > 5 5*   ALBUMIN g/dL  --   --  1 5*   TOTAL BILIRUBIN mg/dL  --   --  0 32   ALK PHOS U/L  --   --  83   ALT U/L  --   --  10*   AST U/L  --   --  18   GLUCOSE RANDOM mg/dL 80   < > 74    < > = values in this interval not displayed  Results from last 7 days   Lab Units 06/11/22  2100 06/11/22  0724 06/10/22  2104 06/10/22  1740   POC GLUCOSE mg/dl 117 66 144* 84         Results from last 7 days   Lab Units 06/10/22  1548   LACTIC ACID mmol/L <0 3*       Lines/Drains:  Invasive Devices  Report    Peripheral Intravenous Line  Duration           Peripheral IV 06/10/22 Right Antecubital 1 day          Hemodialysis Catheter  Duration           HD Permanent Double Catheter 1 day                Imaging: No pertinent imaging reviewed      Recent Cultures (last 7 days):         Last 24 Hours Medication List:   Current Facility-Administered Medications   Medication Dose Route Frequency Provider Last Rate    acetaminophen  650 mg Oral Q6H PRN Fred Almanza PA-C      amLODIPine  5 mg Oral Daily Buzz Bojorquez MD      aspirin  81 mg Oral Daily Fred Almanza PA-C      b complex-vitamin C-folic acid  1 capsule Oral Daily With Chris Rock MD      calcium acetate  2,001 mg Oral TID With Meals Buzz Bojorquez MD      calcium carbonate  1,000 mg Oral BID Buzz Bojorquez MD      carvedilol  12 5 mg Oral BID With Meals Buzz Bojorquez MD      [START ON 6/13/2022] epoetin leyda  5,000 Units Intravenous Once per day on Mon Wed Fri Buzz Bojorquez MD      heparin (porcine)  5,000 Units Subcutaneous [address] [place]      magnesium sulfate  2 g Intravenous Once Emily Germain MD      nicotine  1 patch Transdermal Daily [address], [place]      pravastatin  80 mg Oral Daily With Chayito Koroma [place]          Today, Patient Was Seen By: Laly Hernandez DO    **Please Note: This note may have been constructed using a voice recognition system  **

## 2022-06-12 NOTE — DISCHARGE INSTR - OTHER ORDERS
[address] [facility] , [address]  [place], [address] [facility]  OFFICE HOURS: 7:30 a m -5 p m    Toll-Free: [phone]  Phone: [phone]: [address], [place], [address] [place] [address]

## 2022-06-12 NOTE — PROGRESS NOTES
Progress Note - Nephrology   [place] 62 y o  male MRN: [id number]  Unit/Bed#: E4 -01 Encounter: [id number]    ASSESSMENT AND PLAN:  62 y o   male with pmh of hypertension, hyperlipidemia, CHF, CKD stage 5 and obesity who was admitted for shortness of breath fatigue  Nephrology has been consulted for evaluation management of acute kidney  Patient was lost to follow-up did not follow with a nephrologist     1  ESRD:  CKD 5 most likely ESRD at this time  Prior ultrasound demonstrated small bilateral kidneys echogenic about 8 cm  · Will place on MWF schedule  · Will consult Case Management  · Negative hepatitis studies  · Access:  Right TDC  Will need outpatient referral to vascular surgery  Next treatment:   tentatively in a m                2  Volume/blood pressure:  · Volume:  Appears euvolemic  · Blood pressure:  Stable on carvedilol and amlodipine will place hold parameters to avoid hypotension/hypoperfusion        3  Electrolytes/acid base:  · Sodium 135will place on fluid restriction and HD  · Potassium 3 4 will given additional dose and liberalize diet  · Metabolic acidosis improved at 21 off sodium bicarb     4  MBD of ESRD:  · Hyperphosphatemia:  Hyperphosphatemia:   on PhosLo 3 with each meal:  Currently 4 7  · Replete borderline hypomagnesemia 1 7  · Hypocalcemia:  Most likely related to hyperphosphatemia but I will check vitamin-D studies and a PTH intact:  Pending  · Secondary hyperparathyroidism:  Obtain PTH intact level     5   Anemia of ESRD:  · Current hemoglobin:  8 9  · Treatment:  Place on Epogen, check stool for occult blood/fecal immuno chemical testing and iron studies  · Rule out multiple myeloma with an SPEP UPEP light chain ratio  · Transfuse for hemoglobin less than 7 0 per primary service       6  Reason for hospitalization:  · ESRD please see above     7  Other major problems:  · Hypertension  · Dyslipidemia  · Tobacco abuse  · Withdrawal seizures from alcohol/chronic ETOH abuse  · ? History of CHF            Subjective:   Patient overall feels great  No chest pain or shortness of breath, no nausea vomiting or diarrhea  Still urinating well  Tolerated HD yesterday extremely well    Objective:     Vitals: Blood pressure 123/67, pulse 73, temperature 98 °F (36 7 °C), temperature source Temporal, resp  rate 18, height 5' 8" (1 727 m), weight 97 kg (213 lb 13 5 oz), SpO2 100 %  ,Body mass index is 32 52 kg/m²  Weight (last 2 days)     Date/Time Weight    06/10/22 1115 97 (213 85)            Intake/Output Summary (Last 24 hours) at 6/12/2022 0844  Last data filed at 6/11/2022 1601  Gross per 24 hour   Intake 300 ml   Output 972 ml   Net -672 ml       HD Permanent Double Catheter (Active)   Reasons to continue HD Cath Treatment Therapy 06/12/22 0700   Goal for Removal N/A- chronic HD catheter 06/11/22 1130   Line Necessity Reviewed Yes, reviewed with provider 06/12/22 0700   Site Assessment WDL; Clean;Dry; Intact 06/12/22 0700   Proximal Lumen Status Capped;Flushed;Normal saline locked 06/11/22 1130   Distal Lumen Status Capped;Flushed;Normal saline locked 06/11/22 1130   Dressing Type Chlorhexidine dressing 06/12/22 0700   Dressing Status Clean;Dry; Intact 06/12/22 0700   Dressing Change Due 06/17/22 06/11/22 1130       Physical Exam: General:  No acute distress  Skin:  No acute rash  Eyes:  No scleral icterus and noninjected  ENT:  Moist mucous membranes  Neck:  Supple, no jugular venous distention, trachea midline, overall appearance is normal  Chest:  Clear to auscultation; right TDC in place clean and dry  CVS:  Regular rate and rhythm, without a rub or gallops  Abdomen:  Normal bowel sounds, soft and nontender and nondistended  Extremities:  No edema, and no cyanosis, no significant arthritic changes  Neuro:  No gross focality  Psych:  Alert and oriented and appropriate                Medications:    Scheduled Meds:  Current Facility-Administered Medications   Medication Dose Route Frequency Provider Last Rate    acetaminophen  650 mg Oral Q6H PRN Fred Almanza PA-C      amLODIPine  5 mg Oral Daily Buzz Bojorquez MD      aspirin  81 mg Oral Daily Cassidy Connor      b complex-vitamin C-folic acid  1 capsule Oral Daily With Chris Rock MD      calcium acetate  2,001 mg Oral TID With Meals Buzz Bojorquez MD      calcium carbonate  1,000 mg Oral BID Buzz Bojorquez MD      carvedilol  12 5 mg Oral BID With Meals Buzz Bojorquez MD      [START ON 6/13/2022] epoetin leyda  5,000 Units Intravenous Once per day on Mon Wed Fri Buzz Bojorquez MD      heparin (porcine)  5,000 Units Subcutaneous [facility] Cassidy Connor      nicotine  1 patch Transdermal Daily Fred Almanza [place]      potassium chloride  40 mEq Oral Once Collette Hernandez DO      pravastatin  80 mg Oral Daily With MICHAEL Norris         PRN Meds:   acetaminophen    Continuous Infusions:     Lab, Imaging and other studies: I have personally reviewed pertinent labs    Laboratory Results:  Results from last 7 days   Lab Units 06/12/22  0703 06/11/22  1914 06/11/22  0853 06/10/22  1213   WBC Thousand/uL  --   --  6 23 6 80   HEMOGLOBIN g/dL  --   --  8 9* 9 1*   HEMATOCRIT %  --   --  26 0* 28 2*   PLATELETS Thousands/uL  --   --  282 292   POTASSIUM mmol/L 3 4* 3 1* 2 9* 3 3*   CHLORIDE mmol/L 103 102 97* 96*   CO2 mmol/L 21 21 16* 8*   BUN mg/dL 52* 50* 74* 103*   CREATININE mg/dL 7 70* 7 34* 10 84* 14 43*   CALCIUM mg/dL 5 8* 5 9* 5 5* 5 2*   MAGNESIUM mg/dL 1 7  --   --   --    PHOSPHORUS mg/dL 4 7*  --  8 5*  --      Urinalysis:   Lab Results   Component Value Date    COLORU Yellow 02/02/2022    CLARITYU Clear 02/02/2022    SPECGRAV 1 020 02/02/2022    PHUR 6 0 02/02/2022    PHUR 6 0 02/20/2018    LEUKOCYTESUR Negative 02/02/2022    NITRITE Negative 02/02/2022    GLUCOSEU 100 (1/10%) (A) 02/02/2022    KETONESU Negative 02/02/2022    BILIRUBINUR Negative 02/02/2022    BLOODU Small (A) 02/02/2022 ABGs: No results found for: [facility]  Radiology review:     Portions of the record may have been created with voice recognition software  Occasional wrong word or "sound a like" substitutions may have occurred due to the inherent limitations of voice recognition software  Read the chart carefully and recognize, using context, where substitutions have occurred

## 2022-06-12 NOTE — CASE MANAGEMENT
Case Management Assessment & Discharge Planning Note    Patient name Carol Collins  Location [address] [place] [address] [address] 56-* MRN [id number]  : 1960 Date 2022       Current Admission Date: 6/10/2022  Current Admission Diagnosis:Acute renal failure [facility])   Patient Active Problem List    Diagnosis Date Noted    Anemia 06/10/2022    Hyponatremia 06/10/2022    Hypocalcemia 06/10/2022    Hypomagnesemia 2022    Pharyngeal edema 2022    High anion gap metabolic acidosis     Withdrawal seizures ([facility] [address] ) 2022    Seizure due to alcohol withdrawal, uncomplicated ([facility] [facility] )     Benign essential HTN 2018    CHF (congestive heart failure) ([facility] [address] [facility] ) 2018    ETOH abuse 2018    Acute renal failure ([facility] [address] ) 2018    Marijuana use 2018    Hyperlipidemia 2018    Tobacco abuse 2018    Hypokalemia 2018    Leukocytosis 2018      LOS (days): 2  Geometric Mean LOS (GMLOS) (days):   Days to GMLOS:     OBJECTIVE:    Risk of Unplanned Readmission Score: 27 67         Current admission status: Inpatient  Referral Reason: Other (New start dialysis   Needs outpatient placement)    Preferred Pharmacy:   [address] [place] [address] Mingo Damian PA - [address]  [address]  [facility] [address] [id number]  Phone: [phone] Fax: [phone]    Primary Care Provider: Suleman Felder MD    Primary Insurance: [facility]  Secondary Insurance:     ASSESSMENT:  Ge Olivares Representative - Significant Other   Primary Phone: [phone] (Mobile)  Home Phone: [phone]                         Readmission Root Cause  30 Day Readmission: No    Patient Information  Admitted from[de-identified] Home  Mental Status: Alert  During Assessment patient was accompanied by: Not accompanied during assessment  Assessment information provided by[de-identified] Patient  Primary Caregiver: Self  Support Systems: Spouse/significant other, [address] of Residence: [address] do you live in?: Osorio Melecio entry access options   Select all that apply : Stairs  Number of steps to enter home : 5  Do the steps have railings?: Yes  Type of Current Residence: 2 story home  Upon entering residence, is there a bedroom on the main floor (no further steps)?: No  A bedroom is located on the following floor levels of residence (select all that apply):: 2nd Floor  Upon entering residence, is there a bathroom on the main floor (no further steps)?: No  Indicate which floors of current residence have a bathroom (select all the apply):: 2nd Floor  Number of steps to 2nd floor from main floor: One Flight  In the last 12 months, was there a time when you were not able to pay the mortgage or rent on time?: No  In the last 12 months, how many places have you lived?: 1  In the last 12 months, was there a time when you did not have a steady place to sleep or slept in a shelter (including now)?: No  Homeless/housing insecurity resource given?: Yes ([address] list and [address] info as patient expressed that his SO is moving to [facility] and he will be moving out of current address)  Living Arrangements: Lives w/ Spouse/significant other  Is patient a ?: No    Activities of Daily Living Prior to Admission  Functional Status: Independent  Completes ADLs independently?: Yes  Ambulates independently?: Yes  Does patient use assisted devices?: No  Does patient currently own DME?: No  Does patient have a history of Outpatient Therapy (PT/OT)?: No  Does the patient have a history of Short-Term Rehab?: No  Does patient have a history of HHC?: No  Does patient currently have [facility]?: No         Patient Information Continued  Income Source: Unemployed  Does patient have prescription coverage?: Yes  Within the past 12 months, you worried that your food would run out before you got the money to buy more : Never true  Within the past 12 months, the food you bought just didn't last and you didn't have money to get more : Never true  Food insecurity resource given?: Yes (Patient expressed that he does not have an income and will need help in the future w/ food  Gave food pantry info and phone number for [facility] assistance office to apply for Mamadou Mireles)  Does patient receive dialysis treatments?: No (New start HD this admission  MWF schedule - requesting James Massey)  Does patient have a history of substance abuse?: No (Patient denies at this time, states he is sober  Per chart review hx of ETOH use)  Does patient have a history of Mental Health Diagnosis?: No         Means of Transportation  Means of Transport to Appts[de-identified] Family transport  In the past 12 months, has lack of transportation kept you from medical appointments or from getting medications?: No  In the past 12 months, has lack of transportation kept you from meetings, work, or from getting things needed for daily living?: No  Was application for public transport provided?: Yes (Patient reports his SO usually drives him, but since she is moving soon requested a [employer] application)        DISCHARGE DETAILS:    Discharge planning discussed with[de-identified] Patient  Freedom of Choice: Yes  Comments - Freedom of Choice: Patient would like to use [facility] for [facility] location as he may need to use public transportation to get there if his friends are not able to drive him    CM contacted family/caregiver?: No- see comments (Patient declined)  Were Treatment Team discharge recommendations reviewed with patient/caregiver?: Yes  Did patient/caregiver verbalize understanding of patient care needs?: Yes                      Other Referral/Resources/Interventions Provided:  Interventions: AuntBertha, Dialysis, Transportation, Food Bank, Other (Specify)  Referral Comments: Housing resources, utility resources, [employer] application, SSI guidelines and phone number (to apply via phone), [facility] [place] assistance office (info placed on AVS) to apply for SNAP  OP HD process started by this CM  Resources placed in binder to go with patient at dc as requested by the patient

## 2022-06-12 NOTE — ASSESSMENT & PLAN NOTE
· Known history of CKD lost to follow up due to lack of insurance  Presented to the ER at the recommendation of his PCP due to dyspnea and fatigue  · Workup in the ER showed a creatinine of 14 4 in BUN of 1 3  · Suspected progression to ESRD secondary to hypertensive nephrosclerosis and possible underlying paraproteinemia  · Temporary Vas-Cath placed in the right chest wall  Underwent 1st dialysis sessions on 06/10 and 6/11  · He will be transitioned to a MWF schedule  · Needs vascular surgery referral for access  · Case Management consulted for outpatient HD arrangements    · Cont to monitor lytes and volume status  · Appreciate nephrology recommendations

## 2022-06-13 ENCOUNTER — APPOINTMENT (INPATIENT)
Dept: DIALYSIS | Facility: HOSPITAL | Age: 62
DRG: 469 | End: 2022-06-13
Payer: MEDICARE

## 2022-06-13 PROBLEM — Z72.0 TOBACCO ABUSE: Status: RESOLVED | Noted: 2018-02-19 | Resolved: 2022-06-13

## 2022-06-13 LAB
ALBUMIN SERPL BCP-MCNC: 1.2 G/DL (ref 3.5–5)
ALP SERPL-CCNC: 68 U/L (ref 46–116)
ALT SERPL W P-5'-P-CCNC: 7 U/L (ref 12–78)
ANION GAP SERPL CALCULATED.3IONS-SCNC: 12 MMOL/L (ref 4–13)
AST SERPL W P-5'-P-CCNC: 19 U/L (ref 5–45)
BILIRUB SERPL-MCNC: 0.19 MG/DL (ref 0.2–1)
BUN SERPL-MCNC: 56 MG/DL (ref 5–25)
CALCIUM ALBUM COR SERPL-MCNC: 8.4 MG/DL (ref 8.3–10.1)
CALCIUM SERPL-MCNC: 6.2 MG/DL (ref 8.3–10.1)
CHLORIDE SERPL-SCNC: 103 MMOL/L (ref 100–108)
CO2 SERPL-SCNC: 19 MMOL/L (ref 21–32)
CREAT SERPL-MCNC: 7.96 MG/DL (ref 0.6–1.3)
ERYTHROCYTE [DISTWIDTH] IN BLOOD BY AUTOMATED COUNT: 13.9 % (ref 11.6–15.1)
GFR SERPL CREATININE-BSD FRML MDRD: 6 ML/MIN/1.73SQ M
GLUCOSE SERPL-MCNC: 78 MG/DL (ref 65–140)
HCT VFR BLD AUTO: 23.9 % (ref 36.5–49.3)
HGB BLD-MCNC: 8 G/DL (ref 12–17)
MAGNESIUM SERPL-MCNC: 2 MG/DL (ref 1.6–2.6)
MCH RBC QN AUTO: 30.8 PG (ref 26.8–34.3)
MCHC RBC AUTO-ENTMCNC: 33.5 G/DL (ref 31.4–37.4)
MCV RBC AUTO: 92 FL (ref 82–98)
PLATELET # BLD AUTO: 202 THOUSANDS/UL (ref 149–390)
PMV BLD AUTO: 10.3 FL (ref 8.9–12.7)
POTASSIUM SERPL-SCNC: 3.5 MMOL/L (ref 3.5–5.3)
PROT SERPL-MCNC: 4.8 G/DL (ref 6.4–8.2)
RBC # BLD AUTO: 2.6 MILLION/UL (ref 3.88–5.62)
SODIUM SERPL-SCNC: 134 MMOL/L (ref 136–145)
WBC # BLD AUTO: 7.08 THOUSAND/UL (ref 4.31–10.16)

## 2022-06-13 PROCEDURE — 80053 COMPREHEN METABOLIC PANEL: CPT | Performed by: INTERNAL MEDICINE

## 2022-06-13 PROCEDURE — 83735 ASSAY OF MAGNESIUM: CPT | Performed by: INTERNAL MEDICINE

## 2022-06-13 PROCEDURE — 85027 COMPLETE CBC AUTOMATED: CPT | Performed by: INTERNAL MEDICINE

## 2022-06-13 PROCEDURE — 99232 SBSQ HOSP IP/OBS MODERATE 35: CPT | Performed by: INTERNAL MEDICINE

## 2022-06-13 RX ORDER — MELATONIN
2000 DAILY
Status: DISCONTINUED | OUTPATIENT
Start: 2022-06-13 | End: 2022-06-13

## 2022-06-13 RX ORDER — MELATONIN
4000 DAILY
Status: DISCONTINUED | OUTPATIENT
Start: 2022-06-14 | End: 2022-06-14 | Stop reason: HOSPADM

## 2022-06-13 RX ADMIN — ASPIRIN 81 MG: 81 TABLET, COATED ORAL at 08:19

## 2022-06-13 RX ADMIN — CALCIUM ACETATE 2001 MG: 667 CAPSULE ORAL at 17:35

## 2022-06-13 RX ADMIN — HEPARIN SODIUM 5000 UNITS: 5000 INJECTION INTRAVENOUS; SUBCUTANEOUS at 21:57

## 2022-06-13 RX ADMIN — Medication 1 PATCH: at 08:20

## 2022-06-13 RX ADMIN — HEPARIN SODIUM 5000 UNITS: 5000 INJECTION INTRAVENOUS; SUBCUTANEOUS at 06:34

## 2022-06-13 RX ADMIN — Medication 1 CAPSULE: at 17:35

## 2022-06-13 RX ADMIN — CALCIUM CARBONATE (ANTACID) CHEW TAB 500 MG 1000 MG: 500 CHEW TAB at 08:19

## 2022-06-13 RX ADMIN — EPOETIN ALFA 5000 UNITS: 3000 SOLUTION INTRAVENOUS; SUBCUTANEOUS at 09:43

## 2022-06-13 RX ADMIN — ACETAMINOPHEN 650 MG: 325 TABLET, FILM COATED ORAL at 13:03

## 2022-06-13 RX ADMIN — PRAVASTATIN SODIUM 80 MG: 80 TABLET ORAL at 17:35

## 2022-06-13 RX ADMIN — CALCIUM ACETATE 2001 MG: 667 CAPSULE ORAL at 12:59

## 2022-06-13 RX ADMIN — HEPARIN SODIUM 5000 UNITS: 5000 INJECTION INTRAVENOUS; SUBCUTANEOUS at 13:04

## 2022-06-13 RX ADMIN — CARVEDILOL 12.5 MG: 12.5 TABLET, FILM COATED ORAL at 17:34

## 2022-06-13 RX ADMIN — ACETAMINOPHEN 650 MG: 325 TABLET, FILM COATED ORAL at 20:32

## 2022-06-13 RX ADMIN — CALCIUM CARBONATE (ANTACID) CHEW TAB 500 MG 1000 MG: 500 CHEW TAB at 21:56

## 2022-06-13 RX ADMIN — CALCIUM ACETATE 2001 MG: 667 CAPSULE ORAL at 08:18

## 2022-06-13 NOTE — PROGRESS NOTES
[facility]  Progress Note - Maximusan Sick 1960, 62 y o  male MRN: [id number]  Unit/Bed#: E4 -01 Encounter: [id number]  Primary Care Provider: Jennifer Oneill MD   Date and time admitted to hospital: 6/10/2022 11:20 AM    * Acute renal failure [facility])  Assessment & Plan  · Known history of CKD lost to follow up due to lack of insurance  Presented to the ER at the recommendation of his PCP due to dyspnea and fatigue  · Workup in the ER showed a creatinine of 14 4 in BUN of 1 3  · Suspected progression to ESRD secondary to hypertensive nephrosclerosis and possible underlying paraproteinemia  · Temporary Vas-Cath placed in the right chest wall  Underwent 1st dialysis sessions on 06/10 and 6/11  · He will be transitioned to a MWF schedule  · Needs vascular surgery referral for access  · Case Management consulted for outpatient HD arrangements    · Cont to monitor lytes and volume status  · Appreciate nephrology recommendations    Hypocalcemia  Assessment & Plan  · Continue calcium supplementation  · Continue daily BMP    Hyponatremia  Assessment & Plan  · Secondary to decreased free water excretion due to ESRD  · Continue to monitor with HD    Anemia  Assessment & Plan  · Secondary to ESRD  · Continue to monitor daily hemoglobin  · Continue EPO per Nephrology    High anion gap metabolic acidosis  Assessment & Plan  - resolved off bicarb    Hypokalemia  Assessment & Plan  · 3 5 today  · Continue to monitor and replenish prn    Tobacco abuse  Assessment & Plan  · Nicotine patch   · Encouraged cessation    Hyperlipidemia  Assessment & Plan  · Continue home statin    ETOH abuse  Assessment & Plan  · Patient reports no longer a daily drinker  · No evidence of withdrawal    CHF (congestive heart failure) (HCC)  Assessment & Plan  Wt Readings from Last 3 Encounters:   06/10/22 97 kg (213 lb 13 5 oz)   06/10/22 96 2 kg (212 lb 1 6 oz)   02/04/22 99 2 kg (218 lb 11 1 oz)     · Echo in February showed EF of 60% and grade 1 diastolic dysfunction  · Continue coreg and amlodipine with hold parameters  · Continue I&Os and daily weights  · Na/fluid restricted diet    Benign essential HTN  Assessment & Plan  · Continue Coreg and amlodipine with hold parameters    VTE Pharmacologic Prophylaxis:   Moderate Risk (Score 3-4) - Pharmacological DVT Prophylaxis Ordered: heparin  Patient Centered Rounds: I performed bedside rounds with nursing staff today  Discussions with Specialists or Other Care Team Provider: nursing     Education and Discussions with Family / Patient: Patient declined call to   Time Spent for Care: 30 minutes  More than 50% of total time spent on counseling and coordination of care as described above  Current Length of Stay: 3 day(s)  Current Patient Status: Inpatient   Certification Statement: The patient will continue to require additional inpatient hospital stay due to pending initation of HD and safe dc planning   Discharge Plan: Anticipate discharge in 24-48 hrs to home  Code Status: Level 1 - Full Code    Subjective:   Pt seen and examined at bedside  Feels well since starting HD  Better appetite, no leg edema, sob improved  Objective:     Vitals:   Temp (24hrs), Av 7 °F (36 5 °C), Min:97 1 °F (36 2 °C), Max:98 °F (36 7 °C)    Temp:  [97 1 °F (36 2 °C)-98 °F (36 7 °C)] 97 7 °F (36 5 °C)  HR:  [58-77] 77  Resp:  [18] 18  BP: (129-148)/() 148/85  SpO2:  [97 %-98 %] 98 %  Body mass index is 32 52 kg/m²  Input and Output Summary (last 24 hours): Intake/Output Summary (Last 24 hours) at 2022 1138  Last data filed at 2022 0849  Gross per 24 hour   Intake 380 ml   Output 475 ml   Net -95 ml       Physical Exam:   Physical Exam  Constitutional:       Appearance: Normal appearance  He is not ill-appearing  Comments: Resting comfortably receiving HD tx    HENT:      Head: Normocephalic and atraumatic        Mouth/Throat: Mouth: Mucous membranes are moist    Eyes:      Extraocular Movements: Extraocular movements intact  Cardiovascular:      Rate and Rhythm: Normal rate and regular rhythm  Comments: Right chest wall permacath   Pulmonary:      Effort: Pulmonary effort is normal       Breath sounds: Normal breath sounds  Abdominal:      General: Abdomen is flat  Palpations: Abdomen is soft  Musculoskeletal:         General: No swelling  Normal range of motion  Cervical back: Normal range of motion and neck supple  Skin:     General: Skin is warm and dry  Neurological:      General: No focal deficit present  Mental Status: He is alert and oriented to person, place, and time     Psychiatric:         Mood and Affect: Mood normal          Behavior: Behavior normal        Additional Data:     Labs:  Results from last 7 days   Lab Units 06/13/22  0553 06/11/22  0853   WBC Thousand/uL 7 08 6 23   HEMOGLOBIN g/dL 8 0* 8 9*   HEMATOCRIT % 23 9* 26 0*   PLATELETS Thousands/uL 202 282   NEUTROS PCT %  --  63   LYMPHS PCT %  --  24   MONOS PCT %  --  9   EOS PCT %  --  3     Results from last 7 days   Lab Units 06/13/22  0553   SODIUM mmol/L 134*   POTASSIUM mmol/L 3 5   CHLORIDE mmol/L 103   CO2 mmol/L 19*   BUN mg/dL 56*   CREATININE mg/dL 7 96*   ANION GAP mmol/L 12   CALCIUM mg/dL 6 2*   ALBUMIN g/dL 1 2*   TOTAL BILIRUBIN mg/dL 0 19*   ALK PHOS U/L 68   ALT U/L 7*   AST U/L 19   GLUCOSE RANDOM mg/dL 78         Results from last 7 days   Lab Units 06/11/22  2100 06/11/22  0724 06/10/22  2104 06/10/22  1740   POC GLUCOSE mg/dl 117 66 144* 84         Results from last 7 days   Lab Units 06/10/22  1548   LACTIC ACID mmol/L <0 3*       Lines/Drains:  Invasive Devices  Report    Peripheral Intravenous Line  Duration           Peripheral IV 06/10/22 Right Antecubital 2 days          Hemodialysis Catheter  Duration           HD Permanent Double Catheter 2 days                      Imaging: Reviewed radiology reports from this admission including: chest xray    Recent Cultures (last 7 days):         Last 24 Hours Medication List:   Current Facility-Administered Medications   Medication Dose Route Frequency Provider Last Rate    acetaminophen  650 mg Oral Q6H PRN Mary Ruiz PA-C      amLODIPine  5 mg Oral Daily Solitario Huizar MD      aspirin  81 mg Oral Daily Mary Ruiz PA-C      b complex-vitamin C-folic acid  1 capsule Oral Daily With Mireya Luke MD      calcium acetate  2,001 mg Oral TID With Meals Solitario Huizar MD      calcium carbonate  1,000 mg Oral BID Solitario Huizar MD      carvedilol  12 5 mg Oral BID With Meals Solitario Huizar MD      [START ON 6/14/2022] cholecalciferol  4,000 Units Oral Daily Lorel Rachid Ross DO      epoetin leyda  5,000 Units Intravenous Once per day on Mon Wed Fri Solitario Huizar MD      heparin (porcine)  5,000 Units Subcutaneous [place], [place]      nicotine  1 patch Transdermal Daily [place], [place]      pravastatin  80 mg Oral Daily With [place], [place]          Today, Patient Was Seen By: Delroy Chadwick PA-C    **Please Note: This note may have been constructed using a voice recognition system  **

## 2022-06-13 NOTE — UTILIZATION REVIEW
Continued Stay Review    Date: 6/13                          Current Patient Class: inpatient  Current Level of Care: med surg    HPI:61 y o  male initially admitted on 6/10/22     Assessment/Plan:  Vascular sx referral for access, has temporary vas-cath placed, will be transitioned to MWF dialysis schedule  Nephrology following  Continue to monitor daily labs including electrolytes and replete prn  Continue to monitor hgb, continue home meds  Continue strict IOS and daily wts, Na and fluid restricted diet      Vital Signs:   Date/Time Temp Pulse Resp BP MAP (mmHg) SpO2 O2 Device Patient Position - Orthostatic VS   06/13/22 1200 -- 80 18 137/82 -- -- -- --   06/13/22 1130 -- 77 18 148/85 -- -- -- --   06/13/22 1100 -- 77 18 147/71 -- -- -- --   06/13/22 1030 -- 68 18 143/71 -- -- -- --   06/13/22 1000 -- 77 18 129/85 -- -- -- --   06/13/22 0930 -- 71 18 133/75 -- -- -- --   06/13/22 0900 -- 72 18 132/78 -- -- -- --   06/13/22 0849 97 7 °F (36 5 °C) 58 18 142/127 Abnormal  136 -- -- Lying   06/13/22 0727 98 °F (36 7 °C) 76 18 135/60 87 98 % None (Room air) Lying   06/13/22 0018 97 9 °F (36 6 °C) 73 18 140/88 106 98 % None (Room air) Lying     Pertinent Labs/Diagnostic Results:       Results from last 7 days   Lab Units 06/13/22  0553 06/11/22  0853 06/10/22  1213   WBC Thousand/uL 7 08 6 23 6 80   HEMOGLOBIN g/dL 8 0* 8 9* 9 1*   HEMATOCRIT % 23 9* 26 0* 28 2*   PLATELETS Thousands/uL 202 282 292   NEUTROS ABS Thousands/µL  --  3 92 4 71     Results from last 7 days   Lab Units 06/13/22  0553 06/12/22  0703 06/11/22  1914 06/11/22  0853 06/10/22  1748 06/10/22  1213   SODIUM mmol/L 134* 135* 134* 132*  --  127*   POTASSIUM mmol/L 3 5 3 4* 3 1* 2 9*  --  3 3*   CHLORIDE mmol/L 103 103 102 97*  --  96*   CO2 mmol/L 19* 21 21 16*  --  8*   ANION GAP mmol/L 12 11 11 19*  --  23*   BUN mg/dL 56* 52* 50* 74*  --  103*   CREATININE mg/dL 7 96* 7 70* 7 34* 10 84*  --  14 43*   EGFR ml/min/1 73sq m 6 6 7 4  --  3   CALCIUM mg/dL 6 2* 5 8* 5 9* 5 5*  --  5 2*   CALCIUM, IONIZED mmol/L  --  0 86*  --   --  0 85*  --    MAGNESIUM mg/dL 2 0 1 7  --   --   --   --    PHOSPHORUS mg/dL  --  4 7*  --  8 5*  --   --      Results from last 7 days   Lab Units 06/13/22  0553 06/11/22  0853   AST U/L 19 18   ALT U/L 7* 10*   ALK PHOS U/L 68 83   TOTAL PROTEIN g/dL 4 8* 5 5*   ALBUMIN g/dL 1 2* 1 5*   TOTAL BILIRUBIN mg/dL 0 19* 0 32     Results from last 7 days   Lab Units 06/11/22  2100 06/11/22  0724 06/10/22  2104 06/10/22  1740   POC GLUCOSE mg/dl 117 66 144* 84     Results from last 7 days   Lab Units 06/13/22  0553 06/12/22  0703 06/11/22  1914 06/11/22  0853 06/10/22  1213   GLUCOSE RANDOM mg/dL 78 80 127 74 80     Results from last 7 days   Lab Units 06/10/22  1421   PH ANGEL  7 168*   PCO2 ANGEL mm Hg 23 8*   PO2 ANGEL mm Hg 38 1   HCO3 ANGEL mmol/L 8 4*   BASE EXC ANGEL mmol/L -18 5   O2 CONTENT ANGEL ml/dL 8 9   O2 HGB, VENOUS % 65 3     Results from last 7 days   Lab Units 06/10/22  1548   LACTIC ACID mmol/L <0 3*     Results from last 7 days   Lab Units 06/10/22  1213   NT-PRO BNP pg/mL 5,766*     Results from last 7 days   Lab Units 06/11/22  0853   FERRITIN ng/mL 619*     Results from last 7 days   Lab Units 06/10/22  1748   HEP B S AG  Non-reactive   HEP C AB  Non-reactive   HEP B C IGM  Non-reactive   HEP B C TOTAL AB  Non-reactive     Medications:   Scheduled Medications:  amLODIPine, 5 mg, Oral, Daily  aspirin, 81 mg, Oral, Daily  b complex-vitamin C-folic acid, 1 capsule, Oral, Daily With Dinner  calcium acetate, 2,001 mg, Oral, TID With Meals  calcium carbonate, 1,000 mg, Oral, BID  carvedilol, 12 5 mg, Oral, BID With Meals  [START ON 6/14/2022] cholecalciferol, 4,000 Units, Oral, Daily  epoetin leyda, 5,000 Units, Intravenous, Once per day on Mon Wed Fri  heparin (porcine), 5,000 Units, Subcutaneous, Q8H [facility]  nicotine, 1 patch, Transdermal, Daily  pravastatin, 80 mg, Oral, Daily With Dinner      Continuous IV Infusions: none     PRN Meds:  acetaminophen, 650 mg, Oral, Q6H PRN        Discharge Plan: tbd, case management following dt need for outpt dialysis arrangements    Network Utilization Review Department  ATTENTION: Please call with any questions or concerns to [phone] and carefully listen to the prompts so that you are directed to the right person  All voicemails are confidential   Rachana Armando all requests for admission clinical reviews, approved or denied determinations and any other requests to dedicated fax number below belonging to the campus where the patient is receiving treatment   List of dedicated fax numbers for the Facilities:  [address] DENIALS (Administrative/Medical Necessity) [phone]   [address] (Maternity/NICU/Pediatrics) [phone]   [address]  [address] [address] [place] [address] [address] [facility] [place] [address] [phone]

## 2022-06-13 NOTE — UTILIZATION REVIEW
Initial Clinical Review    Admission: Date/Time/Statement:   Admission Orders (From admission, onward)     Ordered        06/10/22 1523  Inpatient Admission  Once                      Orders Placed This Encounter   Procedures    Inpatient Admission     Standing Status:   Standing     Number of Occurrences:   1     Order Specific Question:   Level of Care     Answer:   Med Surg [16]     Order Specific Question:   Estimated length of stay     Answer:   More than 2 Midnights     Order Specific Question:   Certification     Answer:   I certify that inpatient services are medically necessary for this patient for a duration of greater than two midnights  See H&P and MD Progress Notes for additional information about the patient's course of treatment  ED Arrival Information     Expected   -    Arrival   6/10/2022 11:02    Acuity   Urgent            Means of arrival   Walk-In    Escorted by   Self    Service   Hospitalist    Admission type   Urgent            Arrival complaint   flank pain           Chief Complaint   Patient presents with    Medical Problem     Reports he was sent here from the "clinic" to have " some sort of kidney test done" Pt unsure of what is to be done  Denies pain  Reports decrease in urination  Initial Presentation: 62 y o  male with PMH of CKD, hyperlipidemia, hypertension presents to [facility] ED via personal vehicle with c/o increasing weakness and fatigue, SOB  Pt was to f/u with nephrology following admission in March 2022 but has not dt lack of insurance  Pt went to PCP today and sent to ED for evaluation of symptoms  SOB worse w/ exertion, also noted decrease in urine outpt  In Ed, found to be in acute renal failure, Cr 14, elevated anion gap metabolic acidosis likely secondary to uremia  Nephrology consulted and recommended urgent dialysis today 6/10, IR to place dialysis catheter     Admit Inpatient med surg telemetry, nephrology following, bicarb tabs, IV Ca and K ordered, PTH, vit D, ionized Ca ordered, fluid restriction, stat hectorol 2 mcg IV qd, next dialysis tx on 6/11  Case management consult placed for outpt dialysis sessions  Date: 6/11   Day 2:   ESRD:  Temporary Vas-Cath placed in the right chest wall  Underwent 1st dialysis sessions on 06/10 and 6/11  He will be transitioned to a Monday/Wednesday/Friday schedule  Case Management consulted for outpatient HD arrangements  Breathing improved and appetite better, offers no new complaints  Continue to monitor electrolytes and volume status, daily labs, continue Ca supplementation, monitor daily hgb, continue home meds      ED Triage Vitals [06/10/22 1119]   Temperature Pulse Respirations Blood Pressure SpO2   97 6 °F (36 4 °C) 61 16 152/77 100 %      Temp Source Heart Rate Source Patient Position - Orthostatic VS BP Location FiO2 (%)   Oral Monitor Sitting Right arm --      Pain Score       No Pain          Wt Readings from Last 1 Encounters:   06/10/22 97 kg (213 lb 13 5 oz)     Additional Vital Signs:   Date/Time Temp Pulse Resp BP MAP (mmHg) SpO2 O2 Device Patient Position - Orthostatic VS   06/13/22 1200 -- 80 18 137/82 -- -- -- --   06/13/22 1130 -- 77 18 148/85 -- -- -- --   06/13/22 1100 -- 77 18 147/71 -- -- -- --   06/13/22 1030 -- 68 18 143/71 -- -- -- --   06/13/22 1000 -- 77 18 129/85 -- -- -- --   06/13/22 0930 -- 71 18 133/75 -- -- -- --   06/13/22 0900 -- 72 18 132/78 -- -- -- --   06/13/22 0849 97 7 °F (36 5 °C) 58 18 142/127 Abnormal  136 -- -- Lying   06/13/22 0727 98 °F (36 7 °C) 76 18 135/60 87 98 % None (Room air) Lying   06/13/22 0018 97 9 °F (36 6 °C) 73 18 140/88 106 98 % None (Room air) Lying   06/12/22 1611 97 1 °F (36 2 °C) Abnormal  71 18 141/78 -- 97 % None (Room air) Lying   06/12/22 0844 98 1 °F (36 7 °C) 85 18 120/83 -- 100 % None (Room air) Sitting   06/12/22 0305 98 °F (36 7 °C) 73 18 123/67 89 100 % None (Room air) Lying   06/11/22 2341 98 4 °F (36 9 °C) 75 18 142/73 100 100 % None (Room air) Lying   06/11/22 1937 -- 79 18 133/87 -- -- -- Lying   06/11/22 1519 97 °F (36 1 °C) Abnormal  75 18 138/89 -- 100 % None (Room air) Sitting   06/11/22 1130 98 1 °F (36 7 °C) 65 18 153/75 -- -- -- --   06/11/22 1100 -- 64 18 136/75 -- -- -- --   06/11/22 1030 -- 72 18 142/78 -- -- -- --   06/11/22 1000 -- 67 18 142/77 -- -- -- --   06/11/22 0930 -- 70 18 139/73 -- -- -- --   06/11/22 0900 -- 73 18 117/76 -- -- -- --   06/11/22 0830 98 2 °F (36 8 °C) 79 18 140/81 -- -- -- --   06/11/22 0750 98 4 °F (36 9 °C) 70 18 110/88 93 100 % None (Room air) Lying   06/11/22 0319 98 °F (36 7 °C) 63 17 121/73 -- 100 % None (Room air) Lying   06/11/22 0140 98 5 °F (36 9 °C) 75 18 125/70 80 100 % None (Room air) Lying     Pertinent Labs/Diagnostic Test Results:   6/10 ekg;  nsr    IR tunneled dialysis catheter placement   Final Result by Marguerite Gibson MD (06/11 2054)   Impression:      Successful placement of a Right internal jugular vein 28 cm tunneled dialysis catheter  XR chest 2 views   Final Result by Yue Sharma MD (06/10 1333)      No acute cardiopulmonary disease           Results from last 7 days   Lab Units 06/13/22  0553 06/11/22  0853 06/10/22  1213   WBC Thousand/uL 7 08 6 23 6 80   HEMOGLOBIN g/dL 8 0* 8 9* 9 1*   HEMATOCRIT % 23 9* 26 0* 28 2*   PLATELETS Thousands/uL 202 282 292   NEUTROS ABS Thousands/µL  --  3 92 4 71     Results from last 7 days   Lab Units 06/13/22  0553 06/12/22  0703 06/11/22  1914 06/11/22  0853 06/10/22  1748 06/10/22  1213   SODIUM mmol/L 134* 135* 134* 132*  --  127*   POTASSIUM mmol/L 3 5 3 4* 3 1* 2 9*  --  3 3*   CHLORIDE mmol/L 103 103 102 97*  --  96*   CO2 mmol/L 19* 21 21 16*  --  8*   ANION GAP mmol/L 12 11 11 19*  --  23*   BUN mg/dL 56* 52* 50* 74*  --  103*   CREATININE mg/dL 7 96* 7 70* 7 34* 10 84*  --  14 43*   EGFR ml/min/1 73sq m 6 6 7 4  --  3   CALCIUM mg/dL 6 2* 5 8* 5 9* 5 5*  --  5 2*   CALCIUM, IONIZED mmol/L  --  0 86*  --   --  0 85*  --    MAGNESIUM mg/dL 2 0 1 7  --   --   --   --    PHOSPHORUS mg/dL  --  4 7*  --  8 5*  --   --      Results from last 7 days   Lab Units 06/13/22  0553 06/11/22  0853   AST U/L 19 18   ALT U/L 7* 10*   ALK PHOS U/L 68 83   TOTAL PROTEIN g/dL 4 8* 5 5*   ALBUMIN g/dL 1 2* 1 5*   TOTAL BILIRUBIN mg/dL 0 19* 0 32     Results from last 7 days   Lab Units 06/11/22  2100 06/11/22  0724 06/10/22  2104 06/10/22  1740   POC GLUCOSE mg/dl 117 66 144* 84     Results from last 7 days   Lab Units 06/13/22  0553 06/12/22  0703 06/11/22  1914 06/11/22  0853 06/10/22  1213   GLUCOSE RANDOM mg/dL 78 80 127 74 80     Results from last 7 days   Lab Units 06/10/22  1421   PH ANGEL  7 168*   PCO2 ANGEL mm Hg 23 8*   PO2 ANGEL mm Hg 38 1   HCO3 ANGEL mmol/L 8 4*   BASE EXC ANGEL mmol/L -18 5   O2 CONTENT ANGEL ml/dL 8 9   O2 HGB, VENOUS % 65 3     Results from last 7 days   Lab Units 06/10/22  1548   LACTIC ACID mmol/L <0 3*     Results from last 7 days   Lab Units 06/10/22  1213   NT-PRO BNP pg/mL 5,766*     Results from last 7 days   Lab Units 06/11/22  0853   FERRITIN ng/mL 619*     Results from last 7 days   Lab Units 06/10/22  1748   HEP B S AG  Non-reactive   HEP C AB  Non-reactive   HEP B C IGM  Non-reactive   HEP B C TOTAL AB  Non-reactive     ED Treatment:   Medication Administration from 06/10/2022 1102 to 06/10/2022 1701       Date/Time Order Dose Route Action     06/10/2022 1625 midazolam (VERSED) injection 0 5 mg Intravenous Given     06/10/2022 1613 midazolam (VERSED) injection 1 mg Intravenous Given     06/10/2022 1626 fentanyl citrate (PF) 100 MCG/2ML 25 mcg Intravenous Given     06/10/2022 1614 fentanyl citrate (PF) 100 MCG/2ML 50 mcg Intravenous Given     06/10/2022 1621 ceFAZolin (ANCEF) IVPB (premix in dextrose) 2,000 mg Intravenous New Bag     06/10/2022 1629 lidocaine-epinephrine 1%-1:146115 buffered 20 mL Infiltration Given        Past Medical History:   Diagnosis Date    CHF (congestive heart failure) ([facility] [address] )     Hyperkalemia 02/01/2022  Hypertension     Renal disorder      Present on Admission:   Benign essential HTN   Acute renal failure (HCC)   ETOH abuse   Hypokalemia   Hyperlipidemia   Tobacco abuse   CHF (congestive heart failure) (HCC)      Admitting Diagnosis: Hypocalcemia [E83 51]  Hyponatremia [T48 5]  Metabolic acidosis [U34 8]  Renal failure [N19]  Flank pain [R10 9]  Acute renal failure (ARF) (HCC) [N17 9]  Acute renal failure, unspecified acute renal failure type ([facility] [address] ) [N17 9]  Age/Sex: 62 y o  male  Admission Orders:  Scheduled Medications:  amLODIPine, 5 mg, Oral, Daily  aspirin, 81 mg, Oral, Daily  b complex-vitamin C-folic acid, 1 capsule, Oral, Daily With Dinner  calcium acetate, 2,001 mg, Oral, TID With Meals  calcium carbonate, 1,000 mg, Oral, BID  carvedilol, 12 5 mg, Oral, BID With Meals  [START ON 6/14/2022] cholecalciferol, 4,000 Units, Oral, Daily  epoetin lyeda, 5,000 Units, Intravenous, Once per day on Mon Wed Fri  heparin (porcine), 5,000 Units, Subcutaneous, Q8H [address]  nicotine, 1 patch, Transdermal, Daily  pravastatin, 80 mg, Oral, Daily With Dinner      Continuous IV Infusions: none     PRN Meds:  acetaminophen, 650 mg, Oral, Q6H PRN    scd  Bladder scan    IP CONSULT TO NEPHROLOGY  INPATIENT CONSULT TO IR  IP CONSULT TO CASE MANAGEMENT    Network Utilization Review Department  ATTENTION: Please call with any questions or concerns to [phone] and carefully listen to the prompts so that you are directed to the right person  All voicemails are confidential   Kenyatta Butler all requests for admission clinical reviews, approved or denied determinations and any other requests to dedicated fax number below belonging to the campus where the patient is receiving treatment   List of dedicated fax numbers for the Facilities:  [address] DENIALS (Administrative/Medical Necessity) [phone]   [address] (Maternity/NICU/Pediatrics) 261 [facility],[address] [address] 125 [facility]  [phone]   [facility] 50 150 Medical [place] [address] [address] [place] [address] [address] [phone]

## 2022-06-13 NOTE — PLAN OF CARE
4 hours of hd tx completed  Tx well tolerated; no complications  Ran even  Post-Dialysis RN Treatment Note    Blood Pressure:  Pre 142/127 mm/Hg  Post 157/87 mmHg   EDW  tbd kg    Weight:  Pre 97 8 kg   Post 97 5 kg   Mode of weight measurement: Standing Scale   Volume Removed  0 ml    Treatment duration 240 minutes    NS given  No    Treatment shortened?  No   Medications given during Rx Epogen 5000 units   Estimated Kt/V  Not Applicable   Access type: Permacath/TDC   Access Issues: No    Report called to primary nurse   Yes        Problem: METABOLIC, FLUID AND ELECTROLYTES - ADULT  Goal: Electrolytes maintained within normal limits  Description: INTERVENTIONS:  - Monitor labs and assess patient for signs and symptoms of electrolyte imbalances  - Administer electrolyte replacement as ordered  - Monitor response to electrolyte replacements, including repeat lab results as appropriate  - Instruct patient on fluid and nutrition as appropriate  Outcome: Progressing  Goal: Fluid balance maintained  Description: INTERVENTIONS:  - Monitor labs   - Monitor I/O and WT  - Instruct patient on fluid and nutrition as appropriate  - Assess for signs & symptoms of volume excess or deficit  Outcome: Progressing

## 2022-06-13 NOTE — CASE MANAGEMENT
Case Management Discharge Planning Note    Patient name Len Pruett  Location [address] [facility] [address] [place] [address] [address] [address]-* MRN [id number]  : 1960 Date 2022       Current Admission Date: 6/10/2022  Current Admission Diagnosis:Acute renal failure [facility])   Patient Active Problem List    Diagnosis Date Noted    Anemia 06/10/2022    Hyponatremia 06/10/2022    Hypocalcemia 06/10/2022    Hypomagnesemia 2022    Pharyngeal edema 2022    High anion gap metabolic acidosis     Withdrawal seizures ([facility] [address] ) 2022    Seizure due to alcohol withdrawal, uncomplicated ([facility] [address] )     Benign essential HTN 2018    [facility]    ETOH abuse 2018    Acute renal failure ([facility] [address] ) 2018    Marijuana use 2018    Hyperlipidemia 2018    Tobacco abuse 2018    Hypokalemia 2018    Leukocytosis 2018      LOS (days): 3  Geometric Mean LOS (GMLOS) (days):   Days to GMLOS:     OBJECTIVE:  Risk of Unplanned Readmission Score: 28 33         Current admission status: Inpatient   Preferred Pharmacy:   [facility] [place] - [address]  [address]  [facility] [address] [address]  Phone: [phone] Fax: [phone]    Primary Care Provider: Sherri Becerra MD    Primary Insurance: [facility]  Secondary Insurance:     DISCHARGE DETAILS:    Comments - Freedom of Choice: Received communication from Mini Mason via [address] [place] [address] that patient is confirmed schedule at [facility] with a regular chair time of MWF shift 3 @ 3:35 PM  The patient can start Wednesday 6/15 @ 3:30 PM for intake       Were Treatment Team discharge recommendations reviewed with patient/caregiver?: Yes  Did patient/caregiver verbalize understanding of patient care needs?: Yes  Were patient/caregiver advised of the risks associated with not following Treatment Team discharge recommendations?: Yes      Other Referral/Resources/Interventions Provided:  Interventions: Dialysis      Treatment Team Recommendation: Home (with new HD)  Discharge Destination Plan[de-identified] Home (with new HD)

## 2022-06-13 NOTE — PROGRESS NOTES
NEPHROLOGY HEMODIALYSIS PROCEDURE NOTE    Seen and examined on hemodialysis  Doing well  Offers no new complaints  Overall is feeling better  Denies any chest pain shortness of breath  Appetite is improved  QB: 400  QD: 500  Access: permcath  Dialyzer: 180  Projected Kt/V:-  Sodium: 140  Potassium: 4  Bicarbonate: 35  Ultrafiltration: minimal   Medications given on HD: -    Physical Exam:    /71   Pulse 68   Temp 97 7 °F (36 5 °C) (Tympanic)   Resp 18   Ht 5' 8" (1 727 m)   Wt 97 kg (213 lb 13 5 oz)   SpO2 98%   BMI 32 52 kg/m²     General:  No acute distress appears comfortable  CVS:  Regular  Lungs:  Clear to auscultation  Abdomen:  Soft nontender  Access:  PermCath  Extremities:  No significant edema    Assessment:  1  End-stage renal disease recent new start to hemodialysis  Maintain Monday Wednesday Friday schedule  Awaiting dialysis placement  2  Hypertension, blood pressure overall appears stable no changes in current regimen  3  Anemia of chronic kidney disease, continue with KODY therapy, SPEP/free light chain assay is pending  4  CKD associated mineral bone disorder/ Secondary hyperparathyroidism, vitamin-D level low at 10 2, PTH 1103, start vitamin-D replacement 8614-7461 units daily  5  Hypocalcemia, continue with calcium based binders plus calcium carbonate  Start vitamin-D replacement therapy  6   History of alcohol use    Plan:  Continue with maintenance hemodialysis currently Monday Wednesday Friday  Volume status appears stable, limited ultrafiltration  Start vitamin-D replacement, continue with calcium supplementation

## 2022-06-13 NOTE — APP STUDENT NOTE
KATELYN STUDENT  Inpatient Progress Note for TRAINING ONLY  Not Part of Legal Medical Record     Progress Note - Oneil Perez 62 y o  male MRN: [id number]  Unit/Bed#: E4 -01 Encounter: [id number]        * Acute renal failure [facility])  Assessment & Plan  · Known history of CKD lost to follow up due to lack of insurance  Presented to the ER at the recommendation of his PCP due to dyspnea and fatigue  · Workup in the ER showed a creatinine of 14 4 in BUN of 1 3  · Suspected progression to ESRD secondary to hypertensive nephrosclerosis and possible underlying paraproteinemia  · Temporary Vas-Cath placed in the right chest wall  Underwent 1st dialysis sessions on 06/10 and 6/11  · He will be transitioned to a MWF schedule  · Needs vascular surgery referral for access  · Case Management consulted for outpatient HD arrangements    · Cont to monitor lytes and volume status  · Appreciate nephrology recommendations    Hyponatremia  Assessment & Plan  · Secondary to decreased free water excretion due to ESRD  · Continue to monitor with HD    Hypokalemia  Assessment & Plan  · 3 5 today  · Continue to monitor and replenish prn    Hypocalcemia  Assessment & Plan  · Continue calcium supplementation  · Continue daily BMP    Anemia  Assessment & Plan  · Secondary to ESRD  · Continue to monitor daily hemoglobin  · Continue EPO per Nephrology    CHF (congestive heart failure) ([facility])  Assessment & Plan  Wt Readings from Last 3 Encounters:   06/10/22 97 kg (213 lb 13 5 oz)   06/10/22 96 2 kg (212 lb 1 6 oz)   02/04/22 99 2 kg (218 lb 11 1 oz)     · Echo in February showed EF of 60% and grade 1 diastolic dysfunction  · Continue coreg and amlodipine with hold parameters  · Continue I&Os and daily weights  · Na/fluid restricted diet        Hyperlipidemia  Assessment & Plan  · Continue home statin    Benign essential HTN  Assessment & Plan  · Continue Coreg and amlodipine with hold parameters    ETOH abuse  Assessment & Plan  · Patient reports no longer a daily drinker  · No evidence of withdrawal    High anion gap metabolic acidosis  Assessment & Plan  - resolved off bicarb    Tobacco abuse  Assessment & Plan  · Nicotine patch   · Encouraged cessation       VTE Pharmacologic Prophylaxis:   Pharmacologic: Heparin  Mechanical VTE Prophylaxis in Place: Yes    Patient Centered Rounds: I have performed bedside rounds with nursing staff today  Discussions with Specialists or Other Care Team Provider: Rudi Escobar PA-C    Education and Discussions with Family / Patient: Discussed with patient    Time Spent for Care: 20 minutes  More than 50% of total time spent on counseling and coordination of care as described above  Current Length of Stay: 3 day(s)    Current Patient Status: Inpatient   Certification Statement: The patient will continue to require additional inpatient hospital stay due to HD    Discharge Plan: Pending CM    Code Status: Level 1 - Full Code    Subjective:   Patient seen and examined at bedside while undergoing HD  No acute complaints  Reports feeling much better with HD and denying any adverse effects such as weakness, fatigue, lightheadedness  Objective:     Vitals:   Temp (24hrs), Av 7 °F (36 5 °C), Min:97 1 °F (36 2 °C), Max:98 °F (36 7 °C)    Temp:  [97 1 °F (36 2 °C)-98 °F (36 7 °C)] 97 7 °F (36 5 °C)  HR:  [58-77] 77  Resp:  [18] 18  BP: (129-142)/() 129/85  SpO2:  [97 %-98 %] 98 %  Body mass index is 32 52 kg/m²  Input and Output Summary (last 24 hours): Intake/Output Summary (Last 24 hours) at 2022 1022  Last data filed at 2022 0849  Gross per 24 hour   Intake 380 ml   Output 475 ml   Net -95 ml       Physical Exam:     Physical Exam  Vitals and nursing note reviewed  Constitutional:       Appearance: He is well-developed  HENT:      Head: Normocephalic and atraumatic     Eyes:      Conjunctiva/sclera: Conjunctivae normal    Cardiovascular:      Rate and Rhythm: Normal rate and regular rhythm  Heart sounds: No murmur heard  Pulmonary:      Effort: Pulmonary effort is normal  No respiratory distress  Breath sounds: Normal breath sounds  Abdominal:      General: Abdomen is flat  Bowel sounds are normal       Palpations: Abdomen is soft  Tenderness: There is no abdominal tenderness  Musculoskeletal:      Cervical back: Neck supple  Right lower leg: No edema  Left lower leg: No edema  Skin:     General: Skin is warm and dry  Neurological:      General: No focal deficit present  Mental Status: He is alert and oriented to person, place, and time  Psychiatric:         Mood and Affect: Mood normal          Behavior: Behavior normal          Historical Information   Past Medical History:   Diagnosis Date    [facility]F [facility]congestive heart failure) ([facility])     Hyperkalemia 02/01/2022    Hypertension     Renal disorder      Past Surgical History:   Procedure Laterality Date    IR TUNNELED DIALYSIS CATHETER PLACEMENT  6/10/2022     Social History   Social History     Substance and Sexual Activity   Alcohol Use Yes    Alcohol/week: 10 0 standard drinks    Types: 10 Cans of beer per week    Comment: socially     Social History     Substance and Sexual Activity   Drug Use Yes    Types: Marijuana    Comment: occasionally     Social History     Tobacco Use   Smoking Status Current Every Day Smoker    Packs/day: 1 00    Years: 30 00    Pack years: 30 00   Smokeless Tobacco Never Used     Family History: History reviewed  No pertinent family history  Meds/Allergies   PTA meds:   Prior to Admission Medications   Prescriptions Last Dose Informant Patient Reported? Taking?    EPINEPHrine (EPIPEN) 0 3 mg/0 3 mL SOAJ   No No   Sig: Inject 0 3 mL (0 3 mg total) into a muscle once for 1 dose   amLODIPine (NORVASC) 5 mg tablet 6/10/2022 at Unknown time  No Yes   Sig: Take 1 tablet (5 mg total) by mouth daily   aspirin (ECOTRIN LOW STRENGTH) 81 mg EC tablet 6/10/2022 at Unknown time  No Yes   Sig: Take 1 tablet (81 mg total) by mouth daily   carvedilol (COREG) 25 mg tablet 6/10/2022 at Unknown time  No Yes   Sig: Take 1 tablet (25 mg total) by mouth 2 (two) times a day with meals   nicotine (NICODERM CQ) 14 mg/24hr TD 24 hr patch Unknown at Unknown time  No No   Sig: Place 1 patch on the skin daily   Patient not taking: Reported on 6/10/2022   pravastatin (PRAVACHOL) 80 mg tablet 6/9/2022 at Unknown time  No Yes   Sig: Take 1 tablet (80 mg total) by mouth daily with dinner      Facility-Administered Medications: None     No Known Allergies    Additional Data:     Labs:    Results from last 7 days   Lab Units 06/13/22  0553 06/11/22  0853   WBC Thousand/uL 7 08 6 23   HEMOGLOBIN g/dL 8 0* 8 9*   HEMATOCRIT % 23 9* 26 0*   PLATELETS Thousands/uL 202 282   NEUTROS PCT %  --  63   LYMPHS PCT %  --  24   MONOS PCT %  --  9   EOS PCT %  --  3     Results from last 7 days   Lab Units 06/13/22  0553   SODIUM mmol/L 134*   POTASSIUM mmol/L 3 5   CHLORIDE mmol/L 103   CO2 mmol/L 19*   BUN mg/dL 56*   CREATININE mg/dL 7 96*   ANION GAP mmol/L 12   CALCIUM mg/dL 6 2*   ALBUMIN g/dL 1 2*   TOTAL BILIRUBIN mg/dL 0 19*   ALK PHOS U/L 68   ALT U/L 7*   AST U/L 19   GLUCOSE RANDOM mg/dL 78         Results from last 7 days   Lab Units 06/11/22  2100 06/11/22  0724 06/10/22  2104 06/10/22  1740   POC GLUCOSE mg/dl 117 66 144* 84         Results from last 7 days   Lab Units 06/10/22  1548   LACTIC ACID mmol/L <0 3*         * I Have Reviewed All Lab Data Listed Above  * Additional Pertinent Lab Tests Reviewed:  Massimo Lynch Admission Reviewed    Imaging:    Imaging Reports Reviewed Today Include: CXR  Imaging Personally Reviewed by Myself Includes:  CXR    Recent Cultures (last 7 days):           Last 24 Hours Medication List:   Current Facility-Administered Medications   Medication Dose Route Frequency Provider Last Rate    acetaminophen  650 mg Oral Q6H PRN Dominique Evans MICHAEL Segura      amLODIPine  5 mg Oral Daily Colby Arroyo MD      aspirin  81 mg Oral Daily [place], [place]      b complex-vitamin C-folic acid  1 capsule Oral Daily With Edmund Castellano MD      calcium acetate  2,001 mg Oral TID With Meals Colby Arroyo MD      calcium carbonate  1,000 mg Oral BID Colby Arroyo MD      carvedilol  12 5 mg Oral BID With Meals Colby Arroyo MD      epoetin leyda  5,000 Units Intravenous Once per day on Mon Wed Fri Colby Arroyo MD      heparin (porcine)  5,000 Units Subcutaneous [place], [place]      nicotine  1 patch Transdermal Daily [place], [place]      pravastatin  80 mg Oral Daily With [place], [place]          Today, Patient Was Seen By: Amrik Griffin    ** Please Note: Dictation voice to text software may have been used in the creation of this document   **

## 2022-06-13 NOTE — ASSESSMENT & PLAN NOTE
Wt Readings from Last 3 Encounters:   06/10/22 97 kg (213 lb 13 5 oz)   06/10/22 96 2 kg (212 lb 1 6 oz)   02/04/22 99 2 kg (218 lb 11 1 oz)     · Echo in February showed EF of 60% and grade 1 diastolic dysfunction  · Continue coreg and amlodipine with hold parameters  · Continue I&Os and daily weights  · Na/fluid restricted diet

## 2022-06-14 ENCOUNTER — TELEPHONE (OUTPATIENT)
Dept: SURGICAL ONCOLOGY | Facility: CLINIC | Age: 62
End: 2022-06-14

## 2022-06-14 VITALS
TEMPERATURE: 98.2 F | BODY MASS INDEX: 32.41 KG/M2 | HEIGHT: 68 IN | RESPIRATION RATE: 18 BRPM | OXYGEN SATURATION: 100 % | WEIGHT: 213.85 LBS | DIASTOLIC BLOOD PRESSURE: 82 MMHG | SYSTOLIC BLOOD PRESSURE: 153 MMHG | HEART RATE: 76 BPM

## 2022-06-14 LAB
1,25(OH)2D3 SERPL-MCNC: 15.3 PG/ML (ref 24.8–81.5)
ALBUMIN SERPL ELPH-MCNC: 2.15 G/DL (ref 3.5–5)
ALBUMIN SERPL ELPH-MCNC: 42.1 % (ref 52–65)
ALPHA1 GLOB SERPL ELPH-MCNC: 0.32 G/DL (ref 0.1–0.4)
ALPHA1 GLOB SERPL ELPH-MCNC: 6.3 % (ref 2.5–5)
ALPHA2 GLOB SERPL ELPH-MCNC: 1.22 G/DL (ref 0.4–1.2)
ALPHA2 GLOB SERPL ELPH-MCNC: 24 % (ref 7–13)
BETA GLOB ABNORMAL SERPL ELPH-MCNC: 0.27 G/DL (ref 0.4–0.8)
BETA1 GLOB SERPL ELPH-MCNC: 5.3 % (ref 5–13)
BETA2 GLOB SERPL ELPH-MCNC: 8.6 % (ref 2–8)
BETA2+GAMMA GLOB SERPL ELPH-MCNC: 0.44 G/DL (ref 0.2–0.5)
GAMMA GLOB ABNORMAL SERPL ELPH-MCNC: 0.7 G/DL (ref 0.5–1.6)
GAMMA GLOB SERPL ELPH-MCNC: 13.7 % (ref 12–22)
IGG/ALB SER: 0.73 {RATIO} (ref 1.1–1.8)
KAPPA LC FREE SER-MCNC: 136.1 MG/L (ref 3.3–19.4)
KAPPA LC FREE/LAMBDA FREE SER: 0.96 {RATIO} (ref 0.26–1.65)
LAMBDA LC FREE SERPL-MCNC: 141.8 MG/L (ref 5.7–26.3)
M PROTEIN 1 MFR SERPL ELPH: 2 %
M PROTEIN 1 SERPL ELPH-MCNC: 0.1 G/DL
PROT PATTERN SERPL ELPH-IMP: ABNORMAL
PROT SERPL-MCNC: 5.1 G/DL (ref 6.4–8.2)

## 2022-06-14 PROCEDURE — 99232 SBSQ HOSP IP/OBS MODERATE 35: CPT | Performed by: INTERNAL MEDICINE

## 2022-06-14 PROCEDURE — 99239 HOSP IP/OBS DSCHRG MGMT >30: CPT | Performed by: PHYSICIAN ASSISTANT

## 2022-06-14 RX ORDER — CARVEDILOL 12.5 MG/1
12.5 TABLET ORAL 2 TIMES DAILY WITH MEALS
Qty: 60 TABLET | Refills: 0 | Status: SHIPPED | OUTPATIENT
Start: 2022-06-14 | End: 2022-06-24

## 2022-06-14 RX ORDER — MELATONIN
4000 DAILY
Qty: 120 TABLET | Refills: 0 | Status: SHIPPED | OUTPATIENT
Start: 2022-06-15 | End: 2022-08-03

## 2022-06-14 RX ORDER — CALCIUM ACETATE 667 MG/1
2001 CAPSULE ORAL
Qty: 270 CAPSULE | Refills: 0 | Status: SHIPPED | OUTPATIENT
Start: 2022-06-14 | End: 2022-08-03

## 2022-06-14 RX ORDER — CHOLECALCIFEROL (VITAMIN D3) 10 MCG
1 TABLET ORAL
Qty: 30 CAPSULE | Refills: 0 | Status: SHIPPED | OUTPATIENT
Start: 2022-06-14 | End: 2022-08-03

## 2022-06-14 RX ADMIN — CALCIUM CARBONATE (ANTACID) CHEW TAB 500 MG 1000 MG: 500 CHEW TAB at 08:18

## 2022-06-14 RX ADMIN — CALCIUM ACETATE 2001 MG: 667 CAPSULE ORAL at 08:18

## 2022-06-14 RX ADMIN — HEPARIN SODIUM 5000 UNITS: 5000 INJECTION INTRAVENOUS; SUBCUTANEOUS at 05:15

## 2022-06-14 RX ADMIN — CALCIUM ACETATE 2001 MG: 667 CAPSULE ORAL at 12:20

## 2022-06-14 RX ADMIN — HEPARIN SODIUM 5000 UNITS: 5000 INJECTION INTRAVENOUS; SUBCUTANEOUS at 13:12

## 2022-06-14 RX ADMIN — CARVEDILOL 12.5 MG: 12.5 TABLET, FILM COATED ORAL at 08:18

## 2022-06-14 RX ADMIN — Medication 1 PATCH: at 08:20

## 2022-06-14 RX ADMIN — AMLODIPINE BESYLATE 5 MG: 5 TABLET ORAL at 08:18

## 2022-06-14 RX ADMIN — ASPIRIN 81 MG: 81 TABLET, COATED ORAL at 08:18

## 2022-06-14 RX ADMIN — Medication 4000 UNITS: at 08:18

## 2022-06-14 NOTE — ASSESSMENT & PLAN NOTE
· Known history of CKD lost to follow up due to lack of insurance  Presented to the ER at the recommendation of his PCP due to dyspnea and fatigue  · Workup in the ER showed a creatinine of 14 4 in BUN of 1 3  · Suspected progression to ESRD secondary to hypertensive nephrosclerosis and possible underlying paraproteinemia  · Temporary Vas-Cath placed in the right chest wall  Underwent 1st dialysis sessions on 06/10 and 6/11  · Patient with improvement volume status  Stable for discharge from Nephrology point of view  · He has outpatient follow-up at [facility] in [facility] for dialysis on Tuesdays, Thursdays, and Saturdays at 11:15 a m  With initial appointment at 11:00 a m     This was discussed with patient in detail    · Referral placed for both vascular surgery for long-term dialysis placement, and Nephrology for follow-up outpatient

## 2022-06-14 NOTE — PLAN OF CARE
Problem: Potential for Falls  Goal: Patient will remain free of falls  Description: INTERVENTIONS:  - Educate patient/family on patient safety including physical limitations  - Instruct patient to call for assistance with activity   - Consult OT/PT to assist with strengthening/mobility   - Keep Call bell within reach  - Keep bed low and locked with side rails adjusted as appropriate  - Keep care items and personal belongings within reach  - Initiate and maintain comfort rounds  - Make Fall Risk Sign visible to staff  - Offer Toileting every  Hours, in advance of need  - Initiate/Maintain alarm  - Obtain necessary fall risk management equipment:   - Apply yellow socks and bracelet for high fall risk patients  - Consider moving patient to room near nurses station  Outcome: Progressing     Problem: PAIN - ADULT  Goal: Verbalizes/displays adequate comfort level or baseline comfort level  Description: Interventions:  - Encourage patient to monitor pain and request assistance  - Assess pain using appropriate pain scale  - Administer analgesics based on type and severity of pain and evaluate response  - Implement non-pharmacological measures as appropriate and evaluate response  - Consider cultural and social influences on pain and pain management  - Notify physician/advanced practitioner if interventions unsuccessful or patient reports new pain  Outcome: Progressing     Problem: INFECTION - ADULT  Goal: Absence or prevention of progression during hospitalization  Description: INTERVENTIONS:  - Assess and monitor for signs and symptoms of infection  - Monitor lab/diagnostic results  - Monitor all insertion sites, i e  indwelling lines, tubes, and drains  - Monitor endotracheal if appropriate and nasal secretions for changes in amount and color  - [place] appropriate cooling/warming therapies per order  - Administer medications as ordered  - Instruct and encourage patient and family to use good hand hygiene technique  - Identify and instruct in appropriate isolation precautions for identified infection/condition  Outcome: Progressing  Goal: Absence of fever/infection during neutropenic period  Description: INTERVENTIONS:  - Monitor WBC    Outcome: Progressing     Problem: SAFETY ADULT  Goal: Patient will remain free of falls  Description: INTERVENTIONS:  - Educate patient/family on patient safety including physical limitations  - Instruct patient to call for assistance with activity   - Consult OT/PT to assist with strengthening/mobility   - Keep Call bell within reach  - Keep bed low and locked with side rails adjusted as appropriate  - Keep care items and personal belongings within reach  - Initiate and maintain comfort rounds  - Make Fall Risk Sign visible to staff  - Offer Toileting every  Hours, in advance of need  - Initiate/Maintain alarm  - Obtain necessary fall risk management equipment:   - Apply yellow socks and bracelet for high fall risk patients  - Consider moving patient to room near nurses station  Outcome: Progressing  Goal: Maintain or return to baseline ADL function  Description: INTERVENTIONS:  -  Assess patient's ability to carry out ADLs; assess patient's baseline for ADL function and identify physical deficits which impact ability to perform ADLs (bathing, care of mouth/teeth, toileting, grooming, dressing, etc )  - Assess/evaluate cause of self-care deficits   - Assess range of motion  - Assess patient's mobility; develop plan if impaired  - Assess patient's need for assistive devices and provide as appropriate  - Encourage maximum independence but intervene and supervise when necessary  - Involve family in performance of ADLs  - Assess for home care needs following discharge   - Consider OT consult to assist with ADL evaluation and planning for discharge  - Provide patient education as appropriate  Outcome: Progressing  Goal: Maintains/Returns to pre admission functional level  Description: INTERVENTIONS:  - Perform BMAT or MOVE assessment daily    - Set and communicate daily mobility goal to care team and patient/family/caregiver  - Collaborate with rehabilitation services on mobility goals if consulted  - Perform Range of Motion  times a day  - Reposition patient every  hours  - Dangle patient  times a day  - Stand patient  times a day  - Ambulate patient  times a day  - Out of bed to chair  times a day   - Out of bed for meals  times a day  - Out of bed for toileting  - Record patient progress and toleration of activity level   Outcome: Progressing     Problem: DISCHARGE PLANNING  Goal: Discharge to home or other facility with appropriate resources  Description: INTERVENTIONS:  - Identify barriers to discharge w/patient and caregiver  - Arrange for needed discharge resources and transportation as appropriate  - Identify discharge learning needs (meds, wound care, etc )  - Arrange for interpretive services to assist at discharge as needed  - Refer to Case Management Department for coordinating discharge planning if the patient needs post-hospital services based on physician/advanced practitioner order or complex needs related to functional status, cognitive ability, or social support system  Outcome: Progressing     Problem: Knowledge Deficit  Goal: Patient/family/caregiver demonstrates understanding of disease process, treatment plan, medications, and discharge instructions  Description: Complete learning assessment and assess knowledge base    Interventions:  - Provide teaching at level of understanding  - Provide teaching via preferred learning methods  Outcome: Progressing     Problem: METABOLIC, FLUID AND ELECTROLYTES - ADULT  Goal: Electrolytes maintained within normal limits  Description: INTERVENTIONS:  - Monitor labs and assess patient for signs and symptoms of electrolyte imbalances  - Administer electrolyte replacement as ordered  - Monitor response to electrolyte replacements, including repeat lab results as appropriate  - Instruct patient on fluid and nutrition as appropriate  Outcome: Progressing  Goal: Fluid balance maintained  Description: INTERVENTIONS:  - Monitor labs   - Monitor I/O and WT  - Instruct patient on fluid and nutrition as appropriate  - Assess for signs & symptoms of volume excess or deficit  Outcome: Progressing     Problem: GASTROINTESTINAL - ADULT  Goal: Maintains or returns to baseline bowel function  Description: INTERVENTIONS:  - Assess bowel function  - Encourage oral fluids to ensure adequate hydration  - Administer IV fluids if ordered to ensure adequate hydration  - Administer ordered medications as needed  - Encourage mobilization and activity  - Consider nutritional services referral to assist patient with adequate nutrition and appropriate food choices  Outcome: Progressing

## 2022-06-14 NOTE — DISCHARGE INSTR - AVS FIRST PAGE
Please take all new medications as directed  I have prescribed you a phosphate binder, as well as nephrocaps  Please follow up with your dialysis sessions  You first session is this Thursday 6/16  Please arrive at this initial appointment by 11:00 am  All other appointments will be at 11:15     However new referral to vascular surgery  Please contact them and schedule an appointment within the month to get a long-term hemodialysis access port placed  Please follow-up with your primary care physician within 1 week  Please follow-up with your nephrologist within 4 weeks    Follow up with the hematologist/oncologist  A number has been provided for you to call and schedule an appointment   This is request because of a + SPEP seen while In the hospital

## 2022-06-14 NOTE — ASSESSMENT & PLAN NOTE
Wt Readings from Last 3 Encounters:   06/10/22 97 kg (213 lb 13 5 oz)   06/10/22 96 2 kg (212 lb 1 6 oz)   02/04/22 99 2 kg (218 lb 11 1 oz)     · Echo in February showed EF of 60% and grade 1 diastolic dysfunction  · Continue coreg and amlodipine  · Coreg dose decreased to 12 5 mg b i d   Secondary to hypotension  · Outpatient follow-up with Cardiology

## 2022-06-14 NOTE — PROGRESS NOTES
NEPHROLOGY PROGRESS NOTE   Iliana Lopez 62 y o  male MRN: [id number]  Unit/Bed#: E4 -01 Encounter: [id number]  Reason for Consult:  ESRD on HD    67-year-old male with history of CKD with poor follow-up due to lack of insurance, presenting with hypertension and volume overload  Creatinine was 14 4 upon admission with oliguria  He was initiated on dialysis 06/10/2022  ASSESSMENT/PLAN:  ALEKS on CKD, now likely progressed to ESRD on HD:   -initiated on dialysis 06/10/2022   -receiving maintenance dialysis on Monday Wednesday Friday schedule   -continue with HD as scheduled  -will follow with [facility] in [facility] on TTS schedule at discharge with 11:15 am chair time  Okay to start on 06/16/2022   -EDW: To be determined  Access:  PermCath, site intact  Placed 06/10/2022   -will need follow-up with vascular as an outpatient for access placement   -save nondominant arm for future dialysis access  Blood pressure:  Overall acceptable   -continue UF with HD as BP tolerates  -OP medications:  Amlodipine 5 mg daily, Coreg 25 mg 2 times per day  -currently maintained on:  Amlodipine 5 mg daily, Coreg 12 5 mg 2 times per day    Anemia in CKD:   -Hgb currently 8 0   -KODY:  Epogen 5000 units 3 times per week with dialysis  -SPEP showed IgG lambda, would recommend outpatient follow-up with Hematology  -free light chains pending   -iron panel showed iron 76, ferritin 619, iron sat 59, TIBC 128    -checking occult stools   -goal Hgb 10-12 g/dL  -continue to monitor and transfuse as needed for Hgb <7 0  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 hyperparathyroidism:   -continue to monitor PTH, phos, and Mg as OP    -most recent labs show low vitamin-D, PTH 1103, phosphorus 4 7, Mag 2 0   -started on vitamin-D supplementation  -hypocalcemia, corrected to normal to albumin, ionized calcium 0 86  Currently on calcium based binders as well as calcium carbonate and vitamin-D replacement  -recommend renal diet       Volume status:  Most recent echo with EF of 60% and grade 1 diastolic dysfunction   -chest x-ray negative for acute cardiopulmonary disease   -continue UF as BP allows  -recommend fluid restriction  Electrolytes:   -mild hyponatremia, will continue UF with HD   -mild acidosis, will continue to correct with hemodialysis  -continue to monitor and trend with HD  Other:  Tobacco and alcohol abuse  Disposition:  Okay to discharge from Renal     SUBJECTIVE:  The patient is resting in his bed  He appears be comfortable  He denies any chest discomfort or shortness of breath  He denies nausea, vomiting, diarrhea  He is eating and drinking well      OBJECTIVE:  Current Weight: Weight - Scale: 97 kg (213 lb 13 5 oz)  Vitals:    06/13/22 1249 06/13/22 1535 06/13/22 2255 06/14/22 0754   BP: 157/87 149/80 168/81 153/82   BP Location: Left arm Left arm Left arm Right arm   Pulse: 80 75 73 76   Resp: 18 18 18 18   Temp: 97 7 °F (36 5 °C) 98 4 °F (36 9 °C) 98 °F (36 7 °C) 98 2 °F (36 8 °C)   TempSrc: Tympanic Temporal Temporal Temporal   SpO2:  99% 100% 100%   Weight:       Height:           Intake/Output Summary (Last 24 hours) at 6/14/2022 1025  Last data filed at 6/13/2022 1249  Gross per 24 hour   Intake 300 ml   Output 500 ml   Net -200 ml     General: NAD  Skin: warm, dry, intact, no rash  HEENT: Moist mucous membranes, sclera anicteric, normocephalic, atraumatic  Neck: No apparent JVD appreciated  Chest: lung sounds clear B/L, PermCath  CVS:Regular rate and rhythm, no murmer   Abdomen: Soft, round, non-tender, +BS  Extremities: No B/L LE edema present  Neuro: alert and oriented  Psych: appropriate mood and affect     Medications:    Current Facility-Administered Medications:     acetaminophen (TYLENOL) tablet 650 mg, 650 mg, Oral, Q6H PRN, Wilfred Khoury PA-C, 650 mg at 06/13/22 2032    amLODIPine (NORVASC) tablet 5 mg, 5 mg, Oral, Daily, Jamal Franklin MD, 5 mg at 06/14/22 0818    aspirin (ECOTRIN LOW STRENGTH) EC tablet 81 mg, 81 mg, Oral, Daily, Minoo Huizar PA-C, 81 mg at 06/14/22 0818    b complex-vitamin C-folic acid (NEPHROCAPS) capsule 1 capsule, 1 capsule, Oral, Daily With Muna Mendez MD, 1 capsule at 06/13/22 1735    calcium acetate (PHOSLO) capsule 2,001 mg, 2,001 mg, Oral, TID With Meals, Aissatou Rodriguez MD, 2,001 mg at 06/14/22 0818    calcium carbonate (TUMS) chewable tablet 1,000 mg, 1,000 mg, Oral, BID, Aissatou Rodriguez MD, 1,000 mg at 06/14/22 0818    carvedilol (COREG) tablet 12 5 mg, 12 5 mg, Oral, BID With Meals, Aissatou Rodriguez MD, 12 5 mg at 06/14/22 0818    cholecalciferol (VITAMIN D3) tablet 4,000 Units, 4,000 Units, Oral, Daily, Shobha Ross DO, 4,000 Units at 06/14/22 0818    epoetin leyda (EPOGEN,PROCRIT) injection 5,000 Units, 5,000 Units, Intravenous, Once per day on Mon Wed Fri, Aissatou Rodriguez MD, 5,000 Units at 06/13/22 0943    heparin (porcine) subcutaneous injection 5,000 Units, 5,000 Units, Subcutaneous, Q8H [address], 5,000 Units at 06/14/22 0515 **AND** Platelet count, , , Once, Minoo Huizar PA-C    nicotine (NICODERM CQ) 14 mg/24hr TD 24 hr patch 1 patch, 1 patch, Transdermal, Daily, Minoo Huizar PA-C, 1 patch at 06/14/22 0820    pravastatin (PRAVACHOL) tablet 80 mg, 80 mg, Oral, Daily With Ho Allred PA-C, 80 mg at 06/13/22 1735    Laboratory Results:  Results from last 7 days   Lab Units 06/13/22  0553 06/12/22  0703 06/11/22  1914 06/11/22  0853 06/10/22  1213   WBC Thousand/uL 7 08  --   --  6 23 6 80   HEMOGLOBIN g/dL 8 0*  --   --  8 9* 9 1*   HEMATOCRIT % 23 9*  --   --  26 0* 28 2*   PLATELETS Thousands/uL 202  --   --  282 292   SODIUM mmol/L 134* 135* 134* 132* 127*   POTASSIUM mmol/L 3 5 3 4* 3 1* 2 9* 3 3*   CHLORIDE mmol/L 103 103 102 97* 96*   CO2 mmol/L 19* 21 21 16* 8*   BUN mg/dL 56* 52* 50* 74* 103*   CREATININE mg/dL 7 96* 7 70* 7 34* 10 84* 14 43*   CALCIUM mg/dL 6 2* 5 8* 5 9* 5 5* 5 2*   MAGNESIUM mg/dL 2 0 1 7  --   --   --    PHOSPHORUS mg/dL  --  4 7*  --  8 5*  --    ALK PHOS U/L 68  --   --  83  --    ALT U/L 7*  --   --  10*  --    AST U/L 19  --   --  18  --

## 2022-06-14 NOTE — DISCHARGE SUMMARY
[address] [facility]  Discharge- Christo  1960, 62 y o  male MRN: [id number]  Unit/Bed#: E4 -01 Encounter: [id number]  Primary Care Provider: Perla Stacy MD   Date and time admitted to hospital: 6/10/2022 11:20 AM    * Acute renal failure [facility])  Assessment & Plan  · Known history of CKD lost to follow up due to lack of insurance  Presented to the ER at the recommendation of his PCP due to dyspnea and fatigue  · Workup in the ER showed a creatinine of 14 4 in BUN of 1 3  · Suspected progression to ESRD secondary to hypertensive nephrosclerosis and possible underlying paraproteinemia  · Temporary Vas-Cath placed in the right chest wall  Underwent 1st dialysis sessions on 06/10 and 6/11  · Patient with improvement volume status  Stable for discharge from Nephrology point of view  · He has outpatient follow-up at [facility] in [facility] for dialysis on Tuesdays, Thursdays, and Saturdays at 11:15 a m  With initial appointment at 11:00 a m     This was discussed with patient in detail  · Referral placed for both vascular surgery for long-term dialysis placement, and Nephrology for follow-up outpatient    Hyponatremia  Assessment & Plan  · Monitor prn outpatient  · Mildly low on discharge  Stable per nephrology     CHF (congestive heart failure) ([facility] [address] )  Assessment & Plan  Wt Readings from Last 3 Encounters:   06/10/22 97 kg (213 lb 13 5 oz)   06/10/22 96 2 kg (212 lb 1 6 oz)   02/04/22 99 2 kg (218 lb 11 1 oz)     · Echo in February showed EF of 60% and grade 1 diastolic dysfunction  · Continue coreg and amlodipine  · Coreg dose decreased to 12 5 mg b i d  Secondary to hypotension  · Outpatient follow-up with Cardiology    Anemia  Assessment & Plan  · Secondary to ESRD  · Outpatient hgb monitoring  · Currently stable     Hypocalcemia  Assessment & Plan  · Continue calcium supplementation  · Discussed OTC tums with patient     Hypokalemia  Assessment & Plan  · Normalized  · Monitor with HD     Benign essential HTN  Assessment & Plan  · Continue Coreg and amlodipine   · Decreased dose of coreg as above     ETOH abuse  Assessment & Plan  · Patient reports no longer a daily drinker  · No evidence of withdrawal  · Declined rehab     Tobacco abuse  Assessment & Plan  · Encouraged cessation    Hyperlipidemia  Assessment & Plan  · Continue home statin      Medical Problems             Resolved Problems  Date Reviewed: 6/13/2022   None               Discharging Physician / Practitioner: Nomi Burger PA-C  PCP: Suleman Felder MD  Admission Date:   Admission Orders (From admission, onward)     Ordered        06/10/22 1523  Inpatient Admission  Once                      Discharge Date: 06/14/22    Consultations During Hospital Stay:  · Nephrology    Procedures Performed:   XR chest 2 views    Result Date: 6/10/2022  Narrative: CHEST INDICATION:   dyspnea  COMPARISON:  None EXAM PERFORMED/VIEWS:  XR CHEST PA & LATERAL  The frontal view was performed utilizing dual energy radiographic technique  FINDINGS: Cardiomediastinal silhouette appears unremarkable  The lungs are clear  No pneumothorax or pleural effusion  Osseous structures appear within normal limits for patient age  Impression: No acute cardiopulmonary disease  Workstation performed: [id number]     IR tunneled dialysis catheter placement    Result Date: 6/11/2022  Narrative: TUNNELED DIALYSIS CATHETER INSERTION Clinical History: Long term hemodialysis access Fluoroscopy time: 0 4 min  Images: 3 Sedation time: 30 minutes Procedure: After explaining the risks and benefits of the procedure to the patient, informed consent was obtained  The patient's right neck was prepped and draped in usual sterile fashion and local anesthesia was obtained with the 1% lidocaine solution   All elements of maximal sterile barrier technique, cap and mask and sterile gown and sterile gloves and sterile full-body drape and hand hygiene and 2% chlorhexidine for cutaneous antisepsis  Sterile ultrasound technique with sterile gel and sterile probe covers was also utilized  The right internal jugular vein was evaluated as a potential access site  The vein is patent, compressible, and free of thrombus  A micropuncture needle was used to aspirate the right internal jugular vein under ultrasound guidance  A static ultrasound image was recorded  A 0 018 Mandrel wire was passed into the central circulation and a 5 Western Pat exchange dilator was placed  Using a generous amount of local anesthesia, a subcutaneous tunnel was made from the mid clavicular line to the venotomy site  A 0 035 J wire was passed into the inferior vena cava under fluoroscopic guidance  The venotomy site was then sequentially dilated to accept 16 Danish peel-away sheath  A 28 cm tunneled dialysis catheter was inserted and positioned with the tips in the right atrium under fluoroscopic guidance  No kinks are present in the catheter  Each port aspirated and flushed without resistance  The venotomy site was closed with Exofin   The catheter was secured in place using 2 2-0 prolene sutures  Each port was flushed with a total of 5,000 units of heparin  The patient tolerated the procedure well and left the department in stable condition  · Impression: Impression: Successful placement of a Right internal jugular vein 28 cm tunneled dialysis catheter  Workstation performed: [id number]       Significant Findings / Test Results:   · ESRD new in onset  Patient started hemodialysis with 1st dialysis session on 06/10, and 6/11  · Hypocalcemia corrected with Tums  · Hypokalemia repleted  · Elevated proBNP  · Elevated PTH   · + SPEP - hematology referral placed     Incidental Findings:   · As above      Test Results Pending at Discharge (will require follow up):    · None      Outpatient Tests Requested:  · None     Complications:  None     Reason for Admission:  Shortness of breath    Hospital Course: Kirk Cueto is a 62 y o  male patient who originally presented to the hospital on 6/10/2022 due to end-stage renal disease  Patient presented with acute renal failure with creatinine of 14 4 with anion gap metabolic acidosis  Nephrology was consulted in bicarb tablets were started  He had urgent hemodialysis catheter placed by Interventional Radiology on 06/10 without significant complications  He had multiple electrolyte abnormalities including hypocalcemia with serum calcium 5 2, and hyponatremia with associated hypokalemia as well  These were repleted throughout patient's hospital Stay are currently stable on discharge  Patient with noted history of alcohol abuse and was started on CIWA protocol  No significant withdrawal symptoms while inpatient  Patient underwent urgent hemodialysis on 06/10 and again on 06/11  Patient was also noted to have anemia as well felt secondary to end-stage renal disease  He was started on EPO by Nephrology with stability in anemia noted  He was transitioned off bicarb once anion gap metabolic acidosis was resolved  Patient was noted to have positive SPEP on urine studies  Outpatient referral to Hematology some place  Patient was successfully transitioned over to hemodialysis unit intubated [place] on Tuesdays, Thursdays, and Saturdays at 11:15 a m  Importance of making the sessions was discussed in detail with the patient  Discussed with Nephrology as well prior to discharge, nothing else to do from an inpatient perspective  Patient stable for discharge at this time  He level medications go to home start [place]  For further hospital course, please see attached notes      Please see above list of diagnoses and related plan for additional information  Condition at Discharge: good    Discharge Day Visit / Exam:   Subjective:  Feeling well  His breathing is better as well as his swelling     Vitals: Blood Pressure: 153/82 (06/14/22 0754)  Pulse: 76 (06/14/22 0754)  Temperature: 98 2 °F (36 8 °C) (06/14/22 0754)  Temp Source: Temporal (06/14/22 0754)  Respirations: 18 (06/14/22 0754)  Height: 5' 8" (172 7 cm) (06/10/22 1115)  Weight - Scale: 97 kg (213 lb 13 5 oz) (06/10/22 1115)  SpO2: 100 % (06/14/22 0754)  Exam:   Physical Exam  Vitals and nursing note reviewed  Constitutional:       General: He is not in acute distress  Appearance: He is obese  HENT:      Head: Normocephalic and atraumatic  Mouth/Throat:      Mouth: Mucous membranes are moist       Pharynx: Oropharynx is clear  Eyes:      Conjunctiva/sclera: Conjunctivae normal       Pupils: Pupils are equal, round, and reactive to light  Cardiovascular:      Rate and Rhythm: Normal rate and regular rhythm  Pulses: Normal pulses  Heart sounds: Normal heart sounds  No murmur heard  Pulmonary:      Effort: Pulmonary effort is normal       Breath sounds: Normal breath sounds  Abdominal:      General: Abdomen is flat  Palpations: Abdomen is soft  Musculoskeletal:      Cervical back: No muscular tenderness  Right lower leg: Edema present  Left lower leg: Edema present  Skin:     General: Skin is warm and dry  Coloration: Skin is not jaundiced  Neurological:      General: No focal deficit present  Mental Status: He is alert and oriented to person, place, and time  Psychiatric:         Mood and Affect: Mood normal           Discussion with Family: Patient declined call to   Discharge instructions/Information to patient and family:   See after visit summary for information provided to patient and family  Provisions for Follow-Up Care:  See after visit summary for information related to follow-up care and any pertinent home health orders  Disposition:   Home    Planned Readmission: none      Discharge Statement:  I spent 60 minutes discharging the patient  This time was spent on the day of discharge   I had direct contact with the patient on the day of discharge  Greater than 50% of the total time was spent examining patient, answering all patient questions, arranging and discussing plan of care with patient as well as directly providing post-discharge instructions  Additional time then spent on discharge activities  Discharge Medications:  See after visit summary for reconciled discharge medications provided to patient and/or family        **Please Note: This note may have been constructed using a voice recognition system**

## 2022-06-14 NOTE — CASE MANAGEMENT
Case Management Discharge Planning Note    Patient name Mel Nance  Location [address] [facility] [address] [place] [address] [facility] MRN [id number]  : 1960 Date 2022       Current Admission Date: 6/10/2022  Current Admission Diagnosis:Acute renal failure [facility])   Patient Active Problem List    Diagnosis Date Noted    Anemia 06/10/2022    Hyponatremia 06/10/2022    Hypocalcemia 06/10/2022    Hypomagnesemia 2022    Pharyngeal edema 2022    High anion gap metabolic acidosis     Withdrawal seizures ([facility] [address] ) 2022    Seizure due to alcohol withdrawal, uncomplicated ([facility] [address] )     Benign essential HTN 2018    CHF (congestive heart failure) ([facility] [address] ) 2018    ETOH abuse 2018    Acute renal failure ([facility] [address] ) 2018    Marijuana use 2018    Hyperlipidemia 2018    Tobacco abuse 2018    Hypokalemia 2018    Leukocytosis 2018      LOS (days): 4  Geometric Mean LOS (GMLOS) (days):   Days to GMLOS:     OBJECTIVE:  Risk of Unplanned Readmission Score: 28 63         Current admission status: Inpatient   Preferred Pharmacy:   [facility] [place] - [address]  [address]  [facility] [address]  Phone: [phone] Fax: [address] [place] [address] 60 ,  [facility] 60 ,  [address] [place] [address]  Phone: [phone] Fax: [phone]    Primary Care Provider: Sean Lambert MD    Primary Insurance: [employer] [facility]  Secondary Insurance:     DISCHARGE DETAILS:          Comments - Freedom of Choice: Patient's choice is to D/C to home with HD on T--S at 11:15  Patient requesting info on SNAP and SSI   Patient provided with this information and contact numbers     Requested  [employer] [address]         Is the patient interested in [address] at discharge?: No    DME Referral Provided  Referral made for DME?: No    Other Referral/Resources/Interventions Provided:  Interventions: Dialysis     Patient reports he has a ride home for D/C

## 2022-06-14 NOTE — PLAN OF CARE
Problem: PAIN - ADULT  Goal: Verbalizes/displays adequate comfort level or baseline comfort level  Description: Interventions:  - Encourage patient to monitor pain and request assistance  - Assess pain using appropriate pain scale  - Administer analgesics based on type and severity of pain and evaluate response  - Implement non-pharmacological measures as appropriate and evaluate response  - Consider cultural and social influences on pain and pain management  - Notify physician/advanced practitioner if interventions unsuccessful or patient reports new pain  Outcome: Progressing     Problem: INFECTION - ADULT  Goal: Absence or prevention of progression during hospitalization  Description: INTERVENTIONS:  - Assess and monitor for signs and symptoms of infection  - Monitor lab/diagnostic results  - Monitor all insertion sites, i e  indwelling lines, tubes, and drains  - Monitor endotracheal if appropriate and nasal secretions for changes in amount and color  - [place] appropriate cooling/warming therapies per order  - Administer medications as ordered  - Instruct and encourage patient and family to use good hand hygiene technique  - Identify and instruct in appropriate isolation precautions for identified infection/condition  Outcome: Progressing  Goal: Absence of fever/infection during neutropenic period  Description: INTERVENTIONS:  - Monitor WBC    Outcome: Progressing

## 2022-06-14 NOTE — CASE MANAGEMENT
Case Management Discharge Planning Note    Patient name Boy cMknight  Location [address] [facility] [address] [place] [place] [address] [address] MRN [id number]  : 1960 Date 2022       Current Admission Date: 6/10/2022  Current Admission Diagnosis:Acute renal failure [facility])   Patient Active Problem List    Diagnosis Date Noted    Anemia 06/10/2022    Hyponatremia 06/10/2022    Hypocalcemia 06/10/2022    Hypomagnesemia 2022    Pharyngeal edema 2022    High anion gap metabolic acidosis     Withdrawal seizures ([facility] [address] ) 2022    Seizure due to alcohol withdrawal, uncomplicated ([facility] [address] )     Benign essential HTN 2018    CHF (congestive heart failure) ([facility] ) 2018    ETOH abuse 2018    Acute renal failure [facility] ) 2018    Marijuana use 2018    Hyperlipidemia 2018    Tobacco abuse 2018    Hypokalemia 2018    Leukocytosis 2018      LOS (days): 4  Geometric Mean LOS (GMLOS) (days):   Days to GMLOS:     OBJECTIVE:  Risk of Unplanned Readmission Score: 28 63         Current admission status: Inpatient   [place] Pharmacy:   [facility] [place] - [address]  [address] [facility] [address]  Phone: [phone] Fax: [phone]    Primary Care Provider: Michael Wolff MD    Primary Insurance: [facility]  Secondary Insurance:     DISCHARGE DETAILS:    Additional Comments: This CM received a call from Tonie at [facility] reporting that the chair days and times for this patient needed to be adjusted  Now patient will be on the  schedule with a chair time of 11:15  TT sent to MICHAEL covering patient as well as Fan Villalobos to make them aware of the change

## 2022-06-15 ENCOUNTER — PATIENT OUTREACH (OUTPATIENT)
Dept: FAMILY MEDICINE CLINIC | Facility: CLINIC | Age: 62
End: 2022-06-15

## 2022-06-15 DIAGNOSIS — Z71.89 COMPLEX CARE COORDINATION: Primary | ICD-10-CM

## 2022-06-15 NOTE — PROGRESS NOTES
HRR referral     I called the patient, explained my role and he declines at this time  He stated he was currently at the pharmacy picking up medications  He has my contact information if he finds he needs additional help in the future  Chart reviewed  Case is being closed

## 2022-06-16 ENCOUNTER — TRANSITIONAL CARE MANAGEMENT (OUTPATIENT)
Dept: FAMILY MEDICINE CLINIC | Facility: CLINIC | Age: 62
End: 2022-06-16

## 2022-06-17 ENCOUNTER — TELEPHONE (OUTPATIENT)
Dept: VASCULAR SURGERY | Facility: CLINIC | Age: 62
End: 2022-06-17

## 2022-06-17 ENCOUNTER — TRANSCRIBE ORDERS (OUTPATIENT)
Dept: VASCULAR SURGERY | Facility: CLINIC | Age: 62
End: 2022-06-17

## 2022-06-17 DIAGNOSIS — N18.6 ESRD (END STAGE RENAL DISEASE) ON DIALYSIS (HCC): Primary | ICD-10-CM

## 2022-06-17 DIAGNOSIS — Z99.2 ESRD (END STAGE RENAL DISEASE) ON DIALYSIS (HCC): Primary | ICD-10-CM

## 2022-06-17 NOTE — TELEPHONE ENCOUNTER
Called patient to schedule - left message     WARNING! Based upon the critical issue with ransomware targeting Healthcare systems ALL attachments and links from this email should be heavily scrutinized  Hi Edie Wilcox and MART,    We have a new CVC pt, Krystin Gonzalez, who started at [facility] 6/16  He has an order for [facility] referral to Vascular surgery in [facility] placed while he was an inpt  Nephrologist is Dr Amy Wallace and HD schedule is T-Th-S 2nd shift  Please contact the pt to schedule vein mapping and surgical consult appts  The pt had access education by Nephrology as an inpt and was told to expect a call from your office      Pt details:  Pérez Sick  62year old male  1960 [address] [place]     [phone] (M)    Thank you,  Teddy Mathews

## 2022-06-18 PROBLEM — D47.2 MGUS (MONOCLONAL GAMMOPATHY OF UNKNOWN SIGNIFICANCE): Status: ACTIVE | Noted: 2022-06-18

## 2022-06-18 NOTE — PROGRESS NOTES
Hematology Outpatient Office Note    Date of Service: 6/27/2022    [facility] [address]  [phone]    Reason for Consultation:   Chief Complaint   Patient presents with    Consult       Referral Physician: Pato Vegas PA-C    Primary Care Physician:  Sherri Becerra MD       ASSESSMENT & PLAN      Diagnosis ICD-10-CM Associated Orders   1  MGUS (monoclonal gammopathy of unknown significance)  D47 2 IgG, IgA, IgM     Protein, urine, 24 hour     Protein electrophoresis, urine     STACEY and PE,Serum     IgG, IgA, IgM   2  Anemia, unspecified type  D64 9 Ambulatory Referral to Hematology / Oncology         This is a 62 y o  c PMHx notable for CHF, HTN, ESRD, being seen in consultation for underlying monoclonal paraproteinemia     Monoclonal paraproteinemia    6/11/2022 SPEP (STACEY not done) showed a monoclonal gammopathy identified as IgG lambda  No other testing on file  There is an absence of hypercalcemia, or bone pain  The patient has CKD since 2/19/2018  creat 2 24 then, 4 5 1/31/2022, 7 96 6/13/2022 and it is hard to tell if the progressing creat is 2/2 underlying gammopathy or other etiology    Hypocalcemia, low ionized Ca 0 86, Vitamin D 1,25 OH 15 3  6/11/22: IgG Lambda, Hgb 8 9, MCV 88, WBC 6 23k, plt 282k, PTH 1103  3! !! Kappa 136 1, Lambda 141 8, ratio 0 96      Patient information* for next visit  Some of the immune system cells can cause issues over time that need to be monitored, one very common form of this is called MGUS or monoclonal gammopathy of undetermined significance  Immune cells make immunoglobulin, this is a family of proteins that help fight infections  When there are too many of these cells, we can detect the specific protein that they make  MGUS is extremely common, 1 out of 10 people will develop MGUS   It is more common as people age, and when there are inflammatory conditions (such as rheumatologic diseases like lupus, rheumatoid arthritis, inflammatory arthritis, myositis, etc)  Because MGUS is so common, there is a good chance we will find it on the testing today  About 5% of people develop what is called light chain MGUS (found in the urine or the blood), and about 5% of people develop MGUS from the intact immunoglobulin molecule (found in the blood primarily)  MGUS is monitored because it can turn into a blood cancer called multiple myeloma  We look for MGUS or myeloma when people have low blood counts  Most often MGUS is found when people have rheumatologic diseases or neurologic diseases, but no blood issues  There are different forms of MGUS, the most common form has only a 5% chance of turning into myeloma over 20 years  Other forms are monitored more differently  Several of the names are MGUS (abnormal full-length immunoglobulin in blood), Idiopathic Bence-Fox Proteinuria (abnormal immunoglobulin fragments in the urine only, but not the blood), light chain MGUS (abnormal immunoglobulin fragments in the blood), multiple myeloma (abnormal immunoglobulin in blood, and the immune cells are causing problems with blood cell production or bone health)  MGUS is associated with several other conditions  Sarcoidosis, tuberculosis, hepatitis C virus, CMV infection, chronic hepatitis, colon cancer, hairy cell leukemia, CLL, acute leukemias  C1 esterase deficiency, diabetic neuropathy, Hashimoto's thyroiditis, hyperparathyroidism have also been reported  We note that lupus, cirrhosis, glomerular nephritis, psoriasis, pyoderma gangrenosum, scleroderma, Sjogren syndrome seronegative polyarthritis, scleroderma, rheumatoid arthritis, connective tissue disorders, ankylosing spondylitis have been associated with MGUS  The transformation of MGUS to myeloma, B-cell lymphoma, light chain amyloidosis, or macroglobulinemia depends on disease subtype    M-protein levels greater than 1 5g/dl, non-IgG monoclonal proteins, and free light chain ratios >1 65 are at risk factors  Low risk disease has none of the above risk factors and has a 20yr risk of progression of 5%  With 1 risk factor the 20yr risk is 21%, and with 2 risk factors the 20yr risk is 37%  Bone marrow biopsy is recommended in patients with paraprotein levels >1 5g/dl, elevated FLC ratio (usually >5), non-IgG paraprotein, and in patients with clear anemia/bone lesions/renal insufficiency/hypercalcemia without  an alternative explanation  CKD can lead to elevations in the free light chain ratio, a recent study observed that patients with a ratio less than 5 05 did not have myeloma, yet myeloma is usually associated with extreme ratios ~200 ([place] 2015)  In this study patients with CKD commonly had elevations of LC ratios beyond the traditional normal range, 33% for nephrotic syndomre, 36% non-nephrotic proteinuria, 47% of CKD of unknown origin  Idiopathic Bence-Fox Proteinuria (abnormal free light chains in urine only, not serum) is a rare plasma cell dyscrasia  Jameson et al Franki Rodriguez Hematol 2013) reported that progression to malignancy or amyloidosis was low at 0 4%/yr  , this was much lower than MGUS+BJP at 1 6%/year  While most patients who progressed did so to myeloma or smoldering myeloma, other lymphomas were also noted  Interestingly, BJP was more commonly found in those with neuropathy and anemia  Imaging: generally not recommended for MGUS  If high risk features are present PET-CT can be helpful  NCCN guidelines recommend skeletal survey for lytic lesions in the assessment of myeloma, however the sensitivity is poor and many false positive findings are noted  Whole body MRI can also be effective  Since monoclonal gammopathy is not treated, extensive evaluation of low level paraprotein levels is not recommended   Please note that in patients with underlying renal disease will have elevated kappa light chains as a result of decreased renal clearance, this can lead to FLC ratios and further investigation is not recommended for FLC ratios <5       · Discussion of decision making    I personally reviewed the following lab results, the image studies, pathology, other specialty/physicians consult notes and recommendations, and outside medical records from [facility]  I had a lengthy discussion with the patient and shared the work-up findings  We discussed the diagnosis and management plan as below  I spent 46 minutes reviewing the records (labs, clinician notes, outside records, medical history, ordering medicine/tests/procedures, interpreting the imaging/labs previously done) and coordination of care as well as direct time with the patient today, of which greater than 50% of the time was spent in counseling and coordination of care with the patient/family  · Plan/Labs  · Getting Ig, UPEP, STACEY  · BMBx indicated to rule out worsening renal failure from underlying Multiple myeloma  IR referral placed today  · Pt getting EPO with nephrology during ESRD         Follow Up: 1 month to discuss BMBx results    All questions were answered to the patient's satisfaction during this encounter  The patient knows the contact information for our office and knows to reach out for any relevant concerns related to this encounter  They are to call for any temperature 100 4 or higher, new symptoms including but not restricted to shaking chills, decreased appetite, nausea, vomiting, diarrhea, increased fatigue, shortness of breath or chest pain, confusion, and not feeling the strength to come to the clinic  For all other listed problems and medical diagnosis in their chart - they are managed by PCP and/or other specialists, which the patient acknowledges  Thank you very much for your consultation and making us a part of this patient's care  We are continuing to follow closely with you   Please do not hesitate to reach out to me with any additional questions or concerns  Hoa Rodarte MD  Hematology & Medical Oncology Staff Physician             Disclaimer: This document was prepared using [employer] Fluency Direct technology  If a word or phrase is confusing, or does not make sense, this is likely due to recognition error which was not discovered during this clinician's review  If you believe an error has occurred, please contact me through [address] line for giuliana? cation  HEMATOLOGICAL HISTORY OF PRESENT ILLNESS      Clotting History None   Bleeding History None   Cancer History None   Family Cancer History Cervical (sister), Pancreatic (mom)   H/O Blood/Plt Transfusion None   Tobacco/etoh/drug abuse 1 PPD x 35 years and has been working to quit since 2/2022 (down to a few cigs a day), hx etoh abuse (8 x 18 oz beer for 5 years and then slowed down) or rec drug use of late       Cancer Screening history Haven't done Colon cancer screening and recommended that he do that   Occupation Retired    Pain: none      SUBJECTIVE  ([facility])        I have reviewed the relevant past medical, surgical, social and family history  I have also reviewed allergies and medications for this patient  Review of Systems  Baseline weight: 215 lbs    He has lost 10 lbs since early 2022  Denies F/C, N/V, SOB, CP, LH, HA, falls, gen weakness, rash, itching, hematuria, melena, hematochezia, diarrhea, or constipation  He has chronic LLE swelling that is stable  A 10-point review of system was performed, pertinent positive and negative were detailed as above  Otherwise, the 10-point review of system was negative  Past Medical History:   Diagnosis Date    CHF [facility]congestive heart failure) ([facility] [address] )     Hyperkalemia 02/01/2022    Hypertension     Renal disorder        Past Surgical History:   Procedure Laterality Date    IR TUNNELED DIALYSIS CATHETER PLACEMENT  6/10/2022       No family history on file      Social History     Socioeconomic History    Marital status: Significant Other     Spouse name: Not on file    Number of children: Not on file    Years of education: Not on file    Highest education level: Not on file   Occupational History    Not on file   Tobacco Use    Smoking status: Current Every Day Smoker     Packs/day: 1 00     Years: 30 00     Pack years: 30 00    Smokeless tobacco: Never Used   Substance and Sexual Activity    Alcohol use: Yes     Alcohol/week: 10 0 standard drinks     Types: 10 Cans of beer per week     Comment: socially    Drug use: Yes     Types: Marijuana     Comment: occasionally    Sexual activity: Not on file   Other Topics Concern    Not on file   Social History Narrative    Not on file     Social Determinants of Health     Financial Resource Strain: Not on file   Food Insecurity: No Food Insecurity    Worried About Running Out of Food in the Last Year: Never true    Tashi of Food in the Last Year: Never true   Transportation Needs: No Transportation Needs    Lack of Transportation (Medical): No    Lack of Transportation (Non-Medical):  No   Physical Activity: Not on file   Stress: Not on file   Social Connections: Not on file   Intimate Partner Violence: Not on file   Housing Stability: Low Risk     Unable to Pay for Housing in the Last Year: No    Number of Places Lived in the Last Year: 1    Unstable Housing in the Last Year: No       No Known Allergies    Current Outpatient Medications   Medication Sig Dispense Refill    amLODIPine (NORVASC) 5 mg tablet Take 1 tablet (5 mg total) by mouth daily 90 tablet 1    aspirin (ECOTRIN LOW STRENGTH) 81 mg EC tablet Take 1 tablet (81 mg total) by mouth daily 90 tablet 1    b complex-vitamin C-folic acid (NEPHROCAPS) 1 mg capsule Take 1 capsule by mouth daily with dinner 30 capsule 0    calcium acetate (PHOSLO) capsule Take 3 capsules (2,001 mg total) by mouth 3 (three) times a day with meals 270 capsule 0    carvedilol (COREG) 25 mg tablet Take 1 tablet (25 mg total) by mouth 2 (two) times a day with meals 90 tablet 1    cholecalciferol (VITAMIN D3) 1,000 units tablet Take 4 tablets (4,000 Units total) by mouth daily 120 tablet 0    furosemide (LASIX) 20 mg tablet Take 0 5 tablets (10 mg total) by mouth daily 45 tablet 1    nicotine (NICODERM CQ) 14 mg/24hr TD 24 hr patch Place 1 patch on the skin daily 28 patch 0    pravastatin (PRAVACHOL) 80 mg tablet Take 1 tablet (80 mg total) by mouth daily with dinner 90 tablet 1    EPINEPHrine (EPIPEN) 0 3 mg/0 3 mL SOAJ Inject 0 3 mL (0 3 mg total) into a muscle once for 1 dose 0 6 mL 0     No current facility-administered medications for this visit  (Not in a hospital admission)        Objective:     24 Hour Vitals Assessment:     Vitals:    06/27/22 1133   BP: 118/72   Pulse: 83   Resp: 18   Temp: (!) 96 9 °F (36 1 °C)   SpO2: 97%       PHYSICIAN EXAM:    General: Appearance: alert, cooperative, no distress  HEENT: Normocephalic, atraumatic  No scleral icterus  conjunctivae clear  EOMI  Chest: No tenderness to palpation  No open wound noted  Lungs: Clear to auscultation bilaterally, Respirations unlabored  Cardiac: Regular rate and rhythm, +S1and S2  Abdomen: Soft, non-tender, non-distended  Bowel sounds are normal   Extremities:  No cyanosis, clubbing  Non-pitting edema LLE +1  Skin: Skin color, turgor are normal  No rashes  Lymphatics: no palpable supra-cervical, axillary, or inguinal adenopathy  Neurologic: Awake, Alert, and oriented, no gross focal deficits noted b/l  DATA REVIEW:    Pathology Result:    No results found for: FINALDX       Image Results:   Image result are reviewed and documented in Hematology/Oncology history  I personally reviewed these images  IR tunneled dialysis catheter placement  Narrative: TUNNELED DIALYSIS CATHETER INSERTION    Clinical History: Long term hemodialysis access    Fluoroscopy time: 0 4 min      Images: 3    Sedation time: 30 minutes    Procedure: After explaining the risks and benefits of the procedure to the patient, informed consent was obtained  The patient's right neck was prepped and draped in usual sterile fashion and local anesthesia was obtained with the 1% lidocaine solution  All elements of maximal sterile barrier technique, cap and mask and sterile gown and sterile gloves and sterile full-body drape and hand hygiene and 2% chlorhexidine for cutaneous antisepsis  Sterile ultrasound technique with sterile gel and sterile   probe covers was also utilized  The right internal jugular vein was evaluated as a potential access site  The vein is patent, compressible, and free of thrombus  A micropuncture needle was used to aspirate the right internal jugular vein under ultrasound guidance  A static ultrasound   image was recorded  A 0 018 Mandrel wire was passed into the central circulation and a 5 Western Pat exchange dilator was placed  Using a generous amount of local anesthesia, a subcutaneous tunnel was made from the mid clavicular line to the venotomy site  A 0 035 J wire was passed into the inferior vena cava under fluoroscopic guidance  The venotomy site was then sequentially   dilated to accept 16 Malagasy peel-away sheath  A 28 cm tunneled dialysis catheter was inserted and positioned with the tips in the right atrium under fluoroscopic guidance  No kinks are present in the catheter  Each port aspirated and flushed without   resistance  The venotomy site was closed with Exofin   The catheter was secured in place using 2 2-0 prolene sutures  Each port was flushed with a total of 5,000 units of heparin  The patient tolerated the procedure well and left the department in stable condition  Impression: Impression:    Successful placement of a Right internal jugular vein 28 cm tunneled dialysis catheter  Workstation performed: [id number]      LABS:  Lab data are reviewed and documented in HemOnc history         Lab Results   Component Value Date    HGB 8 0 (L) 06/13/2022    HCT 23 9 (L) 06/13/2022    MCV 92 06/13/2022     06/13/2022    WBC 7 08 06/13/2022    NRBC 0 06/11/2022    BANDSPCT 3 02/01/2022    ATYLMPCT 1 (H) 02/01/2022     Lab Results   Component Value Date    K 3 5 06/13/2022     06/13/2022    CO2 19 (L) 06/13/2022    BUN 56 (H) 06/13/2022    CREATININE 7 96 (H) 06/13/2022    CALCIUM 6 2 (L) 06/13/2022    CORRECTEDCA 8 4 06/13/2022    AST 19 06/13/2022    ALT 7 (L) 06/13/2022    ALKPHOS 68 06/13/2022    EGFR 6 06/13/2022       Lab Results   Component Value Date    IRON 76 06/11/2022    TIBC 128 (L) 06/11/2022    FERRITIN 619 (H) 06/11/2022       Lab Results   Component Value Date    OYKFHRCN24 533 02/19/2018       No results for input(s): WBC, CREAT in the last 72 hours      Invalid input(s):  PLT     By:  Monty Lopez MD, 6/27/2022, 11:45 AM

## 2022-06-22 NOTE — TELEPHONE ENCOUNTER
Spoke with patient  Was not able to schedule an appointment or testing with the patient due to his insurance not being par with our facility  ([place] [address] [address]  [facility])  Patient said he was going to look into changing his insurance to one of the ones we take and then he will call us back to make the appointment   Patient said it might not be until August that his insurance changes though

## 2022-06-24 ENCOUNTER — OFFICE VISIT (OUTPATIENT)
Dept: FAMILY MEDICINE CLINIC | Facility: CLINIC | Age: 62
End: 2022-06-24

## 2022-06-24 VITALS
BODY MASS INDEX: 31.63 KG/M2 | RESPIRATION RATE: 18 BRPM | WEIGHT: 208 LBS | HEART RATE: 82 BPM | TEMPERATURE: 97.8 F | DIASTOLIC BLOOD PRESSURE: 80 MMHG | OXYGEN SATURATION: 99 % | SYSTOLIC BLOOD PRESSURE: 140 MMHG

## 2022-06-24 DIAGNOSIS — E78.5 HYPERLIPIDEMIA, UNSPECIFIED HYPERLIPIDEMIA TYPE: ICD-10-CM

## 2022-06-24 DIAGNOSIS — I50.9 CONGESTIVE HEART FAILURE (HCC): ICD-10-CM

## 2022-06-24 DIAGNOSIS — I10 BENIGN ESSENTIAL HTN: ICD-10-CM

## 2022-06-24 PROCEDURE — 99495 TRANSJ CARE MGMT MOD F2F 14D: CPT | Performed by: FAMILY MEDICINE

## 2022-06-24 RX ORDER — AMLODIPINE BESYLATE 5 MG/1
5 TABLET ORAL DAILY
Qty: 90 TABLET | Refills: 1 | Status: SHIPPED | OUTPATIENT
Start: 2022-06-24

## 2022-06-24 RX ORDER — CARVEDILOL 25 MG/1
25 TABLET ORAL 2 TIMES DAILY WITH MEALS
Qty: 90 TABLET | Refills: 1
Start: 2022-06-24

## 2022-06-24 RX ORDER — PRAVASTATIN SODIUM 80 MG/1
80 TABLET ORAL
Qty: 90 TABLET | Refills: 1 | Status: SHIPPED | OUTPATIENT
Start: 2022-06-24

## 2022-06-24 RX ORDER — ASPIRIN 81 MG/1
81 TABLET ORAL DAILY
Qty: 90 TABLET | Refills: 1 | Status: SHIPPED | OUTPATIENT
Start: 2022-06-24

## 2022-06-24 RX ORDER — FUROSEMIDE 20 MG/1
10 TABLET ORAL DAILY
Qty: 45 TABLET | Refills: 1 | Status: SHIPPED | OUTPATIENT
Start: 2022-06-24

## 2022-06-24 NOTE — PROGRESS NOTES
Assessment/Plan:     No problem-specific Assessment & Plan notes found for this encounter  Diagnoses and all orders for this visit:    Benign essential HTN  -     carvedilol (COREG) 25 mg tablet; Take 1 tablet (25 mg total) by mouth 2 (two) times a day with meals  -     amLODIPine (NORVASC) 5 mg tablet; Take 1 tablet (5 mg total) by mouth daily    Hyperlipidemia, unspecified hyperlipidemia type  -     pravastatin (PRAVACHOL) 80 mg tablet; Take 1 tablet (80 mg total) by mouth daily with dinner    Congestive heart failure (HCC)  -     carvedilol (COREG) 25 mg tablet; Take 1 tablet (25 mg total) by mouth 2 (two) times a day with meals  -     furosemide (LASIX) 20 mg tablet; Take 0 5 tablets (10 mg total) by mouth daily  -     aspirin (ECOTRIN LOW STRENGTH) 81 mg EC tablet; Take 1 tablet (81 mg total) by mouth daily        Inpatient access reviewed   Continue HD as per Nephro  Has appointment with Vascular next week to discuss permanent access  Continue low sodium diet    Subjective:     Patient ID: Maricruz Alfaro is a 62 y o  male  63 yo male seen I=on June 10th, after being lost to follow up, with known CKD and CHF had not followed up due to lack of insurance  Was admitted to [facility] on 6/10/2022  due to end-stage renal disease  Patient presented with acute renal failure with creatinine of 14 4 with anion gap metabolic acidosis  Nephrology was consulted in bicarb tablets were started  He had urgent hemodialysis catheter placed by Interventional Radiology on 06/10 without significant complications  He had multiple electrolyte abnormalities including hypocalcemia with serum calcium 5 2, and hyponatremia with associated hypokalemia as well  These were repleted throughout patient's hospital Stay are currently stable on discharge  Patient with noted history of alcohol abuse and was started on CIWA protocol  No significant withdrawal symptoms while inpatient    Patient underwent urgent hemodialysis on 06/10 and again on 06/11  Patient was also noted to have anemia as well felt secondary to end-stage renal disease  He was started on EPO by Nephrology with stability in anemia noted  He was transitioned off bicarb once anion gap metabolic acidosis was resolved  He was discharged in stable condition on 6/14/2022 with HD set for [facility]  Patient here today for follow up  Currently states he feels much better  He states prior to his hospitalization he needed to stop to rest 3 to 4 times from his parking place to the entrance of his building, now only stops once  Able to climb 7 to 8 steps before needing to stop to catch his breath prior to inpatient could only climb to steps at a time prior to stopping to rest   Complains of b/l leg swelling L>R  Improves on HD days  Review of Systems   Respiratory: Positive for shortness of breath (significantly improved compared to prior )  Cardiovascular: Positive for leg swelling (as per HPI)  All other systems reviewed and are negative  Objective:     Physical Exam  Vitals and nursing note reviewed  Constitutional:       Appearance: He is well-developed  HENT:      Head: Normocephalic  Right Ear: External ear normal       Left Ear: External ear normal       Nose: Nose normal    Eyes:      Conjunctiva/sclera: Conjunctivae normal       Pupils: Pupils are equal, round, and reactive to light  Neck:      Thyroid: No thyromegaly  Cardiovascular:      Rate and Rhythm: Normal rate and regular rhythm  Heart sounds: Normal heart sounds  Comments: B/l trace edema up to mid calf   Pulmonary:      Effort: Pulmonary effort is normal       Breath sounds: Normal breath sounds  Abdominal:      Palpations: Abdomen is soft  Tenderness: There is no abdominal tenderness  There is no guarding or rebound  Musculoskeletal:         General: Normal range of motion  Cervical back: Normal range of motion and neck supple     Skin:     General: Skin is dry  Neurological:      Mental Status: He is alert and oriented to person, place, and time  Deep Tendon Reflexes: Reflexes are normal and symmetric  Vitals:    06/24/22 0957   BP: 140/80   BP Location: Left arm   Patient Position: Sitting   Cuff Size: Adult   Pulse: 82   Resp: 18   Temp: 97 8 °F (36 6 °C)   TempSrc: Temporal   SpO2: 99%   Weight: 94 3 kg (208 lb)       Transitional Care Management Review:  Mason Chavez is a 62 y o  male here for TCM follow up  During the TCM phone call patient stated:    TCM Call (since 5/24/2022)     Date and time call was made  6/16/2022  1:59 PM    Hospital care reviewed  Records reviewed    Patient was hospitialized at  [facility]    Date of Admission  06/10/22    Date of discharge  06/14/22    Diagnosis  Acute renal failure [facility])    Disposition  Home    Were the patients medications reviewed and updated  No    Current Symptoms  Fatigue      TCM Call (since 5/24/2022)     Post hospital issues  None    Should patient be enrolled in anticoag monitoring? No    Scheduled for follow up?   Yes    Patients specialists  Nephrologist    Did you obtain your prescribed medications  Yes    Do you need help managing your prescriptions or medications  No    Is transportation to your appointment needed  No    I have advised the patient to call PCP with any new or worsening symptoms  Shimon Laguna, [id number] [address] members    Are you recieving any outpatient services  No    Are you recieving home care services  No    Are you using any community resources  No    Current waiver services  No    Have you fallen in the last 12 months  No    Interperter language line needed  No    Counseling  Patient          Sai Arnold MD

## 2022-06-27 ENCOUNTER — CONSULT (OUTPATIENT)
Dept: HEMATOLOGY ONCOLOGY | Facility: CLINIC | Age: 62
End: 2022-06-27
Payer: MEDICARE

## 2022-06-27 VITALS
BODY MASS INDEX: 31.13 KG/M2 | RESPIRATION RATE: 18 BRPM | HEART RATE: 83 BPM | WEIGHT: 205.4 LBS | HEIGHT: 68 IN | TEMPERATURE: 96.9 F | DIASTOLIC BLOOD PRESSURE: 72 MMHG | SYSTOLIC BLOOD PRESSURE: 118 MMHG | OXYGEN SATURATION: 97 %

## 2022-06-27 DIAGNOSIS — D64.9 ANEMIA, UNSPECIFIED TYPE: ICD-10-CM

## 2022-06-27 DIAGNOSIS — D47.2 MGUS (MONOCLONAL GAMMOPATHY OF UNKNOWN SIGNIFICANCE): Primary | ICD-10-CM

## 2022-06-27 PROCEDURE — 3008F BODY MASS INDEX DOCD: CPT | Performed by: INTERNAL MEDICINE

## 2022-06-27 PROCEDURE — 99244 OFF/OP CNSLTJ NEW/EST MOD 40: CPT | Performed by: INTERNAL MEDICINE

## 2022-06-27 PROCEDURE — 4004F PT TOBACCO SCREEN RCVD TLK: CPT | Performed by: INTERNAL MEDICINE

## 2022-07-01 ENCOUNTER — TELEPHONE (OUTPATIENT)
Dept: NEPHROLOGY | Facility: CLINIC | Age: 62
End: 2022-07-01

## 2022-07-01 NOTE — TELEPHONE ENCOUNTER
Received call from pt stating he was told to schedule HFU  However, he is on dialysis   I told him that once you are on dialysis that they are followed up there

## 2022-07-07 ENCOUNTER — TELEPHONE (OUTPATIENT)
Dept: INTERVENTIONAL RADIOLOGY/VASCULAR | Facility: HOSPITAL | Age: 62
End: 2022-07-07

## 2022-07-07 RX ORDER — SODIUM CHLORIDE 9 MG/ML
75 INJECTION, SOLUTION INTRAVENOUS CONTINUOUS
Status: CANCELLED | OUTPATIENT
Start: 2022-07-07

## 2022-07-11 ENCOUNTER — APPOINTMENT (OUTPATIENT)
Dept: LAB | Facility: CLINIC | Age: 62
End: 2022-07-11
Payer: MEDICARE

## 2022-07-11 DIAGNOSIS — D47.2 MONOCLONAL PARAPROTEINEMIA: Primary | ICD-10-CM

## 2022-07-11 LAB
IGA SERPL-MCNC: 314 MG/DL (ref 70–400)
IGG SERPL-MCNC: 888 MG/DL (ref 700–1600)
IGM SERPL-MCNC: 63 MG/DL (ref 40–230)

## 2022-07-11 PROCEDURE — 82784 ASSAY IGA/IGD/IGG/IGM EACH: CPT

## 2022-07-11 PROCEDURE — 36415 COLL VENOUS BLD VENIPUNCTURE: CPT

## 2022-07-13 ENCOUNTER — PREP FOR PROCEDURE (OUTPATIENT)
Dept: INTERVENTIONAL RADIOLOGY/VASCULAR | Facility: CLINIC | Age: 62
End: 2022-07-13

## 2022-07-13 ENCOUNTER — APPOINTMENT (OUTPATIENT)
Dept: LAB | Facility: CLINIC | Age: 62
End: 2022-07-13
Payer: MEDICARE

## 2022-07-13 ENCOUNTER — HOSPITAL ENCOUNTER (OUTPATIENT)
Dept: INTERVENTIONAL RADIOLOGY/VASCULAR | Facility: HOSPITAL | Age: 62
Discharge: HOME | End: 2022-07-13
Attending: INTERNAL MEDICINE | Admitting: STUDENT IN AN ORGANIZED HEALTH CARE EDUCATION/TRAINING PROGRAM
Payer: MEDICARE

## 2022-07-13 VITALS
HEIGHT: 68 IN | HEART RATE: 78 BPM | TEMPERATURE: 97.6 F | WEIGHT: 192.9 LBS | BODY MASS INDEX: 29.24 KG/M2 | RESPIRATION RATE: 18 BRPM | OXYGEN SATURATION: 98 % | SYSTOLIC BLOOD PRESSURE: 124 MMHG | DIASTOLIC BLOOD PRESSURE: 79 MMHG

## 2022-07-13 DIAGNOSIS — D47.2 MGUS (MONOCLONAL GAMMOPATHY OF UNKNOWN SIGNIFICANCE): Primary | ICD-10-CM

## 2022-07-13 DIAGNOSIS — D47.2 MGUS (MONOCLONAL GAMMOPATHY OF UNKNOWN SIGNIFICANCE): ICD-10-CM

## 2022-07-13 PROCEDURE — 88364 INSITU HYBRIDIZATION (FISH): CPT | Performed by: PATHOLOGY

## 2022-07-13 PROCEDURE — 85097 BONE MARROW INTERPRETATION: CPT | Performed by: PATHOLOGY

## 2022-07-13 PROCEDURE — 88311 DECALCIFY TISSUE: CPT | Performed by: PATHOLOGY

## 2022-07-13 PROCEDURE — 38222 DX BONE MARROW BX & ASPIR: CPT

## 2022-07-13 PROCEDURE — 88305 TISSUE EXAM BY PATHOLOGIST: CPT | Performed by: PATHOLOGY

## 2022-07-13 PROCEDURE — 88185 FLOWCYTOMETRY/TC ADD-ON: CPT | Performed by: STUDENT IN AN ORGANIZED HEALTH CARE EDUCATION/TRAINING PROGRAM

## 2022-07-13 PROCEDURE — 88341 IMHCHEM/IMCYTCHM EA ADD ANTB: CPT | Performed by: PATHOLOGY

## 2022-07-13 PROCEDURE — 88185 FLOWCYTOMETRY/TC ADD-ON: CPT

## 2022-07-13 PROCEDURE — 88342 IMHCHEM/IMCYTCHM 1ST ANTB: CPT | Performed by: PATHOLOGY

## 2022-07-13 PROCEDURE — 99152 MOD SED SAME PHYS/QHP 5/>YRS: CPT

## 2022-07-13 PROCEDURE — 88237 TISSUE CULTURE BONE MARROW: CPT | Performed by: INTERNAL MEDICINE

## 2022-07-13 PROCEDURE — 88313 SPECIAL STAINS GROUP 2: CPT | Performed by: PATHOLOGY

## 2022-07-13 PROCEDURE — 88262 CHROMOSOME ANALYSIS 15-20: CPT | Performed by: INTERNAL MEDICINE

## 2022-07-13 PROCEDURE — 88184 FLOWCYTOMETRY/ TC 1 MARKER: CPT | Performed by: INTERNAL MEDICINE

## 2022-07-13 PROCEDURE — 99152 MOD SED SAME PHYS/QHP 5/>YRS: CPT | Performed by: STUDENT IN AN ORGANIZED HEALTH CARE EDUCATION/TRAINING PROGRAM

## 2022-07-13 PROCEDURE — 88365 INSITU HYBRIDIZATION (FISH): CPT | Performed by: PATHOLOGY

## 2022-07-13 PROCEDURE — 88360 TUMOR IMMUNOHISTOCHEM/MANUAL: CPT | Performed by: PATHOLOGY

## 2022-07-13 PROCEDURE — 84166 PROTEIN E-PHORESIS/URINE/CSF: CPT

## 2022-07-13 PROCEDURE — 77002 NEEDLE LOCALIZATION BY XRAY: CPT | Performed by: STUDENT IN AN ORGANIZED HEALTH CARE EDUCATION/TRAINING PROGRAM

## 2022-07-13 PROCEDURE — 38222 DX BONE MARROW BX & ASPIR: CPT | Performed by: STUDENT IN AN ORGANIZED HEALTH CARE EDUCATION/TRAINING PROGRAM

## 2022-07-13 RX ORDER — SODIUM CHLORIDE 9 MG/ML
75 INJECTION, SOLUTION INTRAVENOUS CONTINUOUS
Status: DISCONTINUED | OUTPATIENT
Start: 2022-07-13 | End: 2022-07-14 | Stop reason: HOSPADM

## 2022-07-13 RX ORDER — MIDAZOLAM HYDROCHLORIDE 2 MG/2ML
INJECTION, SOLUTION INTRAMUSCULAR; INTRAVENOUS CODE/TRAUMA/SEDATION MEDICATION
Status: COMPLETED | OUTPATIENT
Start: 2022-07-13 | End: 2022-07-13

## 2022-07-13 RX ORDER — LIDOCAINE WITH 8.4% SOD BICARB 0.9%(10ML)
SYRINGE (ML) INJECTION CODE/TRAUMA/SEDATION MEDICATION
Status: COMPLETED | OUTPATIENT
Start: 2022-07-13 | End: 2022-07-13

## 2022-07-13 RX ORDER — FENTANYL CITRATE 50 UG/ML
INJECTION, SOLUTION INTRAMUSCULAR; INTRAVENOUS CODE/TRAUMA/SEDATION MEDICATION
Status: COMPLETED | OUTPATIENT
Start: 2022-07-13 | End: 2022-07-13

## 2022-07-13 RX ADMIN — FENTANYL CITRATE 50 MCG: 50 INJECTION, SOLUTION INTRAMUSCULAR; INTRAVENOUS at 12:40

## 2022-07-13 RX ADMIN — MIDAZOLAM 1 MG: 1 INJECTION INTRAMUSCULAR; INTRAVENOUS at 12:35

## 2022-07-13 RX ADMIN — Medication 10 ML: at 12:41

## 2022-07-13 RX ADMIN — FENTANYL CITRATE 50 MCG: 50 INJECTION, SOLUTION INTRAMUSCULAR; INTRAVENOUS at 12:35

## 2022-07-13 RX ADMIN — SODIUM CHLORIDE 75 ML/HR: 0.9 INJECTION, SOLUTION INTRAVENOUS at 11:31

## 2022-07-13 RX ADMIN — MIDAZOLAM 1 MG: 1 INJECTION INTRAMUSCULAR; INTRAVENOUS at 12:40

## 2022-07-13 NOTE — BRIEF OP NOTE (RAD/CATH)
INTERVENTIONAL RADIOLOGY PROCEDURE NOTE    Date: 7/13/2022    Procedure: IR BIOPSY BONE MARROW    Preoperative diagnosis:   1  MGUS (monoclonal gammopathy of unknown significance)         Postoperative diagnosis: Same  Surgeon: Sanna Calvo MD     Assistant: None  No qualified resident was available  Blood loss: 5 ml    Specimens: sent to path     Findings: BMB/asp L iliac crest     Complications: None immediate      Anesthesia: conscious sedation

## 2022-07-13 NOTE — PROGRESS NOTES
Interventional Radiology Preprocedure Note    History/Indication for procedure:   Carmen Lee is a 62 y o  male with a PMH of MGUS who presents for BMB/asp      Relevant past medical history:    Past Medical History:   Diagnosis Date    CHF (congestive heart failure) ([facility] [address] )     Hyperkalemia 02/01/2022    Hypertension     Renal disorder      Patient Active Problem List   Diagnosis    Benign essential HTN    CHF (congestive heart failure) (HCC)    ETOH abuse    Acute renal failure (HCC)    Marijuana use    Hyperlipidemia    Tobacco abuse    Hypokalemia    Leukocytosis    Seizure due to alcohol withdrawal, uncomplicated (HCC)    Pharyngeal edema    High anion gap metabolic acidosis    Withdrawal seizures (HCC)    Hypomagnesemia    Anemia    Hyponatremia    Hypocalcemia    MGUS (monoclonal gammopathy of unknown significance)       /85 (BP Location: Left arm)   Pulse 78   Temp (!) 96 5 °F (35 8 °C) (Temporal)   Resp 20   Ht 5' 8" (1 727 m)   Wt 87 5 kg (192 lb 14 4 oz)   SpO2 100%   BMI 29 33 kg/m²     Medications:    Inpatient Medications:     Scheduled Medications:  Current Facility-Administered Medications   Medication Dose Route Frequency Provider Last Rate    sodium chloride  75 mL/hr Intravenous Continuous Laxmi Ashford MD 75 mL/hr (07/13/22 1131)       Infusions:  sodium chloride, 75 mL/hr, Last Rate: 75 mL/hr (07/13/22 1131)        PRN:      Outpatient Medications:  Current Outpatient Medications on File Prior to Encounter   Medication Sig Dispense Refill    amLODIPine (NORVASC) 5 mg tablet Take 1 tablet (5 mg total) by mouth daily 90 tablet 1    aspirin (ECOTRIN LOW STRENGTH) 81 mg EC tablet Take 1 tablet (81 mg total) by mouth daily 90 tablet 1    b complex-vitamin C-folic acid (NEPHROCAPS) 1 mg capsule Take 1 capsule by mouth daily with dinner 30 capsule 0    calcium acetate (PHOSLO) capsule Take 3 capsules (2,001 mg total) by mouth 3 (three) times a day with meals 270 capsule 0    carvedilol (COREG) 25 mg tablet Take 1 tablet (25 mg total) by mouth 2 (two) times a day with meals 90 tablet 1    cholecalciferol (VITAMIN D3) 1,000 units tablet Take 4 tablets (4,000 Units total) by mouth daily 120 tablet 0    pravastatin (PRAVACHOL) 80 mg tablet Take 1 tablet (80 mg total) by mouth daily with dinner 90 tablet 1    EPINEPHrine (EPIPEN) 0 3 mg/0 3 mL SOAJ Inject 0 3 mL (0 3 mg total) into a muscle once for 1 dose 0 6 mL 0    furosemide (LASIX) 20 mg tablet Take 0 5 tablets (10 mg total) by mouth daily 45 tablet 1    nicotine (NICODERM CQ) 14 mg/24hr TD 24 hr patch Place 1 patch on the skin daily 28 patch 0     No current facility-administered medications on file prior to encounter  No Known Allergies    Anticoagulants: ASA    ASA classification: ASA 3 - Patient with moderate systemic disease with functional limitations    Airway Assessment: II (hard and soft palate, upper portion of tonsils anduvula visible)    Relevant family history: None    Relevant review of systems: None    Prior sedation/anesthesia: yes    Can the patient lie flat? Yes     NPO Status: yes    Labs:   CBC with diff:   Lab Results   Component Value Date    WBC 7 08 06/13/2022    HGB 8 0 (L) 06/13/2022    HCT 23 9 (L) 06/13/2022    MCV 92 06/13/2022     06/13/2022    MCH 30 8 06/13/2022    MCHC 33 5 06/13/2022    RDW 13 9 06/13/2022    MPV 10 3 06/13/2022    NRBC 0 06/11/2022     BMP/CMP:  Lab Results   Component Value Date    K 3 5 06/13/2022     06/13/2022    CO2 19 (L) 06/13/2022    BUN 56 (H) 06/13/2022    CREATININE 7 96 (H) 06/13/2022    CALCIUM 6 2 (L) 06/13/2022    AST 19 06/13/2022    ALT 7 (L) 06/13/2022    ALKPHOS 68 06/13/2022    EGFR 6 06/13/2022   ,     Coags:   Lab Results   Component Value Date    PTT 26 02/18/2018    INR 0 98 02/18/2018   ,          Relevant imaging studies:   Reviewed      Directed physical examination:  I agree with the physical exam performed on 6/27/22 and there are no additional changes  Assessment/Plan:   BMB/asp  Sedation/Anesthesia plan: Moderate sedation will be used as needed for procedure  Consent with alternatives to the procedure, risks and benefits have been explained and discussed with the patient/patient's family: yes

## 2022-07-13 NOTE — DISCHARGE INSTRUCTIONS
Bone Marrow Biopsy     WHAT YOU NEED TO KNOW:   A bone marrow biopsy is a procedure to remove a small amount of bone marrow from your bone  Bone marrow is the soft tissue inside your bone that helps to make blood cells  The sample is tested for disease or infection  DISCHARGE INSTRUCTIONS:     1  Limit your activities day of biopsy as directed by your doctor  2  Use medication as ordered  3  Return to your normal diet  Small sips of flat soda will help with nausea  4  Remove band-aid or dressing 24 hours after procedure  Contact Interventional Radiology at [phone] Elio PATIENTS: Contact Interventional Radiology at [phone]) Mattie Cruz PATIENTS: Contact Interventional Radiology at [phone]) if:    1  Difficulty breathing, nausea or vomiting  2  Chills or fever above 101 F     3  Pain at biopsy site not relieved by medication  4  Develop any redness, swelling, heat, unusual drainage, heavy bruising or bleeding from biopsy site  Procedural Sedation   WHAT YOU NEED TO KNOW:   Procedural sedation is medicine used during procedures to help you feel relaxed and calm  You will remember little to none of the procedure  After sedation you may feel tired, weak, or unsteady on your feet  You may also have trouble concentrating or short-term memory loss  These symptoms should go away in 24 hours or less  DISCHARGE INSTRUCTIONS:     Call 911 or have someone else call for any of the following: You have sudden trouble breathing  You cannot be woken  Contact Interventional Radiology at [phone]   Elio PATIENTS: Contact Interventional Radiology at [phone]) Mattie Cruz PATIENTS: Contact Interventional Radiology at [phone]) if any of the following occur: You have a severe headache or dizziness  Your heart is beating faster than usual     You have a fever or chills  Your skin is itchy, swollen, or you have a rash       You have nausea or are vomiting for more than 8 hours after the procedure  You have questions or concerns about your condition or care  Self-care:   Have someone stay with you for 24 hours  This person can drive you to errands and help you do things around the house  This person can also watch for problems  Rest and do quiet activities for 24 hours  Do not exercise, ride a bike, or play sports  Stand up slowly to prevent dizziness and falls  Take short walks around the house with another person  Slowly return to your usual activities the next day  Do not drive or use dangerous machines or tools for 24 hours  You may injure yourself or others  Examples include a lawnmower, saw, or drill  Do not return to work for 24 hours if you use dangerous machines or tools for work  Do not make important decisions for 24 hours  For example, do not sign important papers or invest money  Drink liquids as directed  Liquids help flush the sedation medicine out of your body  Ask how much liquid to drink each day and which liquids are best for you  Eat small, frequent meals to prevent nausea and vomiting  Start with clear liquids such as juice or broth  If you do not vomit after clear liquids, you can eat your usual foods  Do not drink alcohol or take medicines that make you drowsy  This includes medicines that help you sleep and anxiety medicines  Ask your healthcare provider if it is safe for you to take pain medicine  Follow up with your healthcare provider as directed: Write down your questions so you remember to ask them during your visits

## 2022-07-14 LAB
ALBUMIN UR ELPH-MCNC: 68.4 %
ALPHA1 GLOB MFR UR ELPH: 5.7 %
ALPHA2 GLOB MFR UR ELPH: 6.6 %
B-GLOBULIN MFR UR ELPH: 8.6 %
GAMMA GLOB MFR UR ELPH: 10.7 %
MISCELLANEOUS LAB TEST RESULT: NORMAL
PROT PATTERN UR ELPH-IMP: ABNORMAL
PROT UR-MCNC: 1515 MG/DL

## 2022-07-20 NOTE — PROGRESS NOTES
Hematology Outpatient Office Note    Date of Service: 7/29/2022    [facility]AT[facility]  [phone]    Reason for Consultation:   Chief Complaint   Patient presents with    Follow-up       Referral Physician: No ref  provider found    Primary Care Physician:  Prabhjot Maher MD       ASSESSMENT & PLAN      Diagnosis ICD-10-CM Associated Orders   1  MGUS (monoclonal gammopathy of unknown significance)  D47 2 IgG, IgA, IgM     Immunoglobulin free LT chains blood     Protein, urine, 24 hour     Protein electrophoresis, urine     Protein, Total and Protein Electrophoresis with Immunofixation     IgG, IgA, IgM     Immunoglobulin free LT chains blood         This is a 62 y o  c PMHx notable for CHF, HTN, ESRD, being seen in consultation for underlying monoclonal paraproteinemia     Monoclonal paraproteinemia    6/11/2022 SPEP (STACEY not done) showed a monoclonal gammopathy identified as IgG lambda  There is an absence of hypercalcemia, or bone pain  The patient has CKD since 2/19/2018  creat 2 24 then, 4 5 1/31/2022, 7 96 6/13/2022  Hypocalcemia, low ionized Ca 0 86, Vitamin D 1,25 OH 15 3  6/11/22: IgG Lambda, Hgb 8 9, MCV 88, WBC 6 23k, plt 282k, PTH 1103  3! !! Kappa 136 1, Lambda 141 8, ratio 0 96    7/13/22: no plasma cell or B cell lymphoma abnl on flow cytometry  BMBx Normocellular esteban (40% cellularity) with trilineage hematopoiesis and 8% polyclonal plasma cells  Adequate iron stores    Patient information  Some of the immune system cells can cause issues over time that need to be monitored, one very common form of this is called MGUS or monoclonal gammopathy of undetermined significance  Immune cells make immunoglobulin, this is a family of proteins that help fight infections  When there are too many of these cells, we can detect the specific protein that they make   MGUS is extremely common, 1 out of 10 people will develop MGUS  It is more common as people age, and when there are inflammatory conditions (such as rheumatologic diseases like lupus, rheumatoid arthritis, inflammatory arthritis, myositis, etc)  Because MGUS is so common, there is a good chance we will find it on the testing today  About 5% of people develop what is called light chain MGUS (found in the urine or the blood), and about 5% of people develop MGUS from the intact immunoglobulin molecule (found in the blood primarily)  MGUS is monitored because it can turn into a blood cancer called multiple myeloma  We look for MGUS or myeloma when people have low blood counts  Most often MGUS is found when people have rheumatologic diseases or neurologic diseases, but no blood issues  There are different forms of MGUS, the most common form has only a 5% chance of turning into myeloma over 20 years  Other forms are monitored more differently  Several of the names are MGUS (abnormal full-length immunoglobulin in blood), Idiopathic Bence-Fox Proteinuria (abnormal immunoglobulin fragments in the urine only, but not the blood), light chain MGUS (abnormal immunoglobulin fragments in the blood), multiple myeloma (abnormal immunoglobulin in blood, and the immune cells are causing problems with blood cell production or bone health)  MGUS is associated with several other conditions  Sarcoidosis, tuberculosis, hepatitis C virus, CMV infection, chronic hepatitis, colon cancer, hairy cell leukemia, CLL, acute leukemias  C1 esterase deficiency, diabetic neuropathy, Hashimoto's thyroiditis, hyperparathyroidism have also been reported  We note that lupus, cirrhosis, glomerular nephritis, psoriasis, pyoderma gangrenosum, scleroderma, Sjogren syndrome seronegative polyarthritis, scleroderma, rheumatoid arthritis, connective tissue disorders, ankylosing spondylitis have been associated with MGUS      The transformation of MGUS to myeloma, B-cell lymphoma, light chain amyloidosis, or macroglobulinemia depends on disease subtype  M-protein levels greater than 1 5g/dl, non-IgG monoclonal proteins, and free light chain ratios >1 65 are at risk factors  Low risk disease has none of the above risk factors and has a 20yr risk of progression of 5%  With 1 risk factor the 20yr risk is 21%, and with 2 risk factors the 20yr risk is 37%  Bone marrow biopsy is recommended in patients with paraprotein levels >1 5g/dl, elevated FLC ratio (usually >5), non-IgG paraprotein, and in patients with clear anemia/bone lesions/renal insufficiency/hypercalcemia without  an alternative explanation  CKD can lead to elevations in the free light chain ratio, a recent study observed that patients with a ratio less than 5 05 did not have myeloma, yet myeloma is usually associated with extreme ratios ~200 ([place] 2015)  In this study patients with CKD commonly had elevations of LC ratios beyond the traditional normal range, 33% for nephrotic syndomre, 36% non-nephrotic proteinuria, 47% of CKD of unknown origin  Imaging: generally not recommended for MGUS  If high risk features are present PET-CT can be helpful  NCCN guidelines recommend skeletal survey for lytic lesions in the assessment of myeloma, however the sensitivity is poor and many false positive findings are noted  Whole body MRI can also be effective  Since monoclonal gammopathy is not treated, extensive evaluation of low level paraprotein levels is not recommended  Please note that in patients with underlying renal disease will have elevated kappa light chains as a result of decreased renal clearance, this can lead to FLC ratios and further investigation is not recommended for FLC ratios <5       · Discussion of decision making    I personally reviewed the following lab results, the image studies, pathology, other specialty/physicians consult notes and recommendations, and outside medical records from [facility]   I had a lengthy discussion with the patient and shared the work-up findings  We discussed the diagnosis and management plan as below  I spent 34 minutes reviewing the records (labs, clinician notes, outside records, medical history, ordering medicine/tests/procedures, interpreting the imaging/labs previously done) and coordination of care as well as direct time with the patient today, of which greater than 50% of the time was spent in counseling and coordination of care with the patient/family  · Plan/Labs  · SPEP labs in 6 months, then again q 2-3 years as the pt is low risk  · Pt getting EPO with nephrology during ESRD         Follow Up: 6 months    All questions were answered to the patient's satisfaction during this encounter  The patient knows the contact information for our office and knows to reach out for any relevant concerns related to this encounter  They are to call for any temperature 100 4 or higher, new symptoms including but not restricted to shaking chills, decreased appetite, nausea, vomiting, diarrhea, increased fatigue, shortness of breath or chest pain, confusion, and not feeling the strength to come to the clinic  For all other listed problems and medical diagnosis in their chart - they are managed by PCP and/or other specialists, which the patient acknowledges  Thank you very much for your consultation and making us a part of this patient's care  We are continuing to follow closely with you  Please do not hesitate to reach out to me with any additional questions or concerns  Hoa Ford MD  Hematology & Medical Oncology Staff Physician             Disclaimer: This document was prepared using [employer] Fluency Direct technology  If a word or phrase is confusing, or does not make sense, this is likely due to recognition error which was not discovered during this clinician's review   If you believe an error has occurred, please contact me through [address] [place] line for giuliana?cation  HEMATOLOGICAL HISTORY OF PRESENT ILLNESS      Clotting History None   Bleeding History None   Cancer History None   Family Cancer History Cervical (sister), Pancreatic (mom)   H/O Blood/Plt Transfusion None   Tobacco/etoh/drug abuse 1 PPD x 35 years and has been working to quit since 2/2022 (down to a few cigs a day), hx etoh abuse (8 x 18 oz beer for 5 years and then slowed down) or rec drug use of late       Cancer Screening history Haven't done Colon cancer screening and recommended that he do that   Occupation Retired    Pain: none    6/11/2022 SPEP (STACEY not done) showed a monoclonal gammopathy identified as IgG lambda  There is an absence of hypercalcemia, or bone pain  The patient has CKD since 2/19/2018  creat 2 24 then, 4 5 1/31/2022, 7 96 6/13/2022  Hypocalcemia, low ionized Ca 0 86, Vitamin D 1,25 OH 15 3  6/11/22: IgG Lambda, Hgb 8 9, MCV 88, WBC 6 23k, plt 282k, PTH 1103  3! !! Kappa 136 1, Lambda 141 8, ratio 0 96    7/13/22: no plasma cell or B cell lymphoma abnl on flow cytometry  BMBx Normocellular esteban (40% cellularity) with trilineage hematopoiesis and 8% polyclonal plasma cells  Adequate iron stores    SUBJECTIVE  (INTERVAL HISTORY)        I have reviewed the relevant past medical, surgical, social and family history  I have also reviewed allergies and medications for this patient  Interval events: he has been feeling much better on dialysis  No new bone, joint pains, F/C, N/V, SOB, CP, LH, HA   SOB went away once dialysis started  Review of Systems  Previous baseline weight: 215 lbs  Dry weight: 196 lbs    Denies falls, gen weakness, rash, itching, hematuria, melena, hematochezia, diarrhea, or constipation  He has chronic LLE swelling that is stable  A 10-point review of system was performed, pertinent positive and negative were detailed as above  Otherwise, the 10-point review of system was negative        Past Medical History:   Diagnosis Date    [facility]congestive heart failure) ([facility] [address] )     Hyperkalemia 02/01/2022    Hypertension     Renal disorder        Past Surgical History:   Procedure Laterality Date    IR BIOPSY BONE MARROW  7/13/2022    IR TUNNELED DIALYSIS CATHETER PLACEMENT  6/10/2022       No family history on file  Social History     Socioeconomic History    Marital status: Significant Other     Spouse name: Not on file    Number of children: Not on file    Years of education: Not on file    Highest education level: Not on file   Occupational History    Not on file   Tobacco Use    Smoking status: Current Every Day Smoker     Packs/day: 1 00     Years: 30 00     Pack years: 30 00    Smokeless tobacco: Never Used   Substance and Sexual Activity    Alcohol use: Yes     Alcohol/week: 10 0 standard drinks     Types: 10 Cans of beer per week     Comment: socially    Drug use: Yes     Types: Marijuana     Comment: occasionally    Sexual activity: Not on file   Other Topics Concern    Not on file   Social History Narrative    Not on file     Social Determinants of Health     Financial Resource Strain: Not on file   Food Insecurity: No Food Insecurity    Worried About Running Out of Food in the Last Year: Never true    Tashi of Food in the Last Year: Never true   Transportation Needs: No Transportation Needs    Lack of Transportation (Medical): No    Lack of Transportation (Non-Medical):  No   Physical Activity: Not on file   Stress: Not on file   Social Connections: Not on file   Intimate Partner Violence: Not on file   Housing Stability: Low Risk     Unable to Pay for Housing in the Last Year: No    Number of Places Lived in the Last Year: 1    Unstable Housing in the Last Year: No       No Known Allergies    Current Outpatient Medications   Medication Sig Dispense Refill    amLODIPine (NORVASC) 5 mg tablet Take 1 tablet (5 mg total) by mouth daily 90 tablet 1    aspirin (ECOTRIN LOW STRENGTH) 81 mg EC tablet Take 1 tablet (81 mg total) by mouth daily 90 tablet 1    carvedilol (COREG) 25 mg tablet Take 1 tablet (25 mg total) by mouth 2 (two) times a day with meals 90 tablet 1    furosemide (LASIX) 20 mg tablet Take 0 5 tablets (10 mg total) by mouth daily 45 tablet 1    nicotine (NICODERM CQ) 14 mg/24hr TD 24 hr patch Place 1 patch on the skin daily 28 patch 0    pravastatin (PRAVACHOL) 80 mg tablet Take 1 tablet (80 mg total) by mouth daily with dinner 90 tablet 1    b complex-vitamin C-folic acid (NEPHROCAPS) 1 mg capsule Take 1 capsule by mouth daily with dinner 30 capsule 0    calcium acetate (PHOSLO) capsule Take 3 capsules (2,001 mg total) by mouth 3 (three) times a day with meals 270 capsule 0    cholecalciferol (VITAMIN D3) 1,000 units tablet Take 4 tablets (4,000 Units total) by mouth daily 120 tablet 0    EPINEPHrine (EPIPEN) 0 3 mg/0 3 mL SOAJ Inject 0 3 mL (0 3 mg total) into a muscle once for 1 dose 0 6 mL 0     No current facility-administered medications for this visit  (Not in a hospital admission)        Objective:     24 Hour Vitals Assessment:     Vitals:    07/29/22 0901   BP: 130/70   Pulse: 70   Resp: 17   Temp: 97 6 °F (36 4 °C)   SpO2: 100%       PHYSICIAN EXAM:    General: Appearance: alert, cooperative, no distress  HEENT: Normocephalic, atraumatic  No scleral icterus  conjunctivae clear  EOMI  Chest: No tenderness to palpation  No open wound noted  Lungs: Clear to auscultation bilaterally, Respirations unlabored  Cardiac: Regular rate and rhythm, +S1and S2  Abdomen: Soft, non-tender, non-distended  Bowel sounds are normal   Extremities:  No cyanosis, clubbing  Non-pitting edema LLE +1  Skin: Skin color, turgor are normal  No rashes  Lymphatics: no palpable supra-cervical, axillary, or inguinal adenopathy  Neurologic: Awake, Alert, and oriented, no gross focal deficits noted b/l         DATA REVIEW:    Pathology Result:    Final Diagnosis   Date Value Ref Range Status   07/13/2022 Incomplete    A-C  Bone Marrow, Left Iliac Crest, Core, Clot and Aspirate:  - Normocellular esteban (40% cellularity) with trilineage hematopoiesis and 8% polyclonal plasma cells  - Adequate iron stores  - Mild patchy reticulin fibrosis  - Cytogenetic/Molecular study pending  Comment: Correlation with serum protein electrophoresis, serum immunofixation, and the serum free light chain assay is suggested  Those methodologies may be more sensitive for low-level plasma cell neoplasm  Image Results:   Image result are reviewed and documented in Hematology/Oncology history  I personally reviewed these images  IR biopsy bone marrow  Narrative: PROCEDURE:  Fluoroscopic-guided bone marrow biopsy and bone marrow aspiration    STAFF:  RHINA Jensen  FLUOROSCOPY TIME:  0 6 minutes  NUMBER OF IMAGES:  Multiple  COMPLICATIONS:   None  MEDICATIONS:   Versed 2 milligrams iv  Fentanyl 100 micrograms iv  Moderate sedation was monitored by radiology nursing staff  Monitoring of conscious sedation totaled 15 minutes  INDICATION:   MGUS  PROCEDURE:   Using fluoroscopic guidance, the Particle Code system was used to perform bone marrow aspiration via the left posterior superior iliac spine  Approximately 5 mL of marrow was removed and sent to pathology once  Next, bone marrow biopsy was performed  The   biopsy specimen was given to the in-room pathologist  The needle was then removed and hemostasis was achieved using manual compression  Impression: Bone marrow aspiration and biopsy  Workstation performed: [id number]      LABS:  Lab data are reviewed and documented in HemOnc history         Lab Results   Component Value Date    HGB 8 0 (L) 06/13/2022    HCT 23 9 (L) 06/13/2022    MCV 92 06/13/2022     06/13/2022    WBC 7 08 06/13/2022    NRBC 0 06/11/2022    BANDSPCT 3 02/01/2022    ATYLMPCT 1 (H) 02/01/2022     Lab Results   Component Value Date    K 3 5 06/13/2022     06/13/2022    CO2 19 (L) 06/13/2022    BUN 56 (H) 06/13/2022    CREATININE 7 96 (H) 06/13/2022    CALCIUM 6 2 (L) 06/13/2022    CORRECTEDCA 8 4 06/13/2022    AST 19 06/13/2022    ALT 7 (L) 06/13/2022    ALKPHOS 68 06/13/2022    EGFR 6 06/13/2022       Lab Results   Component Value Date    IRON 76 06/11/2022    TIBC 128 (L) 06/11/2022    FERRITIN 619 (H) 06/11/2022       Lab Results   Component Value Date    [id number] 533 02/19/2018       No results for input(s): WBC, CREAT in the last 72 hours      Invalid input(s):  PLT     By:  Marta Daley MD, 7/29/2022, 9:18 AM

## 2022-07-21 LAB — MISCELLANEOUS LAB TEST RESULT: NORMAL

## 2022-07-22 LAB — SCAN RESULT: NORMAL

## 2022-07-26 NOTE — TELEPHONE ENCOUNTER
Called patient and spoke to him in regards to his insurance being termed as of 07/31/22  Patient stated that he was under the impression that he was going to continue to be covered until he received his new insurance, [employer], on 09/01/22  Advised patient that we would get back to him in regards to this matter  On 07/26/22 spoke to Rosibel Osorio from [facility] [phone] to request a ROSY Damian authorization  Per representative, Patient is not eligible as of 07/31/22 an Sandy Pauline can not be obtain for services that are scheduled on 08/03/22  Call ref# [id number]    Reached out to [address] [place] [address] to see what we should do in regards to this situation

## 2022-07-27 ENCOUNTER — TELEPHONE (OUTPATIENT)
Dept: INTERVENTIONAL RADIOLOGY/VASCULAR | Facility: HOSPITAL | Age: 62
End: 2022-07-27

## 2022-07-29 ENCOUNTER — OFFICE VISIT (OUTPATIENT)
Dept: HEMATOLOGY ONCOLOGY | Facility: CLINIC | Age: 62
End: 2022-07-29
Payer: MEDICARE

## 2022-07-29 VITALS
WEIGHT: 196 LBS | RESPIRATION RATE: 17 BRPM | HEIGHT: 68 IN | TEMPERATURE: 97.6 F | OXYGEN SATURATION: 100 % | SYSTOLIC BLOOD PRESSURE: 130 MMHG | BODY MASS INDEX: 29.7 KG/M2 | HEART RATE: 70 BPM | DIASTOLIC BLOOD PRESSURE: 70 MMHG

## 2022-07-29 DIAGNOSIS — D47.2 MGUS (MONOCLONAL GAMMOPATHY OF UNKNOWN SIGNIFICANCE): Primary | ICD-10-CM

## 2022-07-29 PROCEDURE — 99214 OFFICE O/P EST MOD 30 MIN: CPT | Performed by: INTERNAL MEDICINE

## 2022-07-29 NOTE — PATIENT INSTRUCTIONS
Patient information  Some of the immune system cells can cause issues over time that need to be monitored, one very common form of this is called MGUS or monoclonal gammopathy of undetermined significance  Immune cells make immunoglobulin, this is a family of proteins that help fight infections  When there are too many of these cells, we can detect the specific protein that they make  MGUS is extremely common, 1 out of 10 people will develop MGUS  It is more common as people age, and when there are inflammatory conditions (such as rheumatologic diseases like lupus, rheumatoid arthritis, inflammatory arthritis, myositis, etc)  Because MGUS is so common, there is a good chance we will find it on the testing today  About 5% of people develop what is called light chain MGUS (found in the urine or the blood), and about 5% of people develop MGUS from the intact immunoglobulin molecule (found in the blood primarily)  MGUS is monitored because it can turn into a blood cancer called multiple myeloma  We look for MGUS or myeloma when people have low blood counts  Most often MGUS is found when people have rheumatologic diseases or neurologic diseases, but no blood issues  There are different forms of MGUS, the most common form has only a 5% chance of turning into myeloma over 20 years  Other forms are monitored more differently  Several of the names are MGUS (abnormal full-length immunoglobulin in blood), Idiopathic Bence-Fox Proteinuria (abnormal immunoglobulin fragments in the urine only, but not the blood), light chain MGUS (abnormal immunoglobulin fragments in the blood), multiple myeloma (abnormal immunoglobulin in blood, and the immune cells are causing problems with blood cell production or bone health)  MGUS is associated with several other conditions  Sarcoidosis, tuberculosis, hepatitis C virus, CMV infection, chronic hepatitis, colon cancer, hairy cell leukemia, CLL, acute leukemias   C1 esterase deficiency, diabetic neuropathy, Hashimoto's thyroiditis, hyperparathyroidism have also been reported  We note that lupus, cirrhosis, glomerular nephritis, psoriasis, pyoderma gangrenosum, scleroderma, Sjogren syndrome seronegative polyarthritis, scleroderma, rheumatoid arthritis, connective tissue disorders, ankylosing spondylitis have been associated with MGUS  The transformation of MGUS to myeloma, B-cell lymphoma, light chain amyloidosis, or macroglobulinemia depends on disease subtype  M-protein levels greater than 1 5g/dl, non-IgG monoclonal proteins, and free light chain ratios >1 65 are at risk factors  Low risk disease has none of the above risk factors and has a 20yr risk of progression of 5%  With 1 risk factor the 20yr risk is 21%, and with 2 risk factors the 20yr risk is 37%  Bone marrow biopsy is recommended in patients with paraprotein levels >1 5g/dl, elevated FLC ratio (usually >5), non-IgG paraprotein, and in patients with clear anemia/bone lesions/renal insufficiency/hypercalcemia without  an alternative explanation  CKD can lead to elevations in the free light chain ratio, a recent study observed that patients with a ratio less than 5 05 did not have myeloma, yet myeloma is usually associated with extreme ratios ~200 ([place] 2015)  In this study patients with CKD commonly had elevations of LC ratios beyond the traditional normal range, 33% for nephrotic syndomre, 36% non-nephrotic proteinuria, 47% of CKD of unknown origin

## 2022-08-03 ENCOUNTER — ANESTHESIA (OUTPATIENT)
Dept: INTERVENTIONAL RADIOLOGY/VASCULAR | Facility: HOSPITAL | Age: 62
End: 2022-08-03

## 2022-08-03 ENCOUNTER — HOSPITAL ENCOUNTER (OUTPATIENT)
Dept: INTERVENTIONAL RADIOLOGY/VASCULAR | Facility: HOSPITAL | Age: 62
Discharge: HOME | End: 2022-08-03
Attending: STUDENT IN AN ORGANIZED HEALTH CARE EDUCATION/TRAINING PROGRAM
Payer: COMMERCIAL

## 2022-08-03 ENCOUNTER — HOSPITAL ENCOUNTER (OUTPATIENT)
Dept: INTERVENTIONAL RADIOLOGY/VASCULAR | Facility: HOSPITAL | Age: 62
Discharge: HOME | End: 2022-08-03
Attending: STUDENT IN AN ORGANIZED HEALTH CARE EDUCATION/TRAINING PROGRAM

## 2022-08-03 ENCOUNTER — ANESTHESIA EVENT (OUTPATIENT)
Dept: INTERVENTIONAL RADIOLOGY/VASCULAR | Facility: HOSPITAL | Age: 62
End: 2022-08-03

## 2022-08-03 ENCOUNTER — PREP FOR PROCEDURE (OUTPATIENT)
Dept: INTERVENTIONAL RADIOLOGY/VASCULAR | Facility: CLINIC | Age: 62
End: 2022-08-03

## 2022-08-03 VITALS
HEART RATE: 74 BPM | RESPIRATION RATE: 18 BRPM | OXYGEN SATURATION: 96 % | DIASTOLIC BLOOD PRESSURE: 75 MMHG | SYSTOLIC BLOOD PRESSURE: 150 MMHG | TEMPERATURE: 97.4 F

## 2022-08-03 VITALS
TEMPERATURE: 96.9 F | RESPIRATION RATE: 18 BRPM | HEART RATE: 75 BPM | OXYGEN SATURATION: 99 % | SYSTOLIC BLOOD PRESSURE: 141 MMHG | DIASTOLIC BLOOD PRESSURE: 89 MMHG

## 2022-08-03 DIAGNOSIS — N18.6 ESRD (END STAGE RENAL DISEASE) (HCC): Primary | ICD-10-CM

## 2022-08-03 DIAGNOSIS — D47.2 MGUS (MONOCLONAL GAMMOPATHY OF UNKNOWN SIGNIFICANCE): ICD-10-CM

## 2022-08-03 LAB
INR PPP: 0.87 (ref 0.84–1.19)
PROTHROMBIN TIME: 12.1 SECONDS (ref 11.6–14.5)

## 2022-08-03 PROCEDURE — 99153 MOD SED SAME PHYS/QHP EA: CPT

## 2022-08-03 PROCEDURE — G2171 AVF USE MAGNETIC/ART/VEN: HCPCS | Performed by: STUDENT IN AN ORGANIZED HEALTH CARE EDUCATION/TRAINING PROGRAM

## 2022-08-03 PROCEDURE — C1769 GUIDE WIRE: HCPCS

## 2022-08-03 PROCEDURE — 99152 MOD SED SAME PHYS/QHP 5/>YRS: CPT

## 2022-08-03 PROCEDURE — C1894 INTRO/SHEATH, NON-LASER: HCPCS

## 2022-08-03 PROCEDURE — G2171 AVF USE MAGNETIC/ART/VEN: HCPCS

## 2022-08-03 PROCEDURE — 85610 PROTHROMBIN TIME: CPT | Performed by: STUDENT IN AN ORGANIZED HEALTH CARE EDUCATION/TRAINING PROGRAM

## 2022-08-03 RX ORDER — SODIUM CHLORIDE 9 MG/ML
50 INJECTION, SOLUTION INTRAVENOUS CONTINUOUS
Status: DISCONTINUED | OUTPATIENT
Start: 2022-08-03 | End: 2022-08-04 | Stop reason: HOSPADM

## 2022-08-03 RX ORDER — MIDAZOLAM HYDROCHLORIDE 2 MG/2ML
INJECTION, SOLUTION INTRAMUSCULAR; INTRAVENOUS CODE/TRAUMA/SEDATION MEDICATION
Status: COMPLETED | OUTPATIENT
Start: 2022-08-03 | End: 2022-08-03

## 2022-08-03 RX ORDER — LIDOCAINE HYDROCHLORIDE 10 MG/ML
INJECTION, SOLUTION EPIDURAL; INFILTRATION; INTRACAUDAL; PERINEURAL CODE/TRAUMA/SEDATION MEDICATION
Status: COMPLETED | OUTPATIENT
Start: 2022-08-03 | End: 2022-08-03

## 2022-08-03 RX ORDER — FENTANYL CITRATE 50 UG/ML
INJECTION, SOLUTION INTRAMUSCULAR; INTRAVENOUS CODE/TRAUMA/SEDATION MEDICATION
Status: COMPLETED | OUTPATIENT
Start: 2022-08-03 | End: 2022-08-03

## 2022-08-03 RX ORDER — LIDOCAINE HYDROCHLORIDE 20 MG/ML
INJECTION, SOLUTION EPIDURAL; INFILTRATION; INTRACAUDAL; PERINEURAL
Status: COMPLETED | OUTPATIENT
Start: 2022-08-03 | End: 2022-08-03

## 2022-08-03 RX ORDER — HEPARIN SODIUM 1000 [USP'U]/ML
INJECTION, SOLUTION INTRAVENOUS; SUBCUTANEOUS CODE/TRAUMA/SEDATION MEDICATION
Status: COMPLETED | OUTPATIENT
Start: 2022-08-03 | End: 2022-08-03

## 2022-08-03 RX ORDER — LIDOCAINE WITH 8.4% SOD BICARB 0.9%(10ML)
SYRINGE (ML) INJECTION CODE/TRAUMA/SEDATION MEDICATION
Status: COMPLETED | OUTPATIENT
Start: 2022-08-03 | End: 2022-08-03

## 2022-08-03 RX ORDER — OXYCODONE HYDROCHLORIDE AND ACETAMINOPHEN 5; 325 MG/1; MG/1
1 TABLET ORAL EVERY 4 HOURS PRN
Status: DISCONTINUED | OUTPATIENT
Start: 2022-08-03 | End: 2022-08-04 | Stop reason: HOSPADM

## 2022-08-03 RX ORDER — VERAPAMIL HYDROCHLORIDE 2.5 MG/ML
INJECTION, SOLUTION INTRAVENOUS CODE/TRAUMA/SEDATION MEDICATION
Status: COMPLETED | OUTPATIENT
Start: 2022-08-03 | End: 2022-08-03

## 2022-08-03 RX ADMIN — Medication 200 MCG: at 10:25

## 2022-08-03 RX ADMIN — VERAPAMIL HYDROCHLORIDE 2.5 MG: 2.5 INJECTION INTRAVENOUS at 10:25

## 2022-08-03 RX ADMIN — MIDAZOLAM 1 MG: 1 INJECTION INTRAMUSCULAR; INTRAVENOUS at 08:51

## 2022-08-03 RX ADMIN — FENTANYL CITRATE 50 MCG: 50 INJECTION, SOLUTION INTRAMUSCULAR; INTRAVENOUS at 08:51

## 2022-08-03 RX ADMIN — SODIUM CHLORIDE 50 ML/HR: 0.9 INJECTION, SOLUTION INTRAVENOUS at 07:32

## 2022-08-03 RX ADMIN — HEPARIN SODIUM 3000 UNITS: 1000 INJECTION INTRAVENOUS; SUBCUTANEOUS at 10:26

## 2022-08-03 RX ADMIN — FENTANYL CITRATE 25 MCG: 50 INJECTION, SOLUTION INTRAMUSCULAR; INTRAVENOUS at 10:34

## 2022-08-03 RX ADMIN — Medication 3 ML: at 09:40

## 2022-08-03 RX ADMIN — IOHEXOL 65 ML: 350 INJECTION, SOLUTION INTRAVENOUS at 11:20

## 2022-08-03 RX ADMIN — LIDOCAINE HYDROCHLORIDE 1 ML: 10 INJECTION, SOLUTION EPIDURAL; INFILTRATION; INTRACAUDAL; PERINEURAL at 08:55

## 2022-08-03 RX ADMIN — LIDOCAINE HYDROCHLORIDE 20 ML: 20 INJECTION, SOLUTION EPIDURAL; INFILTRATION; INTRACAUDAL; PERINEURAL at 09:11

## 2022-08-03 RX ADMIN — MIDAZOLAM 0.5 MG: 1 INJECTION INTRAMUSCULAR; INTRAVENOUS at 10:05

## 2022-08-03 RX ADMIN — MIDAZOLAM 0.5 MG: 1 INJECTION INTRAMUSCULAR; INTRAVENOUS at 09:56

## 2022-08-03 RX ADMIN — MIDAZOLAM 1 MG: 1 INJECTION INTRAMUSCULAR; INTRAVENOUS at 08:58

## 2022-08-03 RX ADMIN — IOHEXOL 65 ML: 350 INJECTION, SOLUTION INTRAVENOUS at 11:43

## 2022-08-03 RX ADMIN — FENTANYL CITRATE 50 MCG: 50 INJECTION, SOLUTION INTRAMUSCULAR; INTRAVENOUS at 09:33

## 2022-08-03 RX ADMIN — LIDOCAINE HYDROCHLORIDE 20 ML: 10 INJECTION, SOLUTION EPIDURAL; INFILTRATION; INTRACAUDAL; PERINEURAL at 09:02

## 2022-08-03 NOTE — ANESTHESIA POSTPROCEDURE EVALUATION
Post-Op Assessment Note    CV Status:  Stable  Pain Score: 0    Pain management: adequate     Mental Status:  Alert and awake   Hydration Status:  Stable   PONV Controlled:  None   Airway Patency:  Patent      Post Op Vitals Reviewed: Yes      Staff: Anesthesiologist, with CRNAs         No complications documented      BP      Temp     Pulse     Resp      SpO2

## 2022-08-03 NOTE — NURSING NOTE
No distress  Able to move left arm  Woggel was removed by IR staff member   Dressing over site dry and intact  No drainage  TR Band remains in place on left wrist puncture site  No bleeding noted  Tolerating toast and drink

## 2022-08-03 NOTE — PROGRESS NOTES
Interventional Radiology Preprocedure Note    History/Indication for procedure:   Stefanie Girard is a 62 y o  male with a PMH of ESRD who presents for L endoAVF creation      Relevant past medical history:    Past Medical History:   Diagnosis Date    CHF (congestive heart failure) ([facility] [address] )     Hyperkalemia 02/01/2022    Hypertension     Renal disorder      Patient Active Problem List   Diagnosis    Benign essential HTN    CHF (congestive heart failure) (HCC)    ETOH abuse    Acute renal failure (HCC)    Marijuana use    Hyperlipidemia    Tobacco abuse    Hypokalemia    Leukocytosis    Seizure due to alcohol withdrawal, uncomplicated (HCC)    Pharyngeal edema    High anion gap metabolic acidosis    Withdrawal seizures (HCC)    Hypomagnesemia    Anemia    Hyponatremia    Hypocalcemia    MGUS (monoclonal gammopathy of unknown significance)       /87   Pulse 72   Temp (!) 96 8 °F (36 °C) (Temporal)   Resp 18   SpO2 100%     Medications:    Inpatient Medications:     Scheduled Medications:  Current Facility-Administered Medications   Medication Dose Route Frequency Provider Last Rate    sodium chloride  50 mL/hr Intravenous Continuous Jairo Farrell MD 50 mL/hr (08/03/22 0732)       Infusions:  sodium chloride, 50 mL/hr, Last Rate: 50 mL/hr (08/03/22 0732)        PRN:      Outpatient Medications:  Current Outpatient Medications on File Prior to Encounter   Medication Sig Dispense Refill    amLODIPine (NORVASC) 5 mg tablet Take 1 tablet (5 mg total) by mouth daily 90 tablet 1    b complex-vitamin C-folic acid (NEPHROCAPS) 1 mg capsule Take 1 capsule by mouth daily with dinner 30 capsule 0    calcium acetate (PHOSLO) capsule Take 3 capsules (2,001 mg total) by mouth 3 (three) times a day with meals 270 capsule 0    carvedilol (COREG) 25 mg tablet Take 1 tablet (25 mg total) by mouth 2 (two) times a day with meals 90 tablet 1    cholecalciferol (VITAMIN D3) 1,000 units tablet Take 4 tablets (4,000 Units total) by mouth daily 120 tablet 0    furosemide (LASIX) 20 mg tablet Take 0 5 tablets (10 mg total) by mouth daily 45 tablet 1    pravastatin (PRAVACHOL) 80 mg tablet Take 1 tablet (80 mg total) by mouth daily with dinner 90 tablet 1    aspirin (ECOTRIN LOW STRENGTH) 81 mg EC tablet Take 1 tablet (81 mg total) by mouth daily 90 tablet 1    EPINEPHrine (EPIPEN) 0 3 mg/0 3 mL SOAJ Inject 0 3 mL (0 3 mg total) into a muscle once for 1 dose 0 6 mL 0    nicotine (NICODERM CQ) 14 mg/24hr TD 24 hr patch Place 1 patch on the skin daily 28 patch 0     No current facility-administered medications on file prior to encounter  No Known Allergies    Anticoagulants: none    ASA classification: ASA 3 - Patient with moderate systemic disease with functional limitations    Airway Assessment: II (hard and soft palate, upper portion of tonsils anduvula visible)    Relevant family history: None    Relevant review of systems: None    Prior sedation/anesthesia: yes    Can the patient lie flat? Yes     NPO Status: yes    Labs:   CBC with diff:   Lab Results   Component Value Date    WBC 7 08 06/13/2022    HGB 8 0 (L) 06/13/2022    HCT 23 9 (L) 06/13/2022    MCV 92 06/13/2022     06/13/2022    MCH 30 8 06/13/2022    MCHC 33 5 06/13/2022    RDW 13 9 06/13/2022    MPV 10 3 06/13/2022    NRBC 0 06/11/2022     BMP/CMP:  Lab Results   Component Value Date    K 3 5 06/13/2022     06/13/2022    CO2 19 (L) 06/13/2022    BUN 56 (H) 06/13/2022    CREATININE 7 96 (H) 06/13/2022    CALCIUM 6 2 (L) 06/13/2022    AST 19 06/13/2022    ALT 7 (L) 06/13/2022    ALKPHOS 68 06/13/2022    EGFR 6 06/13/2022   ,     Coags:   Lab Results   Component Value Date    PTT 26 02/18/2018    INR 0 87 08/03/2022   ,   Results from last 7 days   Lab Units 08/03/22  0718   INR  0 87        Relevant imaging studies:   Reviewed      Directed physical examination:  I agree with the physical exam performed on 7/29/22 and there are no additional changes  Assessment/Plan:   L endoAVF creation  Sedation/Anesthesia plan: Moderate sedation will be used as needed for procedure  Consent with alternatives to the procedure, risks and benefits have been explained and discussed with the patient/patient's family: yes

## 2022-08-03 NOTE — DISCHARGE INSTRUCTIONS
ARTERIOGRAM    WHAT YOU SHOULD KNOW:   An angiogram is a procedure to look at arteries in your body  Arteries are the blood vessels that carry blood from your heart to your body  AFTER YOU LEAVE:     Self-care:   Limit activity: Rest for the remainder of the day of your procedure  Have some one with you until the next morning  Keep your arm or leg straight as much as possible  Rest as much as possible, sitting lying or reclining  Walk only to go to the bathroom, to bed or to eat  If the angiogram catheter was put in your leg, use the stairs as little as possible  No driving  Keep your wound clean and dry  You may shower 24 hours after your procedure  The bandage you have on should fall off in 2-3 days  If there is any drainage from the puncture site, you should put on a clean bandage  Watch for bleeding and bruising: It is normal to have a bruise and soreness where the angiogram catheter went in  Diet:   You may resume your regular diet, Sips of flat soda will help with mild nausea  Drink more liquids than usual for the next 24 hours      IMMEDIATELY Contact Interventional Radiology at [phone] Elio PATIENTS: Contact Interventional Radiology at [phone]) Ebony Cleary PATIENTS: Contact Interventional Radiology at [phone]) if any of the following occur: If your bruise gets larger or if you notice any active bleeding  APPLY DIRECT PRESSURE TO THE BLEEDING SITE  If you notice increased swelling or have increased pain at the puncture site   If you have any numbness or pain in the extremity of the puncture site   If that extremity seems cold or pale  You have fever greater than 101  Persistent nausea or vomitting    Follow up with your primary healthcare provider  as directed: Write down your questions so you remember to ask them during your visits                Procedural Sedation   WHAT YOU NEED TO KNOW:   Procedural sedation is medicine used during procedures to help you feel relaxed and calm  You will remember little to none of the procedure  After sedation you may feel tired, weak, or unsteady on your feet  You may also have trouble concentrating or short-term memory loss  These symptoms should go away in 24 hours or less  DISCHARGE INSTRUCTIONS:     Call 911 or have someone else call for any of the following: You have sudden trouble breathing  You cannot be woken  Contact Interventional Radiology at [phone]   Elio PATIENTS: Contact Interventional Radiology at [phone]) Rodney Jerome PATIENTS: Contact Interventional Radiology at [phone]) if any of the following occur: You have a severe headache or dizziness  Your heart is beating faster than usual     You have a fever or chills  Your skin is itchy, swollen, or you have a rash  You have nausea or are vomiting for more than 8 hours after the procedure  You have questions or concerns about your condition or care  Self-care:   Have someone stay with you for 24 hours  This person can drive you to errands and help you do things around the house  This person can also watch for problems  Rest and do quiet activities for 24 hours  Do not exercise, ride a bike, or play sports  Stand up slowly to prevent dizziness and falls  Take short walks around the house with another person  Slowly return to your usual activities the next day  Do not drive or use dangerous machines or tools for 24 hours  You may injure yourself or others  Examples include a lawnmower, saw, or drill  Do not return to work for 24 hours if you use dangerous machines or tools for work  Do not make important decisions for 24 hours  For example, do not sign important papers or invest money  Drink liquids as directed  Liquids help flush the sedation medicine out of your body  Ask how much liquid to drink each day and which liquids are best for you  Eat small, frequent meals to prevent nausea and vomiting   Start with clear liquids such as juice or broth  If you do not vomit after clear liquids, you can eat your usual foods  Do not drink alcohol or take medicines that make you drowsy  This includes medicines that help you sleep and anxiety medicines  Ask your healthcare provider if it is safe for you to take pain medicine  Follow up with your healthcare provider as directed: Write down your questions so you remember to ask them during your visits

## 2022-08-03 NOTE — SEDATION DOCUMENTATION
A barbeau test was performed on the patient's Left arm  A type A waveform pattern was demonstrated with radial compression  A reverse barbeau test was performed on the patient's Left arm  A type A waveform pattern was demonstrated with ulnar compression

## 2022-08-03 NOTE — NURSING NOTE
Dressings dry and intact  No bleeding or hematoma  Positive thrill noted  Sling to left arm prior to discharge

## 2022-08-03 NOTE — ANESTHESIA PREPROCEDURE EVALUATION
Procedure:  IR OTHER    Relevant Problems   CARDIO   (+) Benign essential HTN   (+) CHF (congestive heart failure) (HCC)   (+) Hyperlipidemia      /RENAL   (+) Acute renal failure (HCC)      HEMATOLOGY   (+) Anemia      NEURO/PSYCH   (+) Seizure due to alcohol withdrawal, uncomplicated (HCC)   (+) Withdrawal seizures (HCC)      Other   (+) ETOH abuse   (+) High anion gap metabolic acidosis   (+) Hypocalcemia   (+) Hypokalemia   (+) Hypomagnesemia   (+) Marijuana use   (+) Tobacco abuse        Physical Exam    Airway    Mallampati score: II  TM Distance: >3 FB  Neck ROM: full     Dental       Cardiovascular  Cardiovascular exam normal    Pulmonary  Pulmonary exam normal     Other Findings        Anesthesia Plan  ASA Score- 3     Anesthesia Type- regional with ASA Monitors  Additional Monitors:   Airway Plan:           Plan Factors-Exercise tolerance (METS): >4 METS  Chart reviewed  Existing labs reviewed  Patient is a current smoker  Patient instructed to abstain from smoking on day of procedure  Patient did not smoke on day of surgery  Induction- intravenous  Postoperative Plan-     Informed Consent- Anesthetic plan and risks discussed with patient  I personally reviewed this patient with the CRNA  Discussed and agreed on the Anesthesia Plan with the CRNA  Karrie Nissen

## 2022-08-03 NOTE — BRIEF OP NOTE (RAD/CATH)
INTERVENTIONAL RADIOLOGY PROCEDURE NOTE    Date: 8/3/2022    Procedure: IR ENDOVASCULAR AVF CREATION    Preoperative diagnosis:   1  MGUS (monoclonal gammopathy of unknown significance)         Postoperative diagnosis: Same  Surgeon: Alexis Fuentes MD     Assistant: None  No qualified resident was available  Blood loss: 5 ml    Specimens: none  Findings: L endoAVF creation  Complications: None immediate      Anesthesia: conscious sedation

## 2022-08-03 NOTE — ANESTHESIA PROCEDURE NOTES
Peripheral Block    Start time: 8/3/2022 8:50 AM  Reason for block: at surgeon's request and post-op pain management  Staffing  Performed: Anesthesiologist   Anesthesiologist: Nguyễn Andres MD  Preanesthetic Checklist  Completed: patient identified, IV checked, site marked, risks and benefits discussed, surgical consent, monitors and equipment checked, pre-op evaluation and timeout performed  Peripheral Block  Patient position: supine  Prep: ChloraPrep  Patient monitoring: heart rate, cardiac monitor, continuous pulse ox and frequent blood pressure checks  Block type: supraclavicular  Laterality: left  Injection technique: single-shot  Procedures: ultrasound guided, Ultrasound guidance required for the procedure to increase accuracy and safety of medication placement and decrease risk of complications    Ultrasound permanent image savedlidocaine (XYLOCAINE) 2 % - Infiltration   20 mL - 8/3/2022 9:11:00 AM  Needle  Needle type: Stimuplex   Needle gauge: 22 G  Needle length: 5 cm  Needle localization: ultrasound guidance  Needle insertion depth: 5 cm  Assessment  Injection assessment: incremental injection, local visualized surrounding nerve on ultrasound, negative aspiration for heme and transient paresthesias  Paresthesia pain: immediately resolved  Heart rate change: no  Slow fractionated injection: no  Post-procedure:  site cleaned  patient tolerated the procedure well with no immediate complications

## 2022-08-10 ENCOUNTER — TELEPHONE (OUTPATIENT)
Dept: INTERVENTIONAL RADIOLOGY/VASCULAR | Facility: HOSPITAL | Age: 62
End: 2022-08-10

## 2022-08-10 RX ORDER — SODIUM CHLORIDE 9 MG/ML
50 INJECTION, SOLUTION INTRAVENOUS CONTINUOUS
Status: CANCELLED | OUTPATIENT
Start: 2022-08-10

## 2022-08-16 ENCOUNTER — HOSPITAL ENCOUNTER (OUTPATIENT)
Dept: INTERVENTIONAL RADIOLOGY/VASCULAR | Facility: HOSPITAL | Age: 62
Discharge: HOME | End: 2022-08-16
Attending: STUDENT IN AN ORGANIZED HEALTH CARE EDUCATION/TRAINING PROGRAM
Payer: COMMERCIAL

## 2022-08-16 VITALS
TEMPERATURE: 96.9 F | HEART RATE: 70 BPM | DIASTOLIC BLOOD PRESSURE: 76 MMHG | WEIGHT: 195.11 LBS | RESPIRATION RATE: 18 BRPM | BODY MASS INDEX: 29.57 KG/M2 | SYSTOLIC BLOOD PRESSURE: 139 MMHG | OXYGEN SATURATION: 100 % | HEIGHT: 68 IN

## 2022-08-16 DIAGNOSIS — N18.6 ESRD (END STAGE RENAL DISEASE) (HCC): ICD-10-CM

## 2022-08-16 DIAGNOSIS — N18.6 ESRD (END STAGE RENAL DISEASE) (HCC): Primary | ICD-10-CM

## 2022-08-16 PROCEDURE — 36902 INTRO CATH DIALYSIS CIRCUIT: CPT

## 2022-08-16 PROCEDURE — 99152 MOD SED SAME PHYS/QHP 5/>YRS: CPT

## 2022-08-16 PROCEDURE — C1725 CATH, TRANSLUMIN NON-LASER: HCPCS

## 2022-08-16 PROCEDURE — C1769 GUIDE WIRE: HCPCS

## 2022-08-16 PROCEDURE — C1894 INTRO/SHEATH, NON-LASER: HCPCS

## 2022-08-16 PROCEDURE — 99152 MOD SED SAME PHYS/QHP 5/>YRS: CPT | Performed by: STUDENT IN AN ORGANIZED HEALTH CARE EDUCATION/TRAINING PROGRAM

## 2022-08-16 PROCEDURE — 76937 US GUIDE VASCULAR ACCESS: CPT | Performed by: STUDENT IN AN ORGANIZED HEALTH CARE EDUCATION/TRAINING PROGRAM

## 2022-08-16 PROCEDURE — 99153 MOD SED SAME PHYS/QHP EA: CPT

## 2022-08-16 PROCEDURE — 36902 INTRO CATH DIALYSIS CIRCUIT: CPT | Performed by: STUDENT IN AN ORGANIZED HEALTH CARE EDUCATION/TRAINING PROGRAM

## 2022-08-16 RX ORDER — MIDAZOLAM HYDROCHLORIDE 2 MG/2ML
INJECTION, SOLUTION INTRAMUSCULAR; INTRAVENOUS CODE/TRAUMA/SEDATION MEDICATION
Status: COMPLETED | OUTPATIENT
Start: 2022-08-16 | End: 2022-08-16

## 2022-08-16 RX ORDER — FENTANYL CITRATE 50 UG/ML
INJECTION, SOLUTION INTRAMUSCULAR; INTRAVENOUS CODE/TRAUMA/SEDATION MEDICATION
Status: COMPLETED | OUTPATIENT
Start: 2022-08-16 | End: 2022-08-16

## 2022-08-16 RX ADMIN — FENTANYL CITRATE 50 MCG: 50 INJECTION, SOLUTION INTRAMUSCULAR; INTRAVENOUS at 09:10

## 2022-08-16 RX ADMIN — FENTANYL CITRATE 25 MCG: 50 INJECTION, SOLUTION INTRAMUSCULAR; INTRAVENOUS at 09:35

## 2022-08-16 RX ADMIN — IOHEXOL 68 ML: 350 INJECTION, SOLUTION INTRAVENOUS at 10:24

## 2022-08-16 RX ADMIN — MIDAZOLAM 0.5 MG: 1 INJECTION INTRAMUSCULAR; INTRAVENOUS at 09:51

## 2022-08-16 RX ADMIN — Medication 200 MCG: at 10:01

## 2022-08-16 RX ADMIN — FENTANYL CITRATE 25 MCG: 50 INJECTION, SOLUTION INTRAMUSCULAR; INTRAVENOUS at 09:51

## 2022-08-16 RX ADMIN — MIDAZOLAM 1 MG: 1 INJECTION INTRAMUSCULAR; INTRAVENOUS at 09:10

## 2022-08-16 RX ADMIN — MIDAZOLAM 0.5 MG: 1 INJECTION INTRAMUSCULAR; INTRAVENOUS at 09:34

## 2022-08-16 NOTE — DISCHARGE INSTRUCTIONS
Fistulagram   WHAT YOU NEED TO KNOW:   Your arm or leg my  be sore, swollen, and bruised after the procedure  This is normal and should get better in a few days  DISCHARGE INSTRUCTIONS:     Contact Interventional Radiology at [phone] and follow prompts if you experience any:    You have a fever or chills  Your puncture site is red, swollen, or draining pus  You have nausea or are vomiting  Your skin is itchy, swollen, or you have a rash  You cannot feel a thrill over your graft or fistula  You have questions or concerns about your condition or care  Seek care immediately if:     You have bleeding that does not stop after 10 minutes of holding firm, direct pressure over the puncture site  Blood soaks through your bandage  Your hand or foot closest to the graft or fistula feels cold, painful, or numb  Your hand or foot closest to the graft or fistula is pale or blue  You have trouble moving your arm or leg closest to the graft or fistula  Your bruise suddenly gets bigger  Care for your wound as directed:  Remove the bandage in 4 to 6 hours or as         directed  Wash the area once a day with soap and water  Gently pat the area dry  Apply firm, steady pressure to the puncture site if it bleeds  Use a clean gauze or towel to hold pressure for 10 to 15 minutes  Call 911 if you cannot stop the bleeding or the bleeding gets heavier  Feel for a thrill once a day or as directed  Place your index and second finger over your fistula or graft as directed  You should feel a vibration  The vibration means that blood is flowing through your graft or fistula correctly  Rest your arm or leg as directed  Do not lift anything heavier than 5 pounds or do strenuous activity for 24 hours  Prevent damage to your graft or fistula  Do not wear tight-fitting clothing over your graft or fistula  Do not wear tight jewelry on the arm or leg with the graft or fistula   Tell healthcare providers not to do, IVs, blood draws, and blood pressure readings in the arm with your graft or fistula  Do not allow flu shots or vaccinations in your arm with your graft or fistula  Follow up with your healthcare provider as directed Procedural Sedation   WHAT YOU NEED TO KNOW:   Procedural sedation is medicine used during procedures to help you feel relaxed and calm  You will remember little to none of the procedure  After sedation you may feel tired, weak, or unsteady on your feet  You may also have trouble concentrating or short-term memory loss  These symptoms should go away in 24 hours or less  DISCHARGE INSTRUCTIONS:     Call 911 or have someone else call for any of the following: You have sudden trouble breathing  You cannot be woken  Contact Interventional Radiology at [phone]   Elio PATIENTS: Contact Interventional Radiology at [phone]) Mattie Cruz PATIENTS: Contact Interventional Radiology at [phone]) if any of the following occur: You have a severe headache or dizziness  Your heart is beating faster than usual     You have a fever or chills  Your skin is itchy, swollen, or you have a rash  You have nausea or are vomiting for more than 8 hours after the procedure  You have questions or concerns about your condition or care  Self-care:   Have someone stay with you for 24 hours  This person can drive you to errands and help you do things around the house  This person can also watch for problems  Rest and do quiet activities for 24 hours  Do not exercise, ride a bike, or play sports  Stand up slowly to prevent dizziness and falls  Take short walks around the house with another person  Slowly return to your usual activities the next day  Do not drive or use dangerous machines or tools for 24 hours  You may injure yourself or others  Examples include a lawnmower, saw, or drill   Do not return to work for 24 hours if you use dangerous machines or tools for work  Do not make important decisions for 24 hours  For example, do not sign important papers or invest money  Drink liquids as directed  Liquids help flush the sedation medicine out of your body  Ask how much liquid to drink each day and which liquids are best for you  Eat small, frequent meals to prevent nausea and vomiting  Start with clear liquids such as juice or broth  If you do not vomit after clear liquids, you can eat your usual foods  Do not drink alcohol or take medicines that make you drowsy  This includes medicines that help you sleep and anxiety medicines  Ask your healthcare provider if it is safe for you to take pain medicine  Follow up with your healthcare provider as directed: Write down your questions so you remember to ask them during your visits  Procedural Sedation   WHAT YOU NEED TO KNOW:   Procedural sedation is medicine used during procedures to help you feel relaxed and calm  You will remember little to none of the procedure  After sedation you may feel tired, weak, or unsteady on your feet  You may also have trouble concentrating or short-term memory loss  These symptoms should go away in 24 hours or less  DISCHARGE INSTRUCTIONS:     Call 911 or have someone else call for any of the following: You have sudden trouble breathing  You cannot be woken  Contact Interventional Radiology at [phone]   Elio PATIENTS: Contact Interventional Radiology at [phone]) Lisa Bolanos PATIENTS: Contact Interventional Radiology at [phone]) if any of the following occur: You have a severe headache or dizziness  Your heart is beating faster than usual     You have a fever or chills  Your skin is itchy, swollen, or you have a rash  You have nausea or are vomiting for more than 8 hours after the procedure  You have questions or concerns about your condition or care  Self-care:   Have someone stay with you for 24 hours   This person can drive you to errands and help you do things around the house  This person can also watch for problems  Rest and do quiet activities for 24 hours  Do not exercise, ride a bike, or play sports  Stand up slowly to prevent dizziness and falls  Take short walks around the house with another person  Slowly return to your usual activities the next day  Do not drive or use dangerous machines or tools for 24 hours  You may injure yourself or others  Examples include a lawnmower, saw, or drill  Do not return to work for 24 hours if you use dangerous machines or tools for work  Do not make important decisions for 24 hours  For example, do not sign important papers or invest money  Drink liquids as directed  Liquids help flush the sedation medicine out of your body  Ask how much liquid to drink each day and which liquids are best for you  Eat small, frequent meals to prevent nausea and vomiting  Start with clear liquids such as juice or broth  If you do not vomit after clear liquids, you can eat your usual foods  Do not drink alcohol or take medicines that make you drowsy  This includes medicines that help you sleep and anxiety medicines  Ask your healthcare provider if it is safe for you to take pain medicine  Follow up with your healthcare provider as directed: Write down your questions so you remember to ask them during your visits

## 2022-08-16 NOTE — BRIEF OP NOTE (RAD/CATH)
INTERVENTIONAL RADIOLOGY PROCEDURE NOTE    Date: 8/16/2022    Procedure: IR AV FISTULAGRAM/GRAFTOGRAM    Preoperative diagnosis:   1  ESRD (end stage renal disease) ([facility] [address] )         Postoperative diagnosis: Same  Surgeon: Batsheva Mcclelland MD     Assistant: None  No qualified resident was available  Blood loss: 5 ml    Specimens: none  Findings:   fistulagram showed severe stenosis of cephalic origin, treated with 6 mm HPB POBA  Severe spasm in the unarterialized cephalic vein, which did not respond to nitroglycerin  Complications: None immediate      Anesthesia: conscious sedation

## 2022-09-07 ENCOUNTER — HOSPITAL ENCOUNTER (OUTPATIENT)
Dept: NON INVASIVE DIAGNOSTICS | Facility: CLINIC | Age: 62
Discharge: HOME | End: 2022-09-07
Payer: COMMERCIAL

## 2022-09-07 DIAGNOSIS — N18.6 ESRD (END STAGE RENAL DISEASE) (HCC): ICD-10-CM

## 2022-09-07 PROCEDURE — 93990 DOPPLER FLOW TESTING: CPT

## 2022-09-07 PROCEDURE — 93978 VASCULAR STUDY: CPT | Performed by: SURGERY

## 2022-09-27 ENCOUNTER — TRANSCRIBE ORDERS (OUTPATIENT)
Dept: ADMINISTRATIVE | Facility: HOSPITAL | Age: 62
End: 2022-09-27

## 2022-09-27 ENCOUNTER — TRANSCRIBE ORDERS (OUTPATIENT)
Dept: VASCULAR SURGERY | Facility: CLINIC | Age: 62
End: 2022-09-27

## 2022-09-27 DIAGNOSIS — N18.9 CHRONIC KIDNEY DISEASE, UNSPECIFIED CKD STAGE: Primary | ICD-10-CM

## 2022-09-28 ENCOUNTER — TELEPHONE (OUTPATIENT)
Dept: VASCULAR SURGERY | Facility: CLINIC | Age: 62
End: 2022-09-28

## 2022-09-28 ENCOUNTER — TRANSCRIBE ORDERS (OUTPATIENT)
Dept: VASCULAR SURGERY | Facility: CLINIC | Age: 62
End: 2022-09-28

## 2022-09-28 DIAGNOSIS — I82.629 DEEP VEIN THROMBOSIS (DVT) OF UPPER EXTREMITY, UNSPECIFIED CHRONICITY, UNSPECIFIED LATERALITY, UNSPECIFIED VEIN (HCC): Primary | ICD-10-CM

## 2022-09-28 NOTE — TELEPHONE ENCOUNTER
Hi Vascular Schedulers,     Nicci Lentz pt, Jeffery Phillips[birth year], has a consult appt with Dr Juan A Ruiz on 10/5  Originally there was no need for a vein mapping, but pt is having arm swelling and now needs vein mapping and US for DVT assessment prior to the 10/5 consult appt  Nephrologist is Dr Héctor Lafleur and HD schedule is T-Th-S 2nd shift  Please contact the pt today to schedule this appt ASAP       Thank you,  Kathya Gutierres RN [facility]  [facility]  [phone] (cell)  [phone] (efax)

## 2022-09-30 ENCOUNTER — HOSPITAL ENCOUNTER (OUTPATIENT)
Dept: NON INVASIVE DIAGNOSTICS | Facility: HOSPITAL | Age: 62
Discharge: HOME | End: 2022-09-30
Payer: COMMERCIAL

## 2022-09-30 ENCOUNTER — TELEPHONE (OUTPATIENT)
Dept: VASCULAR SURGERY | Facility: CLINIC | Age: 62
End: 2022-09-30

## 2022-09-30 DIAGNOSIS — N18.9 CHRONIC KIDNEY DISEASE, UNSPECIFIED CKD STAGE: ICD-10-CM

## 2022-09-30 PROCEDURE — 93985 DUP-SCAN HEMO COMPL BI STD: CPT | Performed by: SURGERY

## 2022-09-30 PROCEDURE — 93990 DOPPLER FLOW TESTING: CPT | Performed by: SURGERY

## 2022-09-30 PROCEDURE — 93985 DUP-SCAN HEMO COMPL BI STD: CPT

## 2022-09-30 NOTE — TELEPHONE ENCOUNTER
----- Message from Deshawn Yepez sent at 2022 11:42 AM EDT -----  Scheduled for a 8a , thank you!    ----- Message -----  From: Brigida Harmon  Sent: 2022  11:15 AM EDT  To: [facility]    Patients Name: Rudell Apgar  : 1960  Phone Number: [phone]  Appointment Date: 10/05/22  Appointment time: 9:00 am    Address: [address] [place] [address]  Drop off Facility/Office Name: [facility]   Drop off  [facility]   3rd [facility], [facility] [address]: [phone]  Special notes/directions for   Note for scheduling team

## 2022-10-05 ENCOUNTER — OFFICE VISIT (OUTPATIENT)
Dept: VASCULAR SURGERY | Facility: CLINIC | Age: 62
End: 2022-10-05
Payer: COMMERCIAL

## 2022-10-05 ENCOUNTER — TELEPHONE (OUTPATIENT)
Dept: VASCULAR SURGERY | Facility: CLINIC | Age: 62
End: 2022-10-05

## 2022-10-05 ENCOUNTER — PREP FOR PROCEDURE (OUTPATIENT)
Dept: VASCULAR SURGERY | Facility: CLINIC | Age: 62
End: 2022-10-05

## 2022-10-05 VITALS
SYSTOLIC BLOOD PRESSURE: 130 MMHG | HEART RATE: 72 BPM | DIASTOLIC BLOOD PRESSURE: 70 MMHG | WEIGHT: 201 LBS | RESPIRATION RATE: 20 BRPM | HEIGHT: 68 IN | BODY MASS INDEX: 30.46 KG/M2

## 2022-10-05 DIAGNOSIS — N18.6 ESRD (END STAGE RENAL DISEASE) ON DIALYSIS (HCC): Primary | ICD-10-CM

## 2022-10-05 DIAGNOSIS — Z99.2 ESRD (END STAGE RENAL DISEASE) ON DIALYSIS (HCC): Primary | ICD-10-CM

## 2022-10-05 DIAGNOSIS — N19 RENAL FAILURE: ICD-10-CM

## 2022-10-05 PROCEDURE — 99215 OFFICE O/P EST HI 40 MIN: CPT | Performed by: SURGERY

## 2022-10-05 RX ORDER — CHLORHEXIDINE GLUCONATE 0.12 MG/ML
15 RINSE ORAL ONCE
Status: CANCELLED | OUTPATIENT
Start: 2022-10-24 | End: 2022-10-05

## 2022-10-05 RX ORDER — CEFAZOLIN SODIUM 2 G/50ML
2000 SOLUTION INTRAVENOUS ONCE
Status: CANCELLED | OUTPATIENT
Start: 2022-10-24 | End: 2022-10-05

## 2022-10-05 NOTE — H&P (VIEW-ONLY)
Assessment/Plan:    Pt is a 61 yo M w/ CHF, HTN, hx EtOH abuse w/ sz during withdrawal, current tobacco abuse, HLD, anemia, ESRD, s/p endoAVF 8/3/22    ESRD (end stage renal disease) on dialysis [facility])  -     Case request operating room: creation of  L brachiobasilic fistula vs LUE graft; ligation of L endoAVF; Standing  -     Basic metabolic panel; Future  -     CBC and Platelet; Future  -     Protime-INR; Future  -     Basic metabolic panel  -     CBC and Platelet  -     Protime-INR  -     Case request operating room: creation of  L brachiobasilic fistula vs LUE graft; ligation of L endoAVF  -     Ambulatory Referral to Vascular Surgery  -s/p endoAVF creation 2/3/75  -c/b cephalic vein spasm and eventual occlusion; no basilic outflow from this fistula; currently with brachial outflow only >1L  -reviewed vein mapping which shows likely adequate B basilic; unfortunately, there is no measurement for the lower arm and this will need to be confirmed on day of surgery with ultrasound  -discussed options at this point; will try to create L brachiobasilic fistula and ligation of the endoAVF at same setting; if basilic is not adequate, will place graft (brach-ax or possibly brach-brach forearm loop graft? ? )  -labs  -cardiac: TTE 2/22 with EF: 59%, grade I diastolic, mod AoV regurg, mild MV regurg  asymptomatic    Other orders  -     Diet NPO; Sips with meds; Standing  -     Void on call to OR; Standing  -     Insert peripheral IV; Standing  -     Nursing Communication CHG bath, have staff wash entire body (neck down) per pre op bathing protocol  Routine, evening prior to, and day of surgery ; Standing  -     Nursing Communication Swab both nares with Povidone-Iodine solution, EXCLUDE if patient has shellfish/Iodine allergy   Routine, day of surgery, on call to OR ; Standing  -     chlorhexidine (PERIDEX) 0 12 % oral rinse 15 mL  -     Place sequential compression device; Standing  -     Shower/scrub; Standing  - ceFAZolin (ANCEF) IVPB (premix in dextrose) 2,000 mg 50 mL        Operative Scheduling Information:    Hospital:  [facility]    Physician:  Me    Surgery: L brachiobasilic fistula vs LUE graft; ligation of L endoAVF    Urgency:  Standard    Level:  Level 3: Outpatients to be scheduled for elective surgery than can be delayed up to 4 weeks without reasonable expectation of detriment to patient    Case Length:  Normal    Post-op Bed:  Outpatient    OR Table:  Standard    Equipment Needs:  None    Medication Instructions:  Aspirin:   Continue (do not hold)    Hydration:  No    Contrast Allergy:  no      Subjective:      Patient ID: Amanuel Jimenez is a 62 y o  male  Patient had HD vein mapping on 9/30/22  Pt is s/p LUE Endo AVF creation on 8/3/22  Pt had a Fgram on 8/16/22  Pt c o LUE pain, discoloration, and warmth  Pt is Rt handed  Pt goes to HD on TTS at [facility]  Pt smokes 1/4 ppd  HPI:    Patient presents for access discussion  R-handed  Patient is s/p creation endo AVF ulnar-ulnar 8/3/22 [place]  He had cephalic vein spasm/stenosis at the index procedure and repeat fistulogram 8/16/22 which showed this to be occluded  He has current outflow via the brachial veins  The basilic is not in continuity with the fistula outflow  About 2 weeks ago, patient noted difficulty closing his fist   This seems to be resolved  He has some swelling but not severe  He notes "hotness" over the fistula site  Currently dialyzing via R chest permacath  T/T/S  Started HD in June '22  Smoking 1/3PPD (down from 1PPD)    Prior EtOH abuse, now occasional beer  Hx of sz w/ withdrawal      The following portions of the patient's history were reviewed and updated as appropriate: allergies, current medications, past family history, past medical history, past social history, past surgical history and problem list     Review of Systems   Constitutional: Negative  HENT: Negative      Eyes: Negative  Respiratory: Negative  Negative for shortness of breath  Cardiovascular: Negative  Negative for chest pain  Gastrointestinal: Negative  Endocrine: Negative  Genitourinary: Positive for decreased urine volume  Musculoskeletal: Negative for arthralgias and neck stiffness  L forearm edema, hotness   Skin: Positive for wound (L forearm burn)  Allergic/Immunologic: Negative  Neurological: Negative  Hematological: Negative  Psychiatric/Behavioral: Negative  Objective:      /70 (BP Location: Right arm, Patient Position: Sitting)   Pulse 72   Resp 20   Ht 5' 8" (1 727 m)   Wt 91 2 kg (201 lb)   BMI 30 56 kg/m²          Physical Exam  Vitals and nursing note reviewed  Constitutional:       Appearance: He is well-developed  HENT:      Head: Normocephalic and atraumatic  Eyes:      Conjunctiva/sclera: Conjunctivae normal    Cardiovascular:      Rate and Rhythm: Normal rate and regular rhythm  Pulses:           Radial pulses are 2+ on the right side and 0 on the left side  Dorsalis pedis pulses are 2+ on the right side and 0 on the left side  Posterior tibial pulses are 0 on the right side and 0 on the left side  Heart sounds: Normal heart sounds  No murmur heard  Comments: 2+ R ulnar, nonpalp L ulnar  Mild LUE edema  Pulmonary:      Effort: Pulmonary effort is normal  No respiratory distress  Breath sounds: Normal breath sounds  No wheezing or rales  Abdominal:      General: There is no distension  Palpations: Abdomen is soft  Tenderness: There is no abdominal tenderness  There is no rebound  Musculoskeletal:         General: Normal range of motion  Cervical back: Normal range of motion and neck supple  Right lower leg: No edema  Left lower leg: No edema  Skin:     General: Skin is warm and dry  Neurological:      Mental Status: He is alert and oriented to person, place, and time  Psychiatric:         Behavior: Behavior normal            I have reviewed and made appropriate changes to the review of systems input by the medical assistant  Vitals:    10/05/22 0858   BP: 130/70   BP Location: Right arm   Patient Position: Sitting   Pulse: 72   Resp: 20   Weight: 91 2 kg (201 lb)   Height: 5' 8" (1 727 m)       Patient Active Problem List   Diagnosis   • Benign essential HTN   • CHF (congestive heart failure) (HCC)   • ETOH abuse   • Acute renal failure (HCC)   • Marijuana use   • Hyperlipidemia   • Tobacco abuse   • Hypokalemia   • Leukocytosis   • Seizure due to alcohol withdrawal, uncomplicated (HCC)   • Pharyngeal edema   • High anion gap metabolic acidosis   • Withdrawal seizures (HCC)   • Hypomagnesemia   • Anemia   • Hyponatremia   • Hypocalcemia   • MGUS (monoclonal gammopathy of unknown significance)       Past Surgical History:   Procedure Laterality Date   • IR AV FISTULAGRAM/GRAFTOGRAM  8/16/2022   • IR BIOPSY BONE MARROW  7/13/2022   • IR PULMONARY ARTERY EMBOLIZATION  8/3/2022   • IR TUNNELED DIALYSIS CATHETER PLACEMENT  6/10/2022       No family history on file  Social History     Socioeconomic History   • Marital status: Significant Other     Spouse name: Not on file   • Number of children: Not on file   • Years of education: Not on file   • Highest education level: Not on file   Occupational History   • Not on file   Tobacco Use   • Smoking status: Current Every Day Smoker     Packs/day: 1 00     Years: 30 00     Pack years: 30 00   • Smokeless tobacco: Never Used   Substance and Sexual Activity   • Alcohol use:  Yes     Alcohol/week: 10 0 standard drinks     Types: 10 Cans of beer per week     Comment: socially   • Drug use: Yes     Types: Marijuana     Comment: occasionally   • Sexual activity: Not on file   Other Topics Concern   • Not on file   Social History Narrative   • Not on file     Social Determinants of Health     Financial Resource Strain: Not on file   Food Insecurity: No Food Insecurity   • Worried About Running Out of Food in the Last Year: Never true   • Ran Out of Food in the Last Year: Never true   Transportation Needs: No Transportation Needs   • Lack of Transportation (Medical): No   • Lack of Transportation (Non-Medical):  No   Physical Activity: Not on file   Stress: Not on file   Social Connections: Not on file   Intimate Partner Violence: Not on file   Housing Stability: Low Risk    • Unable to Pay for Housing in the Last Year: No   • Number of Places Lived in the Last Year: 1   • Unstable Housing in the Last Year: No       No Known Allergies      Current Outpatient Medications:   •  amLODIPine (NORVASC) 5 mg tablet, Take 1 tablet (5 mg total) by mouth daily, Disp: 90 tablet, Rfl: 1  •  aspirin (ECOTRIN LOW STRENGTH) 81 mg EC tablet, Take 1 tablet (81 mg total) by mouth daily, Disp: 90 tablet, Rfl: 1  •  b complex-vitamin C-folic acid (NEPHROCAPS) 1 mg capsule, Take 1 capsule by mouth daily with dinner, Disp: 30 capsule, Rfl: 0  •  calcium acetate (PHOSLO) capsule, Take 3 capsules (2,001 mg total) by mouth 3 (three) times a day with meals, Disp: 270 capsule, Rfl: 0  •  carvedilol (COREG) 25 mg tablet, Take 1 tablet (25 mg total) by mouth 2 (two) times a day with meals, Disp: 90 tablet, Rfl: 1  •  cholecalciferol (VITAMIN D3) 1,000 units tablet, Take 4 tablets (4,000 Units total) by mouth daily, Disp: 120 tablet, Rfl: 0  •  furosemide (LASIX) 20 mg tablet, Take 0 5 tablets (10 mg total) by mouth daily, Disp: 45 tablet, Rfl: 1  •  pravastatin (PRAVACHOL) 80 mg tablet, Take 1 tablet (80 mg total) by mouth daily with dinner, Disp: 90 tablet, Rfl: 1  •  EPINEPHrine (EPIPEN) 0 3 mg/0 3 mL SOAJ, Inject 0 3 mL (0 3 mg total) into a muscle once for 1 dose, Disp: 0 6 mL, Rfl: 0  •  nicotine (NICODERM CQ) 14 mg/24hr TD 24 hr patch, Place 1 patch on the skin daily (Patient not taking: Reported on 10/5/2022), Disp: 28 patch, Rfl: 0

## 2022-10-05 NOTE — TELEPHONE ENCOUNTER
Verified patient's insurance   CONFIRMED - Patient's insurance is [facility]  Is patient requesting a call when authorization has been obtained? Patient did not request a call  Surgery Date: 10/24/22  Primary Surgeon: JESSICA // [facility] (NPI: [id number])  Assisting Surgeon: Not Applicable (N/A)  Facility: [facility] (Tax: [id number] / NPI: [id number])  Inpatient / Outpatient: Outpatient  Level: 3    Clearance Received: No clearance ordered  Consent Received: Yes, scanned into Epic on 10/5/22  Medication Hold / Last Dose: Not Applicable (N/A)  VQI Spreadsheet: 10/5/22  IR Notified: Not Applicable (N/A)  Rep   Notified: Not Applicable (N/A)  Equipment Needs: Not Applicable (N/A)  Vas Lab Requested: Not Applicable (N/A)  Patient Contacted: 10/5/22    Diagnosis: N18 6, Z99 2  Procedure/ CPT Code(s): (AV) Arteriovenous Fistula // CPT: 32044 and LIGATION of Acquired AV Fistula  // CPT: 16960    For varicose vein related procedures:   Last LEVDR: Not Applicable (N/A)  CEAP Classification: Not Applicable (N/A)  VCSS: Not Applicable (N/A)    Post Operative Date/ Time: 11/9/22 , 1:00pm Nicci with LAYLA Hnadley (NPI: [id number])     Will get recent labs from [address] [place] [address]

## 2022-10-05 NOTE — PATIENT INSTRUCTIONS
1) ESRD  -we are planning to create a new fistula for you in the left arm  -NO IV'S, BLOOD DRAWS, OR BLOOD PRESSURE READINGS IN YOUR LEFT ARM

## 2022-10-05 NOTE — TELEPHONE ENCOUNTER
REMINDER: Under Reason For Call, comments MUST be formatted as:   (Surgeon's Initials) / (Procedure)      Special Instructions / FYI:     Procedure: L brachiobasilic fistula vs LUE graft; ligation of L endoAVF    Level: 3 - Route clearance(s) to [facility] Clearance Pool     Allergies: Patient has no known allergies  Instructions Given: NO Bowel Prep General Instructions     [facility] // Days: Tuesday, Thursday, and Saturday    Return Visit Required Prior to Procedure: No     Consent: I certify that patient has signed, printed, timed, and dated their surgery consent  I certify that the patient's LEGAL NAME and DATE OF BIRTH are written in the upper left corner on BOTH sides of the consent  I certify that BOTH sides of the completed surgery consent have been scanned into the patient's Epic chart by myself on 10/5/2022  Yes, I have LABELED the consent in Epic as Consent for Vascular Procedure  For Surgical Clearances     Levels   1-3   ROUTE this encounter to [facility] Clearance Pool (AND)   [facility] Surgery Coordinator Pool     Level   4   ROUTE this encounter to [facility] Surgery Coordinator Pool       HYDRATION CLEARANCES   ONLY ROUTE TO  [facility] Clearance Pool     Patient does not require any pre operative clearance  Yes, I have ROUTED this encounter to [facility] Surgery Coordinator and/or [facility] Clearance Pool

## 2022-10-05 NOTE — TELEPHONE ENCOUNTER
Authorization requirements reviewed  Please refer to Felicity Mullins / Arianne Bernal number [id number] for case updates      No authorization required

## 2022-10-05 NOTE — PROGRESS NOTES
Assessment/Plan:    Pt is a 61 yo M w/ CHF, HTN, hx EtOH abuse w/ sz during withdrawal, current tobacco abuse, HLD, anemia, ESRD, s/p endoAVF 8/3/22    ESRD (end stage renal disease) on dialysis [facility])  -     Case request operating room: creation of  L brachiobasilic fistula vs LUE graft; ligation of L endoAVF; Standing  -     Basic metabolic panel; Future  -     CBC and Platelet; Future  -     Protime-INR; Future  -     Basic metabolic panel  -     CBC and Platelet  -     Protime-INR  -     Case request operating room: creation of  L brachiobasilic fistula vs LUE graft; ligation of L endoAVF  -     Ambulatory Referral to Vascular Surgery  -s/p endoAVF creation 2/5/41  -c/b cephalic vein spasm and eventual occlusion; no basilic outflow from this fistula; currently with brachial outflow only >1L  -reviewed vein mapping which shows likely adequate B basilic; unfortunately, there is no measurement for the lower arm and this will need to be confirmed on day of surgery with ultrasound  -discussed options at this point; will try to create L brachiobasilic fistula and ligation of the endoAVF at same setting; if basilic is not adequate, will place graft (brach-ax or possibly brach-brach forearm loop graft? ? )  -labs  -cardiac: TTE 2/22 with EF: 67%, grade I diastolic, mod AoV regurg, mild MV regurg  asymptomatic    Other orders  -     Diet NPO; Sips with meds; Standing  -     Void on call to OR; Standing  -     Insert peripheral IV; Standing  -     Nursing Communication CHG bath, have staff wash entire body (neck down) per pre op bathing protocol  Routine, evening prior to, and day of surgery ; Standing  -     Nursing Communication Swab both nares with Povidone-Iodine solution, EXCLUDE if patient has shellfish/Iodine allergy   Routine, day of surgery, on call to OR ; Standing  -     chlorhexidine (PERIDEX) 0 12 % oral rinse 15 mL  -     Place sequential compression device; Standing  -     Shower/scrub; Standing  - ceFAZolin (ANCEF) IVPB (premix in dextrose) 2,000 mg 50 mL        Operative Scheduling Information:    Hospital:  [facility]    Physician:  Me    Surgery: L brachiobasilic fistula vs LUE graft; ligation of L endoAVF    Urgency:  Standard    Level:  Level 3: Outpatients to be scheduled for elective surgery than can be delayed up to 4 weeks without reasonable expectation of detriment to patient    Case Length:  Normal    Post-op Bed:  Outpatient    OR Table:  Standard    Equipment Needs:  None    Medication Instructions:  Aspirin:   Continue (do not hold)    Hydration:  No    Contrast Allergy:  no      Subjective:      Patient ID: Modesta Keller is a 62 y o  male  Patient had HD vein mapping on 9/30/22  Pt is s/p LUE Endo AVF creation on 8/3/22  Pt had a Fgram on 8/16/22  Pt c o LUE pain, discoloration, and warmth  Pt is Rt handed  Pt goes to HD on TTS at [facility]  Pt smokes 1/4 ppd  HPI:    Patient presents for access discussion  R-handed  Patient is s/p creation endo AVF ulnar-ulnar 8/3/22 [place]  He had cephalic vein spasm/stenosis at the index procedure and repeat fistulogram 8/16/22 which showed this to be occluded  He has current outflow via the brachial veins  The basilic is not in continuity with the fistula outflow  About 2 weeks ago, patient noted difficulty closing his fist   This seems to be resolved  He has some swelling but not severe  He notes "hotness" over the fistula site  Currently dialyzing via R chest permacath  T/T/S  Started HD in June '22  Smoking 1/3PPD (down from 1PPD)    Prior EtOH abuse, now occasional beer  Hx of sz w/ withdrawal      The following portions of the patient's history were reviewed and updated as appropriate: allergies, current medications, past family history, past medical history, past social history, past surgical history and problem list     Review of Systems   Constitutional: Negative  HENT: Negative      Eyes: Negative  Respiratory: Negative  Negative for shortness of breath  Cardiovascular: Negative  Negative for chest pain  Gastrointestinal: Negative  Endocrine: Negative  Genitourinary: Positive for decreased urine volume  Musculoskeletal: Negative for arthralgias and neck stiffness  L forearm edema, hotness   Skin: Positive for wound (L forearm burn)  Allergic/Immunologic: Negative  Neurological: Negative  Hematological: Negative  Psychiatric/Behavioral: Negative  Objective:      /70 (BP Location: Right arm, Patient Position: Sitting)   Pulse 72   Resp 20   Ht 5' 8" (1 727 m)   Wt 91 2 kg (201 lb)   BMI 30 56 kg/m²          Physical Exam  Vitals and nursing note reviewed  Constitutional:       Appearance: He is well-developed  HENT:      Head: Normocephalic and atraumatic  Eyes:      Conjunctiva/sclera: Conjunctivae normal    Cardiovascular:      Rate and Rhythm: Normal rate and regular rhythm  Pulses:           Radial pulses are 2+ on the right side and 0 on the left side  Dorsalis pedis pulses are 2+ on the right side and 0 on the left side  Posterior tibial pulses are 0 on the right side and 0 on the left side  Heart sounds: Normal heart sounds  No murmur heard  Comments: 2+ R ulnar, nonpalp L ulnar  Mild LUE edema  Pulmonary:      Effort: Pulmonary effort is normal  No respiratory distress  Breath sounds: Normal breath sounds  No wheezing or rales  Abdominal:      General: There is no distension  Palpations: Abdomen is soft  Tenderness: There is no abdominal tenderness  There is no rebound  Musculoskeletal:         General: Normal range of motion  Cervical back: Normal range of motion and neck supple  Right lower leg: No edema  Left lower leg: No edema  Skin:     General: Skin is warm and dry  Neurological:      Mental Status: He is alert and oriented to person, place, and time  Psychiatric:         Behavior: Behavior normal            I have reviewed and made appropriate changes to the review of systems input by the medical assistant  Vitals:    10/05/22 0858   BP: 130/70   BP Location: Right arm   Patient Position: Sitting   Pulse: 72   Resp: 20   Weight: 91 2 kg (201 lb)   Height: 5' 8" (1 727 m)       Patient Active Problem List   Diagnosis    Benign essential HTN    CHF (congestive heart failure) (HCC)    ETOH abuse    Acute renal failure (HCC)    Marijuana use    Hyperlipidemia    Tobacco abuse    Hypokalemia    Leukocytosis    Seizure due to alcohol withdrawal, uncomplicated (HCC)    Pharyngeal edema    High anion gap metabolic acidosis    Withdrawal seizures (HCC)    Hypomagnesemia    Anemia    Hyponatremia    Hypocalcemia    MGUS (monoclonal gammopathy of unknown significance)       Past Surgical History:   Procedure Laterality Date    IR AV FISTULAGRAM/GRAFTOGRAM  8/16/2022    IR BIOPSY BONE MARROW  7/13/2022    IR PULMONARY ARTERY EMBOLIZATION  8/3/2022    IR TUNNELED DIALYSIS CATHETER PLACEMENT  6/10/2022       No family history on file  Social History     Socioeconomic History    Marital status: Significant Other     Spouse name: Not on file    Number of children: Not on file    Years of education: Not on file    Highest education level: Not on file   Occupational History    Not on file   Tobacco Use    Smoking status: Current Every Day Smoker     Packs/day: 1 00     Years: 30 00     Pack years: 30 00    Smokeless tobacco: Never Used   Substance and Sexual Activity    Alcohol use:  Yes     Alcohol/week: 10 0 standard drinks     Types: 10 Cans of beer per week     Comment: socially    Drug use: Yes     Types: Marijuana     Comment: occasionally    Sexual activity: Not on file   Other Topics Concern    Not on file   Social History Narrative    Not on file     Social Determinants of Health     Financial Resource Strain: Not on file   Food Insecurity: No Food Insecurity    Worried About Running Out of Food in the Last Year: Never true    Ran Out of Food in the Last Year: Never true   Transportation Needs: No Transportation Needs    Lack of Transportation (Medical): No    Lack of Transportation (Non-Medical):  No   Physical Activity: Not on file   Stress: Not on file   Social Connections: Not on file   Intimate Partner Violence: Not on file   Housing Stability: Low Risk     Unable to Pay for Housing in the Last Year: No    Number of Places Lived in the Last Year: 1    Unstable Housing in the Last Year: No       No Known Allergies      Current Outpatient Medications:     amLODIPine (NORVASC) 5 mg tablet, Take 1 tablet (5 mg total) by mouth daily, Disp: 90 tablet, Rfl: 1    aspirin (ECOTRIN LOW STRENGTH) 81 mg EC tablet, Take 1 tablet (81 mg total) by mouth daily, Disp: 90 tablet, Rfl: 1    b complex-vitamin C-folic acid (NEPHROCAPS) 1 mg capsule, Take 1 capsule by mouth daily with dinner, Disp: 30 capsule, Rfl: 0    calcium acetate (PHOSLO) capsule, Take 3 capsules (2,001 mg total) by mouth 3 (three) times a day with meals, Disp: 270 capsule, Rfl: 0    carvedilol (COREG) 25 mg tablet, Take 1 tablet (25 mg total) by mouth 2 (two) times a day with meals, Disp: 90 tablet, Rfl: 1    cholecalciferol (VITAMIN D3) 1,000 units tablet, Take 4 tablets (4,000 Units total) by mouth daily, Disp: 120 tablet, Rfl: 0    furosemide (LASIX) 20 mg tablet, Take 0 5 tablets (10 mg total) by mouth daily, Disp: 45 tablet, Rfl: 1    pravastatin (PRAVACHOL) 80 mg tablet, Take 1 tablet (80 mg total) by mouth daily with dinner, Disp: 90 tablet, Rfl: 1    EPINEPHrine (EPIPEN) 0 3 mg/0 3 mL SOAJ, Inject 0 3 mL (0 3 mg total) into a muscle once for 1 dose, Disp: 0 6 mL, Rfl: 0    nicotine (NICODERM CQ) 14 mg/24hr TD 24 hr patch, Place 1 patch on the skin daily (Patient not taking: Reported on 10/5/2022), Disp: 28 patch, Rfl: 0

## 2022-10-13 PROBLEM — N18.6 ANEMIA DUE TO CHRONIC KIDNEY DISEASE, ON CHRONIC DIALYSIS (HCC): Status: ACTIVE | Noted: 2022-06-10

## 2022-10-13 PROBLEM — F19.939 WITHDRAWAL SEIZURES (HCC): Status: RESOLVED | Noted: 2022-02-01 | Resolved: 2022-10-13

## 2022-10-13 PROBLEM — N25.81 SECONDARY HYPERPARATHYROIDISM OF RENAL ORIGIN (HCC): Status: ACTIVE | Noted: 2022-10-13

## 2022-10-13 PROBLEM — D63.1 ANEMIA DUE TO CHRONIC KIDNEY DISEASE, ON CHRONIC DIALYSIS (HCC): Status: ACTIVE | Noted: 2022-06-10

## 2022-10-13 PROBLEM — R56.9 WITHDRAWAL SEIZURES (HCC): Status: RESOLVED | Noted: 2022-02-01 | Resolved: 2022-10-13

## 2022-10-13 PROBLEM — Z99.2 ANEMIA DUE TO CHRONIC KIDNEY DISEASE, ON CHRONIC DIALYSIS (HCC): Status: ACTIVE | Noted: 2022-06-10

## 2022-10-13 NOTE — PROGRESS NOTES
INTERNAL MEDICINE OFFICE VISIT NOTE  [facility] Internal Medicine [place]    NAME: Rika Main  AGE: 62 y o  SEX: male    DATE OF ENCOUNTER: 10/13/2022      Chief Complaint   Patient presents with   • Establish Care     Last time seen by a PCP was on 6/2022 by Dr Jordyn Patel  At that time had been lost to follow-up due to lack of insurance and was status post hospitalization for end-stage renal disease in which hemodialysis was started  S/p AV fistula on 6/9771 complicated by cephalic vein spasm and eventual occlusion  Evaluated by vascular surgery on 10/2020  Scheduled on 10/24 for new fistula creation in his left upper extremity  Patient reports he has been getting hemodialysis at [facility] TTS via right chest PermCath  MGUS   following with Heme-Onc for MGUS  Shx: admits Tobacco use, has decreased from 1 ppd to 1/3 ppd, occasional  marijuana smoking, denies other illict drug use  Alcohol use: 2-3 beers per week  Admits to previous heavy drinking  Review of Systems   Constitutional: Negative for appetite change, diaphoresis, fatigue and fever  HENT: Negative for rhinorrhea and sore throat  Eyes: Negative  Negative for visual disturbance  Respiratory: Negative for apnea, cough, choking, chest tightness and shortness of breath  Cardiovascular: Negative for chest pain, palpitations and leg swelling  Gastrointestinal: Negative for abdominal distention, abdominal pain, constipation, diarrhea, nausea and vomiting  Endocrine: Negative  Genitourinary: Negative  Musculoskeletal: Negative  Skin: Negative  Neurological: Negative for dizziness and headaches  Hematological: Negative  Psychiatric/Behavioral: Negative          OBJECTIVE:  Vitals:    10/14/22 1429   BP: (!) 182/102   BP Location: Left arm   Patient Position: Sitting   Cuff Size: Adult   Pulse: 76   Resp: 18   Temp: 98 2 °F (36 8 °C)   TempSrc: Temporal   SpO2: 98%   Weight: 88 9 kg (196 lb)   Height: 5' 8" (1 727 m)       Physical Exam:   GENERAL: NAD, Normal appearance  Non diaphoretic, non-toxic, not ill-appearing, well-developed, well-nourished  NEUROLOGIC:  Alert/oriented x3  No motor or sensory deficits  HEENT:  NC/AT, PERRL, EOMI, MMM, no scleral icterus  CARDIAC:  RRR, +S1/S2, no S3/S4 heard, no m/g/r  Right-sided PermCath in place covered with dressing  PULMONARY:  non-labored breathing, CTA B/L, no wheezing/rales/rhonci appreciated at time of encounter  ABDOMEN:  Soft, NT/ND, +BS, no rebound/guarding/rigidity  Extremities:  2+ Pulses in DP/PT  No edema, cyanosis, or clubbing  SKIN:  No rashes or erythema, dry skin  ASSESSMENT/PLAN:    1  Encounter to establish care  Assessment & Plan:  Medical, surgical, family, social history reviewed  Medications reviewed        2  ESRD (end stage renal disease) on dialysis [facility])  Assessment & Plan:  Lab Results   Component Value Date    EGFR 6 06/13/2022    EGFR 6 06/12/2022    EGFR 7 06/11/2022    CREATININE 7 96 (H) 06/13/2022    CREATININE 7 70 (H) 06/12/2022    CREATININE 7 34 (H) 06/11/2022      HD started on 06/2022  hemodialysis at [facility] via right chest PermCath  S/p AV fistula on 6/6718 complicated by cephalic vein spasm and eventual occlusion  Evaluated by vascular surgery on 10/2020  Scheduled on 10/24 for  new fistula creation in his left upper extremit    Orders:  -     CBC and Platelet; Future  -     Comprehensive metabolic panel; Future  -     PTH, intact; Future  -     CBC and Platelet  -     Comprehensive metabolic panel  -     PTH, intact    3  Secondary hyperparathyroidism of renal origin ([facility] [address] )  -     PTH, intact; Future  -     PTH, intact    4  Benign essential HTN  Assessment & Plan:  Currently on amlodipine 5 mg, carvedilol 25 mg b i d,   Furosemide 40 mg at least 3 times per week        5  Pharyngeal edema  Assessment & Plan:  Advised to carry epi pen       6   Chronic diastolic congestive heart failure ([facility] [address] )  Assessment & Plan:  Wt Readings from Last 3 Encounters:   10/05/22 91 2 kg (201 lb)   08/16/22 88 5 kg (195 lb 1 7 oz)   07/29/22 88 9 kg (196 lb)     Most recent Echo in February showed EF of 60% and grade 1 diastolic dysfunction   Currently on Lasix 40 mg as needed             7  Seizure due to alcohol withdrawal, uncomplicated ([address] )  Assessment & Plan:  Last episode last year -   currently drinks no more than 3 drinks per week        8  Tobacco abuse  Assessment & Plan:   admits Tobacco use, has decreased from 1 ppd to 1/3 ppd      9  MGUS (monoclonal gammopathy of unknown significance)  Assessment & Plan:  Following with heme Onc  Last visit on 7/2022  Schedule follow-up on 01/2023  10  Marijuana use  Assessment & Plan:  Admits to occasional use      11  Anemia due to chronic kidney disease, on chronic dialysis [facility])  Assessment & Plan:  Lab Results   Component Value Date    HGB 8 0 (L) 06/13/2022    HGB 8 9 (L) 06/11/2022    HGB 9 1 (L) 06/10/2022     Stable   Appears he receives EPO during HD       12  Encounter for immunization  -     Pneumococcal Conjugate Vaccine 20-valent (PCV20)  -     FLUBLOK: influenza vaccine, quadrivalent, recombinant, PF, 0 5 mL    13  Screening for HIV (human immunodeficiency virus)  -     HIV 1/2 Antigen/Antibody (4th Generation) w Reflex [facility]; Future    14  Encounter for screening for lung cancer    15   Smoking greater than 20 pack years  Assessment & Plan:  LDCT to be discussed on next visit           Current Outpatient Medications:   •  amLODIPine (NORVASC) 5 mg tablet, Take 1 tablet (5 mg total) by mouth daily, Disp: 90 tablet, Rfl: 1  •  aspirin (ECOTRIN LOW STRENGTH) 81 mg EC tablet, Take 1 tablet (81 mg total) by mouth daily, Disp: 90 tablet, Rfl: 1  •  b complex-vitamin C-folic acid (NEPHROCAPS) 1 mg capsule, Take 1 capsule by mouth daily with dinner, Disp: 30 capsule, Rfl: 0  •  calcium acetate (PHOSLO) capsule, Take 3 capsules (2,001 mg total) by mouth 3 (three) times a day with meals, Disp: 270 capsule, Rfl: 0  •  carvedilol (COREG) 25 mg tablet, Take 1 tablet (25 mg total) by mouth 2 (two) times a day with meals, Disp: 90 tablet, Rfl: 1  •  cholecalciferol (VITAMIN D3) 1,000 units tablet, Take 4 tablets (4,000 Units total) by mouth daily, Disp: 120 tablet, Rfl: 0  •  EPINEPHrine (EPIPEN) 0 3 mg/0 3 mL SOAJ, Inject 0 3 mL (0 3 mg total) into a muscle once for 1 dose, Disp: 0 6 mL, Rfl: 0  •  furosemide (LASIX) 20 mg tablet, Take 0 5 tablets (10 mg total) by mouth daily, Disp: 45 tablet, Rfl: 1  •  nicotine (NICODERM CQ) 14 mg/24hr TD 24 hr patch, Place 1 patch on the skin daily (Patient not taking: Reported on 10/5/2022), Disp: 28 patch, Rfl: 0  •  pravastatin (PRAVACHOL) 80 mg tablet, Take 1 tablet (80 mg total) by mouth daily with dinner, Disp: 90 tablet, Rfl: 3 [address] Internal Medicine [place]

## 2022-10-13 NOTE — H&P (VIEW-ONLY)
INTERNAL MEDICINE OFFICE VISIT NOTE  [facility] Internal Medicine [place]    NAME: Modesta Keller  AGE: 62 y o  SEX: male    DATE OF ENCOUNTER: 10/13/2022      Chief Complaint   Patient presents with   • Establish Care     Last time seen by a PCP was on 6/2022 by Dr Joel Dasr  At that time had been lost to follow-up due to lack of insurance and was status post hospitalization for end-stage renal disease in which hemodialysis was started  S/p AV fistula on 1/2923 complicated by cephalic vein spasm and eventual occlusion  Evaluated by vascular surgery on 10/2020  Scheduled on 10/24 for new fistula creation in his left upper extremity  Patient reports he has been getting hemodialysis at [facility] TTS via right chest PermCath  MGUS   following with Heme-Onc for MGUS  Shx: admits Tobacco use, has decreased from 1 ppd to 1/3 ppd, occasional  marijuana smoking, denies other illict drug use  Alcohol use: 2-3 beers per week  Admits to previous heavy drinking  Review of Systems   Constitutional: Negative for appetite change, diaphoresis, fatigue and fever  HENT: Negative for rhinorrhea and sore throat  Eyes: Negative  Negative for visual disturbance  Respiratory: Negative for apnea, cough, choking, chest tightness and shortness of breath  Cardiovascular: Negative for chest pain, palpitations and leg swelling  Gastrointestinal: Negative for abdominal distention, abdominal pain, constipation, diarrhea, nausea and vomiting  Endocrine: Negative  Genitourinary: Negative  Musculoskeletal: Negative  Skin: Negative  Neurological: Negative for dizziness and headaches  Hematological: Negative  Psychiatric/Behavioral: Negative          OBJECTIVE:  Vitals:    10/14/22 1429   BP: (!) 182/102   BP Location: Left arm   Patient Position: Sitting   Cuff Size: Adult   Pulse: 76   Resp: 18   Temp: 98 2 °F (36 8 °C)   TempSrc: Temporal   SpO2: 98%   Weight: 88 9 kg (196 lb)   Height: 5' 8" (1 727 m)       Physical Exam:   GENERAL: NAD, Normal appearance  Non diaphoretic, non-toxic, not ill-appearing, well-developed, well-nourished  NEUROLOGIC:  Alert/oriented x3  No motor or sensory deficits  HEENT:  NC/AT, PERRL, EOMI, MMM, no scleral icterus  CARDIAC:  RRR, +S1/S2, no S3/S4 heard, no m/g/r  Right-sided PermCath in place covered with dressing  PULMONARY:  non-labored breathing, CTA B/L, no wheezing/rales/rhonci appreciated at time of encounter  ABDOMEN:  Soft, NT/ND, +BS, no rebound/guarding/rigidity  Extremities:  2+ Pulses in DP/PT  No edema, cyanosis, or clubbing  SKIN:  No rashes or erythema, dry skin  ASSESSMENT/PLAN:    1  Encounter to establish care  Assessment & Plan:  Medical, surgical, family, social history reviewed  Medications reviewed        2  ESRD (end stage renal disease) on dialysis [facility])  Assessment & Plan:  Lab Results   Component Value Date    EGFR 6 06/13/2022    EGFR 6 06/12/2022    EGFR 7 06/11/2022    CREATININE 7 96 (H) 06/13/2022    CREATININE 7 70 (H) 06/12/2022    CREATININE 7 34 (H) 06/11/2022      HD started on 06/2022  hemodialysis at [facility] via right chest PermCath  S/p AV fistula on 7/8414 complicated by cephalic vein spasm and eventual occlusion  Evaluated by vascular surgery on 10/2020  Scheduled on 10/24 for  new fistula creation in his left upper extremit    Orders:  -     CBC and Platelet; Future  -     Comprehensive metabolic panel; Future  -     PTH, intact; Future  -     CBC and Platelet  -     Comprehensive metabolic panel  -     PTH, intact    3  Secondary hyperparathyroidism of renal origin ([facility] [address] )  -     PTH, intact; Future  -     PTH, intact    4  Benign essential HTN  Assessment & Plan:  Currently on amlodipine 5 mg, carvedilol 25 mg b i d,   Furosemide 40 mg at least 3 times per week        5  Pharyngeal edema  Assessment & Plan:  Advised to carry epi pen       6   Chronic diastolic congestive heart failure ([facility] [address] )  Assessment & Plan:  Wt Readings from Last 3 Encounters:   10/05/22 91 2 kg (201 lb)   08/16/22 88 5 kg (195 lb 1 7 oz)   07/29/22 88 9 kg (196 lb)     Most recent Echo in February showed EF of 60% and grade 1 diastolic dysfunction   Currently on Lasix 40 mg as needed             7  Seizure due to alcohol withdrawal, uncomplicated ([address] )  Assessment & Plan:  Last episode last year -   currently drinks no more than 3 drinks per week        8  Tobacco abuse  Assessment & Plan:   admits Tobacco use, has decreased from 1 ppd to 1/3 ppd      9  MGUS (monoclonal gammopathy of unknown significance)  Assessment & Plan:  Following with heme Onc  Last visit on 7/2022  Schedule follow-up on 01/2023  10  Marijuana use  Assessment & Plan:  Admits to occasional use      11  Anemia due to chronic kidney disease, on chronic dialysis [facility])  Assessment & Plan:  Lab Results   Component Value Date    HGB 8 0 (L) 06/13/2022    HGB 8 9 (L) 06/11/2022    HGB 9 1 (L) 06/10/2022     Stable   Appears he receives EPO during HD       12  Encounter for immunization  -     Pneumococcal Conjugate Vaccine 20-valent (PCV20)  -     FLUBLOK: influenza vaccine, quadrivalent, recombinant, PF, 0 5 mL    13  Screening for HIV (human immunodeficiency virus)  -     HIV 1/2 Antigen/Antibody (4th Generation) w Reflex [facility]; Future    14  Encounter for screening for lung cancer    15   Smoking greater than 20 pack years  Assessment & Plan:  LDCT to be discussed on next visit           Current Outpatient Medications:   •  amLODIPine (NORVASC) 5 mg tablet, Take 1 tablet (5 mg total) by mouth daily, Disp: 90 tablet, Rfl: 1  •  aspirin (ECOTRIN LOW STRENGTH) 81 mg EC tablet, Take 1 tablet (81 mg total) by mouth daily, Disp: 90 tablet, Rfl: 1  •  b complex-vitamin C-folic acid (NEPHROCAPS) 1 mg capsule, Take 1 capsule by mouth daily with dinner, Disp: 30 capsule, Rfl: 0  •  calcium acetate (PHOSLO) capsule, Take 3 capsules (2,001 mg total) by mouth 3 (three) times a day with meals, Disp: 270 capsule, Rfl: 0  •  carvedilol (COREG) 25 mg tablet, Take 1 tablet (25 mg total) by mouth 2 (two) times a day with meals, Disp: 90 tablet, Rfl: 1  •  cholecalciferol (VITAMIN D3) 1,000 units tablet, Take 4 tablets (4,000 Units total) by mouth daily, Disp: 120 tablet, Rfl: 0  •  EPINEPHrine (EPIPEN) 0 3 mg/0 3 mL SOAJ, Inject 0 3 mL (0 3 mg total) into a muscle once for 1 dose, Disp: 0 6 mL, Rfl: 0  •  furosemide (LASIX) 20 mg tablet, Take 0 5 tablets (10 mg total) by mouth daily, Disp: 45 tablet, Rfl: 1  •  nicotine (NICODERM CQ) 14 mg/24hr TD 24 hr patch, Place 1 patch on the skin daily (Patient not taking: Reported on 10/5/2022), Disp: 28 patch, Rfl: 0  •  pravastatin (PRAVACHOL) 80 mg tablet, Take 1 tablet (80 mg total) by mouth daily with dinner, Disp: 90 tablet, Rfl: 3 [facility], [address] [place] [address] Internal Medicine [facility]

## 2022-10-13 NOTE — ASSESSMENT & PLAN NOTE
Wt Readings from Last 3 Encounters:   10/05/22 91 2 kg (201 lb)   08/16/22 88 5 kg (195 lb 1 7 oz)   07/29/22 88 9 kg (196 lb)     Most recent Echo in February showed EF of 60% and grade 1 diastolic dysfunction   Currently on Lasix 40 mg as needed

## 2022-10-13 NOTE — ASSESSMENT & PLAN NOTE
Lab Results   Component Value Date    HGB 8 0 (L) 06/13/2022    HGB 8 9 (L) 06/11/2022    HGB 9 1 (L) 06/10/2022     Stable   Appears he receives EPO during HD

## 2022-10-13 NOTE — ASSESSMENT & PLAN NOTE
Lab Results   Component Value Date    EGFR 6 06/13/2022    EGFR 6 06/12/2022    EGFR 7 06/11/2022    CREATININE 7 96 (H) 06/13/2022    CREATININE 7 70 (H) 06/12/2022    CREATININE 7 34 (H) 06/11/2022      HD started on 06/2022  hemodialysis at [facility] via right chest PermCath  S/p AV fistula on 1/3612 complicated by cephalic vein spasm and eventual occlusion  Evaluated by vascular surgery on 10/2020    Scheduled on 10/24 for  new fistula creation in his left upper extremit

## 2022-10-14 ENCOUNTER — OFFICE VISIT (OUTPATIENT)
Dept: INTERNAL MEDICINE CLINIC | Facility: CLINIC | Age: 62
End: 2022-10-14
Payer: COMMERCIAL

## 2022-10-14 VITALS
BODY MASS INDEX: 29.7 KG/M2 | HEIGHT: 68 IN | SYSTOLIC BLOOD PRESSURE: 182 MMHG | HEART RATE: 76 BPM | OXYGEN SATURATION: 98 % | RESPIRATION RATE: 18 BRPM | WEIGHT: 196 LBS | DIASTOLIC BLOOD PRESSURE: 102 MMHG | TEMPERATURE: 98.2 F

## 2022-10-14 DIAGNOSIS — I10 BENIGN ESSENTIAL HTN: ICD-10-CM

## 2022-10-14 DIAGNOSIS — Z72.0 TOBACCO ABUSE: ICD-10-CM

## 2022-10-14 DIAGNOSIS — Z99.2 ANEMIA DUE TO CHRONIC KIDNEY DISEASE, ON CHRONIC DIALYSIS (HCC): ICD-10-CM

## 2022-10-14 DIAGNOSIS — J39.2 PHARYNGEAL EDEMA: ICD-10-CM

## 2022-10-14 DIAGNOSIS — D63.1 ANEMIA DUE TO CHRONIC KIDNEY DISEASE, ON CHRONIC DIALYSIS (HCC): ICD-10-CM

## 2022-10-14 DIAGNOSIS — F17.210 SMOKING GREATER THAN 20 PACK YEARS: ICD-10-CM

## 2022-10-14 DIAGNOSIS — Z12.2 ENCOUNTER FOR SCREENING FOR LUNG CANCER: ICD-10-CM

## 2022-10-14 DIAGNOSIS — I50.32 CHRONIC DIASTOLIC CONGESTIVE HEART FAILURE (HCC): ICD-10-CM

## 2022-10-14 DIAGNOSIS — Z23 ENCOUNTER FOR IMMUNIZATION: ICD-10-CM

## 2022-10-14 DIAGNOSIS — F12.90 MARIJUANA USE: ICD-10-CM

## 2022-10-14 DIAGNOSIS — R56.9 SEIZURE DUE TO ALCOHOL WITHDRAWAL, UNCOMPLICATED (HCC): ICD-10-CM

## 2022-10-14 DIAGNOSIS — Z99.2 ESRD (END STAGE RENAL DISEASE) ON DIALYSIS (HCC): ICD-10-CM

## 2022-10-14 DIAGNOSIS — D47.2 MGUS (MONOCLONAL GAMMOPATHY OF UNKNOWN SIGNIFICANCE): ICD-10-CM

## 2022-10-14 DIAGNOSIS — N18.6 ANEMIA DUE TO CHRONIC KIDNEY DISEASE, ON CHRONIC DIALYSIS (HCC): ICD-10-CM

## 2022-10-14 DIAGNOSIS — F10.930 SEIZURE DUE TO ALCOHOL WITHDRAWAL, UNCOMPLICATED (HCC): ICD-10-CM

## 2022-10-14 DIAGNOSIS — N25.81 SECONDARY HYPERPARATHYROIDISM OF RENAL ORIGIN (HCC): ICD-10-CM

## 2022-10-14 DIAGNOSIS — N18.6 ESRD (END STAGE RENAL DISEASE) ON DIALYSIS (HCC): ICD-10-CM

## 2022-10-14 DIAGNOSIS — Z11.4 SCREENING FOR HIV (HUMAN IMMUNODEFICIENCY VIRUS): ICD-10-CM

## 2022-10-14 DIAGNOSIS — Z76.89 ENCOUNTER TO ESTABLISH CARE: Primary | ICD-10-CM

## 2022-10-14 PROCEDURE — 90471 IMMUNIZATION ADMIN: CPT | Performed by: STUDENT IN AN ORGANIZED HEALTH CARE EDUCATION/TRAINING PROGRAM

## 2022-10-14 PROCEDURE — 90682 RIV4 VACC RECOMBINANT DNA IM: CPT | Performed by: STUDENT IN AN ORGANIZED HEALTH CARE EDUCATION/TRAINING PROGRAM

## 2022-10-14 PROCEDURE — 90472 IMMUNIZATION ADMIN EACH ADD: CPT | Performed by: STUDENT IN AN ORGANIZED HEALTH CARE EDUCATION/TRAINING PROGRAM

## 2022-10-14 PROCEDURE — 90677 PCV20 VACCINE IM: CPT | Performed by: STUDENT IN AN ORGANIZED HEALTH CARE EDUCATION/TRAINING PROGRAM

## 2022-10-14 PROCEDURE — 99205 OFFICE O/P NEW HI 60 MIN: CPT | Performed by: STUDENT IN AN ORGANIZED HEALTH CARE EDUCATION/TRAINING PROGRAM

## 2022-10-14 NOTE — PROGRESS NOTES
BMI Counseling: Body mass index is 29 8 kg/m²  The BMI is above normal  Nutrition recommendations include reducing portion sizes, decreasing overall calorie intake, 3-5 servings of fruits/vegetables daily, reducing fast food intake and consuming healthier snacks  Exercise recommendations include moderate aerobic physical activity for 150 minutes/week

## 2022-10-19 RX ORDER — FUROSEMIDE 40 MG/1
40 TABLET ORAL DAILY
COMMUNITY
Start: 2022-10-08

## 2022-10-19 NOTE — PRE-PROCEDURE INSTRUCTIONS
Pre-Surgery Instructions:   Medication Instructions   • amLODIPine (NORVASC) 5 mg tablet Take day of surgery  • aspirin (ECOTRIN LOW STRENGTH) 81 mg EC tablet Instructions provided by MD-inst no hold on aspirin per     • b complex-vitamin C-folic acid (NEPHROCAPS) 1 mg capsule Hold day of surgery  • calcium acetate (PHOSLO) capsule Hold day of surgery  • carvedilol (COREG) 25 mg tablet Take day of surgery   • cholecalciferol (VITAMIN D3) 1,000 units tablet Hold day of surgery  • furosemide (LASIX) 40 mg tablet Hold day of surgery  • pravastatin (PRAVACHOL) 80 mg tablet Take night before surgery    All pre-op instructions reviewed as per [facility] [place] [facility] w/ pt verb understanding  Inst NPO post MN night before sx except sips water w/ meds am of sx   Instructed to avoid all NSAIDs (which pt cannot take anyway ) and OTC Vit/Supp from now until after surgery per anesthesia guidelines  Tylenol ok prn  Received pre-op showering instructions and CHG  from surgeon office, reviewed process at time of call  Current hospital visitor & masking policy reviewed-inst to wear mask when in hospital Inst no smoking or marijuana morning of sx  Inst will receive phone call w/ time to report on DOS btwn 2-8 pm afternoon/evening before surgery from hospital nursing staff  Pt  Verbalized an understanding of all instructions reviewed and offers no concerns at this time

## 2022-10-20 ENCOUNTER — APPOINTMENT (EMERGENCY)
Dept: RADIOLOGY | Facility: HOSPITAL | Age: 62
End: 2022-10-20
Payer: COMMERCIAL

## 2022-10-20 ENCOUNTER — HOSPITAL ENCOUNTER (EMERGENCY)
Facility: HOSPITAL | Age: 62
Discharge: HOME | End: 2022-10-20
Attending: EMERGENCY MEDICINE
Payer: COMMERCIAL

## 2022-10-20 ENCOUNTER — ANESTHESIA EVENT (OUTPATIENT)
Dept: PERIOP | Facility: HOSPITAL | Age: 62
End: 2022-10-20
Payer: COMMERCIAL

## 2022-10-20 VITALS
RESPIRATION RATE: 18 BRPM | DIASTOLIC BLOOD PRESSURE: 75 MMHG | HEART RATE: 74 BPM | TEMPERATURE: 97.6 F | SYSTOLIC BLOOD PRESSURE: 158 MMHG | OXYGEN SATURATION: 100 %

## 2022-10-20 DIAGNOSIS — Z99.2 ESRD (END STAGE RENAL DISEASE) ON DIALYSIS (HCC): Primary | ICD-10-CM

## 2022-10-20 DIAGNOSIS — N18.6 ESRD (END STAGE RENAL DISEASE) ON DIALYSIS (HCC): Primary | ICD-10-CM

## 2022-10-20 DIAGNOSIS — Z78.9 PROBLEM WITH VASCULAR ACCESS: ICD-10-CM

## 2022-10-20 LAB
ALBUMIN SERPL BCP-MCNC: 2.7 G/DL (ref 3.5–5)
ALP SERPL-CCNC: 65 U/L (ref 46–116)
ALT SERPL W P-5'-P-CCNC: 17 U/L (ref 12–78)
ANION GAP SERPL CALCULATED.3IONS-SCNC: 10 MMOL/L (ref 4–13)
AST SERPL W P-5'-P-CCNC: 19 U/L (ref 5–45)
BILIRUB SERPL-MCNC: 0.42 MG/DL (ref 0.2–1)
BUN SERPL-MCNC: 24 MG/DL (ref 5–25)
CALCIUM ALBUM COR SERPL-MCNC: 9.9 MG/DL (ref 8.3–10.1)
CALCIUM SERPL-MCNC: 8.9 MG/DL (ref 8.3–10.1)
CHLORIDE SERPL-SCNC: 93 MMOL/L (ref 96–108)
CO2 SERPL-SCNC: 25 MMOL/L (ref 21–32)
CREAT SERPL-MCNC: 5.46 MG/DL (ref 0.6–1.3)
ERYTHROCYTE [DISTWIDTH] IN BLOOD BY AUTOMATED COUNT: 14.8 % (ref 11.6–15.1)
GFR SERPL CREATININE-BSD FRML MDRD: 10 ML/MIN/1.73SQ M
GLUCOSE SERPL-MCNC: 90 MG/DL (ref 65–140)
HCT VFR BLD AUTO: 31.8 % (ref 36.5–49.3)
HGB BLD-MCNC: 10.7 G/DL (ref 12–17)
INR PPP: 0.91 (ref 0.84–1.19)
MCH RBC QN AUTO: 33.1 PG (ref 26.8–34.3)
MCHC RBC AUTO-ENTMCNC: 33.6 G/DL (ref 31.4–37.4)
MCV RBC AUTO: 99 FL (ref 82–98)
PLATELET # BLD AUTO: 301 THOUSANDS/UL (ref 149–390)
PMV BLD AUTO: 8.5 FL (ref 8.9–12.7)
POTASSIUM SERPL-SCNC: 4.3 MMOL/L (ref 3.5–5.3)
PROT SERPL-MCNC: 7.2 G/DL (ref 6.4–8.4)
PROTHROMBIN TIME: 12.3 SECONDS (ref 11.6–14.5)
RBC # BLD AUTO: 3.23 MILLION/UL (ref 3.88–5.62)
SODIUM SERPL-SCNC: 128 MMOL/L (ref 135–147)
WBC # BLD AUTO: 9.98 THOUSAND/UL (ref 4.31–10.16)

## 2022-10-20 PROCEDURE — 85027 COMPLETE CBC AUTOMATED: CPT | Performed by: RADIOLOGY

## 2022-10-20 PROCEDURE — 76937 US GUIDE VASCULAR ACCESS: CPT | Performed by: PHYSICIAN ASSISTANT

## 2022-10-20 PROCEDURE — 76937 US GUIDE VASCULAR ACCESS: CPT

## 2022-10-20 PROCEDURE — 36558 INSERT TUNNELED CV CATH: CPT | Performed by: PHYSICIAN ASSISTANT

## 2022-10-20 PROCEDURE — C1894 INTRO/SHEATH, NON-LASER: HCPCS

## 2022-10-20 PROCEDURE — NC001 PR NO CHARGE: Performed by: PHYSICIAN ASSISTANT

## 2022-10-20 PROCEDURE — 77001 FLUOROGUIDE FOR VEIN DEVICE: CPT | Performed by: PHYSICIAN ASSISTANT

## 2022-10-20 PROCEDURE — 99153 MOD SED SAME PHYS/QHP EA: CPT

## 2022-10-20 PROCEDURE — 99152 MOD SED SAME PHYS/QHP 5/>YRS: CPT | Performed by: PHYSICIAN ASSISTANT

## 2022-10-20 PROCEDURE — 85610 PROTHROMBIN TIME: CPT | Performed by: RADIOLOGY

## 2022-10-20 PROCEDURE — 36558 INSERT TUNNELED CV CATH: CPT

## 2022-10-20 PROCEDURE — 99284 EMERGENCY DEPT VISIT MOD MDM: CPT

## 2022-10-20 PROCEDURE — 36415 COLL VENOUS BLD VENIPUNCTURE: CPT | Performed by: RADIOLOGY

## 2022-10-20 PROCEDURE — 99283 EMERGENCY DEPT VISIT LOW MDM: CPT | Performed by: EMERGENCY MEDICINE

## 2022-10-20 PROCEDURE — 80053 COMPREHEN METABOLIC PANEL: CPT | Performed by: RADIOLOGY

## 2022-10-20 PROCEDURE — 99152 MOD SED SAME PHYS/QHP 5/>YRS: CPT

## 2022-10-20 PROCEDURE — C1750 CATH, HEMODIALYSIS,LONG-TERM: HCPCS

## 2022-10-20 RX ORDER — CEFAZOLIN SODIUM 2 G/50ML
SOLUTION INTRAVENOUS
Status: COMPLETED | OUTPATIENT
Start: 2022-10-20 | End: 2022-10-20

## 2022-10-20 RX ORDER — FENTANYL CITRATE 50 UG/ML
INJECTION, SOLUTION INTRAMUSCULAR; INTRAVENOUS CODE/TRAUMA/SEDATION MEDICATION
Status: COMPLETED | OUTPATIENT
Start: 2022-10-20 | End: 2022-10-20

## 2022-10-20 RX ORDER — MIDAZOLAM HYDROCHLORIDE 2 MG/2ML
INJECTION, SOLUTION INTRAMUSCULAR; INTRAVENOUS CODE/TRAUMA/SEDATION MEDICATION
Status: COMPLETED | OUTPATIENT
Start: 2022-10-20 | End: 2022-10-20

## 2022-10-20 RX ADMIN — CEFAZOLIN SODIUM 2000 MG: 2 SOLUTION INTRAVENOUS at 13:29

## 2022-10-20 RX ADMIN — Medication 20 ML: at 13:53

## 2022-10-20 RX ADMIN — FENTANYL CITRATE 50 MCG: 50 INJECTION INTRAMUSCULAR; INTRAVENOUS at 13:51

## 2022-10-20 RX ADMIN — FENTANYL CITRATE 50 MCG: 50 INJECTION INTRAMUSCULAR; INTRAVENOUS at 13:58

## 2022-10-20 RX ADMIN — MIDAZOLAM 1 MG: 1 INJECTION INTRAMUSCULAR; INTRAVENOUS at 13:51

## 2022-10-20 RX ADMIN — MIDAZOLAM 1 MG: 1 INJECTION INTRAMUSCULAR; INTRAVENOUS at 13:58

## 2022-10-20 NOTE — INTERVAL H&P NOTE
Update: (This section must be completed if the H&P was completed greater than 24 hrs to procedure or admission)    H&P reviewed  After examining the patient, I find no changed to the H&P since it had been written  63 yo with ESRD on HD (catheter)    Catheter became dislodged this AM, no catheter in patient    Placement indicated - risks benefits alt discussed    Surgery next week (prior endoavf)    MP2 ASA3    Patient re-evaluated   Accept as history and physical     Berny Durán 20, 2022/1:38 PM

## 2022-10-20 NOTE — DISCHARGE INSTRUCTIONS
Perma-cath Placement   WHAT YOU NEED TO KNOW:   A perma-cath is a catheter placed through a vein into or near your right atrium  Your right atrium is the right upper chamber of your heart  A perma-cath is used for dialysis in an emergency or until a long-term device is ready to use  After your procedure, you will have some pain and swelling on your chest and neck  You may have some bruises on your chest and neck  You may also have 2 dressings, one on your chest and one on your neck  DISCHARGE INSTRUCTIONS:   Call 911 for any of the following: You feel lightheaded, short of breath, and have chest pain  Your catheter comes out   Contact Interventional Radiology at [phone] Elio PATIENTS: Contact Interventional Radiology at [phone]) Mirtha Fragoso PATIENTS: Contact Interventional Radiology at [phone]) if:  Blood soaks through your bandage  You have new swelling in your arm, neck, face, or chest on your right side  Your catheter gets wet  Your bruises or pain get worse  You have a fever or chills  Persistent nausea or vomiting  Your incision is red, swollen, or draining pus  You have questions or concerns about your condition or care  Self-care:       Resume your normal diet  Keep your dressings dry  Do not take a shower or swim  You may take a tub bath, but do not get your dressings wet  Water in your wound can cause bacteria to grow and cause an infection  If your dressing gets wet, dry it off and cover it with dry sterile gauze  Call your healthcare provider  Do not use soaps or ointments  Do not change your dressings  Your healthcare provider or dialysis nurse will change your dressings  Your dressings should stay in place until your healthcare provider removes them  The dressing on your chest will stay as long as you have the catheter in place  The dressing prevents infection  Do not remove the red and blue caps from the end of your catheter   The caps prevent air from getting into your catheter  Follow up with your healthcare provider as directed: Write down your questions so you remember to ask them during your visits  Procedural Sedation   WHAT YOU NEED TO KNOW:   Procedural sedation is medicine used during procedures to help you feel relaxed and calm  You will remember little to none of the procedure  After sedation you may feel tired, weak, or unsteady on your feet  You may also have trouble concentrating or short-term memory loss  These symptoms should go away in 24 hours or less  DISCHARGE INSTRUCTIONS:     Call 911 or have someone else call for any of the following: You have sudden trouble breathing  You cannot be woken  Contact Interventional Radiology at [phone]   Elio PATIENTS: Contact Interventional Radiology at [phone]) Hernando Lynn PATIENTS: Contact Interventional Radiology at [phone]) if any of the following occur: You have a severe headache or dizziness  Your heart is beating faster than usual     You have a fever or chills  Your skin is itchy, swollen, or you have a rash  You have nausea or are vomiting for more than 8 hours after the procedure  You have questions or concerns about your condition or care  Self-care:   Have someone stay with you for 24 hours  This person can drive you to errands and help you do things around the house  This person can also watch for problems  Rest and do quiet activities for 24 hours  Do not exercise, ride a bike, or play sports  Stand up slowly to prevent dizziness and falls  Take short walks around the house with another person  Slowly return to your usual activities the next day  Do not drive or use dangerous machines or tools for 24 hours  You may injure yourself or others  Examples include a lawnmower, saw, or drill  Do not return to work for 24 hours if you use dangerous machines or tools for work  Do not make important decisions for 24 hours   For example, do not sign important papers or invest money  Drink liquids as directed  Liquids help flush the sedation medicine out of your body  Ask how much liquid to drink each day and which liquids are best for you  Eat small, frequent meals to prevent nausea and vomiting  Start with clear liquids such as juice or broth  If you do not vomit after clear liquids, you can eat your usual foods  Do not drink alcohol or take medicines that make you drowsy  This includes medicines that help you sleep and anxiety medicines  Ask your healthcare provider if it is safe for you to take pain medicine  Follow up with your healthcare provider as directed: Write down your questions so you remember to ask them during your visits

## 2022-10-20 NOTE — TELEMEDICINE
e-Consult (IPC)  - Interventional Radiology  Abiel Hill 62 y o  male MRN: [id number]  Unit/Bed#: ED 15 Encounter: [id number]          Interventional Radiology has been consulted to evaluate Abiel Hill    We were consulted by nephrology concerning this patient with ESRD on HD  IP Consult to IR  Consult performed by: Dorann Schlatter, PA-C  Consult ordered by: Tommiealeksey Dove, DO        10/20/22    Assessment/Recommendation:     62year old male with pmh of ESRD on dialysis, L endoAVF, scheduled for L brachiobasilic fistula vs LUE graft and ligation of L endoAVF 10/24/22, presenting due to dislodged permcath     - f/u labwork this AM  - please keep npo  - will plan for possible permcath placement this afternoon pending IR scheduling    11-20 minutes, >50% of the total time devoted to medical consultative verbal/EMR discussion between providers  Written report will be generated in the EMR  Thank you for allowing Interventional Radiology to participate in the care of Abiel Hill  Please don't hesitate to call or TigerText us with any questions       Dorann Schlatter

## 2022-10-20 NOTE — BRIEF OP NOTE (RAD/CATH)
IR TUNNELED DIALYSIS CATHETER PLACEMENT Procedure Note    PATIENT NAME: Levi Mcintosh  : 1960  MRN: [id number]    Pre-op Diagnosis: ESRD on dialysis  Post-op Diagnosis: ESRD on dialysis    Provider:  Sabiha Jain PA-C    Supervising physician:  Dr Oconnell Morning    No qualified resident was available, Resident is only observing    Estimated Blood Loss: minimal    Findings: Existing tunneled dialysis catheter had fallen out earlier today  Decision made to place new tunneled dialysis catheter lateral to old site  14Fr x 28cm Denys Split cathter placed      Specimens: none    Complications:  None immediate    Anesthesia: conscious sedation and local    Sabiha Jain     Date: 10/20/2022  Time: 2:26 PM

## 2022-10-20 NOTE — ED NOTES
Scheduled for IR at 1300  Pt to remain NPO  Pt aware of needing a ride home due to sedation        Hedy Brunner, RN  10/20/22 9263

## 2022-10-20 NOTE — ED PROVIDER NOTES
Pt Name: Luann Halsted  MRN: [id number]  [place] 1960  Age/Sex: 62 y o  male  Date of evaluation: 10/20/2022  PCP: Radha Ann MD    CHIEF COMPLAINT    Chief Complaint   Patient presents with   • Vascular Access Problem     Pt went to dialysis this morning and found that his HD catheter had fallen out  Pt scheduled to have fistula on Monday  HPI    Jeffery presents to the Emergency Department after his dialysis access fell out  He was due to have dialysis today  He realized that it was in his shirt and was not sure how it fell out  He went to dialysis and they sent him here to have the catheter put back in  HPI      Past Medical and Surgical History    Past Medical History:   Diagnosis Date   • CHF (congestive heart failure) ([facility] [address] )    • Chronic kidney disease    • ESRD (end stage renal disease) on dialysis ([facility] [address] )     dialysis tues-thurs-sat   • Hyperkalemia 02/01/2022   • Hyperlipidemia    • Hypertension    • Personal history of COVID-19 07/2021    mild symptoms - recovered at home   • Renal disorder    • Withdrawal seizures ([facility] [address] )     approximately 2018 when reducing alcohol       Past Surgical History:   Procedure Laterality Date   • HERNIA REPAIR      states umbilical   • IR AV FISTULAGRAM/GRAFTOGRAM  08/16/2022   • IR BIOPSY BONE MARROW  07/13/2022   • IR PULMONARY ARTERY EMBOLIZATION  08/03/2022   • IR TUNNELED DIALYSIS CATHETER PLACEMENT  06/10/2022       History reviewed  No pertinent family history  Social History     Tobacco Use   • Smoking status: Current Every Day Smoker     Packs/day: 0 25     Years: 30 00     Pack years: 7 50   • Smokeless tobacco: Never Used   • Tobacco comment: states down to approx 1/3 pack per day cutting down on his down   Vaping Use   • Vaping Use: Never used   Substance Use Topics   • Alcohol use:  Yes     Alcohol/week: 4 0 standard drinks     Types: 4 Cans of beer per week     Comment: has cut down to 4 per week   • Drug use: Yes     Frequency: 3 0 times per week     Types: Marijuana     Comment: occasionally           Allergies    Allergies   Allergen Reactions   • Ibuprofen Other (See Comments)     ESRD - cannot have ibuprofen       Home Medications    Prior to Admission medications    Medication Sig Start Date End Date Taking? Authorizing Provider   amLODIPine (NORVASC) 5 mg tablet Take 1 tablet (5 mg total) by mouth daily 6/24/22   Colt Fletcher MD   aspirin (ECOTRIN LOW STRENGTH) 81 mg EC tablet Take 1 tablet (81 mg total) by mouth daily 6/24/22   Colt Fletcher MD   b complex-vitamin C-folic acid (NEPHROCAPS) 1 mg capsule Take 1 capsule by mouth daily with dinner 6/14/22 10/19/22  Roland Norman PA-C   calcium acetate (PHOSLO) capsule Take 3 capsules (2,001 mg total) by mouth 3 (three) times a day with meals 6/14/22 10/19/22  Eric Patel PA-C   carvedilol (COREG) 25 mg tablet Take 1 tablet (25 mg total) by mouth 2 (two) times a day with meals 6/24/22   Colt Fletcher MD   cholecalciferol (VITAMIN D3) 1,000 units tablet Take 4 tablets (4,000 Units total) by mouth daily 6/15/22 10/19/22  Eric Patel PA-C   EPINEPHrine (EPIPEN) 0 3 mg/0 3 mL SOAJ Inject 0 3 mL (0 3 mg total) into a muscle once for 1 dose 2/4/22 2/4/22  Joselin Patel DO   furosemide (LASIX) 40 mg tablet Take 40 mg by mouth daily 10/8/22   Historical Provider, MD   pravastatin (PRAVACHOL) 80 mg tablet Take 1 tablet (80 mg total) by mouth daily with dinner 6/24/22   Colt Fletcher MD           Review of Systems    Review of Systems   Constitutional: Negative for chills and fever  HENT: Negative for ear pain and sore throat  Eyes: Negative for pain and visual disturbance  Respiratory: Negative for cough and shortness of breath  Cardiovascular: Negative for chest pain and palpitations  Gastrointestinal: Negative for abdominal pain and vomiting  Genitourinary: Negative for dysuria and hematuria  Musculoskeletal: Negative for arthralgias and back pain     Skin: Negative for color change and rash  Neurological: Negative for seizures and syncope  All other systems reviewed and are negative  Physical Exam      ED Triage Vitals   Temperature Pulse Respirations Blood Pressure SpO2   10/20/22 0806 10/20/22 0806 10/20/22 0806 10/20/22 0806 10/20/22 0806   97 6 °F (36 4 °C) 77 18 161/72 100 %      Temp Source Heart Rate Source Patient Position - Orthostatic VS BP Location FiO2 (%)   10/20/22 0806 10/20/22 0806 10/20/22 0806 10/20/22 0806 --   Oral Monitor Sitting Right arm       Pain Score       10/20/22 1500       No Pain               Physical Exam  Vitals and nursing note reviewed  Constitutional:       General: He is not in acute distress  Appearance: He is well-developed  He is not diaphoretic  HENT:      Head: Normocephalic and atraumatic  Nose: Nose normal    Eyes:      Conjunctiva/sclera: Conjunctivae normal       Pupils: Pupils are equal, round, and reactive to light  Cardiovascular:      Rate and Rhythm: Normal rate and regular rhythm  Heart sounds: Normal heart sounds  No murmur heard  No friction rub  No gallop  Pulmonary:      Effort: Pulmonary effort is normal  No respiratory distress  Breath sounds: Normal breath sounds  No wheezing or rales  Abdominal:      General: Bowel sounds are normal       Palpations: Abdomen is soft  Tenderness: There is no abdominal tenderness  There is no guarding or rebound  Musculoskeletal:         General: Normal range of motion  Cervical back: Normal range of motion and neck supple  Skin:     General: Skin is warm and dry  Neurological:      Mental Status: He is alert and oriented to person, place, and time  Psychiatric:         Behavior: Behavior normal          Assessment and Plan    Replace catheter and reschedule dialysis        MDM    Diagnostic Results    Labs:    Results for orders placed or performed during the hospital encounter of 10/20/22   CBC and Platelet   Result Value Ref Range    WBC 9 98 4 31 - 10 16 Thousand/uL    RBC 3 23 (L) 3 88 - 5 62 Million/uL    Hemoglobin 10 7 (L) 12 0 - 17 0 g/dL    Hematocrit 31 8 (L) 36 5 - 49 3 %    MCV 99 (H) 82 - 98 fL    MCH 33 1 26 8 - 34 3 pg    MCHC 33 6 31 4 - 37 4 g/dL    RDW 14 8 11 6 - 15 1 %    Platelets 436 817 - 667 Thousands/uL    MPV 8 5 (L) 8 9 - 12 7 fL   Protime-INR   Result Value Ref Range    Protime 12 3 11 6 - 14 5 seconds    INR 0 91 0 84 - 1 19   Comprehensive metabolic panel   Result Value Ref Range    Sodium 128 (L) 135 - 147 mmol/L    Potassium 4 3 3 5 - 5 3 mmol/L    Chloride 93 (L) 96 - 108 mmol/L    CO2 25 21 - 32 mmol/L    ANION GAP 10 4 - 13 mmol/L    BUN 24 5 - 25 mg/dL    Creatinine 5 46 (H) 0 60 - 1 30 mg/dL    Glucose 90 65 - 140 mg/dL    Calcium 8 9 8 3 - 10 1 mg/dL    Corrected Calcium 9 9 8 3 - 10 1 mg/dL    AST 19 5 - 45 U/L    ALT 17 12 - 78 U/L    Alkaline Phosphatase 65 46 - 116 U/L    Total Protein 7 2 6 4 - 8 4 g/dL    Albumin 2 7 (L) 3 5 - 5 0 g/dL    Total Bilirubin 0 42 0 20 - 1 00 mg/dL    eGFR 10 ml/min/1 73sq m       All labs reviewed and utilized in the medical decision making process    Radiology:    IR tunneled dialysis catheter placement    (Results Pending)       All radiology studies independently viewed by me and interpreted by the radiologist     Procedure    Procedures      ED Course of Care and Re-Assessments      Medications   ceFAZolin (ANCEF) IVPB (premix in dextrose) ( Intravenous Stopped 10/20/22 1358)   midazolam (VERSED) injection (1 mg Intravenous Given 10/20/22 1358)   fentanyl citrate (PF) 100 MCG/2ML (50 mcg Intravenous Given 10/20/22 1358)   lidocaine-epinephrine 1%-1:692715 buffered (20 mL Infiltration Given 10/20/22 1353)     Patient was seen in ED by nephrology  Plan to dialyze tomorrow if labs acceptable to wait     IR to replace catheter at 1pm        FINAL IMPRESSION    Final diagnoses:   ESRD (end stage renal disease) on dialysis ([address] )   Problem with vascular access         DISPOSITION/PLAN      Time reflects when diagnosis was documented in both MDM as applicable and the Disposition within this note     Time User Action Codes Description Comment    10/20/2022  2:35 PM Jake Clause Add [N18 6,  Z99 2] ESRD (end stage renal disease) on dialysis ([facility] [address] )     10/20/2022  2:35 PM Jake Clause Add [Z78 9] Problem with vascular access       ED Disposition     ED Disposition   Discharge    Condition   Stable    Date/Time   Thu Oct 20, 2022  2:35 PM    Comment   Eun Kemp discharge to home/self care  Follow-up Information     Follow up With Specialties Details Why Contact Info        Please go to dialysis tomorrow at 11:30  PATIENT REFERRED TO:      Please go to dialysis tomorrow at 11:30  DISCHARGE MEDICATIONS:    Patient's Medications   Discharge Prescriptions    No medications on file       No discharge procedures on file           Chris Pinedo, DO Chris Pinedo,   10/20/22 1604

## 2022-10-20 NOTE — ED NOTES
Lunch tray ordered  Per IR, pt to be discharge in 1 hour  Family aware of needing to pick patient up        Amina Medeiros RN  10/20/22 0204

## 2022-10-20 NOTE — PROCEDURES
Central Line Insertion    Date/Time: 10/20/2022 3:03 PM  Performed by: Gustavo Hinds PA-C  Authorized by: Gustavo Hinds PA-C     Patient location:  IR  Consent:     Consent obtained:  Written    Consent given by:  Patient    Risks discussed:  Bleeding, infection and incorrect placement  Universal protocol:     Procedure explained and questions answered to patient or proxy's satisfaction: yes      Immediately prior to procedure, a time out was called: yes      Patient identity confirmed:  Verbally with patient  Pre-procedure details:     Hand hygiene: Hand hygiene performed prior to insertion      Sterile barrier technique: All elements of maximal sterile technique followed      Skin preparation:  ChloraPrep    Skin preparation agent: Skin preparation agent completely dried prior to procedure    Indications:     Central line indications: dialysis    Sedation:     Sedation type: Moderate (conscious) sedation  Anesthesia (see MAR for exact dosages): Anesthesia method:  Local infiltration  Procedure details:     Location:  Right internal jugular    Vessel type: vein      Laterality:  Right    Patient position:  Flat    Catheter type:  Double lumen    Catheter size:  14 Fr    Landmarks identified: yes      Ultrasound guidance: yes      Ultrasound image availability:  Images available in PACS    Sterile ultrasound techniques: Sterile gel and sterile probe covers were used      Number of attempts:  1    Successful placement: yes    Post-procedure details:     Post-procedure:  Line sutured    Assessment:  Blood return through all ports    Post-procedure complications: none      Patient tolerance of procedure:   Tolerated well, no immediate complications  Comments:      Tunneled dialysis line

## 2022-10-24 ENCOUNTER — HOSPITAL ENCOUNTER (OUTPATIENT)
Facility: HOSPITAL | Age: 62
Setting detail: OUTPATIENT SURGERY
Discharge: HOME | End: 2022-10-24
Attending: SURGERY | Admitting: SURGERY
Payer: COMMERCIAL

## 2022-10-24 ENCOUNTER — ANESTHESIA (OUTPATIENT)
Dept: PERIOP | Facility: HOSPITAL | Age: 62
End: 2022-10-24
Payer: COMMERCIAL

## 2022-10-24 VITALS
HEART RATE: 74 BPM | RESPIRATION RATE: 16 BRPM | HEIGHT: 68 IN | DIASTOLIC BLOOD PRESSURE: 73 MMHG | SYSTOLIC BLOOD PRESSURE: 139 MMHG | OXYGEN SATURATION: 97 % | TEMPERATURE: 98 F | BODY MASS INDEX: 31.17 KG/M2 | WEIGHT: 205.69 LBS

## 2022-10-24 DIAGNOSIS — Z99.2 ESRD (END STAGE RENAL DISEASE) ON DIALYSIS (HCC): Primary | ICD-10-CM

## 2022-10-24 DIAGNOSIS — N18.6 ESRD (END STAGE RENAL DISEASE) ON DIALYSIS (HCC): Primary | ICD-10-CM

## 2022-10-24 LAB
ANION GAP SERPL CALCULATED.3IONS-SCNC: 10 MMOL/L (ref 4–13)
BASOPHILS # BLD AUTO: 0.12 THOUSANDS/ÂΜL (ref 0–0.1)
BASOPHILS NFR BLD AUTO: 1 % (ref 0–1)
BUN SERPL-MCNC: 23 MG/DL (ref 5–25)
CALCIUM SERPL-MCNC: 9.3 MG/DL (ref 8.3–10.1)
CHLORIDE SERPL-SCNC: 98 MMOL/L (ref 96–108)
CO2 SERPL-SCNC: 26 MMOL/L (ref 21–32)
CREAT SERPL-MCNC: 4.68 MG/DL (ref 0.6–1.3)
EOSINOPHIL # BLD AUTO: 0.63 THOUSAND/ÂΜL (ref 0–0.61)
EOSINOPHIL NFR BLD AUTO: 7 % (ref 0–6)
ERYTHROCYTE [DISTWIDTH] IN BLOOD BY AUTOMATED COUNT: 15.4 % (ref 11.6–15.1)
GFR SERPL CREATININE-BSD FRML MDRD: 12 ML/MIN/1.73SQ M
GLUCOSE P FAST SERPL-MCNC: 83 MG/DL (ref 65–99)
GLUCOSE SERPL-MCNC: 83 MG/DL (ref 65–140)
HCT VFR BLD AUTO: 30.8 % (ref 36.5–49.3)
HGB BLD-MCNC: 10.4 G/DL (ref 12–17)
IMM GRANULOCYTES # BLD AUTO: 0.04 THOUSAND/UL (ref 0–0.2)
IMM GRANULOCYTES NFR BLD AUTO: 1 % (ref 0–2)
INR PPP: 0.95 (ref 0.84–1.19)
LYMPHOCYTES # BLD AUTO: 2.47 THOUSANDS/ÂΜL (ref 0.6–4.47)
LYMPHOCYTES NFR BLD AUTO: 28 % (ref 14–44)
MCH RBC QN AUTO: 33.7 PG (ref 26.8–34.3)
MCHC RBC AUTO-ENTMCNC: 33.8 G/DL (ref 31.4–37.4)
MCV RBC AUTO: 100 FL (ref 82–98)
MONOCYTES # BLD AUTO: 0.83 THOUSAND/ÂΜL (ref 0.17–1.22)
MONOCYTES NFR BLD AUTO: 9 % (ref 4–12)
NEUTROPHILS # BLD AUTO: 4.72 THOUSANDS/ÂΜL (ref 1.85–7.62)
NEUTS SEG NFR BLD AUTO: 54 % (ref 43–75)
NRBC BLD AUTO-RTO: 0 /100 WBCS
PLATELET # BLD AUTO: 231 THOUSANDS/UL (ref 149–390)
PMV BLD AUTO: 8.3 FL (ref 8.9–12.7)
POTASSIUM SERPL-SCNC: 3.7 MMOL/L (ref 3.5–5.3)
PROTHROMBIN TIME: 12.7 SECONDS (ref 11.6–14.5)
RBC # BLD AUTO: 3.09 MILLION/UL (ref 3.88–5.62)
SODIUM SERPL-SCNC: 134 MMOL/L (ref 135–147)
WBC # BLD AUTO: 8.81 THOUSAND/UL (ref 4.31–10.16)

## 2022-10-24 PROCEDURE — 85610 PROTHROMBIN TIME: CPT | Performed by: SURGERY

## 2022-10-24 PROCEDURE — 85025 COMPLETE CBC W/AUTO DIFF WBC: CPT | Performed by: SURGERY

## 2022-10-24 PROCEDURE — 80048 BASIC METABOLIC PNL TOTAL CA: CPT | Performed by: SURGERY

## 2022-10-24 RX ORDER — CHLORHEXIDINE GLUCONATE 0.12 MG/ML
15 RINSE ORAL ONCE
Status: COMPLETED | OUTPATIENT
Start: 2022-10-24 | End: 2022-10-24

## 2022-10-24 RX ORDER — CEFAZOLIN SODIUM 2 G/50ML
2000 SOLUTION INTRAVENOUS ONCE
Status: COMPLETED | OUTPATIENT
Start: 2022-10-24 | End: 2022-10-24

## 2022-10-24 RX ORDER — ONDANSETRON 2 MG/ML
INJECTION INTRAMUSCULAR; INTRAVENOUS AS NEEDED
Status: DISCONTINUED | OUTPATIENT
Start: 2022-10-24 | End: 2022-10-24

## 2022-10-24 RX ORDER — ONDANSETRON 2 MG/ML
4 INJECTION INTRAMUSCULAR; INTRAVENOUS ONCE AS NEEDED
Status: DISCONTINUED | OUTPATIENT
Start: 2022-10-24 | End: 2022-10-24 | Stop reason: HOSPADM

## 2022-10-24 RX ORDER — LIDOCAINE HYDROCHLORIDE 20 MG/ML
INJECTION, SOLUTION EPIDURAL; INFILTRATION; INTRACAUDAL; PERINEURAL AS NEEDED
Status: DISCONTINUED | OUTPATIENT
Start: 2022-10-24 | End: 2022-10-24

## 2022-10-24 RX ORDER — BUPIVACAINE HYDROCHLORIDE 5 MG/ML
INJECTION, SOLUTION EPIDURAL; INTRACAUDAL AS NEEDED
Status: DISCONTINUED | OUTPATIENT
Start: 2022-10-24 | End: 2022-10-24 | Stop reason: HOSPADM

## 2022-10-24 RX ORDER — HYDROCODONE BITARTRATE AND ACETAMINOPHEN 5; 325 MG/1; MG/1
1 TABLET ORAL EVERY 6 HOURS PRN
Qty: 10 TABLET | Refills: 0 | Status: SHIPPED | OUTPATIENT
Start: 2022-10-24 | End: 2022-11-03

## 2022-10-24 RX ORDER — FENTANYL CITRATE 50 UG/ML
INJECTION, SOLUTION INTRAMUSCULAR; INTRAVENOUS AS NEEDED
Status: DISCONTINUED | OUTPATIENT
Start: 2022-10-24 | End: 2022-10-24

## 2022-10-24 RX ORDER — GLYCOPYRROLATE 0.2 MG/ML
INJECTION INTRAMUSCULAR; INTRAVENOUS AS NEEDED
Status: DISCONTINUED | OUTPATIENT
Start: 2022-10-24 | End: 2022-10-24

## 2022-10-24 RX ORDER — HEPARIN SODIUM 1000 [USP'U]/ML
INJECTION, SOLUTION INTRAVENOUS; SUBCUTANEOUS AS NEEDED
Status: DISCONTINUED | OUTPATIENT
Start: 2022-10-24 | End: 2022-10-24

## 2022-10-24 RX ORDER — SODIUM CHLORIDE 9 MG/ML
50 INJECTION, SOLUTION INTRAVENOUS CONTINUOUS
Status: DISCONTINUED | OUTPATIENT
Start: 2022-10-24 | End: 2022-10-24 | Stop reason: HOSPADM

## 2022-10-24 RX ORDER — DEXMEDETOMIDINE HYDROCHLORIDE 100 UG/ML
INJECTION, SOLUTION INTRAVENOUS AS NEEDED
Status: DISCONTINUED | OUTPATIENT
Start: 2022-10-24 | End: 2022-10-24

## 2022-10-24 RX ORDER — HYDROMORPHONE HCL/PF 1 MG/ML
SYRINGE (ML) INJECTION AS NEEDED
Status: DISCONTINUED | OUTPATIENT
Start: 2022-10-24 | End: 2022-10-24

## 2022-10-24 RX ORDER — MIDAZOLAM HYDROCHLORIDE 2 MG/2ML
INJECTION, SOLUTION INTRAMUSCULAR; INTRAVENOUS AS NEEDED
Status: DISCONTINUED | OUTPATIENT
Start: 2022-10-24 | End: 2022-10-24

## 2022-10-24 RX ORDER — OXYCODONE HYDROCHLORIDE AND ACETAMINOPHEN 5; 325 MG/1; MG/1
1 TABLET ORAL EVERY 4 HOURS PRN
Status: DISCONTINUED | OUTPATIENT
Start: 2022-10-24 | End: 2022-10-24 | Stop reason: HOSPADM

## 2022-10-24 RX ORDER — EPHEDRINE SULFATE 50 MG/ML
INJECTION INTRAVENOUS AS NEEDED
Status: DISCONTINUED | OUTPATIENT
Start: 2022-10-24 | End: 2022-10-24

## 2022-10-24 RX ORDER — PHENYLEPHRINE HYDROCHLORIDE 10 MG/ML
INJECTION INTRAVENOUS AS NEEDED
Status: DISCONTINUED | OUTPATIENT
Start: 2022-10-24 | End: 2022-10-24

## 2022-10-24 RX ORDER — PROPOFOL 10 MG/ML
INJECTION, EMULSION INTRAVENOUS AS NEEDED
Status: DISCONTINUED | OUTPATIENT
Start: 2022-10-24 | End: 2022-10-24

## 2022-10-24 RX ORDER — FENTANYL CITRATE/PF 50 MCG/ML
25 SYRINGE (ML) INJECTION
Status: COMPLETED | OUTPATIENT
Start: 2022-10-24 | End: 2022-10-24

## 2022-10-24 RX ADMIN — Medication 15 MG: at 08:53

## 2022-10-24 RX ADMIN — EPHEDRINE SULFATE 5 MG: 50 INJECTION, SOLUTION INTRAVENOUS at 11:05

## 2022-10-24 RX ADMIN — FENTANYL CITRATE 25 MCG: 50 INJECTION, SOLUTION INTRAMUSCULAR; INTRAVENOUS at 13:22

## 2022-10-24 RX ADMIN — CEFAZOLIN SODIUM 2000 MG: 2 SOLUTION INTRAVENOUS at 11:53

## 2022-10-24 RX ADMIN — EPHEDRINE SULFATE 10 MG: 50 INJECTION, SOLUTION INTRAVENOUS at 09:21

## 2022-10-24 RX ADMIN — DEXMEDETOMIDINE HCL 20 MCG: 100 INJECTION INTRAVENOUS at 11:50

## 2022-10-24 RX ADMIN — FENTANYL CITRATE 50 MCG: 50 INJECTION, SOLUTION INTRAMUSCULAR; INTRAVENOUS at 08:53

## 2022-10-24 RX ADMIN — LIDOCAINE HYDROCHLORIDE 80 MG: 20 INJECTION, SOLUTION EPIDURAL; INFILTRATION; INTRACAUDAL at 08:13

## 2022-10-24 RX ADMIN — CEFAZOLIN SODIUM 2000 MG: 2 SOLUTION INTRAVENOUS at 08:03

## 2022-10-24 RX ADMIN — MIDAZOLAM 2 MG: 1 INJECTION INTRAMUSCULAR; INTRAVENOUS at 08:03

## 2022-10-24 RX ADMIN — FENTANYL CITRATE 50 MCG: 50 INJECTION, SOLUTION INTRAMUSCULAR; INTRAVENOUS at 09:57

## 2022-10-24 RX ADMIN — EPHEDRINE SULFATE 5 MG: 50 INJECTION, SOLUTION INTRAVENOUS at 10:23

## 2022-10-24 RX ADMIN — FENTANYL CITRATE 25 MCG: 50 INJECTION, SOLUTION INTRAMUSCULAR; INTRAVENOUS at 13:12

## 2022-10-24 RX ADMIN — SODIUM CHLORIDE 50 ML/HR: 0.9 INJECTION, SOLUTION INTRAVENOUS at 06:38

## 2022-10-24 RX ADMIN — ONDANSETRON 4 MG: 2 INJECTION INTRAMUSCULAR; INTRAVENOUS at 11:54

## 2022-10-24 RX ADMIN — FENTANYL CITRATE 50 MCG: 50 INJECTION, SOLUTION INTRAMUSCULAR; INTRAVENOUS at 08:13

## 2022-10-24 RX ADMIN — HYDROMORPHONE HYDROCHLORIDE 0.5 MG: 1 INJECTION, SOLUTION INTRAMUSCULAR; INTRAVENOUS; SUBCUTANEOUS at 10:23

## 2022-10-24 RX ADMIN — FENTANYL CITRATE 50 MCG: 50 INJECTION, SOLUTION INTRAMUSCULAR; INTRAVENOUS at 09:01

## 2022-10-24 RX ADMIN — Medication 25 MG: at 08:17

## 2022-10-24 RX ADMIN — EPHEDRINE SULFATE 5 MG: 50 INJECTION, SOLUTION INTRAVENOUS at 09:47

## 2022-10-24 RX ADMIN — HEPARIN SODIUM 3000 UNITS: 1000 INJECTION INTRAVENOUS; SUBCUTANEOUS at 11:05

## 2022-10-24 RX ADMIN — FENTANYL CITRATE 25 MCG: 50 INJECTION, SOLUTION INTRAMUSCULAR; INTRAVENOUS at 13:27

## 2022-10-24 RX ADMIN — GLYCOPYRROLATE 0.2 MG: 0.2 INJECTION, SOLUTION INTRAMUSCULAR; INTRAVENOUS at 08:17

## 2022-10-24 RX ADMIN — EPHEDRINE SULFATE 10 MG: 50 INJECTION, SOLUTION INTRAVENOUS at 09:38

## 2022-10-24 RX ADMIN — CHLORHEXIDINE GLUCONATE 0.12% ORAL RINSE 15 ML: 1.2 LIQUID ORAL at 06:37

## 2022-10-24 RX ADMIN — PROPOFOL 200 MG: 10 INJECTION, EMULSION INTRAVENOUS at 08:13

## 2022-10-24 RX ADMIN — DEXMEDETOMIDINE HCL 20 MCG: 100 INJECTION INTRAVENOUS at 12:26

## 2022-10-24 RX ADMIN — FENTANYL CITRATE 25 MCG: 50 INJECTION, SOLUTION INTRAMUSCULAR; INTRAVENOUS at 13:17

## 2022-10-24 RX ADMIN — PHENYLEPHRINE HYDROCHLORIDE 100 MCG: 10 INJECTION INTRAVENOUS at 09:06

## 2022-10-24 RX ADMIN — OXYCODONE HYDROCHLORIDE AND ACETAMINOPHEN 1 TABLET: 5; 325 TABLET ORAL at 14:12

## 2022-10-24 RX ADMIN — EPHEDRINE SULFATE 5 MG: 50 INJECTION, SOLUTION INTRAVENOUS at 09:57

## 2022-10-24 NOTE — DISCHARGE INSTRUCTIONS
DISCHARGE INSTRUCTIONS  DIALYSIS FISTULA SURGERY    ACTIVITY:  Limit use of the operated arm to what is necessary for the first day after surgery  On the second day after surgery, you may start to increase use of your arm as tolerated  Avoid heavy lifting (no more than 15 lbs) for the first one week  You should start to exercise your hand on the side of the fistula by squeezing a stress ball or a rolled-up sock  This increases blood flow in your fistula and arm so your fistula will function better  Feel for a thrill every day  The thrill is the vibration or pulse you feel over the fistula that means the blood is flowing through it  If you cannot feel a thrill, call our office ([phone])  DIET:   Resume your normal diet  Good nutrition is important for healing of your incision  DRESSING:   You may have surgical glue at your surgical site  There are stitches present under the skin which will absorb on their own  The glue is used to cover the incision, assist in closure, and prevent contamination  This adhesive will darken and peel away on its own within one to two weeks  Do not pick at it  If you have a dressing over your surgical site, remove this on the second day after surgery  INCISION:   If you do not have a dialysis catheter in place, you may shower and get your incision wet  Wash incision daily with soap and water, but do not rub or scrub the incision; rinse thoroughly and pat dry  You may have stitches or staples to close your incision and it is okay for these to get wet  Do not bathe in a tub or swim for the first 4 week following surgery or if you have any open wounds  It is normal to have mild swelling or discoloration around the incision  If increasing redness or pain develops, call our office immediately  Numbness in the region of the incision may occur following the surgery  This normally improves over six to twelve months    If you have numbness or pain in your hand, please call our office immediately  DO NOT put any powders, creams, ointments, or lotions on your incision  ARM SWELLING:    Most patients have some noticeable arm swelling after surgery  This usually disappears within a few weeks  If swelling is present, elevate the arm whenever possible  RESTRICTIONS:   Do NOT have blood draws, IV's, or blood pressures performed on the operated arm  FISTULA USE:    Your fistula will not be used until it has fully matured - approximately 6 to 12 weeks  If you are using a catheter for dialysis, this will not be removed until after your fistula has matured and is being used for dialysis without any issues  FOLLOW UP STUDIES:  A Doppler ultrasound will be performed about 5-6 weeks after surgery  Your surgeon will arrange this at your first postoperative visit  FOLLOW UP APPOINTMENTS:  Making and keeping follow up appointments and ultrasound tests are important to your recovery  If you have difficulty making it to or keeping your follow up appointments, call the office  If you have increased pain, fever >101 5, increased drainage, redness or a bad smell at your surgery site, new coldness/numbness of your arm or leg, please call us immediately and GO directly to the ER  PLEASE CALL THE OFFICE IF YOU HAVE ANY QUESTIONS  [phone]  [phone]  [address] [place] [address] , [address], [place], [address]  600 [facility] 20, [address], [facility], 210 [facility]  [address]   2707 [facility], [address] [place] [address]  611 [facility], [facility], Leopold [place], [address]    [address], 1st Floor, [address]  [address], [address] [facility], [address] [address] [facility]  1307 [facility], 8614 [facility], [address]  One [facility], 532 [facility], One [facility], Tammie Guerrero 6  201 [facility], [place], [address]

## 2022-10-24 NOTE — ANESTHESIA POSTPROCEDURE EVALUATION
Post-Op Assessment Note    CV Status:  Stable    Pain management: adequate     Mental Status:  Alert and awake   Hydration Status:  Euvolemic   PONV Controlled:  Controlled   Airway Patency:  Patent      Post Op Vitals Reviewed: Yes      Staff: Anesthesiologist         No complications documented      BP      Temp      Pulse     Resp      SpO2      /67   Pulse 76   Temp 98 7 °F (37 1 °C) (Temporal)   Resp 16   Ht 5' 8" (1 727 m)   Wt 93 3 kg (205 lb 11 oz)   SpO2 96%   BMI 31 27 kg/m²

## 2022-10-24 NOTE — OP NOTE
OPERATIVE REPORT  PATIENT NAME: Janki Sotelo    :  1960  MRN: [id number]  Pt Location: [place] [address]    SURGERY DATE: 10/24/2022    Surgeon(s) and Role:     Jennifer Otero MD - Primary     * Naheed Mitchell PA-C - Assisting    Preop Diagnosis:  ESRD (end stage renal disease) on dialysis ([facility] [address] ) [N18 6, Z99 2]    Post-Op Diagnosis Codes:     * ESRD (end stage renal disease) on dialysis ([facility] [address] ) [N18 6, Z99 2]    Procedure(s) (LRB):  Creation of  L brachiobasilic fistula  Ligation of L endoAVF    Specimen(s):  none    Estimated Blood Loss:   60 mL    Drains:  none    Anesthesia Type:   General - LMA    Operative Indications:  Patient is a 63 yo M w/ ESRD on HD, s/p creation of ulnar-ulnar endoAVF w/ cephalic outflow c/b cephalic occlusion  There was no basilic connection to this fistula  He had subsequent maturity of the brachial vein and arm swelling  Presents now for ligation of his endoAVF and new fistula creation    Operative Findings:  Well developed brachial vein  Brachial artery was of adequate caliber and quality for anastomosis  Basilic vein was quite large up to the mid arm where is trifurcated; the largest branch continued quite laterally/ulnar on the arm past the elbow    Complications:   None    Procedure and Technique:  After informed consent was obtained, the patient was brought to the operating room and placed in the supine position with the left arm outstretched  He was given anesthesia and an LMA was placed  He was given IV antibiotics  Duplex ultrasound was used to examine the brachial artery and basilic vein and these were found to be of adequate size and quality  The endoAVF was also examined and the  branch was marked  He was prepped and draped in the usual sterile fashion, exposing the left arm circumferentially  A timeout was performed  A transverse incision was made at the antecubital fossa overlying the brachial artery    Cautery was used to dissect through the soft tissue  The brachial artery was identified and freed proximally and distally  Vessel loops were placed proximally and distally  Another transverse incision was made about 4cm distal to this on the forearm overlying the endoAVF connection and   Dissection was continued thtrough the soft tissue and the fascia  The radial artery and veins were identified and confirmed using doppler with loss of radial signal with compression  The dissection was continued deeper and the ulnar artery and veins were identified  This area was quite fibrotic/scarred  Both ulnar veins were identified central to the  and were ligated with clips  The  was dissected free and ligated with 2-0 silk  The radial and ulnar signals were noted to improve with each of these maneuvers but there continued to be a significant thrill noted in another  branch which appeared to go from the brachial or radial artery to the cephalic vein  This was ligated with 2-0 silk as well  Thrill at this point was minimal     We then made an incision over the basilic vein  This was identified and dissected free along its length  All branches were suture ligated  A jan-wick tunneler was used to make a lazy tunnel along the anterior surface of the arm from the brahial artery incision to the central basilic vein  3000 units of IV heparin were given  A bulldog clamp was placed on the basilic vein centrally and it was ligated and transected peripherally  It was flushed and dilated with heparinized saline  It was then passed through the tunnel being careful to avoid any twists or turns  The vessel loops were pulled taught on the brachial artery and an 11-blade was used to make a longitudinal incision  This was lengthened with Lake scissors  The vein was cut to length and beveled  The anastomosis was created using 6-0 prolene suture tied at the heel and run circumferentially    Prior to completion, the vein and artery were bled and flushed  The vessel loops were then released, allowing flow through the fistula first and then distally down the arm  An excellent thrill could be felt in the fistula and a radial pulse was palpated  The wounds were checked for hemostasis and copiously irrigated with antibiotic saline solution  The smaller incisions were closed with 3-0 vicryl running suture for the subcutaneous tissue and 4-0 monocryl running suture in the subcuticular layer  The basilic harvest site was closed with 2-0 running civryl for the deep tissue, 3-0 vicryl running suture for the subcutaneous tissue and 4-0 monocryl running suture in the subcuticular layer  The incisions were dressed with histoacryl glue  The patient was allowed to awaken and was extubated  He was transferred to the PACU for postoperative care  I was present for the entire procedure, A qualified resident physician was not available and A physician assistant was required during the procedure for retraction tissue handling,dissection and suturing    Patient Disposition:  PACU         SIGNATURE: Edda Otero MD  DATE: October 24, 2022  TIME: 4:43 PM    Vascular Quality Initiative - Hemodialysis Access Placement    Pre-admission Information   Functional status: Fully active; able to carry on all predisease activities without restriction  ESRD: ESRD: Hemodialysis dependent  Current Access Type : Tunneled Catheter    Historical Information      Previous Access: left   Access Type/Location: ulnar-ulnar endoAVF        Procedure Information      Status: Outpatient     Side:left      Anesthesia: General     Access Type: Access Type: Surgical AVF Inflow Artery is: Brachial, Antecubital Intraoperative Artery taget diameter is: 3mm  Outflow Vein is: Basilic, Upper Arm  Intraoperative Vein target diameter is: 3mm  Anastomosis configuration is: End to Side    Cocomitant Procedure performed-: Other, please list   endoAVF ligation    Completion Fistulogram: no     Preop ARTERIAL evaluation and/or treatment: arteriogram    Preop VENOUS evaluation and/or treatment: venogram    *Obtain Target Diameters from study          Post op Information     Discharge Status: Home    Post op Complications: None

## 2022-10-24 NOTE — INTERVAL H&P NOTE
H&P reviewed  After examining the patient I find no changes in the patients condition since the H&P had been written      Vitals:    10/24/22 0619   BP: (!) 175/82   Pulse: 80   Resp: 16   Temp: 98 2 °F (36 8 °C)   SpO2: 98%

## 2022-10-24 NOTE — ANESTHESIA PREPROCEDURE EVALUATION
Procedure:  Creation of  L brachiobasilic fistula vs LUE graft; ligation of L endoAVF (Left Arm Upper)    Relevant Problems   CARDIO   (+) Benign essential HTN   (+) CHF (congestive heart failure) (HCC)   (+) Hyperlipidemia      ENDO   (+) Secondary hyperparathyroidism of renal origin ([facility] [address] )      /RENAL   (+) Anemia due to chronic kidney disease, on chronic dialysis (HCC)   (+) ESRD (end stage renal disease) on dialysis (HCC)      HEMATOLOGY   (+) Anemia due to chronic kidney disease, on chronic dialysis ([facility])      NEURO/PSYCH   (+) Seizure due to alcohol withdrawal, uncomplicated (HCC)      PULMONARY   (+) Smoking greater than 20 pack years      Other   (+) Marijuana use   (+) Tobacco abuse        Physical Exam    Airway    Mallampati score: II  TM Distance: >3 FB  Neck ROM: full     Dental       Cardiovascular  Rhythm: regular, Rate: normal, Cardiovascular exam normal    Pulmonary  Pulmonary exam normal Breath sounds clear to auscultation,     Other Findings        Anesthesia Plan  ASA Score- 4     Anesthesia Type- general with ASA Monitors  Additional Monitors:   Airway Plan:     Comment: tiva  Vs  lma   Plan Factors-Exercise tolerance (METS): <4 METS  Chart reviewed  Existing labs reviewed  Patient is not a current smoker  Obstructive sleep apnea risk education given perioperatively  Induction- intravenous  Postoperative Plan-     Informed Consent- Anesthetic plan and risks discussed with patient

## 2022-10-26 ENCOUNTER — TELEPHONE (OUTPATIENT)
Dept: VASCULAR SURGERY | Facility: CLINIC | Age: 62
End: 2022-10-26

## 2022-10-26 NOTE — TELEPHONE ENCOUNTER
Vascular Nurse Navigator Post Op Call    Procedure: Creation of  L brachiobasilic fistula  Ligation of L endoAVF     Date of Procedure: 10/24/22     Surgeon: Harsha Otero MD - Primary     * Margarito Loya PA-C - Assisting      Painful tingling or numbness in your fingers?: No    Paleness/Coolness in hands/fingers?: No    Redness, swelling or pus from your wound?: No    Bleeding?: No    Thrill present?: Yes    Anticoagulation pt was discharged on post op?: Aspirin    Statin pt was discharged on post op?:  Pravachol (pravastatin)    Fever/chills?: No    Uncontrolled Pain?: No      Reviewed discharge instructions and incision care with patient  Dialysis Days and Location: [facility] at [address] [place] [address] OFFICE VISIT:  11/9/22 at 1 pm with Adolfo Cutler PA-C at The [facility] Confirmed?: Yes      Any Questions or Concerns? Patient stated that he is doing good since discharge  He stated that he had pain yesterday, but took pain medication and his pain is improved today  He stated that he is squeezing a sock in his hand doing hand exercises  Reviewed incision care with him - wash gently and daily with soap and water and pat dry  All questions answered  No concerns expressed at this time

## 2022-10-26 NOTE — TELEPHONE ENCOUNTER
Vascular Nurse Navigator Post Op Phone Call    Post-Discharge phone call attempted to assess patient recovery after vascular surgery I left a message on answering machine  Will attempt to contact at later date  Pt's chart was reviewed prior to call and leaving message  Procedure: Creation of  L brachiobasilic fistula  Ligation of L endoAVF    Date of Procedure: 10/24/22    Surgeon:    Ana Maria Otero MD - Primary     * Lizbeth Alegre PA-C - Assisting        Anticoagulation pt was discharged on post op?: Aspirin    Statin pt was discharged on post op?:  Pravachol (pravastatin)    Dialysis Days and Location:  [facility]    Reminded pt of the following in message:    NEXT SCHEDULED OFFICE VISIT:  11/9/22 at 1 pm with Maribell Brar PA-C at The [address] [place] [address] Road    To contact [facility] with any concerns

## 2022-10-28 LAB
EXTERNAL HIV CONFIRMATION: NORMAL
EXTERNAL HIV SCREEN: NORMAL

## 2022-11-01 ENCOUNTER — TELEPHONE (OUTPATIENT)
Dept: ADMINISTRATIVE | Facility: OTHER | Age: 62
End: 2022-11-01

## 2022-11-01 NOTE — TELEPHONE ENCOUNTER
----- Message from Gordo Polk sent at 11/1/2022  8:37 AM EDT -----  Regarding: care gap request  11/01/22 8:37 AM    Hello, our patient attached above has had HIV completed/performed  Please assist in updating the patient chart by pulling the document from Lab Tab within Chart Review  The date of service is 10/28/2022       Thank you,  Gordo Polk   INTERNAL MED [place]

## 2022-11-01 NOTE — TELEPHONE ENCOUNTER
Upon review of the In Basket request we were able to locate, review, and update the patient chart as requested for HIV  Any additional questions or concerns should be emailed to the Practice Liaisons via the appropriate education email address, please do not reply via In Basket      Thank you  Remus Padilla

## 2022-11-08 NOTE — PROGRESS NOTES
Assessment/Plan:    ESRD (end stage renal disease) on dialysis ([facility] [address] )  62year old male smoker w/HTN, HLD, hx ETOH abuse, CHF, ESRD on HD s/p LUE endoAVF 2/6/75 c/b cephalic vein spasm and occlusion, now s/p endoAVF ligation and creation of L brachiobasilic AVF (single stage transposition) on 10/24/22 by Dr Liegha Otero  Patient presents for post operative visit      -Clinically, AVF appears to have thrombosed  Unable to palpate thrill or hear bruit on exam today  Patient states that his HD nurses were regularly checking and were able to feel thrill and hear bruit   -Patient offers no complaints  His incisions are well healed without breakdown or drainage  -Reviewed w/Dr Leigha Otero  Since patient is now >2 weeks post op, can attempt fistulagram for salvage  -IR consult placed, appreciate assistance  Recommend fistulagram be competed this week for attempt to preserve AVF    Plan Summary: S/p LUE brachiobasilic AVF with one stage transposition on 10/24/22, now clinically occluded  Recommend urgent fistulagram for attempt at access preservation  IR consult placed  Diagnoses and all orders for this visit:    ESRD (end stage renal disease) on dialysis [facility])  -     Ambulatory Referral to Interventional Radiology; Future          Subjective:      Patient ID: Destiny Al is a 62 y o  male  Patient present for Post Op L fistula creation  Patient denies any pain, fever, chills, or swelling  Patient has Good ROM  Patient is currently taking ASA and Pravastatin  Patient smokes 6 cigarettes a day  Patient goes to dialysis on [facility]     Patient presents s/p LUE endoAVF ligation and creation of LUE brachiobasilic AVF via one stage vein transposition on 10/24/22  Patient states that since surgery he has been doing well and offers no complaints  He reports that when he goes to HD, the nurses have been checking for thrill/bruit and reports that this was present yesterday   However, today in the office I am unable to appreciate either a thrill or a bruit  The following portions of the patient's history were reviewed and updated as appropriate: allergies, current medications, past family history, past medical history, past social history, past surgical history and problem list     Review of Systems   Constitutional: Negative  HENT: Negative  Eyes: Negative  Respiratory: Negative  Cardiovascular: Negative  Gastrointestinal: Negative  Endocrine: Negative  Genitourinary: Negative  Musculoskeletal: Negative  Skin: Negative  Allergic/Immunologic: Negative  Neurological: Negative  Hematological: Negative  Psychiatric/Behavioral: Negative  I have reviewed and made appropriate changes to the review of systems input by the medical assistant      Vitals:    11/09/22 1257   BP: 120/60   BP Location: Right arm   Patient Position: Sitting   Cuff Size: Adult   Pulse: 57   Weight: 91 9 kg (202 lb 9 6 oz)   Height: 5' 8" (1 727 m)       Patient Active Problem List   Diagnosis   • Benign essential HTN   • CHF (congestive heart failure) ([facility])   • ETOH abuse   • ESRD (end stage renal disease) on dialysis ([facility])   • Marijuana use   • Hyperlipidemia   • Tobacco abuse   • Hypokalemia   • Leukocytosis   • Seizure due to alcohol withdrawal, uncomplicated ([facility])   • Pharyngeal edema   • High anion gap metabolic acidosis   • Hypomagnesemia   • Anemia due to chronic kidney disease, on chronic dialysis ([address] )   • Hyponatremia   • Hypocalcemia   • MGUS (monoclonal gammopathy of unknown significance)   • Secondary hyperparathyroidism of renal origin ([address] )   • Encounter to establish care   • Smoking greater than 20 pack years       Past Surgical History:   Procedure Laterality Date   • HERNIA REPAIR      states umbilical   • IR AV FISTULAGRAM/GRAFTOGRAM  08/16/2022   • IR BIOPSY BONE MARROW  07/13/2022   • IR PULMONARY ARTERY EMBOLIZATION  08/03/2022   • IR TUNNELED DIALYSIS CATHETER PLACEMENT  06/10/2022 • IR TUNNELED DIALYSIS CATHETER PLACEMENT  10/20/2022   • MO ANASTOMOSIS,AV,ANY SITE Left 10/24/2022    Procedure: Creation of  L brachiobasilic fistula, ligation of L endoAVF; Surgeon: Levar Otero MD;  Location: [facility];  Service: Vascular       No family history on file  Social History     Socioeconomic History   • Marital status: Significant Other     Spouse name: Not on file   • Number of children: Not on file   • Years of education: Not on file   • Highest education level: Not on file   Occupational History   • Not on file   Tobacco Use   • Smoking status: Current Every Day Smoker     Packs/day: 0 25     Years: 30 00     Pack years: 7 50   • Smokeless tobacco: Never Used   • Tobacco comment: states down to approx 1/3 pack per day cutting down on his down   Vaping Use   • Vaping Use: Never used   Substance and Sexual Activity   • Alcohol use: Yes     Alcohol/week: 4 0 standard drinks     Types: 4 Cans of beer per week     Comment: has cut down to 4 per week   • Drug use: Yes     Frequency: 3 0 times per week     Types: Marijuana     Comment: occasionally   • Sexual activity: Not on file   Other Topics Concern   • Not on file   Social History Narrative   • Not on file     Social Determinants of Health     Financial Resource Strain: Not on file   Food Insecurity: No Food Insecurity   • Worried About Running Out of Food in the Last Year: Never true   • Ran Out of Food in the Last Year: Never true   Transportation Needs: No Transportation Needs   • Lack of Transportation (Medical): No   • Lack of Transportation (Non-Medical):  No   Physical Activity: Not on file   Stress: Not on file   Social Connections: Not on file   Intimate Partner Violence: Not on file   Housing Stability: Low Risk    • Unable to Pay for Housing in the Last Year: No   • Number of Places Lived in the Last Year: 1   • Unstable Housing in the Last Year: No       Allergies   Allergen Reactions   • Ibuprofen Other (See Comments)     ESRD - cannot have ibuprofen         Current Outpatient Medications:   •  amLODIPine (NORVASC) 5 mg tablet, Take 1 tablet (5 mg total) by mouth daily, Disp: 90 tablet, Rfl: 1  •  aspirin (ECOTRIN LOW STRENGTH) 81 mg EC tablet, Take 1 tablet (81 mg total) by mouth daily, Disp: 90 tablet, Rfl: 1  •  carvedilol (COREG) 25 mg tablet, Take 1 tablet (25 mg total) by mouth 2 (two) times a day with meals, Disp: 90 tablet, Rfl: 1  •  furosemide (LASIX) 40 mg tablet, Take 40 mg by mouth daily, Disp: , Rfl:   •  pravastatin (PRAVACHOL) 80 mg tablet, Take 1 tablet (80 mg total) by mouth daily with dinner, Disp: 90 tablet, Rfl: 1  •  b complex-vitamin C-folic acid (NEPHROCAPS) 1 mg capsule, Take 1 capsule by mouth daily with dinner, Disp: 30 capsule, Rfl: 0  •  calcium acetate (PHOSLO) capsule, Take 3 capsules (2,001 mg total) by mouth 3 (three) times a day with meals, Disp: 270 capsule, Rfl: 0  •  cholecalciferol (VITAMIN D3) 1,000 units tablet, Take 4 tablets (4,000 Units total) by mouth daily, Disp: 120 tablet, Rfl: 0  •  EPINEPHrine (EPIPEN) 0 3 mg/0 3 mL SOAJ, Inject 0 3 mL (0 3 mg total) into a muscle once for 1 dose, Disp: 0 6 mL, Rfl: 0    Objective:      /60 (BP Location: Right arm, Patient Position: Sitting, Cuff Size: Adult)   Pulse 57   Ht 5' 8" (1 727 m)   Wt 91 9 kg (202 lb 9 6 oz)   BMI 30 81 kg/m²          Physical Exam  Constitutional:       Appearance: Normal appearance  HENT:      Head: Normocephalic and atraumatic  Cardiovascular:      Rate and Rhythm: Normal rate and regular rhythm  Comments: No thrill or bruit present on exam    Pulmonary:      Effort: Pulmonary effort is normal       Breath sounds: Normal breath sounds  Skin:     Comments: Incision clean, dry and intact  Well healed without breakdown or drainage   Neurological:      General: No focal deficit present  Mental Status: He is alert and oriented to person, place, and time     Psychiatric:         Mood and Affect: Mood normal  Behavior: Behavior normal

## 2022-11-09 ENCOUNTER — PREP FOR PROCEDURE (OUTPATIENT)
Dept: INTERVENTIONAL RADIOLOGY/VASCULAR | Facility: CLINIC | Age: 62
End: 2022-11-09

## 2022-11-09 ENCOUNTER — OFFICE VISIT (OUTPATIENT)
Dept: VASCULAR SURGERY | Facility: CLINIC | Age: 62
End: 2022-11-09

## 2022-11-09 VITALS
DIASTOLIC BLOOD PRESSURE: 60 MMHG | HEIGHT: 68 IN | SYSTOLIC BLOOD PRESSURE: 120 MMHG | HEART RATE: 57 BPM | BODY MASS INDEX: 30.71 KG/M2 | WEIGHT: 202.6 LBS

## 2022-11-09 DIAGNOSIS — N18.6 ESRD (END STAGE RENAL DISEASE) (HCC): Primary | ICD-10-CM

## 2022-11-09 DIAGNOSIS — N18.6 ESRD (END STAGE RENAL DISEASE) ON DIALYSIS (HCC): Primary | ICD-10-CM

## 2022-11-09 DIAGNOSIS — Z99.2 ESRD (END STAGE RENAL DISEASE) ON DIALYSIS (HCC): Primary | ICD-10-CM

## 2022-11-09 NOTE — ASSESSMENT & PLAN NOTE
62year old male smoker w/HTN, HLD, hx ETOH abuse, CHF, ESRD on HD s/p LUE endoAVF 3/1/46 c/b cephalic vein spasm and occlusion, now s/p endoAVF ligation and creation of L brachiobasilic AVF (single stage transposition) on 10/24/22 by Dr Fady Lopez Doctor  Patient presents for post operative visit      -Clinically, AVF appears to have thrombosed  Unable to palpate thrill or hear bruit on exam today  Patient states that his HD nurses were regularly checking and were able to feel thrill and hear bruit   -Patient offers no complaints  His incisions are well healed without breakdown or drainage  -Reviewed w/Dr Fady Lopez Doctor  Since patient is now >2 weeks post op, can attempt fistulagram for salvage  -IR consult placed, appreciate assistance  Recommend fistulagram be competed this week for attempt to preserve AVF    Plan Summary: S/p LUE brachiobasilic AVF with one stage transposition on 10/24/22, now clinically occluded  Recommend urgent fistulagram for attempt at access preservation  IR consult placed

## 2022-11-10 ENCOUNTER — OFFICE VISIT (OUTPATIENT)
Dept: INTERNAL MEDICINE CLINIC | Facility: CLINIC | Age: 62
End: 2022-11-10

## 2022-11-10 ENCOUNTER — TELEPHONE (OUTPATIENT)
Dept: VASCULAR SURGERY | Facility: CLINIC | Age: 62
End: 2022-11-10

## 2022-11-10 VITALS
BODY MASS INDEX: 30.04 KG/M2 | WEIGHT: 198.2 LBS | RESPIRATION RATE: 16 BRPM | DIASTOLIC BLOOD PRESSURE: 74 MMHG | SYSTOLIC BLOOD PRESSURE: 118 MMHG | HEART RATE: 68 BPM | OXYGEN SATURATION: 95 % | TEMPERATURE: 98.4 F | HEIGHT: 68 IN

## 2022-11-10 DIAGNOSIS — T82.898D ARTERIOVENOUS FISTULA OCCLUSION, SUBSEQUENT ENCOUNTER: Primary | ICD-10-CM

## 2022-11-10 DIAGNOSIS — E78.5 HYPERLIPIDEMIA, UNSPECIFIED HYPERLIPIDEMIA TYPE: ICD-10-CM

## 2022-11-10 DIAGNOSIS — Z12.12 SCREENING FOR COLORECTAL CANCER: ICD-10-CM

## 2022-11-10 DIAGNOSIS — N25.81 SECONDARY HYPERPARATHYROIDISM OF RENAL ORIGIN (HCC): ICD-10-CM

## 2022-11-10 DIAGNOSIS — N18.6 ANEMIA DUE TO CHRONIC KIDNEY DISEASE, ON CHRONIC DIALYSIS (HCC): ICD-10-CM

## 2022-11-10 DIAGNOSIS — Z12.11 SCREENING FOR COLORECTAL CANCER: ICD-10-CM

## 2022-11-10 DIAGNOSIS — Z99.2 ANEMIA DUE TO CHRONIC KIDNEY DISEASE, ON CHRONIC DIALYSIS (HCC): ICD-10-CM

## 2022-11-10 DIAGNOSIS — D63.1 ANEMIA DUE TO CHRONIC KIDNEY DISEASE, ON CHRONIC DIALYSIS (HCC): ICD-10-CM

## 2022-11-10 PROBLEM — E87.29 HIGH ANION GAP METABOLIC ACIDOSIS: Status: RESOLVED | Noted: 2022-02-01 | Resolved: 2022-11-10

## 2022-11-10 PROBLEM — I15.0 RENOVASCULAR HYPERTENSION: Status: ACTIVE | Noted: 2018-02-19

## 2022-11-10 PROBLEM — E87.6 HYPOKALEMIA: Status: RESOLVED | Noted: 2018-02-19 | Resolved: 2022-11-10

## 2022-11-10 PROBLEM — E83.51 HYPOCALCEMIA: Status: RESOLVED | Noted: 2022-06-10 | Resolved: 2022-11-10

## 2022-11-10 PROBLEM — Z76.89 ENCOUNTER TO ESTABLISH CARE: Status: RESOLVED | Noted: 2022-10-14 | Resolved: 2022-11-10

## 2022-11-10 PROBLEM — D72.829 LEUKOCYTOSIS: Status: RESOLVED | Noted: 2018-02-19 | Resolved: 2022-11-10

## 2022-11-10 PROBLEM — T82.898A AV FISTULA OCCLUSION (HCC): Status: ACTIVE | Noted: 2022-11-10

## 2022-11-10 RX ORDER — PRAVASTATIN SODIUM 80 MG/1
80 TABLET ORAL
Qty: 90 TABLET | Refills: 1 | Status: SHIPPED | OUTPATIENT
Start: 2022-11-10

## 2022-11-10 NOTE — PROGRESS NOTES
INTERNAL MEDICINE OFFICE VISIT NOTE  [facility] [place]    NAME: Tom Cazares  AGE: 62 y o  SEX: male    DATE OF ENCOUNTER: 11/10/2022      Chief Complaint   Patient presents with   • Follow-up     4 week        Last visit on 10/14     status post left upper extremity brachial basilic AV fistula creation for dialysis  Evaluated by vascular surgery on 11/09  Documentation shows concern for thrombosis of the new AV fistula  Planned fistulogram for salvage with IR on 11/11    Outside labs reviewed       Review of Systems  10 point ROS negative except per HPI    OBJECTIVE:  Vitals:    11/10/22 1556   BP: 118/74   BP Location: Left arm   Patient Position: Sitting   Cuff Size: Adult   Pulse: 68   Resp: 16   Temp: 98 4 °F (36 9 °C)   TempSrc: Temporal   SpO2: 95%   Weight: 89 9 kg (198 lb 3 2 oz)   Height: 5' 8" (1 727 m)       Physical Exam:   GENERAL: NAD,   Non diaphoretic, non-toxic, not ill-appearing, well-developed, well-nourished  NEUROLOGIC:  Alert/oriented x3  No motor or sensory deficits  HEENT:  NC/AT, PERRL, EOMI, MMM, no scleral icterus  CARDIAC:  RRR, +S1/S2, no S3/S4 heard, no m/g/r  LUE AVF with faint bruit  PULMONARY:  non-labored breathing, CTA B/L, no wheezing/rales/rhonci appreciated at time of encounter  ABDOMEN:  Soft, NT/ND, +BS, no rebound/guarding/rigidity  Extremities:  2+ Pulses in DP/PT  No edema, cyanosis, or clubbing  SKIN:  No rashes or erythema, dry skin    ASSESSMENT/PLAN:    1  Arteriovenous fistula occlusion, subsequent encounter  Assessment & Plan:  As per documentation by vascular surgery  Scheduled for salvage procedure with IR on 11/11      2  Hyperlipidemia, unspecified hyperlipidemia type  -     pravastatin (PRAVACHOL) 80 mg tablet; Take 1 tablet (80 mg total) by mouth daily with dinner    3  Screening for colorectal cancer  -     Ambulatory referral for Colonoscopy; Future; Expected date: 11/10/2022    4   Anemia due to chronic kidney disease, on chronic dialysis [facility])  Assessment & Plan:  Lab Results   Component Value Date    EGFR 12 10/24/2022    EGFR 10 10/20/2022    EGFR 6 06/13/2022    CREATININE 4 68 (H) 10/24/2022    CREATININE 5 46 (H) 10/20/2022    CREATININE 7 96 (H) 06/13/2022     Hb of 10 5 on recent outside labs      5   Secondary hyperparathyroidism of renal origin [facility])  Assessment & Plan:   on recent external labs  Following with nephrology          Current Outpatient Medications:   •  pravastatin (PRAVACHOL) 80 mg tablet, Take 1 tablet (80 mg total) by mouth daily with dinner, Disp: 90 tablet, Rfl: 1  •  amLODIPine (NORVASC) 5 mg tablet, Take 1 tablet (5 mg total) by mouth daily, Disp: 90 tablet, Rfl: 1  •  aspirin (ECOTRIN LOW STRENGTH) 81 mg EC tablet, Take 1 tablet (81 mg total) by mouth daily, Disp: 90 tablet, Rfl: 1  •  b complex-vitamin C-folic acid (NEPHROCAPS) 1 mg capsule, Take 1 capsule by mouth daily with dinner, Disp: 30 capsule, Rfl: 0  •  calcium acetate (PHOSLO) capsule, Take 3 capsules (2,001 mg total) by mouth 3 (three) times a day with meals, Disp: 270 capsule, Rfl: 0  •  carvedilol (COREG) 25 mg tablet, Take 1 tablet (25 mg total) by mouth 2 (two) times a day with meals, Disp: 90 tablet, Rfl: 1  •  cholecalciferol (VITAMIN D3) 1,000 units tablet, Take 4 tablets (4,000 Units total) by mouth daily, Disp: 120 tablet, Rfl: 0  •  EPINEPHrine (EPIPEN) 0 3 mg/0 3 mL SOAJ, Inject 0 3 mL (0 3 mg total) into a muscle once for 1 dose, Disp: 0 6 mL, Rfl: 0  •  furosemide (LASIX) 40 mg tablet, Take 40 mg by mouth daily, Disp: , Rfl:     TCM Call     Date and time call was made  6/16/2022  1:59 PM    Hospital care reviewed  Records reviewed    Patient was hospitialized at  [facility] - CLOSED    Date of Admission  06/10/22    Date of discharge  06/14/22    Diagnosis  Acute renal failure ([address] )    Disposition  Home    Were the patients medications reviewed and updated  No    Current Symptoms  Fatigue      TCM Call     Post hospital issues  None    Should patient be enrolled in anticoag monitoring? No    Scheduled for follow up?   Yes    Patients specialists  Nephrologist    Did you obtain your prescribed medications  Yes    Do you need help managing your prescriptions or medications  No    Is transportation to your appointment needed  No    I have advised the patient to call PCP with any new or worsening symptoms  Vinita Shelby, [address] members    Are you recieving any outpatient services  No    Are you recieving home care services  No    Are you using any community resources  No    Current waiver services  No    Have you fallen in the last 12 months  No    Interperter language line needed  No    Counseling  Patient             Nanette Valles MD  [address]  [facility]

## 2022-11-10 NOTE — ASSESSMENT & PLAN NOTE
Lab Results   Component Value Date    EGFR 12 10/24/2022    EGFR 10 10/20/2022    EGFR 6 06/13/2022    CREATININE 4 68 (H) 10/24/2022    CREATININE 5 46 (H) 10/20/2022    CREATININE 7 96 (H) 06/13/2022     Hb of 10 5 on recent outside labs

## 2022-11-10 NOTE — TELEPHONE ENCOUNTER
Verified patient's insurance   CONFIRMED - Patient's insurance is [facility]  Is patient requesting a call when authorization has been obtained? Patient did not request a call  Surgery Date: 11/11/22  Primary Surgeon: TO BE DONE BY IR   Assisting Surgeon: Not Applicable (N/A)  Facility: [facility] (Tax: [id number] / NPI: [id number])  Inpatient / Outpatient: Outpatient  Level: 1    Clearance Received: No clearance ordered  Consent Received: Consent will be signed day of procedure  Medication Hold / Last Dose: Not Applicable (N/A)  VQI Spreadsheet: Not Applicable (N/A)  IR Notified: 11/9/22  Rep   Notified: Not Applicable (N/A)  Equipment Needs: Not Applicable (N/A)  Vas Lab Requested: Not Applicable (N/A)  Patient Contacted: 11/10/22    Diagnosis: N18 6, Z99 2  Procedure/ CPT Code(s): Fistulogram // CPT: T965694    For varicose vein related procedures:   Last LEVDR: Not Applicable (N/A)  CEAP Classification: Not Applicable (N/A)  VCSS: Not Applicable (N/A)    Post Operative Date/ Time: To Be Determined (TBD)

## 2022-11-10 NOTE — TELEPHONE ENCOUNTER
From: Gonzalo Babcock <[email or web address]   Sent: 2022 1:22 PM  To: IR [email or web address]; [email or web address] <[email or web address]  Cc: Vascular Surgery Schedulers [email or web address]  Subject: URGENT FISTULAGRAM  Importance: High     Patient was seen this afternoon and an order was placed for patient to be scheduled for a fistulagram this week please  Jeffery is awaiting your call  PT: Barbara Taylor  : 62      Thank you

## 2022-11-11 ENCOUNTER — HOSPITAL ENCOUNTER (OUTPATIENT)
Dept: RADIOLOGY | Facility: HOSPITAL | Age: 62
Discharge: HOME | End: 2022-11-11
Attending: STUDENT IN AN ORGANIZED HEALTH CARE EDUCATION/TRAINING PROGRAM

## 2022-11-11 VITALS
WEIGHT: 199.08 LBS | SYSTOLIC BLOOD PRESSURE: 132 MMHG | OXYGEN SATURATION: 98 % | DIASTOLIC BLOOD PRESSURE: 68 MMHG | HEIGHT: 68 IN | RESPIRATION RATE: 16 BRPM | HEART RATE: 76 BPM | BODY MASS INDEX: 30.17 KG/M2 | TEMPERATURE: 98.5 F

## 2022-11-11 DIAGNOSIS — N18.6 ESRD (END STAGE RENAL DISEASE) (HCC): ICD-10-CM

## 2022-11-11 RX ADMIN — IOHEXOL 12 ML: 350 INJECTION, SOLUTION INTRAVENOUS at 13:50

## 2022-11-11 NOTE — DISCHARGE INSTRUCTIONS
[address] [facility] [address]     Interventional Radiology Phone Number: [phone]  WHAT YOU NEED TO KNOW:   Your arm or leg may be sore, swollen, and bruised after the procedure  This is normal and should get better in a few days  DISCHARGE INSTRUCTIONS:   Call 911 for any of the following: You have any of the following signs of a heart attack:    Squeezing, pressure, or pain in your chest that lasts longer than 5 minutes or returns  Discomfort or pain in your back, neck, jaw, stomach, or arm   Trouble breathing  Nausea or vomiting  Lightheadedness or a sudden cold sweat, especially with chest pain or trouble breathing  You have any of the following signs of a stroke:    Numbness or drooping on one side of your face   Weakness in an arm or leg  Confusion or difficulty speaking  Dizziness, a severe headache, or vision loss  You feel lightheaded, short of breath, and have chest pain  You cough up blood  You have trouble breathing  You have bleeding that does not stop after 10 minutes of holding firm, direct pressure over the puncture site  Seek care immediately if:   Blood soaks through your bandage  Your hand or foot closest to the graft or fistula feels cold, painful, or numb  Your hand or foot closest to the graft or fistula is pale or blue  You have trouble moving your arm or leg closest to the graft or fistula  Your bruise suddenly gets bigger  Contact your healthcare provider if:   You have a fever or chills  Your puncture site is red, swollen, or draining pus  You have nausea or are vomiting  Your skin is itchy, swollen, or you have a rash  You cannot feel a thrill over your graft or fistula  You have questions or concerns about your condition or care  Medicines: You may  need any of the following:  Acetaminophen  decreases pain  It is available without a doctor's order  Ask how much to take and how often to take it  Follow directions   Read the labels of all other medicines you are using to see if they also contain acetaminophen, or ask your doctor or pharmacist  Acetaminophen can cause liver damage if not taken correctly  Do not take more than 4 grams (4,000 milligrams) of acetaminophen in one day  Blood thinners help prevent blood clots  Examples of blood thinners include heparin and warfarin  Clots can cause strokes, heart attacks, and death  The following are general safety guidelines to follow while you are taking a blood thinner:  Watch for bleeding and bruising while you take blood thinners  Watch for bleeding from your gums or nose  Watch for blood in your urine and bowel movements  Use a soft washcloth on your skin, and a soft toothbrush to brush your teeth  This can keep your skin and gums from bleeding  If you shave, use an electric shaver  Do not play contact sports  Tell your dentist and other healthcare providers that you take anticoagulants  Wear a bracelet or necklace that says you take this medicine  Do not start or stop any medicines unless your healthcare provider tells you to  Many medicines cannot be used with blood thinners  Tell your healthcare provider right away if you forget to take the medicine, or if you take too much  Warfarin  is a blood thinner that you may need to take  The following are things you should be aware of if you take warfarin  Foods and medicines can affect the amount of warfarin in your blood  Do not make major changes to your diet while you take warfarin  Warfarin works best when you eat about the same amount of vitamin K every day  Vitamin K is found in green leafy vegetables and certain other foods  Ask for more information about what to eat when you are taking warfarin  You will need to see your healthcare provider for follow-up visits when you are on warfarin  You will need regular blood tests  These tests are used to decide how much medicine you need  Take your medicine as directed    Call your healthcare provider if you think your medicine is not helping or if you have side effects  Tell him if you are allergic to any medicine  Keep a list of the medicines, vitamins, and herbs you take  Include the amounts, and when and why you take them  Bring the list or the pill bottles to follow-up visits  Carry your medicine list with you in case of an emergency  Care for your wound as directed:  Remove the bandage in 4 to 6 hours or as directed  Wash the area once a day with soap and water  Gently pat the area dry  Self-care:   Apply firm, steady pressure to the puncture site if it bleeds  Use a clean gauze or towel to hold pressure for 10 to 15 minutes  Call 911 if you cannot stop the bleeding or the bleeding gets heavier  Feel for a thrill once a day or as directed  Place your index and second finger over your fistula or graft as directed  You should feel a vibration  The vibration means that blood is flowing through your graft or fistula correctly  Rest your arm or leg as directed  Do not lift anything heavier than 5 pounds or do strenuous activity for 24 hours  Prevent damage to your graft or fistula  Do not wear tight-fitting clothing over your graft or fistula  Do not wear tight jewelry on the arm or leg with the graft or fistula  Tell healthcare providers not to do, IVs, blood draws, and blood pressure readings in the arm with your graft or fistula  Do not allow flu shots or vaccinations in your arm with your graft or fistula  Follow up with your healthcare provider as directed:  Write down your questions so you remember to ask them during your visits  © 2016 [address] is for End User's use only and may not be sold, redistributed or otherwise used for commercial purposes  All illustrations and images included in CareNotes® are the copyrighted property of [employer] , [employer]  or Arian Barclay  The above information is an  only   It is not intended as medical advice for individual conditions or treatments  Talk to your doctor, nurse or pharmacist before following any medical regimen to see if it is safe and effective for you

## 2022-11-11 NOTE — BRIEF OP NOTE (RAD/CATH)
INTERVENTIONAL RADIOLOGY PROCEDURE NOTE    Date: 11/11/2022    Procedure:   Procedure Summary     Date: 11/11/22 Room / Location: [facility] [address] Interventional Radiology    Anesthesia Start:  Anesthesia Stop:     Procedure: IR AV FISTULAGRAM/GRAFTOGRAM Diagnosis:       ESRD (end stage renal disease) ([facility] )      (decreased flow/clotted in new BVT)    Scheduled Providers:  Responsible Provider:     Anesthesia Type: Not recorded ASA Status: Not recorded          Preoperative diagnosis:   1  ESRD (end stage renal disease) ([facility] )         Postoperative diagnosis: Same  Surgeon: Ewa Sauceda MD     Assistant: None  No qualified resident was available  Blood loss: Minimal    Specimens: none     Findings: Brachiobasilic fistula occluded  Venogram performed show patent brachial vein and no central stenosis    Complications: None immediate      Anesthesia: local

## 2022-11-14 NOTE — TELEPHONE ENCOUNTER
Authorization requirements reviewed  Please refer to Veda Yeh / Cheryl Dials number [id number] for case updates      No authorization required

## 2022-12-07 ENCOUNTER — CONSULT (OUTPATIENT)
Dept: INTERNAL MEDICINE CLINIC | Facility: CLINIC | Age: 62
End: 2022-12-07

## 2022-12-07 VITALS
BODY MASS INDEX: 30.92 KG/M2 | HEIGHT: 68 IN | TEMPERATURE: 96 F | OXYGEN SATURATION: 100 % | WEIGHT: 204 LBS | HEART RATE: 74 BPM | SYSTOLIC BLOOD PRESSURE: 130 MMHG | DIASTOLIC BLOOD PRESSURE: 88 MMHG

## 2022-12-07 DIAGNOSIS — J39.2 PHARYNGEAL EDEMA: ICD-10-CM

## 2022-12-07 DIAGNOSIS — N18.6 ESRD (END STAGE RENAL DISEASE) ON DIALYSIS (HCC): ICD-10-CM

## 2022-12-07 DIAGNOSIS — Z99.2 ESRD (END STAGE RENAL DISEASE) ON DIALYSIS (HCC): ICD-10-CM

## 2022-12-07 DIAGNOSIS — Z01.818 PRE-OP EXAMINATION: Primary | ICD-10-CM

## 2022-12-07 DIAGNOSIS — E87.1 HYPONATREMIA: ICD-10-CM

## 2022-12-07 DIAGNOSIS — F10.10 ETOH ABUSE: ICD-10-CM

## 2022-12-07 DIAGNOSIS — I15.0 RENOVASCULAR HYPERTENSION: ICD-10-CM

## 2022-12-07 DIAGNOSIS — Z72.0 TOBACCO ABUSE: ICD-10-CM

## 2022-12-07 NOTE — ASSESSMENT & PLAN NOTE
Lab Results   Component Value Date    EGFR 12 10/24/2022    EGFR 10 10/20/2022    EGFR 6 06/13/2022    CREATININE 4 68 (H) 10/24/2022    CREATININE 5 46 (H) 10/20/2022    CREATININE 7 96 (H) 06/13/2022     No severe electrolyte abnormalities - electrolytes consistent with h/o ESRD

## 2022-12-07 NOTE — ASSESSMENT & PLAN NOTE
Patient is low risk for low risk procedure, may proceed with as recommended by Surgical/Dental team  H/o pharyngeal edema - unknown trigger  Carries EPI pen  Advised to bring to dental procedure     Advised on smoking cessation to minimize respiratory complications   Medication recommendations   Hold :  ASA on the day of the procedure   Continue all other medications

## 2022-12-07 NOTE — PROGRESS NOTES
INTERNAL MEDICINE OFFICE VISIT NOTE  [facility] Internal Medicine [place]    NAME: Modesta Keller  AGE: 65 y o  SEX: male    DATE OF ENCOUNTER: 12/7/2022      Chief Complaint   Patient presents with   • Pre-op Exam     Clearance for dental deep cleaning        Pre-Op Risk Assessment     Risk Factor Score (Yes=1, No=0)   Hx of TIA/CVA       0   Hx of prior ischemic heart disease (AMI, unstable angina, Q waves on EKG, CABG)       0   Hx of congestive heart failure       0   Serum Creatinine >2 mg/dl       1     Insulin dependent diabetes mellitus       0   Total Score        1       · MACE Risk score of 1 - with an estimated a 0 9% of 30 day risk of death, mi, or cardiac arrest  · Mets >4  · Denies current chest pain, shortness of breath, lightheadedness, dizziness, fever, or chills  · NO h/o Asthma  No dx of COPD - long history of smoking   · NO evidence of active infection    · HTN controlled  · NO H/o GRAYSON   · No h/o thyroid disease  · ESRD on HD TTS at [facility]   · No liver disease   · [place] [address] on 11/29/26   · Coagulation studies normal  · history of alcohol abuse in remission - drinks one beer per week   · No severe electrolyte abnormalities - electrolytes consistent with h/o ESRD   · EKG- normal sinus rhythm with no ST or T-wave changes noted             Review of Systems  10 point ROS negative except per HPI    OBJECTIVE:  Vitals:    12/07/22 1303   Temp: (!) 96 °F (35 6 °C)   Weight: 92 5 kg (204 lb)   Height: 5' 8" (1 727 m)       Physical Exam:   GENERAL: NAD, Normal appearance  Non diaphoretic, non-toxic, not ill-appearing, well-developed, well-nourished  NEUROLOGIC:  Alert/oriented x3  HEENT:  NC/AT, EOMI, MMM, no scleral icterus  CARDIAC:  RRR, +S1/S2, no S3/S4 heard, no m/g/r  PULMONARY:  non-labored breathing, CTA B/L, no wheezing/rales/rhonci appreciated at time of encounter  ABDOMEN:  Soft, NT/ND, +BS, no rebound/guarding/rigidity  Extremities:  2+ Pulses in DP/PT   No edema, cyanosis  SKIN:  No rashes or erythema    ASSESSMENT/PLAN:    1  Pre-op examination  Assessment & Plan:  Patient is low risk for low risk procedure, may proceed with as recommended by Surgical/Dental team  H/o pharyngeal edema - unknown trigger  Carries EPI pen  Advised to bring to dental procedure  Advised on smoking cessation to minimize respiratory complications   Medication recommendations   Hold :  ASA on the day of the procedure   Continue all other medications       2  Tobacco abuse  Assessment & Plan:  Smoking 6 cig per day   Cessation advised   New goal is 3 cig per day - by the next visit      3  Pharyngeal edema  Assessment & Plan:  unknown trigger  Carries EPI pen  Advised to bring to dental procedure  4  Renovascular hypertension  Assessment & Plan:  Controlled on current regimen      5  ETOH abuse  Assessment & Plan:  in remission -   Reports he drinks one beer per week       6  ESRD (end stage renal disease) on dialysis [facility])  Assessment & Plan:  Lab Results   Component Value Date    EGFR 12 10/24/2022    EGFR 10 10/20/2022    EGFR 6 06/13/2022    CREATININE 4 68 (H) 10/24/2022    CREATININE 5 46 (H) 10/20/2022    CREATININE 7 96 (H) 06/13/2022     No severe electrolyte abnormalities - electrolytes consistent with h/o ESRD       7   Hyponatremia  Assessment & Plan:  Sodium of 130 on 10/28   Stable   Not a barrier for dental procedure           Current Outpatient Medications:   •  amLODIPine (NORVASC) 5 mg tablet, Take 1 tablet (5 mg total) by mouth daily, Disp: 90 tablet, Rfl: 1  •  aspirin (ECOTRIN LOW STRENGTH) 81 mg EC tablet, Take 1 tablet (81 mg total) by mouth daily, Disp: 90 tablet, Rfl: 1  •  b complex-vitamin C-folic acid (NEPHROCAPS) 1 mg capsule, Take 1 capsule by mouth daily with dinner, Disp: 30 capsule, Rfl: 0  •  calcium acetate (PHOSLO) capsule, Take 3 capsules (2,001 mg total) by mouth 3 (three) times a day with meals, Disp: 270 capsule, Rfl: 0  •  carvedilol (COREG) 25 mg tablet, Take 1 tablet (25 mg total) by mouth 2 (two) times a day with meals, Disp: 90 tablet, Rfl: 1  •  cholecalciferol (VITAMIN D3) 1,000 units tablet, Take 4 tablets (4,000 Units total) by mouth daily, Disp: 120 tablet, Rfl: 0  •  EPINEPHrine (EPIPEN) 0 3 mg/0 3 mL SOAJ, Inject 0 3 mL (0 3 mg total) into a muscle once for 1 dose, Disp: 0 6 mL, Rfl: 0  •  furosemide (LASIX) 40 mg tablet, Take 40 mg by mouth daily, Disp: , Rfl:   •  pravastatin (PRAVACHOL) 80 mg tablet, Take 1 tablet (80 mg total) by mouth daily with dinner, Disp: 90 tablet, Rfl: 1    TCM Call     Date and time call was made  6/16/2022  1:59 PM    Hospital care reviewed  Records reviewed    Patient was hospitialized at  [facility] - CLOSED    Date of Admission  06/10/22    Date of discharge  06/14/22    Diagnosis  Acute renal failure ([facility] )    Disposition  Home    Were the patients medications reviewed and updated  No    Current Symptoms  Fatigue      TCM Call     Post hospital issues  None    Should patient be enrolled in anticoag monitoring? No    Scheduled for follow up?   Yes    Patients specialists  Nephrologist    Did you obtain your prescribed medications  Yes    Do you need help managing your prescriptions or medications  No    Is transportation to your appointment needed  No    I have advised the patient to call PCP with any new or worsening symptoms  Christy Encinas, [address] members    Are you recieving any outpatient services  No    Are you recieving home care services  No    Are you using any community resources  No    Current waiver services  No    Have you fallen in the last 12 months  No    Interperter language line needed  No    Counseling  Patient             Chandler Lara MD  [address]  [facility]

## 2022-12-08 ENCOUNTER — TELEPHONE (OUTPATIENT)
Dept: INTERNAL MEDICINE CLINIC | Facility: CLINIC | Age: 62
End: 2022-12-08

## 2022-12-08 NOTE — TELEPHONE ENCOUNTER
Patient came in office this am and stated, " I am dropping off this Dental clearance form Dr Jean Pierre Harvey needs to fill out from my visit with him yesterday at 1:00 pm       I am going to dialysis right now and will stop back around 3:00 pm to see if he finished it for me "

## 2022-12-13 ENCOUNTER — TELEPHONE (OUTPATIENT)
Dept: VASCULAR SURGERY | Facility: CLINIC | Age: 62
End: 2022-12-13

## 2022-12-13 NOTE — TELEPHONE ENCOUNTER
----- Message from Sachi Ventura sent at 2022  3:22 PM EST -----  Regarding: RE: Abbi ferguson for tomorrow  Scheduled for a 330p , thank you!  ----- Message -----  From: Nini Corea  Sent: 2022   3:20 PM EST  To: [facility] [address] [facility], #  Subject: Abbi ferguson for tomorrow                           Patients Name: Gary Huynh  : 1960  Phone Number: [phone]  Appointment Date: 22  Appointment time: 4:30 PM   Address: [address] [place] [address] [facility]  Drop off Facility/Office Name: [facility]   Drop off  Address: [facility]  [facility]: [phone]  Special notes/directions for   Note for scheduling team

## 2022-12-13 NOTE — TELEPHONE ENCOUNTER
----- Message from Nathanael Martinez sent at 2022  3:22 PM EST -----  Regarding: RE: [place] for tomorrow  Scheduled for a 330p , thank you!  ----- Message -----  From: Matthew Molina  Sent: 2022   3:20 PM EST  To: [facility] [address] [facility], #  Subject: [place] for tomorrow                           Patients Name: Luann Halsted  : 1960  Phone Number: [phone]  Appointment Date: 22  Appointment time: 4:30 PM   Address: [address] [place] [address] Jeane Peralta  Drop off Facility/Office Name: [facility]   Drop off  Address: Lena Gifford  [facility]: [phone]  Special notes/directions for   Note for scheduling team

## 2022-12-14 ENCOUNTER — OFFICE VISIT (OUTPATIENT)
Dept: VASCULAR SURGERY | Facility: CLINIC | Age: 62
End: 2022-12-14

## 2022-12-14 ENCOUNTER — TELEPHONE (OUTPATIENT)
Dept: ADMINISTRATIVE | Facility: HOSPITAL | Age: 62
End: 2022-12-14

## 2022-12-14 VITALS
WEIGHT: 195 LBS | RESPIRATION RATE: 20 BRPM | HEART RATE: 72 BPM | SYSTOLIC BLOOD PRESSURE: 124 MMHG | BODY MASS INDEX: 29.55 KG/M2 | DIASTOLIC BLOOD PRESSURE: 74 MMHG | HEIGHT: 68 IN

## 2022-12-14 DIAGNOSIS — N18.6 ESRD (END STAGE RENAL DISEASE) ON DIALYSIS (HCC): Primary | ICD-10-CM

## 2022-12-14 DIAGNOSIS — Z99.2 ESRD (END STAGE RENAL DISEASE) ON DIALYSIS (HCC): Primary | ICD-10-CM

## 2022-12-14 DIAGNOSIS — T82.898D ARTERIOVENOUS FISTULA OCCLUSION, SUBSEQUENT ENCOUNTER: ICD-10-CM

## 2022-12-14 DIAGNOSIS — Z72.0 TOBACCO ABUSE: ICD-10-CM

## 2022-12-14 RX ORDER — CEFAZOLIN SODIUM 2 G/50ML
2000 SOLUTION INTRAVENOUS ONCE
OUTPATIENT
Start: 2022-12-14 | End: 2022-12-14

## 2022-12-14 RX ORDER — FOLIC ACID/VIT B COMPLEX AND C 0.8 MG
1 TABLET ORAL DAILY
COMMUNITY
Start: 2022-11-26

## 2022-12-14 RX ORDER — CHLORHEXIDINE GLUCONATE 0.12 MG/ML
15 RINSE ORAL ONCE
OUTPATIENT
Start: 2022-12-14 | End: 2022-12-14

## 2022-12-14 NOTE — PROGRESS NOTES
Assessment/Plan:    Pt is a 66 yo M w/ CHF, HTN, hx EtOH abuse w/ sz during withdrawal, current tobacco abuse, HLD, anemia, ESRD, s/p endoAVF 0/2/09 c/b cephalic occlusion, s/p ligation endoAVF and creation single stage brachiobasilic fistula 01/25/77 c/b occlusion  ESRD (end stage renal disease) on dialysis [facility])  Arteriovenous fistula occlusion, subsequent encounter  -     Case request operating room: Creation of right upper extremity fistula vs graft; Standing  -     Basic metabolic panel; Future  -     CBC and Platelet;  Future  -     Basic metabolic panel  -     CBC and Platelet  -     Case request operating room: Creation of right upper extremity fistula vs graft  -s/p endoAVF creation 4/2/10  -c/b cephalic vein spasm and eventual occlusion; no basilic outflow from this fistula; currently with brachial outflow only >1L  -reviewed vein mapping which shows likely adequate B basilic; R cephalic is marginal; unfortunately, there is no measurement for the antecub for any vein in this study  -s/p ligation of endoAVF and creation single stage L brachiobasilic fistula 86/49/33  -unfortunately, fistula likely occluded at first postop visit; underwent fgram, which I have reviewed but I'm unclear on what is being seen; the access point in the brachial artery seems like it might be distal to the fistula anastomosis based on the elbow joint but difficult to tell; there is still a faint thrill/bruit in the basilic but this could also be residual outflow from the endoAVF as well despite attempted ligation which was very difficult; will discuss w/ Dr Blossom Mccoy who performed this procedure  -regardless, this fistula is obviously not usable and thus we will move forward with new access; last mapping showed marginal R basilic and cephalic; will plan to ultrasound on day of surgery and plan for creation R brachiocephalic vs brachiobasilic vs brach-axillary graft  -will also consider additional anticoagulation after next creation as his thrombosis is somewhat unexplained; does seem to have reactive/spastic vessels; also discussed need for complete smoking cessation and nicotine has a vaso-effect which could be contributing  -plan for RUE fistula vs graft  -labs  -cardiac: TTE 2/22 with EF: 91%, grade I diastolic, mod AoV regurg, mild MV regurg  Asymptomatic    Tobacco abuse  -current 1/3PPD; trying to quit; has cut down significantly  -discussed increased risk of vasospasm and thrombosis    Other orders  -     B Complex-C-Folic Acid (Karlee-Melissa) TABS; Take 1 tablet by mouth daily  -     Diet NPO; Sips with meds; Standing  -     Void on call to OR; Standing  -     Insert peripheral IV; Standing  -     Nursing Communication CHG bath, have staff wash entire body (neck down) per pre op bathing protocol  Routine, evening prior to, and day of surgery ; Standing  -     Nursing Communication Swab both nares with Povidone-Iodine solution, EXCLUDE if patient has shellfish/Iodine allergy  Routine, day of surgery, on call to OR ; Standing  -     chlorhexidine (PERIDEX) 0 12 % oral rinse 15 mL  -     Place sequential compression device; Standing  -     Shower/scrub; Standing  -     ceFAZolin (ANCEF) IVPB (premix in dextrose) 2,000 mg 50 mL      Operative Scheduling Information:    Hospital:  [facility]    Physician:  Me    Surgery: RUE fistula creation vs graft placement    Urgency:  Standard    Level:  Level 3: Outpatients to be scheduled for elective surgery than can be delayed up to 4 weeks without reasonable expectation of detriment to patient    Case Length:  Normal    Post-op Bed:  Outpatient    OR Table:  Standard    Equipment Needs:  None    Medication Instructions:  Aspirin:   Continue (do not hold)    Hydration:  No    Contrast Allergy:  no      Subjective:      Patient ID: Antoinette Blas is a 65 y o  male  Patient is s/p LUE Fgram on 11/11/22 by Dr Kingston Carpio  Pt denies LUE pain, numbness, tingling, or loss of motion   Pt [facility]  Pt currently has a PermCath that is being used for HD  There is a thrill and bruit  HPI:    Patient presents for access discussion  R-handed  Patient is s/p creation endo AVF ulnar-ulnar 8/3/22 [place]  He had cephalic vein spasm/stenosis at the index procedure and repeat fistulogram 8/16/22 which showed this to be occluded  He has current outflow via the brachial veins  The basilic is not in continuity with the fistula outflow  He then had ligation of the endoAVF and single stage basilic vein transposition, which unfortunately was occluded at his first postop visit  He underwent fistulogram which also showed occlusion    Currently dialyzing via R chest permacath  T/T/S  Started HD in June '22  Smoking 1/3PPD (down from 1PPD)    Prior EtOH abuse, now occasional beer  Hx of sz w/ withdrawal      The following portions of the patient's history were reviewed and updated as appropriate: allergies, current medications, past family history, past medical history, past social history, past surgical history and problem list     Review of Systems   Constitutional: Negative  HENT: Negative  Eyes: Negative  Respiratory: Negative  Cardiovascular: Negative  Gastrointestinal: Negative  Endocrine: Negative  Genitourinary: Positive for decreased urine volume  Musculoskeletal: Negative  Skin: Negative  Allergic/Immunologic: Negative  Neurological: Negative  Hematological: Negative  Psychiatric/Behavioral: Negative  Objective:      /74 (BP Location: Right arm, Patient Position: Sitting)   Pulse 72   Resp 20   Ht 5' 8" (1 727 m)   Wt 88 5 kg (195 lb)   BMI 29 65 kg/m²          Physical Exam  Vitals and nursing note reviewed  Constitutional:       Appearance: He is well-developed  HENT:      Head: Normocephalic and atraumatic     Eyes:      Conjunctiva/sclera: Conjunctivae normal    Cardiovascular:      Rate and Rhythm: Normal rate and regular rhythm  Pulses:           Radial pulses are 2+ on the right side and 0 on the left side  Dorsalis pedis pulses are 2+ on the right side and 0 on the left side  Posterior tibial pulses are 0 on the right side and 0 on the left side  Heart sounds: Normal heart sounds  No murmur heard  Comments: 2+ R ulnar, nonpalp L ulnar    There is still a slight thrill/bruit over the B/B fistula site  Pulmonary:      Effort: Pulmonary effort is normal  No respiratory distress  Breath sounds: Normal breath sounds  No wheezing or rales  Abdominal:      General: There is no distension  Palpations: Abdomen is soft  Tenderness: There is no abdominal tenderness  There is no rebound  Musculoskeletal:         General: Normal range of motion  Cervical back: Normal range of motion and neck supple  Right lower leg: No edema  Left lower leg: No edema  Skin:     General: Skin is warm and dry  Comments: L antecub incisions and L upper arm incision well healed   Neurological:      Mental Status: He is alert and oriented to person, place, and time  Psychiatric:         Behavior: Behavior normal            I have reviewed and made appropriate changes to the review of systems input by the medical assistant      Vitals:    12/14/22 1618   BP: 124/74   BP Location: Right arm   Patient Position: Sitting   Pulse: 72   Resp: 20   Weight: 88 5 kg (195 lb)   Height: 5' 8" (1 727 m)       Patient Active Problem List   Diagnosis   • Renovascular hypertension   • CHF (congestive heart failure) ([facility])   • ETOH abuse   • ESRD (end stage renal disease) on dialysis ([address] )   • Marijuana use   • Hyperlipidemia   • Tobacco abuse   • Seizure due to alcohol withdrawal, uncomplicated ([facility])   • Pharyngeal edema   • Hypomagnesemia   • Anemia due to chronic kidney disease, on chronic dialysis ([address] )   • Hyponatremia   • MGUS (monoclonal gammopathy of unknown significance)   • Secondary hyperparathyroidism of renal origin [facility])   • Smoking greater than 20 pack years   • AV fistula occlusion [facility])   • Pre-op examination       Past Surgical History:   Procedure Laterality Date   • HERNIA REPAIR      states umbilical   • IR AV FISTULAGRAM/GRAFTOGRAM  08/16/2022   • IR AV FISTULAGRAM/GRAFTOGRAM  11/11/2022   • IR BIOPSY BONE MARROW  07/13/2022   • IR PULMONARY ARTERY EMBOLIZATION  08/03/2022   • IR TUNNELED DIALYSIS CATHETER PLACEMENT  06/10/2022   • IR TUNNELED DIALYSIS CATHETER PLACEMENT  10/20/2022   • HI ANASTOMOSIS,AV,ANY SITE Left 10/24/2022    Procedure: Creation of  L brachiobasilic fistula, ligation of L endoAVF; Surgeon: Telma Otero MD;  Location: [facility];  Service: Vascular       No family history on file  Social History     Socioeconomic History   • Marital status: Significant Other     Spouse name: Not on file   • Number of children: Not on file   • Years of education: Not on file   • Highest education level: Not on file   Occupational History   • Not on file   Tobacco Use   • Smoking status: Every Day     Packs/day: 0 25     Years: 30 00     Pack years: 7 50     Types: Cigarettes   • Smokeless tobacco: Never   • Tobacco comments:     states down to approx 1/3 pack per day cutting down on his down, smoked one cigarette this morning   Vaping Use   • Vaping Use: Never used   Substance and Sexual Activity   • Alcohol use:  Yes     Alcohol/week: 4 0 standard drinks     Types: 4 Cans of beer per week     Comment: has cut down to 4 per week   • Drug use: Yes     Frequency: 3 0 times per week     Types: Marijuana     Comment: occasionally, last use 11/09   • Sexual activity: Not on file   Other Topics Concern   • Not on file   Social History Narrative   • Not on file     Social Determinants of Health     Financial Resource Strain: Not on file   Food Insecurity: No Food Insecurity   • Worried About Running Out of Food in the Last Year: Never true   • Ran Out of Food in the Last Year: Never true   Transportation Needs: No Transportation Needs   • Lack of Transportation (Medical): No   • Lack of Transportation (Non-Medical):  No   Physical Activity: Not on file   Stress: Not on file   Social Connections: Not on file   Intimate Partner Violence: Not on file   Housing Stability: Low Risk    • Unable to Pay for Housing in the Last Year: No   • Number of Places Lived in the Last Year: 1   • Unstable Housing in the Last Year: No       Allergies   Allergen Reactions   • Ibuprofen Other (See Comments)     ESRD - cannot have ibuprofen         Current Outpatient Medications:   •  amLODIPine (NORVASC) 5 mg tablet, Take 1 tablet (5 mg total) by mouth daily, Disp: 90 tablet, Rfl: 1  •  aspirin (ECOTRIN LOW STRENGTH) 81 mg EC tablet, Take 1 tablet (81 mg total) by mouth daily, Disp: 90 tablet, Rfl: 1  •  B Complex-C-Folic Acid (Karlee-Melissa) TABS, Take 1 tablet by mouth daily, Disp: , Rfl:   •  calcium acetate (PHOSLO) capsule, Take 3 capsules (2,001 mg total) by mouth 3 (three) times a day with meals, Disp: 270 capsule, Rfl: 0  •  carvedilol (COREG) 25 mg tablet, Take 1 tablet (25 mg total) by mouth 2 (two) times a day with meals, Disp: 90 tablet, Rfl: 1  •  cholecalciferol (VITAMIN D3) 1,000 units tablet, Take 4 tablets (4,000 Units total) by mouth daily, Disp: 120 tablet, Rfl: 0  •  furosemide (LASIX) 40 mg tablet, Take 40 mg by mouth daily, Disp: , Rfl:   •  pravastatin (PRAVACHOL) 80 mg tablet, Take 1 tablet (80 mg total) by mouth daily with dinner, Disp: 90 tablet, Rfl: 1  •  EPINEPHrine (EPIPEN) 0 3 mg/0 3 mL SOAJ, Inject 0 3 mL (0 3 mg total) into a muscle once for 1 dose, Disp: 0 6 mL, Rfl: 0

## 2022-12-14 NOTE — TELEPHONE ENCOUNTER
REMINDER: Under Reason For Call, comments MUST be formatted as:   (Surgeon's Initials) / (Procedure)      Special Instructions / FYI: Patient requests [place]      Procedure: Creation right arm fistula versus graft    Level: 3 - Route clearance(s) to [facility] Clearance Pool     Allergies: Ibuprofen    Instructions Given: NO Bowel Prep General Instructions     [facility] Days: Tuesday, Thursday, and Saturday    Return Visit Required Prior to Procedure: No     Consent: I certify that patient has signed, printed, timed, and dated their surgery consent  I certify that the patient's LEGAL NAME and DATE OF BIRTH are written in the upper left corner on BOTH sides of the consent  I certify that BOTH sides of the completed surgery consent have been scanned into the patient's Epic chart by myself on 12/14/2022  Yes, I have LABELED the consent in Epic as Consent for Vascular Procedure  For Surgical Clearances     Levels   1-3   ROUTE this encounter to [facility] Clearance Pool (AND)   [facility] Surgery Coordinator Pool     Level   4   ROUTE this encounter to [facility] Surgery Coordinator Pool       HYDRATION CLEARANCES   ONLY ROUTE TO  [facility] Clearance Pool     Patient does not require any pre operative clearance  Yes, I have ROUTED this encounter to [facility] Surgery Coordinator and/or [facility] Clearance Pool

## 2022-12-14 NOTE — PATIENT INSTRUCTIONS
1) ESRD  -we are planning to create a new fistula for you in the right arm  -NO IV'S, BLOOD DRAWS, OR BLOOD PRESSURE READINGS IN YOUR RIGHT ARM    2) Smoking  -please do your best to quit smoking completely; smoking affects the blood vessels, increases your risk of clotting and prevents you from being a transplant candidate

## 2022-12-14 NOTE — H&P (VIEW-ONLY)
Assessment/Plan:    Pt is a 64 yo M w/ CHF, HTN, hx EtOH abuse w/ sz during withdrawal, current tobacco abuse, HLD, anemia, ESRD, s/p endoAVF 6/0/76 c/b cephalic occlusion, s/p ligation endoAVF and creation single stage brachiobasilic fistula 21/83/18 c/b occlusion  ESRD (end stage renal disease) on dialysis [facility])  Arteriovenous fistula occlusion, subsequent encounter  -     Case request operating room: Creation of right upper extremity fistula vs graft; Standing  -     Basic metabolic panel; Future  -     CBC and Platelet;  Future  -     Basic metabolic panel  -     CBC and Platelet  -     Case request operating room: Creation of right upper extremity fistula vs graft  -s/p endoAVF creation 0/7/49  -c/b cephalic vein spasm and eventual occlusion; no basilic outflow from this fistula; currently with brachial outflow only >1L  -reviewed vein mapping which shows likely adequate B basilic; R cephalic is marginal; unfortunately, there is no measurement for the antecub for any vein in this study  -s/p ligation of endoAVF and creation single stage L brachiobasilic fistula 74/80/65  -unfortunately, fistula likely occluded at first postop visit; underwent fgram, which I have reviewed but I'm unclear on what is being seen; the access point in the brachial artery seems like it might be distal to the fistula anastomosis based on the elbow joint but difficult to tell; there is still a faint thrill/bruit in the basilic but this could also be residual outflow from the endoAVF as well despite attempted ligation which was very difficult; will discuss w/ Dr Adrian Kirk who performed this procedure  -regardless, this fistula is obviously not usable and thus we will move forward with new access; last mapping showed marginal R basilic and cephalic; will plan to ultrasound on day of surgery and plan for creation R brachiocephalic vs brachiobasilic vs brach-axillary graft  -will also consider additional anticoagulation after next creation as his thrombosis is somewhat unexplained; does seem to have reactive/spastic vessels; also discussed need for complete smoking cessation and nicotine has a vaso-effect which could be contributing  -plan for RUE fistula vs graft  -labs  -cardiac: TTE 2/22 with EF: 43%, grade I diastolic, mod AoV regurg, mild MV regurg  Asymptomatic    Tobacco abuse  -current 1/3PPD; trying to quit; has cut down significantly  -discussed increased risk of vasospasm and thrombosis    Other orders  -     B Complex-C-Folic Acid (Karlee-Melissa) TABS; Take 1 tablet by mouth daily  -     Diet NPO; Sips with meds; Standing  -     Void on call to OR; Standing  -     Insert peripheral IV; Standing  -     Nursing Communication CHG bath, have staff wash entire body (neck down) per pre op bathing protocol  Routine, evening prior to, and day of surgery ; Standing  -     Nursing Communication Swab both nares with Povidone-Iodine solution, EXCLUDE if patient has shellfish/Iodine allergy  Routine, day of surgery, on call to OR ; Standing  -     chlorhexidine (PERIDEX) 0 12 % oral rinse 15 mL  -     Place sequential compression device; Standing  -     Shower/scrub; Standing  -     ceFAZolin (ANCEF) IVPB (premix in dextrose) 2,000 mg 50 mL      Operative Scheduling Information:    Hospital:  [facility]    Physician:  Me    Surgery: RUE fistula creation vs graft placement    Urgency:  Standard    Level:  Level 3: Outpatients to be scheduled for elective surgery than can be delayed up to 4 weeks without reasonable expectation of detriment to patient    Case Length:  Normal    Post-op Bed:  Outpatient    OR Table:  Standard    Equipment Needs:  None    Medication Instructions:  Aspirin:   Continue (do not hold)    Hydration:  No    Contrast Allergy:  no      Subjective:      Patient ID: Marie Navarro is a 65 y o  male  Patient is s/p LUE Fgram on 11/11/22 by Dr Helen Garg  Pt denies LUE pain, numbness, tingling, or loss of motion   [facility]  Pt currently has a PermCath that is being used for HD  There is a thrill and bruit  HPI:    Patient presents for access discussion  R-handed  Patient is s/p creation endo AVF ulnar-ulnar 8/3/22 [place]  He had cephalic vein spasm/stenosis at the index procedure and repeat fistulogram 8/16/22 which showed this to be occluded  He has current outflow via the brachial veins  The basilic is not in continuity with the fistula outflow  He then had ligation of the endoAVF and single stage basilic vein transposition, which unfortunately was occluded at his first postop visit  He underwent fistulogram which also showed occlusion    Currently dialyzing via R chest permacath  T/T/S  Started HD in June '22  Smoking 1/3PPD (down from 1PPD)    Prior EtOH abuse, now occasional beer  Hx of sz w/ withdrawal      The following portions of the patient's history were reviewed and updated as appropriate: allergies, current medications, past family history, past medical history, past social history, past surgical history and problem list     Review of Systems   Constitutional: Negative  HENT: Negative  Eyes: Negative  Respiratory: Negative  Cardiovascular: Negative  Gastrointestinal: Negative  Endocrine: Negative  Genitourinary: Positive for decreased urine volume  Musculoskeletal: Negative  Skin: Negative  Allergic/Immunologic: Negative  Neurological: Negative  Hematological: Negative  Psychiatric/Behavioral: Negative  Objective:      /74 (BP Location: Right arm, Patient Position: Sitting)   Pulse 72   Resp 20   Ht 5' 8" (1 727 m)   Wt 88 5 kg (195 lb)   BMI 29 65 kg/m²          Physical Exam  Vitals and nursing note reviewed  Constitutional:       Appearance: He is well-developed  HENT:      Head: Normocephalic and atraumatic     Eyes:      Conjunctiva/sclera: Conjunctivae normal    Cardiovascular:      Rate and Rhythm: Normal rate and regular rhythm  Pulses:           Radial pulses are 2+ on the right side and 0 on the left side  Dorsalis pedis pulses are 2+ on the right side and 0 on the left side  Posterior tibial pulses are 0 on the right side and 0 on the left side  Heart sounds: Normal heart sounds  No murmur heard  Comments: 2+ R ulnar, nonpalp L ulnar    There is still a slight thrill/bruit over the B/B fistula site  Pulmonary:      Effort: Pulmonary effort is normal  No respiratory distress  Breath sounds: Normal breath sounds  No wheezing or rales  Abdominal:      General: There is no distension  Palpations: Abdomen is soft  Tenderness: There is no abdominal tenderness  There is no rebound  Musculoskeletal:         General: Normal range of motion  Cervical back: Normal range of motion and neck supple  Right lower leg: No edema  Left lower leg: No edema  Skin:     General: Skin is warm and dry  Comments: L antecub incisions and L upper arm incision well healed   Neurological:      Mental Status: He is alert and oriented to person, place, and time  Psychiatric:         Behavior: Behavior normal            I have reviewed and made appropriate changes to the review of systems input by the medical assistant      Vitals:    12/14/22 1618   BP: 124/74   BP Location: Right arm   Patient Position: Sitting   Pulse: 72   Resp: 20   Weight: 88 5 kg (195 lb)   Height: 5' 8" (1 727 m)       Patient Active Problem List   Diagnosis   • Renovascular hypertension   • CHF (congestive heart failure) ([facility])   • ETOH abuse   • ESRD (end stage renal disease) on dialysis ([facility] [address] )   • Marijuana use   • Hyperlipidemia   • Tobacco abuse   • Seizure due to alcohol withdrawal, uncomplicated ([facility])   • Pharyngeal edema   • Hypomagnesemia   • Anemia due to chronic kidney disease, on chronic dialysis ([facility] [address] )   • Hyponatremia   • MGUS (monoclonal gammopathy of unknown significance)   • Secondary hyperparathyroidism of renal origin [facility])   • Smoking greater than 20 pack years   • AV fistula occlusion [facility])   • Pre-op examination       Past Surgical History:   Procedure Laterality Date   • HERNIA REPAIR      states umbilical   • IR AV FISTULAGRAM/GRAFTOGRAM  08/16/2022   • IR AV FISTULAGRAM/GRAFTOGRAM  11/11/2022   • IR BIOPSY BONE MARROW  07/13/2022   • IR PULMONARY ARTERY EMBOLIZATION  08/03/2022   • IR TUNNELED DIALYSIS CATHETER PLACEMENT  06/10/2022   • IR TUNNELED DIALYSIS CATHETER PLACEMENT  10/20/2022   • MS ANASTOMOSIS,AV,ANY SITE Left 10/24/2022    Procedure: Creation of  L brachiobasilic fistula, ligation of L endoAVF; Surgeon: Reyna Otero MD;  Location: [facility];  Service: Vascular       No family history on file  Social History     Socioeconomic History   • Marital status: Significant Other     Spouse name: Not on file   • Number of children: Not on file   • Years of education: Not on file   • Highest education level: Not on file   Occupational History   • Not on file   Tobacco Use   • Smoking status: Every Day     Packs/day: 0 25     Years: 30 00     Pack years: 7 50     Types: Cigarettes   • Smokeless tobacco: Never   • Tobacco comments:     states down to approx 1/3 pack per day cutting down on his down, smoked one cigarette this morning   Vaping Use   • Vaping Use: Never used   Substance and Sexual Activity   • Alcohol use:  Yes     Alcohol/week: 4 0 standard drinks     Types: 4 Cans of beer per week     Comment: has cut down to 4 per week   • Drug use: Yes     Frequency: 3 0 times per week     Types: Marijuana     Comment: occasionally, last use 11/09   • Sexual activity: Not on file   Other Topics Concern   • Not on file   Social History Narrative   • Not on file     Social Determinants of Health     Financial Resource Strain: Not on file   Food Insecurity: No Food Insecurity   • Worried About Running Out of Food in the Last Year: Never true   • Ran Out of Food in the Last Year: Never true   Transportation Needs: No Transportation Needs   • Lack of Transportation (Medical): No   • Lack of Transportation (Non-Medical):  No   Physical Activity: Not on file   Stress: Not on file   Social Connections: Not on file   Intimate Partner Violence: Not on file   Housing Stability: Low Risk    • Unable to Pay for Housing in the Last Year: No   • Number of Places Lived in the Last Year: 1   • Unstable Housing in the Last Year: No       Allergies   Allergen Reactions   • Ibuprofen Other (See Comments)     ESRD - cannot have ibuprofen         Current Outpatient Medications:   •  amLODIPine (NORVASC) 5 mg tablet, Take 1 tablet (5 mg total) by mouth daily, Disp: 90 tablet, Rfl: 1  •  aspirin (ECOTRIN LOW STRENGTH) 81 mg EC tablet, Take 1 tablet (81 mg total) by mouth daily, Disp: 90 tablet, Rfl: 1  •  B Complex-C-Folic Acid (Karlee-Melissa) TABS, Take 1 tablet by mouth daily, Disp: , Rfl:   •  calcium acetate (PHOSLO) capsule, Take 3 capsules (2,001 mg total) by mouth 3 (three) times a day with meals, Disp: 270 capsule, Rfl: 0  •  carvedilol (COREG) 25 mg tablet, Take 1 tablet (25 mg total) by mouth 2 (two) times a day with meals, Disp: 90 tablet, Rfl: 1  •  cholecalciferol (VITAMIN D3) 1,000 units tablet, Take 4 tablets (4,000 Units total) by mouth daily, Disp: 120 tablet, Rfl: 0  •  furosemide (LASIX) 40 mg tablet, Take 40 mg by mouth daily, Disp: , Rfl:   •  pravastatin (PRAVACHOL) 80 mg tablet, Take 1 tablet (80 mg total) by mouth daily with dinner, Disp: 90 tablet, Rfl: 1  •  EPINEPHrine (EPIPEN) 0 3 mg/0 3 mL SOAJ, Inject 0 3 mL (0 3 mg total) into a muscle once for 1 dose, Disp: 0 6 mL, Rfl: 0

## 2022-12-15 ENCOUNTER — PREP FOR PROCEDURE (OUTPATIENT)
Dept: VASCULAR SURGERY | Facility: CLINIC | Age: 62
End: 2022-12-15

## 2022-12-15 NOTE — TELEPHONE ENCOUNTER
Verified patient's insurance   CONFIRMED - Patient's insurance is [facility]  Is patient requesting a call when authorization has been obtained? Patient did not request a call  Surgery Date: 1/6/23  Primary Surgeon: LMERMELINDA // Doctor, Evie Richards (NPI: [id number])  Assisting Surgeon: Not Applicable (N/A)  Facility: [facility] (Tax: [id number] / NPI: [id number])  Inpatient / Outpatient: Outpatient  Level: 3    Clearance Received: No clearance ordered  Consent Received: Yes, scanned into Epic on 12/14/22  Medication Hold / Last Dose: NO HOLD ON ASPIRIN  VQI Spreadsheet: 12/15/22  IR Notified: Not Applicable (N/A)  Rep  Notified: Not Applicable (N/A)  Equipment Needs: Not Applicable (N/A)  Vas Lab Requested: Not Applicable (N/A)  Patient Contacted: HIMA W/MANJINDER AND CONFIRMED W/DAGOBERTO ESTES FOR 3RD CASE THIS DAY FOR LMD    Diagnosis: N18 6 Z99 2  Procedure/ CPT Code(s): (AV) Arteriovenous Fistula // CPT: 25494    For varicose vein related procedures:   Last LEVDR: Not Applicable (N/A)  CEAP Classification: Not Applicable (N/A)  VCSS: Not Applicable (N/A)    Post Operative Date/ Time: To Be Determined (TBD)     *Please review medication hold(s), PATs, and check H&P with patient  *  PATIENT WAS MAILED SURGERY/SHOWERING/DISCHARGE/COVID INSTRUCTIONS AFTER REVIEWING WITH THEM VIA PHONE CALL

## 2022-12-16 NOTE — TELEPHONE ENCOUNTER
Spoke to patient to give him the date and location of his surgery 1-6-23 at [facility] Patient was also given surgery instructions LLF

## 2022-12-27 ENCOUNTER — APPOINTMENT (OUTPATIENT)
Dept: PREADMISSION TESTING | Facility: HOSPITAL | Age: 62
End: 2022-12-27

## 2022-12-27 DIAGNOSIS — N25.81 SECONDARY HYPERPARATHYROIDISM OF RENAL ORIGIN (HCC): Primary | ICD-10-CM

## 2022-12-27 RX ORDER — SEVELAMER CARBONATE 800 MG/1
800 TABLET, FILM COATED ORAL
Qty: 270 TABLET | Refills: 4 | Status: SHIPPED | OUTPATIENT
Start: 2022-12-27

## 2022-12-30 ENCOUNTER — ANESTHESIA EVENT (OUTPATIENT)
Dept: PERIOP | Facility: HOSPITAL | Age: 62
End: 2022-12-30

## 2022-12-30 NOTE — PRE-PROCEDURE INSTRUCTIONS
Pre-Surgery Instructions:   Medication Instructions   • amLODIPine (NORVASC) 5 mg tablet Take day of surgery  • aspirin (ECOTRIN LOW STRENGTH) 81 mg EC tablet Hold day of surgery  • B Complex-C-Folic Acid (Karlee-Melissa) TABS Hold day of surgery  • calcium acetate (PHOSLO) capsule Hold day of surgery  • carvedilol (COREG) 25 mg tablet Take day of surgery  • cholecalciferol (VITAMIN D3) 1,000 units tablet Hold day of surgery  • furosemide (LASIX) 40 mg tablet Hold day of surgery  • pravastatin (PRAVACHOL) 80 mg tablet Take night before surgery   • sevelamer carbonate (RENVELA) 800 mg tablet Hold day of surgery  You will receive a phone call from hospital for arrival time  Please call surgeons office if any changes in your condition  Wear easy on/off clothing; consider type of surgery;  Valuables, jewelry, piercing's please keep at home  **COVID-19  education/surgical guidelines  Updated covid    Visitation policy  Please: No contact lenses or eye make up, artificial eyelashes    Please bring special ordered sling or braces if needed for  Your particular surgery  n/a    Please secure transportation     Follow pre surgery showering or cleaning instructions as  Reviewed by nurse or surgeons office      Questions answered and concerns addressed

## 2023-01-05 DIAGNOSIS — N25.81 SECONDARY HYPERPARATHYROIDISM OF RENAL ORIGIN (HCC): Primary | ICD-10-CM

## 2023-01-05 RX ORDER — CALCITRIOL 0.5 UG/1
0.5 CAPSULE, LIQUID FILLED ORAL 3 TIMES WEEKLY
Qty: 45 CAPSULE | Refills: 4
Start: 2023-01-05

## 2023-01-06 ENCOUNTER — HOSPITAL ENCOUNTER (OUTPATIENT)
Facility: HOSPITAL | Age: 63
Setting detail: OUTPATIENT SURGERY
Discharge: HOME | End: 2023-01-06
Attending: SURGERY | Admitting: SURGERY

## 2023-01-06 ENCOUNTER — ANESTHESIA (OUTPATIENT)
Dept: PERIOP | Facility: HOSPITAL | Age: 63
End: 2023-01-06

## 2023-01-06 VITALS
DIASTOLIC BLOOD PRESSURE: 75 MMHG | TEMPERATURE: 95.9 F | OXYGEN SATURATION: 100 % | RESPIRATION RATE: 19 BRPM | SYSTOLIC BLOOD PRESSURE: 141 MMHG | HEART RATE: 77 BPM

## 2023-01-06 DIAGNOSIS — Z99.2 ESRD (END STAGE RENAL DISEASE) ON DIALYSIS (HCC): ICD-10-CM

## 2023-01-06 DIAGNOSIS — T82.898S ARTERIOVENOUS FISTULA OCCLUSION, SEQUELA: Primary | ICD-10-CM

## 2023-01-06 DIAGNOSIS — N18.6 ESRD (END STAGE RENAL DISEASE) ON DIALYSIS (HCC): ICD-10-CM

## 2023-01-06 RX ORDER — OXYCODONE HYDROCHLORIDE AND ACETAMINOPHEN 5; 325 MG/1; MG/1
1 TABLET ORAL EVERY 6 HOURS PRN
Qty: 15 TABLET | Refills: 0 | Status: SHIPPED | OUTPATIENT
Start: 2023-01-06 | End: 2023-01-16

## 2023-01-06 RX ORDER — PROPOFOL 10 MG/ML
INJECTION, EMULSION INTRAVENOUS AS NEEDED
Status: DISCONTINUED | OUTPATIENT
Start: 2023-01-06 | End: 2023-01-06

## 2023-01-06 RX ORDER — BUPIVACAINE HYDROCHLORIDE 5 MG/ML
INJECTION, SOLUTION PERINEURAL AS NEEDED
Status: DISCONTINUED | OUTPATIENT
Start: 2023-01-06 | End: 2023-01-06 | Stop reason: HOSPADM

## 2023-01-06 RX ORDER — ACETAMINOPHEN 325 MG/1
650 TABLET ORAL EVERY 6 HOURS PRN
Status: DISCONTINUED | OUTPATIENT
Start: 2023-01-06 | End: 2023-01-06 | Stop reason: HOSPADM

## 2023-01-06 RX ORDER — SODIUM CHLORIDE 9 MG/ML
INJECTION, SOLUTION INTRAVENOUS CONTINUOUS PRN
Status: DISCONTINUED | OUTPATIENT
Start: 2023-01-06 | End: 2023-01-06

## 2023-01-06 RX ORDER — CEFAZOLIN SODIUM 2 G/50ML
2000 SOLUTION INTRAVENOUS ONCE
Status: DISCONTINUED | OUTPATIENT
Start: 2023-01-06 | End: 2023-01-06 | Stop reason: HOSPADM

## 2023-01-06 RX ORDER — FENTANYL CITRATE 50 UG/ML
INJECTION, SOLUTION INTRAMUSCULAR; INTRAVENOUS AS NEEDED
Status: DISCONTINUED | OUTPATIENT
Start: 2023-01-06 | End: 2023-01-06

## 2023-01-06 RX ORDER — HYDROMORPHONE HCL IN WATER/PF 6 MG/30 ML
0.2 PATIENT CONTROLLED ANALGESIA SYRINGE INTRAVENOUS
Status: DISCONTINUED | OUTPATIENT
Start: 2023-01-06 | End: 2023-01-06 | Stop reason: HOSPADM

## 2023-01-06 RX ORDER — CEFAZOLIN SODIUM 2 G/50ML
SOLUTION INTRAVENOUS AS NEEDED
Status: DISCONTINUED | OUTPATIENT
Start: 2023-01-06 | End: 2023-01-06

## 2023-01-06 RX ORDER — EPHEDRINE SULFATE 50 MG/ML
INJECTION INTRAVENOUS AS NEEDED
Status: DISCONTINUED | OUTPATIENT
Start: 2023-01-06 | End: 2023-01-06

## 2023-01-06 RX ORDER — OXYCODONE HYDROCHLORIDE AND ACETAMINOPHEN 5; 325 MG/1; MG/1
1 TABLET ORAL EVERY 4 HOURS PRN
Status: DISCONTINUED | OUTPATIENT
Start: 2023-01-06 | End: 2023-01-06 | Stop reason: HOSPADM

## 2023-01-06 RX ORDER — HEPARIN SODIUM 1000 [USP'U]/ML
INJECTION, SOLUTION INTRAVENOUS; SUBCUTANEOUS AS NEEDED
Status: DISCONTINUED | OUTPATIENT
Start: 2023-01-06 | End: 2023-01-06

## 2023-01-06 RX ORDER — ONDANSETRON 2 MG/ML
4 INJECTION INTRAMUSCULAR; INTRAVENOUS ONCE AS NEEDED
Status: DISCONTINUED | OUTPATIENT
Start: 2023-01-06 | End: 2023-01-06 | Stop reason: HOSPADM

## 2023-01-06 RX ORDER — MIDAZOLAM HYDROCHLORIDE 2 MG/2ML
INJECTION, SOLUTION INTRAMUSCULAR; INTRAVENOUS AS NEEDED
Status: DISCONTINUED | OUTPATIENT
Start: 2023-01-06 | End: 2023-01-06

## 2023-01-06 RX ORDER — ONDANSETRON 2 MG/ML
INJECTION INTRAMUSCULAR; INTRAVENOUS AS NEEDED
Status: DISCONTINUED | OUTPATIENT
Start: 2023-01-06 | End: 2023-01-06

## 2023-01-06 RX ORDER — DEXAMETHASONE SODIUM PHOSPHATE 4 MG/ML
8 INJECTION, SOLUTION INTRA-ARTICULAR; INTRALESIONAL; INTRAMUSCULAR; INTRAVENOUS; SOFT TISSUE ONCE AS NEEDED
Status: DISCONTINUED | OUTPATIENT
Start: 2023-01-06 | End: 2023-01-06 | Stop reason: HOSPADM

## 2023-01-06 RX ADMIN — EPHEDRINE SULFATE 5 MG: 50 INJECTION INTRAVENOUS at 08:38

## 2023-01-06 RX ADMIN — MIDAZOLAM 2 MG: 1 INJECTION INTRAMUSCULAR; INTRAVENOUS at 07:56

## 2023-01-06 RX ADMIN — PROPOFOL 100 MG: 10 INJECTION, EMULSION INTRAVENOUS at 08:07

## 2023-01-06 RX ADMIN — ONDANSETRON 4 MG: 2 INJECTION INTRAMUSCULAR; INTRAVENOUS at 09:22

## 2023-01-06 RX ADMIN — HEPARIN SODIUM 3000 UNITS: 1000 INJECTION INTRAVENOUS; SUBCUTANEOUS at 08:48

## 2023-01-06 RX ADMIN — ACETAMINOPHEN 650 MG: 325 TABLET, FILM COATED ORAL at 10:41

## 2023-01-06 RX ADMIN — HYDROMORPHONE HYDROCHLORIDE 0.2 MG: 0.2 INJECTION, SOLUTION INTRAMUSCULAR; INTRAVENOUS; SUBCUTANEOUS at 09:59

## 2023-01-06 RX ADMIN — SODIUM CHLORIDE: 0.9 INJECTION, SOLUTION INTRAVENOUS at 07:56

## 2023-01-06 RX ADMIN — FENTANYL CITRATE 50 MCG: 50 INJECTION INTRAMUSCULAR; INTRAVENOUS at 08:15

## 2023-01-06 RX ADMIN — HYDROMORPHONE HYDROCHLORIDE 0.2 MG: 0.2 INJECTION, SOLUTION INTRAMUSCULAR; INTRAVENOUS; SUBCUTANEOUS at 09:52

## 2023-01-06 RX ADMIN — CEFAZOLIN SODIUM 2000 MG: 2 SOLUTION INTRAVENOUS at 08:31

## 2023-01-06 RX ADMIN — FENTANYL CITRATE 50 MCG: 50 INJECTION INTRAMUSCULAR; INTRAVENOUS at 08:01

## 2023-01-06 NOTE — DISCHARGE INSTR - AVS FIRST PAGE
DISCHARGE INSTRUCTIONS  DIALYSIS FISTULA SURGERY    ACTIVITY:  Limit use of the operated arm to what is necessary for the first day after surgery  On the second day after surgery, you may start to increase use of your arm as tolerated  Avoid heavy lifting (no more than 15 lbs) for the first one week  You should start to exercise your hand on the side of the fistula by squeezing a stress ball or a rolled-up sock  This increases blood flow in your fistula and arm so your fistula will function better  Feel for a thrill every day  The thrill is the vibration or pulse you feel over the fistula that means the blood is flowing through it  If you cannot feel a thrill, call our office ([phone])  DIET:   Resume your normal diet  Good nutrition is important for healing of your incision  DRESSING:   You may have surgical glue at your surgical site  There are stitches present under the skin which will absorb on their own  The glue is used to cover the incision, assist in closure, and prevent contamination  This adhesive will darken and peel away on its own within one to two weeks  Do not pick at it  If you have a dressing over your surgical site, remove this on the second day after surgery  INCISION:   If you do not have a dialysis catheter in place, you may shower and get your incision wet  Wash incision daily with soap and water, but do not rub or scrub the incision; rinse thoroughly and pat dry  You may have stitches or staples to close your incision and it is okay for these to get wet  Do not bathe in a tub or swim for the first 4 week following surgery or if you have any open wounds  It is normal to have mild swelling or discoloration around the incision  If increasing redness or pain develops, call our office immediately  Numbness in the region of the incision may occur following the surgery  This normally improves over six to twelve months    If you have numbness or pain in your hand, please call our office immediately  DO NOT put any powders, creams, ointments, or lotions on your incision  ARM SWELLING:    Most patients have some noticeable arm swelling after surgery  This usually disappears within a few weeks  If swelling is present, elevate the arm whenever possible  RESTRICTIONS:   Do NOT have blood draws, IV's, or blood pressures performed on the operated arm  FISTULA USE:    Your fistula will not be used until it has fully matured - approximately 6 to 12 weeks  If you are using a catheter for dialysis, this will not be removed until after your fistula has matured and is being used for dialysis without any issues  FOLLOW UP STUDIES:  A Doppler ultrasound will be performed about 5-6 weeks after surgery  Your surgeon will arrange this at your first postoperative visit  FOLLOW UP APPOINTMENTS:  Making and keeping follow up appointments and ultrasound tests are important to your recovery  If you have difficulty making it to or keeping your follow up appointments, call the office  If you have increased pain, fever >101 5, increased drainage, redness or a bad smell at your surgery site, new coldness/numbness of your arm or leg, please call us immediately and GO directly to the ER  PLEASE CALL THE OFFICE IF YOU HAVE ANY QUESTIONS  [phone]  [phone] [address] [facility] , [address], [place], [address]  [address], [address], [place], [address] [facility]  [address]    [address] [facility], [facility], [address] [place] [address]  [address] [facility], [facility], [facility], [address] [facility]  [address], [address], [address]  [facility] 19, [address] [facility], [address] [id number] [facility]  [address] [facility], [address] [facility], [address] [facility]  [address] [facility], [address] [facility], [facility], T[address]ie [address]erre[address]  201 [facility], [address]  [address] [place] [address] [address]

## 2023-01-06 NOTE — INTERVAL H&P NOTE
H&P reviewed  After examining the patient I find no changes in the patients condition since the H&P had been written      Vitals:    01/06/23 0616   BP: 136/93   Pulse: 76   Resp: 16   Temp: (!) 95 9 °F (35 5 °C)   SpO2: 100%

## 2023-01-06 NOTE — ANESTHESIA PREPROCEDURE EVALUATION
Procedure:  Creation of right upper extremity fistula vs graft (Right: Arm Upper)    Relevant Problems   CARDIO   (+) CHF (congestive heart failure) ([facility])   (+) Hyperlipidemia   (+) Renovascular hypertension      ENDO   (+) Secondary hyperparathyroidism of renal origin ([facility] [address] )      /RENAL   (+) Anemia due to chronic kidney disease, on chronic dialysis ([facility])   (+) ESRD (end stage renal disease) on dialysis ([facility])      HEMATOLOGY   (+) Anemia due to chronic kidney disease, on chronic dialysis (HCC)      NEURO/PSYCH   (+) Seizure due to alcohol withdrawal, uncomplicated (HCC)      PULMONARY   (+) Smoking greater than 20 pack years

## 2023-01-06 NOTE — ANESTHESIA POSTPROCEDURE EVALUATION
Post-Op Assessment Note    CV Status:  Stable  Pain Score: 0    Pain management: adequate     Mental Status:  Awake       Staff: Anesthesiologist         No notable events documented      /77 (01/06/23 0938)    Temp (!) 97 3 °F (36 3 °C) (01/06/23 0938)    Pulse 68 (01/06/23 0938)   Resp 20 (01/06/23 0938)    SpO2

## 2023-01-06 NOTE — ANESTHESIA PREPROCEDURE EVALUATION
Procedure:  Creation of right upper extremity fistula vs graft (Right: Arm Upper)    Relevant Problems   CARDIO   (+) CHF (congestive heart failure) ([facility])   (+) Hyperlipidemia   (+) Renovascular hypertension      ENDO   (+) Secondary hyperparathyroidism of renal origin ([facility] [address] )      /RENAL   (+) Anemia due to chronic kidney disease, on chronic dialysis (HCC)   (+) ESRD (end stage renal disease) on dialysis (HCC)      HEMATOLOGY   (+) Anemia due to chronic kidney disease, on chronic dialysis ([facility])      NEURO/PSYCH   (+) Seizure due to alcohol withdrawal, uncomplicated ([facility])      PULMONARY   (+) Smoking greater than 20 pack years        Physical Exam    Airway    Mallampati score: II         Dental   upper dentures,     Cardiovascular  Cardiovascular exam normal    Pulmonary  Pulmonary exam normal     Other Findings  Poor dentition      Anesthesia Plan  ASA Score- 4     Anesthesia Type- general with ASA Monitors  Additional Monitors:   Airway Plan: LMA  Plan Factors-    Chart reviewed  EKG reviewed  Imaging results reviewed  Existing labs reviewed  Induction-     Postoperative Plan-     Informed Consent- Anesthetic plan and risks discussed with patient

## 2023-01-06 NOTE — OP NOTE
OPERATIVE REPORT  PATIENT NAME: Abiel Hill    :  1960  MRN: [id number]  Pt Location: [facility] OR ROOM 08    SURGERY DATE: 2023    Surgeon(s) and Role:     Nicola Otero MD - Primary     * Eric Mccormick PA-C - Assisting    Preop Diagnosis:  ESRD (end stage renal disease) on dialysis ([facility] [address] ) [N18 6, Z99 2]    Post-Op Diagnosis Codes:     * ESRD (end stage renal disease) on dialysis ([facility] [address] ) [N18 6, Z99 2]    Procedure(s) (LRB):  Creation right brachiocephalic fistula    Specimen(s):  none    Estimated Blood Loss:   10cc    Drains:  none    Anesthesia Type:   General LMA    Operative Indications:  Pt is a 66 yo M w/ CHF, HTN, hx EtOH abuse w/ sz during withdrawal, current tobacco abuse, HLD, anemia, ESRD, s/p endoAVF 05 c/b cephalic occlusion, s/p ligation endoAVF and creation single stage brachiobasilic fistula  c/b occlusion  Presents for new access    Operative Findings:  Cephalic vein 0-7DL in size at the antecubital fossa (larger than expected based on preop mapping  Brachial artery 3mm in size and excellent quality  Excellent thrill in fistula at completion  Radial pulse nonpalpable at start of case and at completion    Complications:   None    Procedure and Technique:  After informed consent was obtained, the patient was brought to the operating room and placed in the supine position  He was given anesthesia and an LMA was placed  He was given IV antibiotics  Duplex ultrasound was used to examine the brachial artery and cephalic vein and these were found to be of adequate size and quality  He was prepped and draped in the usual sterile fashion, exposing the right arm circumferentially  A timeout was performed  A transverse incision was made at the antecubital fossa  Cautery was used to dissect through the soft tissue  The cephalic vein was identified and freed proximally and distally  The brachial artery was then identified and dissected free    Vessel loops were placed proximally and distally  3000 units of IV heparin were given  A bulldog clamp was placed on the cephalic vein centrally and it was ligated and transected peripherally  It was flushed and dilated with heparinized saline  The vessel loops were pulled taught on the brachial artery and an 11-blade was used to make a longitudinal incision  This was lengthened with Lake scissors  The vein was cut to length and beveled  The anastomosis was created using 6-0 prolene suture, tied at the heel, and run circumferentially  Prior to completion, the vein and artery were bled and flushed  The vessel loops were then released, allowing flow through the fistula first and then distally down the arm  An excellent thrill could be felt in the fistula  The wound was checked for hemostasis and copiously irrigated with antibiotic saline solution  It was then closed with 3-0 vicryl running suture for the subcutaneous tissue and 4-0 monocryl running suture in the subcuticular layer  The incision was dressed with histoacryl glue  The patient was allowed to awaken and was extubated  He was transferred to the PACU for postoperative care  I was present for the entire procedure and A physician assistant was required during the procedure for retraction tissue handling,dissection and suturing    Patient Disposition:  PACU         SIGNATURE: Rusty Otero MD  DATE: January 6, 2023  TIME: 9:37 AM    Vascular Quality Initiative - Hemodialysis Access Placement    Pre-admission Information   Functional status: Restricted in physically strenuous activity but ambulatory and able to carry out work of a light or sedentary nature  ESRD: ESRD: Hemodialysis dependent    Current Access Type : Tunneled Catheter    Historical Information      Previous Access: left   Access Type/Location: see history, multiple        Procedure Information      Status: Outpatient     Side:right      Anesthesia: General     Access Type: Access Type: Surgical AVF Inflow Artery is: Brachial, Antecubital Intraoperative Artery taget diameter is: 3mm  Outflow Vein is: Cephalic, Upper Arm  Intraoperative Vein target diameter is: 4mm  Anastomosis configuration is: End to Side    Cocomitant Procedure performed-: None    Completion Fistulogram: no     Preop ARTERIAL evaluation and/or treatment: duplex    Preop VENOUS evaluation and/or treatment: venogram    *Obtain Target Diameters from study          Post op Information     Discharge Status: Home    Post op Complications: None

## 2023-01-09 ENCOUNTER — TELEPHONE (OUTPATIENT)
Dept: VASCULAR SURGERY | Facility: CLINIC | Age: 63
End: 2023-01-09

## 2023-01-09 NOTE — TELEPHONE ENCOUNTER
Vascular Nurse Navigator Post Op Call    Procedure: Creation right brachiocephalic fistula    Date of Procedure:  1/6/23    Surgeon:    Emmy Otero MD - Primary     * Darinel Cooley PA-C - Assisting      Painful tingling or numbness in your fingers?: No    Paleness/Coolness in hands/fingers?: No    Redness, swelling or pus from your wound?: No    Bleeding?: No    Thrill present?: Yes    Anticoagulation pt was discharged on post op?: Aspirin and Apixaban (Eliquis)    Statin pt was discharged on post op?:  Pravachol (pravastatin)    Fever/chills?: No    Uncontrolled Pain?: No      Reviewed discharge instructions and incision care with patient  Dialysis Days and Location:  [facility] at [address] [place] [address] OFFICE VISIT:  1/25/23 at 11:30 am with Dr Joleen Clarke Doctor at [facility], [facility] Confirmed?: Yes      Any Questions or Concerns? Patient stated that he is doing good since procedure  Reviewed incision care with him - wash daily with soap and water  Reviewed discharge medications - Aspirin, Eliquis, and Pravachol  All questions answered  No concerns expressed at this time

## 2023-01-25 ENCOUNTER — OFFICE VISIT (OUTPATIENT)
Dept: VASCULAR SURGERY | Facility: CLINIC | Age: 63
End: 2023-01-25

## 2023-01-25 VITALS
WEIGHT: 198 LBS | HEIGHT: 68 IN | DIASTOLIC BLOOD PRESSURE: 64 MMHG | HEART RATE: 77 BPM | SYSTOLIC BLOOD PRESSURE: 118 MMHG | BODY MASS INDEX: 30.01 KG/M2

## 2023-01-25 DIAGNOSIS — T82.898S ARTERIOVENOUS FISTULA OCCLUSION, SEQUELA: ICD-10-CM

## 2023-01-25 DIAGNOSIS — Z99.2 ESRD (END STAGE RENAL DISEASE) ON DIALYSIS (HCC): Primary | ICD-10-CM

## 2023-01-25 DIAGNOSIS — N18.6 ESRD (END STAGE RENAL DISEASE) ON DIALYSIS (HCC): Primary | ICD-10-CM

## 2023-01-25 DIAGNOSIS — Z72.0 TOBACCO ABUSE: ICD-10-CM

## 2023-01-25 NOTE — PATIENT INSTRUCTIONS
1) ESRD  -we created a new fistula for you in the right arm  -this is healing well  -we will get an ultrasound at the 5 week point after surgery  -NO IV'S, BLOOD DRAWS, OR BLOOD PRESSURE READINGS IN YOUR RIGHT ARM    2) Smoking  -please do your best to quit smoking completely; smoking affects the blood vessels, increases your risk of clotting and prevents you from being a transplant candidate

## 2023-01-25 NOTE — PROGRESS NOTES
Assessment/Plan:    Pt is a 64 yo M w/ CHF, HTN, hx EtOH abuse w/ sz during withdrawal, current tobacco abuse, HLD, anemia, ESRD, s/p endoAVF 7/2/42 c/b cephalic occlusion, s/p ligation endoAVF and creation single stage brachiobasilic fistula 09/44/91 c/b occlusion now s/p creation brachiocephalic fistula 6/9/19    ESRD (end stage renal disease) on dialysis [facility])  Arteriovenous fistula occlusion, sequela  -     VAS hemodialysis access duplex right upper limb avf; Future  -s/p endoAVF creation 7/0/88  -c/b cephalic vein spasm and eventual occlusion; no basilic outflow from this fistula; currently with brachial outflow only >1L  -reviewed vein mapping which shows likely adequate B basilic; R cephalic is marginal; unfortunately, there is no measurement for the antecub for any vein in this study  -s/p ligation of endoAVF and creation single stage L brachiobasilic fistula 46/75/55  -unfortunately, fistula likely occluded at first postop visit; underwent fgram, which I have reviewed but I'm unclear on what is being seen; the access point in the brachial artery seems like it might be distal to the fistula anastomosis based on the elbow joint but difficult to tell; there is still a faint thrill/bruit in the basilic but this could also be residual outflow from the endoAVF as well despite attempted ligation which was very difficult  -now s/p creation R brachiocephalic fistula 2/8/47 without issue  -excellent thrill; somewhat obstructive sounding bruit; he does have permacath in R chest which might be causing this  -will get DU at 5 wks to assess for maturity and f/u after this    -cardiac: TTE 2/22 with EF: 24%, grade I diastolic, mod AoV regurg, mild MV regurg   Asymptomatic    Tobacco abuse  -current 1/3PPD; trying to quit; has cut down significantly  -discussed increased risk of vasospasm and thrombosis    Medications  -continue ASA/statin  -started on ppx dose Eliquis for hx of multiple access thromboses    Subjective: Patient ID: Wade Monroy is a 65 y o  male  Patient is s/p RUE AVF creation on 1/6/23  Pt has occasional tingling in the Rt hand  Pt denies pain, coldness, or discoloration  Pt goes to [facility] on TTS  There is a thrill and bruit  HPI:    Patient presents for access followup  R-handed  Patient is s/p creation endo AVF ulnar-ulnar 8/3/22 [place]  He had cephalic vein spasm/stenosis at the index procedure and repeat fistulogram 8/16/22 which showed this to be occluded  He has current outflow via the brachial veins  The basilic is not in continuity with the fistula outflow  He then had ligation of the endoAVF and single stage basilic vein transposition, which unfortunately was occluded at his first postop visit  He underwent fistulogram which also showed occlusion    Now s/p creation R brachiocephalic fistula without issue  He notes intermittent tingling of his right hand  He is doing R hand exercises    Currently dialyzing via R chest permacath  T/T/S  Started HD in June '22  Smoking 1/3PPD (down from 1PPD)    Prior EtOH abuse, now occasional beer  Hx of sz w/ withdrawal      The following portions of the patient's history were reviewed and updated as appropriate: allergies, current medications, past family history, past medical history, past social history, past surgical history and problem list     Review of Systems   Constitutional: Negative for chills and fever  Musculoskeletal: Negative  Neurological: Negative for numbness  LUE tingling         Objective:      /64 (BP Location: Left arm, Patient Position: Sitting)   Pulse 77   Ht 5' 8" (1 727 m)   Wt 89 8 kg (198 lb)   BMI 30 11 kg/m²          Physical Exam  Vitals and nursing note reviewed  Constitutional:       Appearance: He is well-developed  HENT:      Head: Normocephalic and atraumatic     Eyes:      Conjunctiva/sclera: Conjunctivae normal    Cardiovascular:      Rate and Rhythm: Normal rate and regular rhythm  Pulses:           Radial pulses are 0 on the right side and 2+ on the left side  Dorsalis pedis pulses are 2+ on the right side and 0 on the left side  Posterior tibial pulses are 0 on the right side and 0 on the left side  Heart sounds: Normal heart sounds  No murmur heard  Comments: nonpalp R ulnar    There is still a slight thrill/bruit over the L B/B fistula site    Excellent thrill over R B/C fistula; bruit is slightly harsh/obstructive sounding  Pulmonary:      Effort: Pulmonary effort is normal  No respiratory distress  Breath sounds: Normal breath sounds  No wheezing or rales  Abdominal:      General: There is no distension  Palpations: Abdomen is soft  Tenderness: There is no abdominal tenderness  There is no rebound  Musculoskeletal:         General: Normal range of motion  Cervical back: Normal range of motion and neck supple  Right lower leg: No edema  Left lower leg: No edema  Skin:     General: Skin is warm and dry  Comments: L antecub incisions and L upper arm incision well healed   Neurological:      Mental Status: He is alert and oriented to person, place, and time  Psychiatric:         Behavior: Behavior normal            I have reviewed and made appropriate changes to the review of systems input by the medical assistant      Vitals:    01/25/23 1111   BP: 118/64   BP Location: Left arm   Patient Position: Sitting   Pulse: 77   Weight: 89 8 kg (198 lb)   Height: 5' 8" (1 727 m)       Patient Active Problem List   Diagnosis   • Renovascular hypertension   • CHF (congestive heart failure) ([facility])   • ETOH abuse   • ESRD (end stage renal disease) on dialysis ([facility] )   • Marijuana use   • Hyperlipidemia   • Tobacco abuse   • Seizure due to alcohol withdrawal, uncomplicated ([facility])   • Pharyngeal edema   • Hypomagnesemia   • Anemia due to chronic kidney disease, on chronic dialysis ([facility] )   • Hyponatremia   • MGUS (monoclonal gammopathy of unknown significance)   • Secondary hyperparathyroidism of renal origin (HCC)   • Smoking greater than 20 pack years   • AV fistula occlusion [facility])   • Pre-op examination       Past Surgical History:   Procedure Laterality Date   • HERNIA REPAIR      states umbilical   • IR AV FISTULAGRAM/GRAFTOGRAM  08/16/2022   • IR AV FISTULAGRAM/GRAFTOGRAM  11/11/2022   • IR BIOPSY BONE MARROW  07/13/2022   • IR PULMONARY ARTERY EMBOLIZATION  08/03/2022   • IR TUNNELED DIALYSIS CATHETER PLACEMENT  06/10/2022   • IR TUNNELED DIALYSIS CATHETER PLACEMENT  10/20/2022   • IR TUNNELED DIALYSIS CATHETER PLACEMENT     • UT ARTERIOVENOUS ANASTOMOSIS OPEN DIRECT Left 10/24/2022    Procedure: Creation of  L brachiobasilic fistula, ligation of L endoAVF; Surgeon: Pool Otero MD;  Location: [facility];  Service: Vascular   • UT ARTERIOVENOUS ANASTOMOSIS OPEN DIRECT Right 1/6/2023    Procedure: Creation right brachiocephalic fistula; Surgeon: Pool Otero MD;  Location: [facility] MA[facility] OR;  Service: Vascular       No family history on file  Social History     Socioeconomic History   • Marital status: Significant Other     Spouse name: Not on file   • Number of children: Not on file   • Years of education: Not on file   • Highest education level: Not on file   Occupational History   • Not on file   Tobacco Use   • Smoking status: Every Day     Packs/day: 0 25     Years: 30 00     Pack years: 7 50     Types: Cigarettes   • Smokeless tobacco: Never   • Tobacco comments:     Smokes 5 a day   Vaping Use   • Vaping Use: Never used   Substance and Sexual Activity   • Alcohol use:  Yes     Alcohol/week: 4 0 standard drinks     Types: 4 Cans of beer per week     Comment: has cut down to 4 per week   • Drug use: Yes     Frequency: 3 0 times per week     Types: Marijuana     Comment: occasionally, last use 11/09/2022   • Sexual activity: Not on file   Other Topics Concern   • Not on file   Social History Narrative   • Not on file     Social Determinants of Health     Financial Resource Strain: Not on file   Food Insecurity: No Food Insecurity   • Worried About Running Out of Food in the Last Year: Never true   • Ran Out of Food in the Last Year: Never true   Transportation Needs: No Transportation Needs   • Lack of Transportation (Medical): No   • Lack of Transportation (Non-Medical):  No   Physical Activity: Not on file   Stress: Not on file   Social Connections: Not on file   Intimate Partner Violence: Not on file   Housing Stability: Low Risk    • Unable to Pay for Housing in the Last Year: No   • Number of Places Lived in the Last Year: 1   • Unstable Housing in the Last Year: No       Allergies   Allergen Reactions   • Ibuprofen Other (See Comments)     ESRD - cannot have ibuprofen         Current Outpatient Medications:   •  amLODIPine (NORVASC) 5 mg tablet, Take 1 tablet (5 mg total) by mouth daily, Disp: 90 tablet, Rfl: 1  •  apixaban (Eliquis) 2 5 mg, Take 1 tablet (2 5 mg total) by mouth 2 (two) times a day, Disp: 180 tablet, Rfl: 4  •  aspirin (ECOTRIN LOW STRENGTH) 81 mg EC tablet, Take 1 tablet (81 mg total) by mouth daily, Disp: 90 tablet, Rfl: 1  •  B Complex-C-Folic Acid (Karlee-Melissa) TABS, Take 1 tablet by mouth daily, Disp: , Rfl:   •  calcitriol (ROCALTROL) 0 5 MCG capsule, Take 1 capsule (0 5 mcg total) by mouth 3 (three) times a week, Disp: 45 capsule, Rfl: 4  •  carvedilol (COREG) 25 mg tablet, Take 1 tablet (25 mg total) by mouth 2 (two) times a day with meals, Disp: 90 tablet, Rfl: 1  •  furosemide (LASIX) 40 mg tablet, Take 40 mg by mouth daily, Disp: , Rfl:   •  pravastatin (PRAVACHOL) 80 mg tablet, Take 1 tablet (80 mg total) by mouth daily with dinner, Disp: 90 tablet, Rfl: 1  •  sevelamer carbonate (RENVELA) 800 mg tablet, Take 1 tablet (800 mg total) by mouth 3 (three) times a day with meals, Disp: 270 tablet, Rfl: 4  •  calcium acetate (PHOSLO) capsule, Take 3 capsules (2,001 mg total) by mouth 3 (three) times a day with meals, Disp: 270 capsule, Rfl: 0  •  cholecalciferol (VITAMIN D3) 1,000 units tablet, Take 4 tablets (4,000 Units total) by mouth daily, Disp: 120 tablet, Rfl: 0  •  EPINEPHrine (EPIPEN) 0 3 mg/0 3 mL SOAJ, Inject 0 3 mL (0 3 mg total) into a muscle once for 1 dose, Disp: 0 6 mL, Rfl: 0

## 2023-01-26 ENCOUNTER — TELEPHONE (OUTPATIENT)
Dept: HEMATOLOGY ONCOLOGY | Facility: CLINIC | Age: 63
End: 2023-01-26

## 2023-01-26 DIAGNOSIS — N25.81 SECONDARY HYPERPARATHYROIDISM OF RENAL ORIGIN (HCC): ICD-10-CM

## 2023-01-26 DIAGNOSIS — N18.6 ESRD (END STAGE RENAL DISEASE) ON DIALYSIS (HCC): Primary | ICD-10-CM

## 2023-01-26 DIAGNOSIS — Z99.2 ESRD (END STAGE RENAL DISEASE) ON DIALYSIS (HCC): Primary | ICD-10-CM

## 2023-01-26 RX ORDER — FUROSEMIDE 80 MG
160 TABLET ORAL DAILY
Qty: 180 TABLET | Refills: 3 | Status: SHIPPED | OUTPATIENT
Start: 2023-01-26

## 2023-01-26 RX ORDER — CALCITRIOL 0.25 UG/1
0.5 CAPSULE, LIQUID FILLED ORAL 3 TIMES WEEKLY
Qty: 45 CAPSULE | Refills: 4
Start: 2023-01-27

## 2023-01-26 NOTE — TELEPHONE ENCOUNTER
Voicemail was left for patient to have labwork completed no later than Friday morning for follow up on Monday with Dr Camp Duty  [employer] number was left for reschedule or questions

## 2023-01-30 ENCOUNTER — TELEPHONE (OUTPATIENT)
Dept: HEMATOLOGY ONCOLOGY | Facility: CLINIC | Age: 63
End: 2023-01-30

## 2023-02-06 ENCOUNTER — TELEPHONE (OUTPATIENT)
Dept: VASCULAR SURGERY | Facility: CLINIC | Age: 63
End: 2023-02-06

## 2023-02-08 ENCOUNTER — HOSPITAL ENCOUNTER (OUTPATIENT)
Dept: NON INVASIVE DIAGNOSTICS | Facility: HOSPITAL | Age: 63
Discharge: HOME | End: 2023-02-08
Attending: SURGERY

## 2023-02-08 DIAGNOSIS — Z99.2 ESRD (END STAGE RENAL DISEASE) ON DIALYSIS (HCC): ICD-10-CM

## 2023-02-08 DIAGNOSIS — N18.6 ESRD (END STAGE RENAL DISEASE) ON DIALYSIS (HCC): ICD-10-CM

## 2023-02-10 ENCOUNTER — OFFICE VISIT (OUTPATIENT)
Dept: INTERNAL MEDICINE CLINIC | Facility: CLINIC | Age: 63
End: 2023-02-10

## 2023-02-10 VITALS
WEIGHT: 207 LBS | OXYGEN SATURATION: 96 % | DIASTOLIC BLOOD PRESSURE: 68 MMHG | HEIGHT: 68 IN | SYSTOLIC BLOOD PRESSURE: 134 MMHG | RESPIRATION RATE: 18 BRPM | BODY MASS INDEX: 31.37 KG/M2 | TEMPERATURE: 96.8 F | HEART RATE: 76 BPM

## 2023-02-10 DIAGNOSIS — Z99.2 ANEMIA DUE TO CHRONIC KIDNEY DISEASE, ON CHRONIC DIALYSIS (HCC): ICD-10-CM

## 2023-02-10 DIAGNOSIS — Z99.2 ESRD (END STAGE RENAL DISEASE) ON DIALYSIS (HCC): ICD-10-CM

## 2023-02-10 DIAGNOSIS — N18.6 ANEMIA DUE TO CHRONIC KIDNEY DISEASE, ON CHRONIC DIALYSIS (HCC): ICD-10-CM

## 2023-02-10 DIAGNOSIS — I50.32 CHRONIC DIASTOLIC CONGESTIVE HEART FAILURE (HCC): ICD-10-CM

## 2023-02-10 DIAGNOSIS — D63.1 ANEMIA DUE TO CHRONIC KIDNEY DISEASE, ON CHRONIC DIALYSIS (HCC): ICD-10-CM

## 2023-02-10 DIAGNOSIS — Z72.0 TOBACCO ABUSE: ICD-10-CM

## 2023-02-10 DIAGNOSIS — Z98.890 S/P ARTERIOVENOUS (AV) FISTULA CREATION: Primary | ICD-10-CM

## 2023-02-10 DIAGNOSIS — E87.1 CHRONIC HYPONATREMIA: ICD-10-CM

## 2023-02-10 DIAGNOSIS — Z12.11 COLON CANCER SCREENING: ICD-10-CM

## 2023-02-10 DIAGNOSIS — N18.6 ESRD (END STAGE RENAL DISEASE) ON DIALYSIS (HCC): ICD-10-CM

## 2023-02-10 DIAGNOSIS — F12.90 MARIJUANA USE: ICD-10-CM

## 2023-02-10 NOTE — PROGRESS NOTES
INTERNAL MEDICINE OFFICE VISIT NOTE  [facility] [place]    NAME: Ruth Campos  AGE: 65 y o  SEX: male    DATE OF ENCOUNTER: 2/10/2023       Chief Complaint   Patient presents with   • Follow-up     3 month      No new complaints  Status post creation of right brachiocephalic AV fistula  Schedule vascular surgery next week  Review of Systems  10 point ROS negative except per HPI    OBJECTIVE:  Vitals:    02/10/23 1125   BP: 134/68   BP Location: Left arm   Patient Position: Standing   Cuff Size: Standard   Pulse: 76   Resp: 18   Temp: (!) 96 8 °F (36 °C)   SpO2: 96%   Weight: 93 9 kg (207 lb)   Height: 5' 8" (1 727 m)       Physical Exam:   GENERAL: NAD, Normal appearance  Non diaphoretic, non-toxic, not ill-appearing, well-developed, well-nourished  NEUROLOGIC:  Alert/oriented x3  HEENT:  NC/AT, EOMI, MMM, no scleral icterus  CARDIAC:  RRR, +S1/S2, no S3/S4 heard, no m/g/r  PULMONARY:  non-labored breathing, CTA B/L, no wheezing/rales/rhonci appreciated at time of encounter  ABDOMEN:  Soft, NT/ND, +BS, no rebound/guarding/rigidity  Extremities:  2+ Pulses in DP/PT  No edema, cyanosis  SKIN:  No rashes or erythema    ASSESSMENT/PLAN:    1  S/P arteriovenous (AV) fistula creation  Assessment & Plan:  Status post creation of right brachiocephalic AV fistula  Schedule vascular surgery next week  2  ESRD (end stage renal disease) on dialysis [facility])  Assessment & Plan:  Lab Results   Component Value Date    EGFR 12 10/24/2022    EGFR 10 10/20/2022    EGFR 6 06/13/2022    CREATININE 4 68 (H) 10/24/2022    CREATININE 5 46 (H) 10/20/2022    CREATININE 7 96 (H) 06/13/2022     Reports compliance with hemodialysis 3 times a week      3   Anemia due to chronic kidney disease, on chronic dialysis [facility])  Assessment & Plan:  Lab Results   Component Value Date    EGFR 12 10/24/2022    EGFR 10 10/20/2022    EGFR 6 06/13/2022    CREATININE 4 68 (H) 10/24/2022    CREATININE 5 46 (H) 10/20/2022 CREATININE 7 96 (H) 06/13/2022     Most recent hemoglobin of 11 7       4  Chronic diastolic congestive heart failure [facility])  Assessment & Plan:  Wt Readings from Last 3 Encounters:   02/10/23 93 9 kg (207 lb)   01/25/23 89 8 kg (198 lb)   12/14/22 88 5 kg (195 lb)     Most recent Echo in February showed EF of 60% and grade 1 diastolic dysfunction   Currently on Lasix  80 mg twice daily by nephrology          5  Marijuana use  Assessment & Plan:  Inquiring about medical marijuana card  Advised to contact local dispensaries for information      6  Chronic hyponatremia  Assessment & Plan:  Following with nephrology      7  Tobacco abuse  Assessment & Plan:  Reports 6 cig per day   Cessation advised  Declined medication at this time      8  Colon cancer screening  Assessment & Plan:  Referred during previous visit  Inquired about the benefit of stool test versus colonoscopy  Amenable to colonoscopy  Advised to schedule            Current Outpatient Medications:   •  amLODIPine (NORVASC) 5 mg tablet, Take 1 tablet (5 mg total) by mouth daily, Disp: 90 tablet, Rfl: 1  •  apixaban (Eliquis) 2 5 mg, Take 1 tablet (2 5 mg total) by mouth 2 (two) times a day, Disp: 180 tablet, Rfl: 4  •  aspirin (ECOTRIN LOW STRENGTH) 81 mg EC tablet, Take 1 tablet (81 mg total) by mouth daily, Disp: 90 tablet, Rfl: 1  •  B Complex-C-Folic Acid (Karlee-Melissa) TABS, Take 1 tablet by mouth daily, Disp: , Rfl:   •  carvedilol (COREG) 25 mg tablet, Take 1 tablet (25 mg total) by mouth 2 (two) times a day with meals, Disp: 90 tablet, Rfl: 1  •  cholecalciferol (VITAMIN D3) 1,000 units tablet, Take 4 tablets (4,000 Units total) by mouth daily, Disp: 120 tablet, Rfl: 0  •  furosemide (LASIX) 80 mg tablet, Take 2 tablets (160 mg total) by mouth daily, Disp: 180 tablet, Rfl: 3  •  pravastatin (PRAVACHOL) 80 mg tablet, Take 1 tablet (80 mg total) by mouth daily with dinner, Disp: 90 tablet, Rfl: 1  •  sevelamer carbonate (RENVELA) 800 mg tablet, Take 1 tablet (800 mg total) by mouth 3 (three) times a day with meals, Disp: 270 tablet, Rfl: 4  •  calcitriol (ROCALTROL) 0 25 mcg capsule, Take 2 capsules (0 5 mcg total) by mouth 3 (three) times a week, Disp: 45 capsule, Rfl: 4  •  calcium acetate (PHOSLO) capsule, Take 3 capsules (2,001 mg total) by mouth 3 (three) times a day with meals, Disp: 270 capsule, Rfl: 0  •  EPINEPHrine (EPIPEN) 0 3 mg/0 3 mL SOAJ, Inject 0 3 mL (0 3 mg total) into a muscle once for 1 dose, Disp: 0 6 mL, Rfl: 0    TCM Call     Date and time call was made  6/16/2022  1:59 PM    Hospital care reviewed  Records reviewed    Patient was hospitialized at  [facility]    Date of Admission  06/10/22    Date of discharge  06/14/22    Diagnosis  Acute renal failure ([facility] [address] )    Disposition  Home    Were the patients medications reviewed and updated  No    Current Symptoms  Fatigue      TCM Call     Post hospital issues  None    Should patient be enrolled in anticoag monitoring? No    Scheduled for follow up?   Yes    Patients specialists  Nephrologist    Did you obtain your prescribed medications  Yes    Do you need help managing your prescriptions or medications  No    Is transportation to your appointment needed  No    I have advised the patient to call PCP with any new or worsening symptoms  Edmund Cifuentes, [address] members    Are you recieving any outpatient services  No    Are you recieving home care services  No    Are you using any community resources  No    Current waiver services  No    Have you fallen in the last 12 months  No    Interperter language line needed  No    Counseling  Patient             Danita Eddy MD  [address]  [facility]

## 2023-02-10 NOTE — ASSESSMENT & PLAN NOTE
Lab Results   Component Value Date    EGFR 12 10/24/2022    EGFR 10 10/20/2022    EGFR 6 06/13/2022    CREATININE 4 68 (H) 10/24/2022    CREATININE 5 46 (H) 10/20/2022    CREATININE 7 96 (H) 06/13/2022     Most recent hemoglobin of 11 7

## 2023-02-10 NOTE — ASSESSMENT & PLAN NOTE
Wt Readings from Last 3 Encounters:   02/10/23 93 9 kg (207 lb)   01/25/23 89 8 kg (198 lb)   12/14/22 88 5 kg (195 lb)     Most recent Echo in February showed EF of 60% and grade 1 diastolic dysfunction   Currently on Lasix  80 mg twice daily by nephrology

## 2023-02-10 NOTE — ASSESSMENT & PLAN NOTE
Referred during previous visit  Inquired about the benefit of stool test versus colonoscopy  Amenable to colonoscopy  Advised to schedule

## 2023-02-10 NOTE — ASSESSMENT & PLAN NOTE
Lab Results   Component Value Date    EGFR 12 10/24/2022    EGFR 10 10/20/2022    EGFR 6 06/13/2022    CREATININE 4 68 (H) 10/24/2022    CREATININE 5 46 (H) 10/20/2022    CREATININE 7 96 (H) 06/13/2022     Reports compliance with hemodialysis 3 times a week

## 2023-02-15 ENCOUNTER — OFFICE VISIT (OUTPATIENT)
Dept: VASCULAR SURGERY | Facility: CLINIC | Age: 63
End: 2023-02-15

## 2023-02-15 VITALS
WEIGHT: 201 LBS | BODY MASS INDEX: 30.46 KG/M2 | DIASTOLIC BLOOD PRESSURE: 80 MMHG | SYSTOLIC BLOOD PRESSURE: 120 MMHG | HEIGHT: 68 IN

## 2023-02-15 DIAGNOSIS — N18.6 ESRD (END STAGE RENAL DISEASE) ON DIALYSIS (HCC): Primary | ICD-10-CM

## 2023-02-15 DIAGNOSIS — Z98.890 S/P ARTERIOVENOUS (AV) FISTULA CREATION: ICD-10-CM

## 2023-02-15 DIAGNOSIS — Z72.0 TOBACCO ABUSE: ICD-10-CM

## 2023-02-15 DIAGNOSIS — E78.5 HYPERLIPIDEMIA, UNSPECIFIED HYPERLIPIDEMIA TYPE: ICD-10-CM

## 2023-02-15 DIAGNOSIS — Z99.2 ESRD (END STAGE RENAL DISEASE) ON DIALYSIS (HCC): Primary | ICD-10-CM

## 2023-02-15 DIAGNOSIS — T82.898S ARTERIOVENOUS FISTULA OCCLUSION, SEQUELA: ICD-10-CM

## 2023-02-15 RX ORDER — PRAVASTATIN SODIUM 80 MG/1
TABLET ORAL
Qty: 90 TABLET | Refills: 1 | Status: SHIPPED | OUTPATIENT
Start: 2023-02-15

## 2023-02-15 NOTE — PROGRESS NOTES
Assessment/Plan:    Pt is a 59 yo M w/ CHF, HTN, hx EtOH abuse w/ sz during withdrawal, current tobacco abuse, HLD, anemia, ESRD, s/p endoAVF 5/8/65 c/b cephalic occlusion, s/p ligation endoAVF and creation single stage brachiobasilic fistula 25/53/19 c/b occlusion now s/p creation brachiocephalic fistula 7/0/43    ESRD (end stage renal disease) on dialysis [facility])  S/P arteriovenous (AV) fistula creation  Arteriovenous fistula occlusion, sequela  -s/p endoAVF creation 0/1/46  -c/b cephalic vein spasm and eventual occlusion; no basilic outflow from this fistula; currently with brachial outflow only >1L  -reviewed vein mapping which shows likely adequate B basilic; R cephalic is marginal; unfortunately, there is no measurement for the antecub for any vein in this study  -s/p ligation of endoAVF and creation single stage L brachiobasilic fistula 29/74/64  -unfortunately, fistula likely occluded at first postop visit; underwent fgram, which I have reviewed but I'm unclear on what is being seen; the access point in the brachial artery seems like it might be distal to the fistula anastomosis based on the elbow joint but difficult to tell; there is still a faint thrill/bruit in the basilic but this could also be residual outflow from the endoAVF as well despite attempted ligation which was very difficult  -now s/p creation R brachiocephalic fistula 6/2/91 without issue  -excellent thrill/bruit; initially had edema and obstructive sounding bruit but this is resolved  -reviewed DU which was done a little early given his edema (moved up by Diane Finley) which shows flows 600-900cc/min, depth <6mm and diameters in the 4mm range  -given resolution of his edema, excellent flow rates and that his DU was done on the early side, I think this has continued to mature; on exam today excellent bruit and thrill; I favor attempted cannulation and if any issues, would do fgram to assess for issues including central stenosis  -ok to start cannulation  -f/u PRN     -cardiac: TTE 2/22 with EF: 81%, grade I diastolic, mod AoV regurg, mild MV regurg  Asymptomatic    Tobacco abuse  -current 1/3PPD; trying to quit; has cut down significantly  -discussed increased risk of vasospasm and thrombosis    Medications  -continue ASA/statin  -started on ppx dose Eliquis for hx of multiple access thromboses    Subjective:      Patient ID: Wade Monroy is a 65 y o  male  Patient present to review VM done on 2/8/23  Patient denies any pain, swelling or wounds  Patient goes to dialysis T,TH,S to [facility] [address]  Patient is currently taking Eliquis, ASA and Pravastatin  Patient smokes about 6 arettes a day  Fistula placed on  R arm  Thrill and Bruit present  Patient still using Chest port for dialysis  HPI:    Patient presents for access followup  R-handed  Patient is s/p creation endo AVF ulnar-ulnar 8/3/22 [place]  He had cephalic vein spasm/stenosis at the index procedure and repeat fistulogram 8/16/22 which showed this to be occluded  He has current outflow via the brachial veins  The basilic is not in continuity with the fistula outflow  He then had ligation of the endoAVF and single stage basilic vein transposition, which unfortunately was occluded at his first postop visit  He underwent fistulogram which also showed occlusion    Now s/p creation R brachiocephalic fistula without issue 1/6/23  Returns to review DU testing    He notes intermittent tingling of his right hand  He is doing R hand exercises    Currently dialyzing via R chest permacath  T/T/S  Started HD in June '22  Smoking 6/day (down from 1PPD)    Prior EtOH abuse, now occasional beer   Hx of sz w/ withdrawal    On Eliquis (for fistula patency given early thrombosis x 2)      The following portions of the patient's history were reviewed and updated as appropriate: allergies, current medications, past family history, past medical history, past social history, past surgical history and problem list     Review of Systems   Respiratory: Negative for shortness of breath  Cardiovascular: Negative for chest pain and leg swelling  Musculoskeletal: Positive for arthralgias (R knee)  Negative for gait problem  Skin: Negative for wound  Neurological: Positive for numbness (intermittent R hand tingling)  Negative for weakness  Objective:      /80 (BP Location: Left arm, Patient Position: Sitting, Cuff Size: Adult)   Ht 5' 8" (1 727 m)   Wt 91 2 kg (201 lb)   BMI 30 56 kg/m²          Physical Exam  Vitals and nursing note reviewed  Constitutional:       Appearance: He is well-developed  HENT:      Head: Normocephalic and atraumatic  Eyes:      Conjunctiva/sclera: Conjunctivae normal    Cardiovascular:      Rate and Rhythm: Normal rate and regular rhythm  Pulses:           Radial pulses are 0 on the right side and 2+ on the left side  Dorsalis pedis pulses are 2+ on the right side and 0 on the left side  Posterior tibial pulses are 0 on the right side and 0 on the left side  Heart sounds: Murmur (?subtle?) heard  Comments: nonpalp R ulnar    Excellent thrill over R B/C fistula; bruit is no longer harsh/obstructive sounding  Pulmonary:      Effort: Pulmonary effort is normal  No respiratory distress  Breath sounds: Normal breath sounds  No wheezing or rales  Abdominal:      General: There is no distension  Palpations: Abdomen is soft  Tenderness: There is no abdominal tenderness  There is no rebound  Musculoskeletal:         General: Normal range of motion  Cervical back: Normal range of motion and neck supple  Right lower leg: No edema  Left lower leg: No edema  Skin:     General: Skin is warm and dry  Comments: L antecub incisions and L upper arm incision well healed  R antecub incision well healed   Neurological:      Mental Status: He is alert and oriented to person, place, and time  Psychiatric:         Behavior: Behavior normal            I have reviewed and made appropriate changes to the review of systems input by the medical assistant  Vitals:    02/15/23 1523   BP: 120/80   BP Location: Left arm   Patient Position: Sitting   Cuff Size: Adult   Weight: 91 2 kg (201 lb)   Height: 5' 8" (1 727 m)       Patient Active Problem List   Diagnosis   • Renovascular hypertension   • CHF (congestive heart failure) ([facility])   • ETOH abuse   • ESRD (end stage renal disease) on dialysis ([facility] )   • Marijuana use   • Hyperlipidemia   • Tobacco abuse   • Seizure due to alcohol withdrawal, uncomplicated ([facility])   • Pharyngeal edema   • Hypomagnesemia   • Anemia due to chronic kidney disease, on chronic dialysis ([facility])   • Chronic hyponatremia   • MGUS (monoclonal gammopathy of unknown significance)   • Secondary hyperparathyroidism of renal origin ([facility] )   • Smoking greater than 20 pack years   • AV fistula occlusion ([facility])   • Pre-op examination   • S/P arteriovenous (AV) fistula creation   • Colon cancer screening       Past Surgical History:   Procedure Laterality Date   • HERNIA REPAIR      states umbilical   • IR AV FISTULAGRAM/GRAFTOGRAM  08/16/2022   • IR AV FISTULAGRAM/GRAFTOGRAM  11/11/2022   • IR BIOPSY BONE MARROW  07/13/2022   • IR PULMONARY ARTERY EMBOLIZATION  08/03/2022   • IR TUNNELED DIALYSIS CATHETER PLACEMENT  06/10/2022   • IR TUNNELED DIALYSIS CATHETER PLACEMENT  10/20/2022   • IR TUNNELED DIALYSIS CATHETER PLACEMENT     • SD ARTERIOVENOUS ANASTOMOSIS OPEN DIRECT Left 10/24/2022    Procedure: Creation of  L brachiobasilic fistula, ligation of L endoAVF; Surgeon: Yuliya Otero MD;  Location: [facility];  Service: Vascular   • SD ARTERIOVENOUS ANASTOMOSIS OPEN DIRECT Right 1/6/2023    Procedure: Creation right brachiocephalic fistula; Surgeon: Yuliya Otero MD;  Location: [facility];  Service: Vascular       No family history on file      Social History     Socioeconomic History   • Marital status: Significant Other     Spouse name: Not on file   • Number of children: Not on file   • Years of education: Not on file   • Highest education level: Not on file   Occupational History   • Not on file   Tobacco Use   • Smoking status: Every Day     Packs/day: 0 25     Years: 30 00     Pack years: 7 50     Types: Cigarettes   • Smokeless tobacco: Never   • Tobacco comments:     Smokes 5 a day   Vaping Use   • Vaping Use: Never used   Substance and Sexual Activity   • Alcohol use: Yes     Alcohol/week: 4 0 standard drinks     Types: 4 Cans of beer per week     Comment: has cut down to 4 per week   • Drug use: Yes     Frequency: 3 0 times per week     Types: Marijuana     Comment: occasionally, last use 11/09/2022   • Sexual activity: Not Currently   Other Topics Concern   • Not on file   Social History Narrative   • Not on file     Social Determinants of Health     Financial Resource Strain: Not on file   Food Insecurity: No Food Insecurity   • Worried About Running Out of Food in the Last Year: Never true   • Ran Out of Food in the Last Year: Never true   Transportation Needs: No Transportation Needs   • Lack of Transportation (Medical): No   • Lack of Transportation (Non-Medical):  No   Physical Activity: Not on file   Stress: Not on file   Social Connections: Not on file   Intimate Partner Violence: Not on file   Housing Stability: Low Risk    • Unable to Pay for Housing in the Last Year: No   • Number of Places Lived in the Last Year: 1   • Unstable Housing in the Last Year: No       Allergies   Allergen Reactions   • Ibuprofen Other (See Comments)     ESRD - cannot have ibuprofen         Current Outpatient Medications:   •  amLODIPine (NORVASC) 5 mg tablet, Take 1 tablet (5 mg total) by mouth daily, Disp: 90 tablet, Rfl: 1  •  apixaban (Eliquis) 2 5 mg, Take 1 tablet (2 5 mg total) by mouth 2 (two) times a day, Disp: 180 tablet, Rfl: 4  •  aspirin (ECOTRIN LOW STRENGTH) 81 mg EC tablet, Take 1 tablet (81 mg total) by mouth daily, Disp: 90 tablet, Rfl: 1  •  B Complex-C-Folic Acid (Karlee-Melissa) TABS, Take 1 tablet by mouth daily, Disp: , Rfl:   •  calcitriol (ROCALTROL) 0 25 mcg capsule, Take 2 capsules (0 5 mcg total) by mouth 3 (three) times a week, Disp: 45 capsule, Rfl: 4  •  carvedilol (COREG) 25 mg tablet, Take 1 tablet (25 mg total) by mouth 2 (two) times a day with meals, Disp: 90 tablet, Rfl: 1  •  furosemide (LASIX) 80 mg tablet, Take 2 tablets (160 mg total) by mouth daily, Disp: 180 tablet, Rfl: 3  •  pravastatin (PRAVACHOL) 80 mg tablet, TAKE 1 TABLET(80 MG) BY MOUTH DAILY WITH DINNER, Disp: 90 tablet, Rfl: 1  •  sevelamer carbonate (RENVELA) 800 mg tablet, Take 1 tablet (800 mg total) by mouth 3 (three) times a day with meals, Disp: 270 tablet, Rfl: 4  •  calcium acetate (PHOSLO) capsule, Take 3 capsules (2,001 mg total) by mouth 3 (three) times a day with meals, Disp: 270 capsule, Rfl: 0  •  cholecalciferol (VITAMIN D3) 1,000 units tablet, Take 4 tablets (4,000 Units total) by mouth daily, Disp: 120 tablet, Rfl: 0  •  EPINEPHrine (EPIPEN) 0 3 mg/0 3 mL SOAJ, Inject 0 3 mL (0 3 mg total) into a muscle once for 1 dose, Disp: 0 6 mL, Rfl: 0

## 2023-02-15 NOTE — LETTER
February 15, 2023     [address] [facility]  [address] [facility]  [address]  [facility]    Patient: Mami Cueto   YOB: 1960   Date of Visit: 2/15/2023       Dear Dr Villegas: Thank you for referring Ismael Oliva to me for evaluation  Below are the relevant portions of my assessment and plan of care  Patient is ready for cannulation  If you have questions, please do not hesitate to call me  I look forward to following Jeffery along with you           Sincerely,        Vinicio Otero MD        CC: No Recipients

## 2023-02-15 NOTE — PATIENT INSTRUCTIONS
1) ESRD  -we created a new fistula for you in the right arm  -this is healing well  -your ultrasound showed good flow in the fistula; it was a little small but I think it will continue to grow  -you are ready to start using your fistula  -NO IV'S, BLOOD DRAWS, OR BLOOD PRESSURE READINGS IN YOUR RIGHT ARM    2) Smoking  -please do your best to quit smoking completely; smoking affects the blood vessels, increases your risk of clotting and prevents you from being a transplant candidate

## 2023-02-23 DIAGNOSIS — N25.81 SECONDARY HYPERPARATHYROIDISM OF RENAL ORIGIN (HCC): Primary | ICD-10-CM

## 2023-02-23 RX ORDER — CINACALCET 30 MG/1
30 TABLET, FILM COATED ORAL WEEKLY
Qty: 12 TABLET | Refills: 4 | Status: SHIPPED | OUTPATIENT
Start: 2023-02-23 | End: 2023-02-23 | Stop reason: SDUPTHER

## 2023-02-23 RX ORDER — CINACALCET 30 MG/1
30 TABLET, FILM COATED ORAL WEEKLY
Qty: 12 TABLET | Refills: 4 | Status: SHIPPED | OUTPATIENT
Start: 2023-02-23

## 2023-03-07 DIAGNOSIS — N25.81 SECONDARY HYPERPARATHYROIDISM OF RENAL ORIGIN (HCC): ICD-10-CM

## 2023-03-07 RX ORDER — CINACALCET 30 MG/1
30 TABLET, FILM COATED ORAL WEEKLY
Qty: 12 TABLET | Refills: 4 | Status: SHIPPED | OUTPATIENT
Start: 2023-03-07

## 2023-03-13 ENCOUNTER — PREP FOR PROCEDURE (OUTPATIENT)
Dept: INTERVENTIONAL RADIOLOGY/VASCULAR | Facility: CLINIC | Age: 63
End: 2023-03-13

## 2023-03-13 DIAGNOSIS — N18.6 ESRD (END STAGE RENAL DISEASE) (HCC): Primary | ICD-10-CM

## 2023-03-15 ENCOUNTER — HOSPITAL ENCOUNTER (OUTPATIENT)
Dept: INTERVENTIONAL RADIOLOGY/VASCULAR | Facility: HOSPITAL | Age: 63
Discharge: HOME | End: 2023-03-15
Attending: STUDENT IN AN ORGANIZED HEALTH CARE EDUCATION/TRAINING PROGRAM

## 2023-03-15 VITALS
DIASTOLIC BLOOD PRESSURE: 88 MMHG | WEIGHT: 195 LBS | OXYGEN SATURATION: 98 % | HEIGHT: 68 IN | BODY MASS INDEX: 29.55 KG/M2 | TEMPERATURE: 97.3 F | HEART RATE: 79 BPM | RESPIRATION RATE: 18 BRPM | SYSTOLIC BLOOD PRESSURE: 142 MMHG

## 2023-03-15 DIAGNOSIS — N18.6 ESRD (END STAGE RENAL DISEASE) (HCC): ICD-10-CM

## 2023-03-15 RX ORDER — FENTANYL CITRATE 50 UG/ML
INJECTION, SOLUTION INTRAMUSCULAR; INTRAVENOUS AS NEEDED
Status: COMPLETED | OUTPATIENT
Start: 2023-03-15 | End: 2023-03-15

## 2023-03-15 RX ORDER — MIDAZOLAM HYDROCHLORIDE 2 MG/2ML
INJECTION, SOLUTION INTRAMUSCULAR; INTRAVENOUS AS NEEDED
Status: COMPLETED | OUTPATIENT
Start: 2023-03-15 | End: 2023-03-15

## 2023-03-15 RX ORDER — LIDOCAINE HYDROCHLORIDE 10 MG/ML
INJECTION, SOLUTION EPIDURAL; INFILTRATION; INTRACAUDAL; PERINEURAL AS NEEDED
Status: COMPLETED | OUTPATIENT
Start: 2023-03-15 | End: 2023-03-15

## 2023-03-15 RX ADMIN — MIDAZOLAM 1 MG: 1 INJECTION INTRAMUSCULAR; INTRAVENOUS at 14:27

## 2023-03-15 RX ADMIN — MIDAZOLAM 1 MG: 1 INJECTION INTRAMUSCULAR; INTRAVENOUS at 14:47

## 2023-03-15 RX ADMIN — FENTANYL CITRATE 50 MCG: 50 INJECTION, SOLUTION INTRAMUSCULAR; INTRAVENOUS at 14:47

## 2023-03-15 RX ADMIN — IOHEXOL 120 ML: 350 INJECTION, SOLUTION INTRAVENOUS at 14:45

## 2023-03-15 RX ADMIN — LIDOCAINE HYDROCHLORIDE 3 ML: 10 INJECTION, SOLUTION EPIDURAL; INFILTRATION; INTRACAUDAL; PERINEURAL at 14:23

## 2023-03-15 RX ADMIN — FENTANYL CITRATE 50 MCG: 50 INJECTION, SOLUTION INTRAMUSCULAR; INTRAVENOUS at 14:27

## 2023-03-15 NOTE — NURSING NOTE
Pt returned from IR in stable condition, no c/o pain  Dry dressing to RUE intact, "woggle" suture under dressing to be removed by IR

## 2023-03-15 NOTE — H&P
Interventional Radiology Preprocedure Note    History/Indication for procedure:   Georgia Luu is a 65 y o  male with a PMH of ESRD on HD dialyzed through a [facility] who presents for fistulagraphy for the indication of HVP  No prior intervention on this access  Relevant past medical history:    Past Medical History:   Diagnosis Date   • CHF (congestive heart failure) ([place] [address] )    • Chronic kidney disease    • ESRD (end stage renal disease) on dialysis ([place] [address] )     dialysis tues-thurs-sat   • Hyperkalemia 02/01/2022   • Hyperlipidemia    • Hypertension    • Personal history of COVID-19 07/2021    mild symptoms - recovered at home   • Renal disorder    • Withdrawal seizures ([place] [address] )     approximately 2018 when reducing alcohol     Patient Active Problem List   Diagnosis   • Renovascular hypertension   • CHF (congestive heart failure) ([facility])   • ETOH abuse   • ESRD (end stage renal disease) on dialysis ([place] [address] )   • Marijuana use   • Hyperlipidemia   • Tobacco abuse   • Seizure due to alcohol withdrawal, uncomplicated ([facility])   • Pharyngeal edema   • Hypomagnesemia   • Anemia due to chronic kidney disease, on chronic dialysis ([facility])   • Chronic hyponatremia   • MGUS (monoclonal gammopathy of unknown significance)   • Secondary hyperparathyroidism of renal origin ([place] [address] )   • Smoking greater than 20 pack years   • AV fistula occlusion ([facility])   • Pre-op examination   • S/P arteriovenous (AV) fistula creation   • Colon cancer screening       /57   Pulse 70   Temp (!) 96 4 °F (35 8 °C) (Tympanic)   Resp 16   Ht 5' 8" (1 727 m)   Wt 88 5 kg (195 lb)   SpO2 94%   BMI 29 65 kg/m²     Medications:    Inpatient Medications:     Scheduled Medications:      Infusions:  No current facility-administered medications for this encounter        PRN:      Outpatient Medications:  Current Outpatient Medications on File Prior to Encounter   Medication Sig Dispense Refill   • amLODIPine (NORVASC) 5 mg tablet Take 1 tablet (5 mg total) by mouth daily 90 tablet 1   • apixaban (Eliquis) 2 5 mg Take 1 tablet (2 5 mg total) by mouth 2 (two) times a day 180 tablet 4   • aspirin (ECOTRIN LOW STRENGTH) 81 mg EC tablet Take 1 tablet (81 mg total) by mouth daily 90 tablet 1   • B Complex-C-Folic Acid (Karlee-Melissa) TABS Take 1 tablet by mouth daily     • calcitriol (ROCALTROL) 0 25 mcg capsule Take 2 capsules (0 5 mcg total) by mouth 3 (three) times a week 45 capsule 4   • calcium acetate (PHOSLO) capsule Take 3 capsules (2,001 mg total) by mouth 3 (three) times a day with meals 270 capsule 0   • carvedilol (COREG) 25 mg tablet Take 1 tablet (25 mg total) by mouth 2 (two) times a day with meals 90 tablet 1   • cholecalciferol (VITAMIN D3) 1,000 units tablet Take 4 tablets (4,000 Units total) by mouth daily 120 tablet 0   • cinacalcet (SENSIPAR) 30 mg tablet Take 1 tablet (30 mg total) by mouth once a week 12 tablet 4   • furosemide (LASIX) 80 mg tablet Take 2 tablets (160 mg total) by mouth daily 180 tablet 3   • pravastatin (PRAVACHOL) 80 mg tablet TAKE 1 TABLET(80 MG) BY MOUTH DAILY WITH DINNER 90 tablet 1   • sevelamer carbonate (RENVELA) 800 mg tablet Take 1 tablet (800 mg total) by mouth 3 (three) times a day with meals 270 tablet 4   • EPINEPHrine (EPIPEN) 0 3 mg/0 3 mL SOAJ Inject 0 3 mL (0 3 mg total) into a muscle once for 1 dose 0 6 mL 0     No current facility-administered medications on file prior to encounter  Allergies   Allergen Reactions   • Ibuprofen Other (See Comments)     ESRD - cannot have ibuprofen       Anticoagulants: eliquis    ASA classification: ASA 3 - Patient with moderate systemic disease with functional limitations    Airway Assessment: II (hard and soft palate, upper portion of tonsils anduvula visible)    Relevant family history: None    Relevant review of systems: None    Prior sedation/anesthesia: yes    Can the patient lie flat?  Yes     NPO Status: yes    Labs:   CBC with diff:   Lab Results   Component Value Date    WBC 8 81 10/24/2022    HGB 10 4 (L) 10/24/2022    HCT 30 8 (L) 10/24/2022     (H) 10/24/2022     10/24/2022    MCH 33 7 10/24/2022    MCHC 33 8 10/24/2022    RDW 15 4 (H) 10/24/2022    MPV 8 3 (L) 10/24/2022    NRBC 0 10/24/2022     BMP/CMP:  Lab Results   Component Value Date    K 3 7 10/24/2022    CL 98 10/24/2022    CO2 26 10/24/2022    BUN 23 10/24/2022    CREATININE 4 68 (H) 10/24/2022    CALCIUM 9 3 10/24/2022    AST 19 10/20/2022    ALT 17 10/20/2022    ALKPHOS 65 10/20/2022    EGFR 12 10/24/2022   ,     Coags:   Lab Results   Component Value Date    PTT 26 02/18/2018    INR 0 95 10/24/2022   ,          Relevant imaging studies:   Reviewed  Directed physical examination:  CONSTITUTIONAL: The patient appeared well in no acute distress  NEUROLOGICAL: alert, awake, answering questions appropriately  PSYCHIATRIC: Affect normal   PULMONARY: No respiratory distress  CARDIAC: normal sinus rhythm on monitor, without tachycardia  GASTROINTESTINAL: abdomen was soft, round, nontender  EXTREMITIES: No cyanosis  [facility] has heavily pulsatile thrill  Assessment/Plan: For diagnostic fistulagram and possible treatment  Sedation/Anesthesia plan: Moderate sedation will be used as needed for procedure  Consent with alternatives to the procedure, risks and benefits have been explained and discussed with the patient/patient's family: yes  During the informed consent process, the following was discussed, and the patient was educated concerning paclitaxel coated balloons and stents, which may be appropriate for the patient's up-coming fistulagram procedure: Analysis of randomized trials suggest a possible increased death rate after two years in patients treated with paclitaxel-coated balloons and paclitaxel-eluting stents compared to patients treated with control devices (non-coated balloons or bare metal stents) specifically in patients with lower extremity claudication   This has not been corroborated for dialysis access circuits  The specific cause for this observation is yet to be determined  Currently, the FDA believes that the benefits continue to outweigh the risks for approved paclitaxel-coated balloons and paclitaxel-eluting stents when used in accordance with their indications for use  The patient gave informed consent for the use of these devices if appropriately indicated for treatment

## 2023-03-15 NOTE — NURSING NOTE
"Woggle" suture removed, dry dressing in place and no drainage present  Pt tolerated well   AVS printed and reviewed with patient, verbalized understanding

## 2023-03-15 NOTE — BRIEF OP NOTE (RAD/CATH)
INTERVENTIONAL RADIOLOGY PROCEDURE NOTE    Date: 3/15/2023    Procedure:   Procedure Summary     Date: 03/15/23 Room / Location: [facility] Interventional Radiology    Anesthesia Start:  Anesthesia Stop:     Procedure: IR AV FISTULAGRAM/GRAFTOGRAM Diagnosis:       ESRD (end stage renal disease) ([facility] )      (HVP; new BCF)    Scheduled Providers:  Responsible Provider:     Anesthesia Type: Not recorded ASA Status: Not recorded          Preoperative diagnosis:   1  ESRD (end stage renal disease) ([facility] )         Postoperative diagnosis: Same  Surgeon: Nell Howell MD     Assistant: None  No qualified resident was available  Blood loss: 5 ml    Specimens: none  Findings:   fistulagram showed mild stenosis of cephalic arch, treated with 6 mm HPB POBA  Severe SVC narrowing, treated with 10 mm HPB POBA  Complications: None immediate      Anesthesia: conscious sedation

## 2023-03-15 NOTE — DISCHARGE INSTRUCTIONS
Fistulagram   WHAT YOU NEED TO KNOW:   Your arm or leg my  be sore, swollen, and bruised after the procedure  This is normal and should get better in a few days  DISCHARGE INSTRUCTIONS:     Contact Interventional Radiology at [phone] and follow prompts if you experience any:    You have a fever or chills  Your puncture site is red, swollen, or draining pus  You have nausea or are vomiting  Your skin is itchy, swollen, or you have a rash  You cannot feel a thrill over your graft or fistula  You have questions or concerns about your condition or care  Seek care immediately if:     You have bleeding that does not stop after 10 minutes of holding firm, direct pressure over the puncture site  Blood soaks through your bandage  Your hand or foot closest to the graft or fistula feels cold, painful, or numb  Your hand or foot closest to the graft or fistula is pale or blue  You have trouble moving your arm or leg closest to the graft or fistula  Your bruise suddenly gets bigger  Care for your wound as directed:  Remove the bandage in 4 to 6 hours or as         directed  Wash the area once a day with soap and water  Gently pat the area dry  Apply firm, steady pressure to the puncture site if it bleeds  Use a clean gauze or towel to hold pressure for 10 to 15 minutes  Call 911 if you cannot stop the bleeding or the bleeding gets heavier  Feel for a thrill once a day or as directed  Place your index and second finger over your fistula or graft as directed  You should feel a vibration  The vibration means that blood is flowing through your graft or fistula correctly  Rest your arm or leg as directed  Do not lift anything heavier than 5 pounds or do strenuous activity for 24 hours  Prevent damage to your graft or fistula  Do not wear tight-fitting clothing over your graft or fistula  Do not wear tight jewelry on the arm or leg with the graft or fistula   Tell healthcare providers not to do, IVs, blood draws, and blood pressure readings in the arm with your graft or fistula  Do not allow flu shots or vaccinations in your arm with your graft or fistula  Follow up with your healthcare provider as directed Procedural Sedation   WHAT YOU NEED TO KNOW:   Procedural sedation is medicine used during procedures to help you feel relaxed and calm  You will remember little to none of the procedure  After sedation you may feel tired, weak, or unsteady on your feet  You may also have trouble concentrating or short-term memory loss  These symptoms should go away in 24 hours or less  DISCHARGE INSTRUCTIONS:     Call 911 or have someone else call for any of the following: You have sudden trouble breathing  You cannot be woken  Contact Interventional Radiology at [phone]   [place] PATIENTS: Contact Interventional Radiology at [phone]) Eva Daniel PATIENTS: Contact Interventional Radiology at [phone]) if any of the following occur: You have a severe headache or dizziness  Your heart is beating faster than usual     You have a fever or chills  Your skin is itchy, swollen, or you have a rash  You have nausea or are vomiting for more than 8 hours after the procedure  You have questions or concerns about your condition or care  Self-care:   Have someone stay with you for 24 hours  This person can drive you to errands and help you do things around the house  This person can also watch for problems  Rest and do quiet activities for 24 hours  Do not exercise, ride a bike, or play sports  Stand up slowly to prevent dizziness and falls  Take short walks around the house with another person  Slowly return to your usual activities the next day  Do not drive or use dangerous machines or tools for 24 hours  You may injure yourself or others  Examples include a lawnmower, saw, or drill   Do not return to work for 24 hours if you use dangerous machines or tools for work  Do not make important decisions for 24 hours  For example, do not sign important papers or invest money  Drink liquids as directed  Liquids help flush the sedation medicine out of your body  Ask how much liquid to drink each day and which liquids are best for you  Eat small, frequent meals to prevent nausea and vomiting  Start with clear liquids such as juice or broth  If you do not vomit after clear liquids, you can eat your usual foods  Do not drink alcohol or take medicines that make you drowsy  This includes medicines that help you sleep and anxiety medicines  Ask your healthcare provider if it is safe for you to take pain medicine  Follow up with your healthcare provider as directed: Write down your questions so you remember to ask them during your visits  Procedural Sedation   WHAT YOU NEED TO KNOW:   Procedural sedation is medicine used during procedures to help you feel relaxed and calm  You will remember little to none of the procedure  After sedation you may feel tired, weak, or unsteady on your feet  You may also have trouble concentrating or short-term memory loss  These symptoms should go away in 24 hours or less  DISCHARGE INSTRUCTIONS:     Call 911 or have someone else call for any of the following: You have sudden trouble breathing  You cannot be woken  Contact Interventional Radiology at [phone]   [place] PATIENTS: Contact Interventional Radiology at [phone]) [place] PATIENTS: Contact Interventional Radiology at [phone]) if any of the following occur: You have a severe headache or dizziness  Your heart is beating faster than usual     You have a fever or chills  Your skin is itchy, swollen, or you have a rash  You have nausea or are vomiting for more than 8 hours after the procedure  You have questions or concerns about your condition or care  Self-care:   Have someone stay with you for 24 hours   This person can drive you to errands and help you do things around the house  This person can also watch for problems  Rest and do quiet activities for 24 hours  Do not exercise, ride a bike, or play sports  Stand up slowly to prevent dizziness and falls  Take short walks around the house with another person  Slowly return to your usual activities the next day  Do not drive or use dangerous machines or tools for 24 hours  You may injure yourself or others  Examples include a lawnmower, saw, or drill  Do not return to work for 24 hours if you use dangerous machines or tools for work  Do not make important decisions for 24 hours  For example, do not sign important papers or invest money  Drink liquids as directed  Liquids help flush the sedation medicine out of your body  Ask how much liquid to drink each day and which liquids are best for you  Eat small, frequent meals to prevent nausea and vomiting  Start with clear liquids such as juice or broth  If you do not vomit after clear liquids, you can eat your usual foods  Do not drink alcohol or take medicines that make you drowsy  This includes medicines that help you sleep and anxiety medicines  Ask your healthcare provider if it is safe for you to take pain medicine  Follow up with your healthcare provider as directed: Write down your questions so you remember to ask them during your visits

## 2023-03-25 DIAGNOSIS — N19 RENAL FAILURE: ICD-10-CM

## 2023-03-25 RX ORDER — CALCIUM ACETATE 667 MG/1
2001 CAPSULE ORAL
Qty: 90 CAPSULE | Refills: 0 | Status: SHIPPED | OUTPATIENT
Start: 2023-03-25 | End: 2023-04-04

## 2023-03-30 ENCOUNTER — PREP FOR PROCEDURE (OUTPATIENT)
Dept: INTERVENTIONAL RADIOLOGY/VASCULAR | Facility: CLINIC | Age: 63
End: 2023-03-30

## 2023-03-30 DIAGNOSIS — N18.6 ESRD (END STAGE RENAL DISEASE) (HCC): Primary | ICD-10-CM

## 2023-04-04 ENCOUNTER — PREP FOR PROCEDURE (OUTPATIENT)
Dept: INTERVENTIONAL RADIOLOGY/VASCULAR | Facility: CLINIC | Age: 63
End: 2023-04-04

## 2023-04-04 DIAGNOSIS — N18.6 ESRD (END STAGE RENAL DISEASE) (HCC): Primary | ICD-10-CM

## 2023-04-11 PROBLEM — Z12.11 COLON CANCER SCREENING: Status: RESOLVED | Noted: 2023-02-10 | Resolved: 2023-04-11

## 2023-05-02 DIAGNOSIS — N19 RENAL FAILURE: ICD-10-CM

## 2023-05-02 DIAGNOSIS — N25.81 SECONDARY HYPERPARATHYROIDISM OF RENAL ORIGIN (HCC): ICD-10-CM

## 2023-05-02 RX ORDER — CALCIUM ACETATE 667 MG/1
2001 CAPSULE ORAL
Qty: 270 CAPSULE | Refills: 3 | Status: SHIPPED | OUTPATIENT
Start: 2023-05-02 | End: 2023-06-01

## 2023-05-09 DIAGNOSIS — N18.6 ESRD (END STAGE RENAL DISEASE) ON DIALYSIS (HCC): Primary | ICD-10-CM

## 2023-05-09 DIAGNOSIS — Z99.2 ESRD (END STAGE RENAL DISEASE) ON DIALYSIS (HCC): Primary | ICD-10-CM

## 2023-05-09 RX ORDER — FOLIC ACID/VIT B COMPLEX AND C 0.8 MG
1 TABLET ORAL DAILY
Qty: 90 TABLET | Refills: 4 | Status: SHIPPED | OUTPATIENT
Start: 2023-05-09

## 2023-05-16 DIAGNOSIS — N18.6 ESRD (END STAGE RENAL DISEASE) ON DIALYSIS (HCC): ICD-10-CM

## 2023-05-16 DIAGNOSIS — Z99.2 ESRD (END STAGE RENAL DISEASE) ON DIALYSIS (HCC): ICD-10-CM

## 2023-05-16 RX ORDER — FUROSEMIDE 80 MG
160 TABLET ORAL DAILY
Qty: 180 TABLET | Refills: 3 | Status: SHIPPED | OUTPATIENT
Start: 2023-05-16

## 2023-07-03 ENCOUNTER — PREP FOR PROCEDURE (OUTPATIENT)
Dept: INTERVENTIONAL RADIOLOGY/VASCULAR | Facility: CLINIC | Age: 63
End: 2023-07-03

## 2023-07-03 DIAGNOSIS — N18.6 ESRD (END STAGE RENAL DISEASE) (HCC): Primary | ICD-10-CM

## 2023-07-05 DIAGNOSIS — N19 RENAL FAILURE: ICD-10-CM

## 2023-07-05 RX ORDER — CALCIUM ACETATE 667 MG/1
CAPSULE ORAL
Qty: 810 CAPSULE | Refills: 3 | Status: SHIPPED | OUTPATIENT
Start: 2023-07-05

## 2023-07-28 ENCOUNTER — HOSPITAL ENCOUNTER (OUTPATIENT)
Dept: INTERVENTIONAL RADIOLOGY/VASCULAR | Facility: HOSPITAL | Age: 63
Discharge: HOME | End: 2023-07-28
Attending: STUDENT IN AN ORGANIZED HEALTH CARE EDUCATION/TRAINING PROGRAM
Payer: COMMERCIAL

## 2023-07-28 VITALS
HEIGHT: 68 IN | SYSTOLIC BLOOD PRESSURE: 116 MMHG | OXYGEN SATURATION: 96 % | DIASTOLIC BLOOD PRESSURE: 75 MMHG | RESPIRATION RATE: 16 BRPM | HEART RATE: 65 BPM | BODY MASS INDEX: 29.4 KG/M2 | WEIGHT: 194 LBS | TEMPERATURE: 97.8 F

## 2023-07-28 DIAGNOSIS — N18.6 ESRD (END STAGE RENAL DISEASE) (HCC): ICD-10-CM

## 2023-07-28 PROCEDURE — C1894 INTRO/SHEATH, NON-LASER: HCPCS

## 2023-07-28 PROCEDURE — 36905 THRMBC/NFS DIALYSIS CIRCUIT: CPT

## 2023-07-28 PROCEDURE — C1725 CATH, TRANSLUMIN NON-LASER: HCPCS

## 2023-07-28 PROCEDURE — C1757 CATH, THROMBECTOMY/EMBOLECT: HCPCS

## 2023-07-28 PROCEDURE — 36905 THRMBC/NFS DIALYSIS CIRCUIT: CPT | Performed by: INTERNAL MEDICINE

## 2023-07-28 PROCEDURE — 36907 BALO ANGIOP CTR DIALYSIS SEG: CPT | Performed by: INTERNAL MEDICINE

## 2023-07-28 PROCEDURE — 99152 MOD SED SAME PHYS/QHP 5/>YRS: CPT | Performed by: INTERNAL MEDICINE

## 2023-07-28 PROCEDURE — 99152 MOD SED SAME PHYS/QHP 5/>YRS: CPT

## 2023-07-28 PROCEDURE — 36907 BALO ANGIOP CTR DIALYSIS SEG: CPT

## 2023-07-28 PROCEDURE — C2623 CATH, TRANSLUMIN, DRUG-COAT: HCPCS

## 2023-07-28 PROCEDURE — C1769 GUIDE WIRE: HCPCS

## 2023-07-28 PROCEDURE — 76937 US GUIDE VASCULAR ACCESS: CPT

## 2023-07-28 PROCEDURE — 76937 US GUIDE VASCULAR ACCESS: CPT | Performed by: INTERNAL MEDICINE

## 2023-07-28 PROCEDURE — 99153 MOD SED SAME PHYS/QHP EA: CPT

## 2023-07-28 RX ORDER — MIDAZOLAM HYDROCHLORIDE 2 MG/2ML
INJECTION, SOLUTION INTRAMUSCULAR; INTRAVENOUS AS NEEDED
Status: COMPLETED | OUTPATIENT
Start: 2023-07-28 | End: 2023-07-28

## 2023-07-28 RX ORDER — FENTANYL CITRATE 50 UG/ML
INJECTION, SOLUTION INTRAMUSCULAR; INTRAVENOUS AS NEEDED
Status: COMPLETED | OUTPATIENT
Start: 2023-07-28 | End: 2023-07-28

## 2023-07-28 RX ORDER — HEPARIN SODIUM 1000 [USP'U]/ML
INJECTION, SOLUTION INTRAVENOUS; SUBCUTANEOUS AS NEEDED
Status: COMPLETED | OUTPATIENT
Start: 2023-07-28 | End: 2023-07-28

## 2023-07-28 RX ADMIN — FENTANYL CITRATE 50 MCG: 50 INJECTION, SOLUTION INTRAMUSCULAR; INTRAVENOUS at 08:31

## 2023-07-28 RX ADMIN — MIDAZOLAM 1 MG: 1 INJECTION INTRAMUSCULAR; INTRAVENOUS at 08:31

## 2023-07-28 RX ADMIN — HEPARIN SODIUM 5000 UNITS: 1000 INJECTION INTRAVENOUS; SUBCUTANEOUS at 08:51

## 2023-07-28 RX ADMIN — FENTANYL CITRATE 50 MCG: 50 INJECTION, SOLUTION INTRAMUSCULAR; INTRAVENOUS at 09:32

## 2023-07-28 RX ADMIN — IOHEXOL 108 ML: 350 INJECTION, SOLUTION INTRAVENOUS at 08:45

## 2023-07-28 RX ADMIN — MIDAZOLAM 1 MG: 1 INJECTION INTRAMUSCULAR; INTRAVENOUS at 09:32

## 2023-07-28 NOTE — NURSING NOTE
Woggles (2) in place under dressing. Dressing remains clean, dry and intact. No bleeding noted. Positive bruit/thrill. Positive radial pulse. Fingers warm to touch. Denies pain.

## 2023-07-28 NOTE — H&P
Interventional Radiology  History and Physical 7/28/2023     Sharmon Epley   1960   [id number]    Assessment/Plan:    65year old male with a YIN AVF, presenting with HVP. Most recent intervention in April 2023, where outflow stenosis was treated with 6 and 7mm angioplasty. On exam the outflow is pulsatile. Plan for fistulagram with intervention including angioplasty, possible stent placement. Procedure, risks, benefits and alternatives were discussed with the patient. Consent obtained at bedside. /76   Pulse 74   Temp (!) 97 °F (36.1 °C) (Temporal)   Resp 16   Ht 5' 8" (1.727 m)   Wt 88 kg (194 lb 0.1 oz)   SpO2 100%   BMI 29.50 kg/m²       Problem List Items Addressed This Visit    None  Visit Diagnoses     ESRD (end stage renal disease) ([address])        Relevant Orders    IR AV fistulagram/graftogram             Subjective: Normal state of health, no acute complaints. Patient ID: Sharmon Epley is a 65 y.o. male. History of Present Illness  See A/P above. Review of Systems   Respiratory: Negative. Cardiovascular: Negative.           Past Medical History:   Diagnosis Date   • CHF (congestive heart failure) (HCC)    • Chronic kidney disease    • ESRD (end stage renal disease) on dialysis ([address])     dialysis tues-thurs-sat   • Hyperkalemia 02/01/2022   • Hyperlipidemia    • Hypertension    • Personal history of COVID-19 07/2021    mild symptoms - recovered at home   • Renal disorder    • Withdrawal seizures ([address])     approximately 2018 when reducing alcohol        Past Surgical History:   Procedure Laterality Date   • HERNIA REPAIR      states umbilical   • IR AV FISTULAGRAM/GRAFTOGRAM  08/16/2022   • IR AV FISTULAGRAM/GRAFTOGRAM  11/11/2022   • IR AV FISTULAGRAM/GRAFTOGRAM  3/15/2023   • IR AV FISTULAGRAM/GRAFTOGRAM  4/14/2023   • IR BIOPSY BONE MARROW  07/13/2022   • IR PULMONARY ARTERY EMBOLIZATION  08/03/2022   • IR TUNNELED CENTRAL LINE REMOVAL  4/14/2023 • IR TUNNELED DIALYSIS CATHETER PLACEMENT  06/10/2022   • IR TUNNELED DIALYSIS CATHETER PLACEMENT  10/20/2022   • IR TUNNELED DIALYSIS CATHETER PLACEMENT     • NV ARTERIOVENOUS ANASTOMOSIS OPEN DIRECT Left 10/24/2022    Procedure: Creation of  L brachiobasilic fistula, ligation of L endoAVF; Surgeon: Mark Otero MD;  Location: [facility];  Service: Vascular   • NV ARTERIOVENOUS ANASTOMOSIS OPEN DIRECT Right 1/6/2023    Procedure: Creation right brachiocephalic fistula;   Surgeon: Mark Otero MD;  Location: [facility];  Service: Vascular        Social History     Tobacco Use   Smoking Status Every Day   • Packs/day: 0.25   • Years: 30.00   • Total pack years: 7.50   • Types: Cigarettes   Smokeless Tobacco Never   Tobacco Comments    Smokes 5 a day        Social History     Substance and Sexual Activity   Alcohol Use Yes   • Alcohol/week: 4.0 standard drinks of alcohol   • Types: 4 Cans of beer per week    Comment: has cut down to 4 per week        Social History     Substance and Sexual Activity   Drug Use Yes   • Frequency: 3.0 times per week   • Types: Marijuana    Comment: occasionally, last used sunday        Allergies   Allergen Reactions   • Ibuprofen Other (See Comments)     ESRD - cannot have ibuprofen       Current Outpatient Medications   Medication Sig Dispense Refill   • amLODIPine (NORVASC) 5 mg tablet Take 1 tablet (5 mg total) by mouth daily 90 tablet 1   • apixaban (Eliquis) 2.5 mg Take 1 tablet (2.5 mg total) by mouth 2 (two) times a day 180 tablet 4   • aspirin (ECOTRIN LOW STRENGTH) 81 mg EC tablet Take 1 tablet (81 mg total) by mouth daily 90 tablet 1   • B Complex-C-Folic Acid (Karlee-Melissa) TABS Take 1 tablet by mouth daily 90 tablet 4   • calcitriol (ROCALTROL) 0.25 mcg capsule Take 2 capsules (0.5 mcg total) by mouth 3 (three) times a week 45 capsule 4   • calcium acetate (PHOSLO) capsule TAKE 3 CAPSULES(2001 MG) BY MOUTH THREE TIMES DAILY WITH MEALS 810 capsule 3   • carvedilol (COREG) 25 mg tablet Take 1 tablet (25 mg total) by mouth 2 (two) times a day with meals 90 tablet 1   • furosemide (LASIX) 80 mg tablet Take 2 tablets (160 mg total) by mouth daily 180 tablet 3   • sevelamer carbonate (RENVELA) 800 mg tablet Take 1 tablet (800 mg total) by mouth 3 (three) times a day with meals 270 tablet 4   • cholecalciferol (VITAMIN D3) 1,000 units tablet Take 4 tablets (4,000 Units total) by mouth daily 120 tablet 0   • cinacalcet (SENSIPAR) 30 mg tablet Take 1 tablet (30 mg total) by mouth once a week 12 tablet 4   • EPINEPHrine (EPIPEN) 0.3 mg/0.3 mL SOAJ Inject 0.3 mL (0.3 mg total) into a muscle once for 1 dose 0.6 mL 0   • pravastatin (PRAVACHOL) 80 mg tablet TAKE 1 TABLET(80 MG) BY MOUTH DAILY WITH DINNER 90 tablet 1     No current facility-administered medications for this encounter. Objective:    Vitals:    07/28/23 0721   BP: 113/76   Pulse: 74   Resp: 16   Temp: (!) 97 °F (36.1 °C)   TempSrc: Temporal   SpO2: 100%   Weight: 88 kg (194 lb 0.1 oz)   Height: 5' 8" (1.727 m)        Physical Exam  Pulmonary:      Effort: Pulmonary effort is normal.           No results found for: "BNP"   Lab Results   Component Value Date    WBC 8.81 10/24/2022    HGB 10.4 (L) 10/24/2022    HCT 30.8 (L) 10/24/2022     (H) 10/24/2022     10/24/2022     Lab Results   Component Value Date    INR 0.95 10/24/2022    INR 0.91 10/20/2022    INR 0.87 08/03/2022    PROTIME 12.7 10/24/2022    PROTIME 12.3 10/20/2022    PROTIME 12.1 08/03/2022     Lab Results   Component Value Date    PTT 26 02/18/2018         I have personally reviewed pertinent imaging and laboratory results. Code Status: Prior  Advance Directive and Living Will:      Power of :    POLST:      This text is generated with voice recognition software. There may be translation, syntax,  or grammatical errors. If you have any questions, please contact the dictating provider.

## 2023-07-28 NOTE — DISCHARGE INSTRUCTIONS
Fistulagram   WHAT YOU NEED TO KNOW:   Your arm or leg my  be sore, swollen, and bruised after the procedure. This is normal and should get better in a few days. DISCHARGE INSTRUCTIONS:     Contact Interventional Radiology at [phone] and follow prompts if you experience any:    You have a fever or chills. Your puncture site is red, swollen, or draining pus. You have nausea or are vomiting. Your skin is itchy, swollen, or you have a rash. You cannot feel a thrill over your graft or fistula. You have questions or concerns about your condition or care. Seek care immediately if:     You have bleeding that does not stop after 10 minutes of holding firm, direct pressure over the puncture site. Blood soaks through your bandage. Your hand or foot closest to the graft or fistula feels cold, painful, or numb. Your hand or foot closest to the graft or fistula is pale or blue. You have trouble moving your arm or leg closest to the graft or fistula. Your bruise suddenly gets bigger. Care for your wound as directed:  Remove the bandage in 4 to 6 hours or as         directed. Wash the area once a day with soap and water. Gently pat the area dry. Apply firm, steady pressure to the puncture site if it bleeds. Use a clean gauze or towel to hold pressure for 10 to 15 minutes. Call 911 if you cannot stop the bleeding or the bleeding gets heavier. Feel for a thrill once a day or as directed. Place your index and second finger over your fistula or graft as directed. You should feel a vibration. The vibration means that blood is flowing through your graft or fistula correctly. Rest your arm or leg as directed. Do not lift anything heavier than 5 pounds or do strenuous activity for 24 hours. Prevent damage to your graft or fistula. Do not wear tight-fitting clothing over your graft or fistula. Do not wear tight jewelry on the arm or leg with the graft or fistula.  Tell healthcare providers not to do, IVs, blood draws, and blood pressure readings in the arm with your graft or fistula. Do not allow flu shots or vaccinations in your arm with your graft or fistula. Follow up with your healthcare provider as directed Procedural Sedation   WHAT YOU NEED TO KNOW:   Procedural sedation is medicine used during procedures to help you feel relaxed and calm. You will remember little to none of the procedure. After sedation you may feel tired, weak, or unsteady on your feet. You may also have trouble concentrating or short-term memory loss. These symptoms should go away in 24 hours or less. DISCHARGE INSTRUCTIONS:     Call 911 or have someone else call for any of the following: You have sudden trouble breathing. You cannot be woken. Contact Interventional Radiology at [phone]   [place] PATIENTS: Contact Interventional Radiology at [phone]) [place] PATIENTS: Contact Interventional Radiology at [phone]) if any of the following occur: You have a severe headache or dizziness. Your heart is beating faster than usual.    You have a fever or chills. Your skin is itchy, swollen, or you have a rash. You have nausea or are vomiting for more than 8 hours after the procedure. You have questions or concerns about your condition or care. Self-care:   Have someone stay with you for 24 hours. This person can drive you to errands and help you do things around the house. This person can also watch for problems. Rest and do quiet activities for 24 hours. Do not exercise, ride a bike, or play sports. Stand up slowly to prevent dizziness and falls. Take short walks around the house with another person. Slowly return to your usual activities the next day. Do not drive or use dangerous machines or tools for 24 hours. You may injure yourself or others. Examples include a lawnmower, saw, or drill.  Do not return to work for 24 hours if you use dangerous machines or tools for work. Do not make important decisions for 24 hours. For example, do not sign important papers or invest money. Drink liquids as directed. Liquids help flush the sedation medicine out of your body. Ask how much liquid to drink each day and which liquids are best for you. Eat small, frequent meals to prevent nausea and vomiting. Start with clear liquids such as juice or broth. If you do not vomit after clear liquids, you can eat your usual foods. Do not drink alcohol or take medicines that make you drowsy. This includes medicines that help you sleep and anxiety medicines. Ask your healthcare provider if it is safe for you to take pain medicine. Follow up with your healthcare provider as directed: Write down your questions so you remember to ask them during your visits. Procedural Sedation   WHAT YOU NEED TO KNOW:   Procedural sedation is medicine used during procedures to help you feel relaxed and calm. You will remember little to none of the procedure. After sedation you may feel tired, weak, or unsteady on your feet. You may also have trouble concentrating or short-term memory loss. These symptoms should go away in 24 hours or less. DISCHARGE INSTRUCTIONS:     Call 911 or have someone else call for any of the following: You have sudden trouble breathing. You cannot be woken. Contact Interventional Radiology at [phone]   [place] PATIENTS: Contact Interventional Radiology at [phone]) [place] PATIENTS: Contact Interventional Radiology at [phone]) if any of the following occur: You have a severe headache or dizziness. Your heart is beating faster than usual.    You have a fever or chills. Your skin is itchy, swollen, or you have a rash. You have nausea or are vomiting for more than 8 hours after the procedure. You have questions or concerns about your condition or care. Self-care:   Have someone stay with you for 24 hours.  This person can drive you to errands and help you do things around the house. This person can also watch for problems. Rest and do quiet activities for 24 hours. Do not exercise, ride a bike, or play sports. Stand up slowly to prevent dizziness and falls. Take short walks around the house with another person. Slowly return to your usual activities the next day. Do not drive or use dangerous machines or tools for 24 hours. You may injure yourself or others. Examples include a lawnmower, saw, or drill. Do not return to work for 24 hours if you use dangerous machines or tools for work. Do not make important decisions for 24 hours. For example, do not sign important papers or invest money. Drink liquids as directed. Liquids help flush the sedation medicine out of your body. Ask how much liquid to drink each day and which liquids are best for you. Eat small, frequent meals to prevent nausea and vomiting. Start with clear liquids such as juice or broth. If you do not vomit after clear liquids, you can eat your usual foods. Do not drink alcohol or take medicines that make you drowsy. This includes medicines that help you sleep and anxiety medicines. Ask your healthcare provider if it is safe for you to take pain medicine. Follow up with your healthcare provider as directed: Write down your questions so you remember to ask them during your visits.

## 2023-07-28 NOTE — BRIEF OP NOTE (RAD/CATH)
INTERVENTIONAL RADIOLOGY PROCEDURE NOTE    Date: 7/28/2023    Procedure:   Procedure Summary     Date: 07/28/23 Room / Location: [address] [facility] Interventional Radiology    Anesthesia Start:  Anesthesia Stop:     Procedure: IR AV FISTULAGRAM/GRAFTOGRAM Diagnosis:       ESRD (end stage renal disease) ([address])      (HVP)    Scheduled Providers:  Responsible Provider:     Anesthesia Type: Not recorded ASA Status: Not recorded          Preoperative diagnosis:   1. ESRD (end stage renal disease) ([address])         Postoperative diagnosis: Same. Surgeon: Nora Singh MD     Assistant: None. No qualified resident was available. Blood loss: Minimal    Specimens: None     Findings:     Multifocal several outflow stenosis of 75%:    - Cannulation zone: serial 6 and 7 Conquest balloon angioplasty, complicated by thrombosis, clot cleared with angiojet and gonzalez balloon. Heparin given. Improved vessel caliber, irregularity persisted. - Cephalic arch: 6mm Conquest followed 7mm Lutonix DCB. No residual stenosis. - Cranial SVC stenosis related to prior HD line: 10mm Conquest balloon angioplasty. Improved vessel caliber, decreased collaterals. -JA segment stenosis: 5mm  balloon angioplasty, no residual stenosis. Anastomosis patent. Woggle closure x 2. Complications: None immediate.     Anesthesia: conscious sedation

## 2023-08-11 ENCOUNTER — OFFICE VISIT (OUTPATIENT)
Dept: INTERNAL MEDICINE CLINIC | Facility: CLINIC | Age: 63
End: 2023-08-11
Payer: COMMERCIAL

## 2023-08-11 VITALS
SYSTOLIC BLOOD PRESSURE: 140 MMHG | TEMPERATURE: 97.5 F | WEIGHT: 199 LBS | HEART RATE: 69 BPM | RESPIRATION RATE: 16 BRPM | OXYGEN SATURATION: 100 % | BODY MASS INDEX: 30.16 KG/M2 | HEIGHT: 68 IN | DIASTOLIC BLOOD PRESSURE: 60 MMHG

## 2023-08-11 DIAGNOSIS — F10.930 SEIZURE DUE TO ALCOHOL WITHDRAWAL, UNCOMPLICATED (HCC): ICD-10-CM

## 2023-08-11 DIAGNOSIS — I15.0 RENOVASCULAR HYPERTENSION: ICD-10-CM

## 2023-08-11 DIAGNOSIS — I77.0 ARTERIOVENOUS FISTULA, ACQUIRED (HCC): Primary | ICD-10-CM

## 2023-08-11 DIAGNOSIS — R56.9 SEIZURE DUE TO ALCOHOL WITHDRAWAL, UNCOMPLICATED (HCC): ICD-10-CM

## 2023-08-11 DIAGNOSIS — T82.898S ARTERIOVENOUS FISTULA OCCLUSION, SEQUELA: ICD-10-CM

## 2023-08-11 DIAGNOSIS — N18.6 ESRD (END STAGE RENAL DISEASE) ON DIALYSIS (HCC): ICD-10-CM

## 2023-08-11 DIAGNOSIS — Z12.11 COLON CANCER SCREENING: ICD-10-CM

## 2023-08-11 DIAGNOSIS — N25.81 SECONDARY HYPERPARATHYROIDISM OF RENAL ORIGIN (HCC): ICD-10-CM

## 2023-08-11 DIAGNOSIS — I50.32 CHRONIC DIASTOLIC CONGESTIVE HEART FAILURE (HCC): ICD-10-CM

## 2023-08-11 DIAGNOSIS — Z99.2 ESRD (END STAGE RENAL DISEASE) ON DIALYSIS (HCC): ICD-10-CM

## 2023-08-11 DIAGNOSIS — M79.675 GREAT TOE PAIN, LEFT: ICD-10-CM

## 2023-08-11 PROCEDURE — 99214 OFFICE O/P EST MOD 30 MIN: CPT | Performed by: STUDENT IN AN ORGANIZED HEALTH CARE EDUCATION/TRAINING PROGRAM

## 2023-08-11 PROCEDURE — G0439 PPPS, SUBSEQ VISIT: HCPCS | Performed by: STUDENT IN AN ORGANIZED HEALTH CARE EDUCATION/TRAINING PROGRAM

## 2023-08-11 NOTE — ASSESSMENT & PLAN NOTE
Following with nephrology  Currently his medication list includes Sensipar however this is not a medication he is actively taking at the moment. I advised him to contact his nephrologist for clarification.

## 2023-08-11 NOTE — ASSESSMENT & PLAN NOTE
Previously on amlodipine however discontinued due to episodes of hypotension during dialysis sessions  Currently on carvedilol 25 mg twice daily  And Lasix 80 mg daily

## 2023-08-11 NOTE — PROGRESS NOTES
Assessment and Plan:     Problem List Items Addressed This Visit        Endocrine    Secondary hyperparathyroidism of renal origin [facility])     Following with nephrology  Currently his medication list includes Sensipar however this is not a medication he is actively taking at the moment. I advised him to contact his nephrologist for clarification.              Cardiovascular and Mediastinum    Renovascular hypertension     Previously on amlodipine however discontinued due to episodes of hypotension during dialysis sessions  Currently on carvedilol 25 mg twice daily  And Lasix 80 mg daily         CHF (congestive heart failure) (HCC)     Wt Readings from Last 3 Encounters:   08/11/23 90.3 kg (199 lb)   07/28/23 88 kg (194 lb 0.1 oz)   03/15/23 88.5 kg (195 lb)     Stable- on beta-blocker and Lasix             AV fistula occlusion (HCC)     As per chart review no further complications after fistulogram  Currently on Eliquis low-dose         Relevant Medications    apixaban (Eliquis) 2.5 mg    Arteriovenous fistula, acquired ([address]) - Primary     AV fistula of the right upper extremity utilized during hemodialysis            Nervous and Auditory    Seizure due to alcohol withdrawal, uncomplicated (HCC)     Last episode in more than 2 years  Admits to occasionally drink 1-2 drinks, denies heavy alcohol drinking as in the past            Genitourinary    ESRD (end stage renal disease) on dialysis [facility])     Lab Results   Component Value Date    EGFR 12 10/24/2022    EGFR 10 10/20/2022    EGFR 6 06/13/2022    CREATININE 4.68 (H) 10/24/2022    CREATININE 5.46 (H) 10/20/2022    CREATININE 7.96 (H) 06/13/2022     Hemodialysis         Relevant Medications    apixaban (Eliquis) 2.5 mg       Other    Great toe pain, left     Onychodystrophy noted on exam without evidence of wounds/ulcers  -We will refer to podiatry for further evaluation         Relevant Orders    Ambulatory Referral to Podiatry   Other Visit Diagnoses     Colon cancer screening        Relevant Orders    Ambulatory Referral to Gastroenterology           Preventive health issues were discussed with patient, and age appropriate screening tests were ordered as noted in patient's After Visit Summary. Personalized health advice and appropriate referrals for health education or preventive services given if needed, as noted in patient's After Visit Summary. History of Present Illness:     Patient presents for a Medicare Wellness Visit and follow-up on chronic conditions      HPI   Patient Care Team:  Samia Pineda MD as PCP - General (Internal Medicine)  Jamari Jean DO as PCP - [address] [place] [address])  Silverio Baxter MD (Family Medicine)     Review of Systems:     Review of Systems   Constitutional: Negative for appetite change, diaphoresis, fatigue and fever. HENT: Negative for rhinorrhea and sore throat. Eyes: Negative. Negative for visual disturbance. Respiratory: Negative for apnea, cough, choking, chest tightness and shortness of breath. Cardiovascular: Negative for chest pain, palpitations and leg swelling. Gastrointestinal: Negative for abdominal distention, abdominal pain, constipation, diarrhea, nausea and vomiting. Endocrine: Negative. Genitourinary: Negative. Musculoskeletal: Negative. Skin: Negative. Neurological: Negative for dizziness and headaches. Hematological: Negative. Psychiatric/Behavioral: Negative.          Problem List:     Patient Active Problem List   Diagnosis   • Renovascular hypertension   • CHF (congestive heart failure) (HCC)   • ETOH abuse   • ESRD (end stage renal disease) on dialysis ([address])   • Marijuana use   • Hyperlipidemia   • Tobacco abuse   • Seizure due to alcohol withdrawal, uncomplicated (HCC)   • Pharyngeal edema   • Hypomagnesemia   • Anemia due to chronic kidney disease, on chronic dialysis (HCC)   • Chronic hyponatremia   • MGUS (monoclonal gammopathy of unknown significance)   • Secondary hyperparathyroidism of renal origin ([address])   • Smoking greater than 20 pack years   • AV fistula occlusion (HCC)   • Pre-op examination   • S/P arteriovenous (AV) fistula creation      Past Medical and Surgical History:     Past Medical History:   Diagnosis Date   • CHF (congestive heart failure) ([address])    • Chronic kidney disease    • ESRD (end stage renal disease) on dialysis ([address])     dialysis tues-thurs-sat   • Hyperkalemia 02/01/2022   • Hyperlipidemia    • Hypertension    • Personal history of COVID-19 07/2021    mild symptoms - recovered at home   • Renal disorder    • Withdrawal seizures ([address])     approximately 2018 when reducing alcohol     Past Surgical History:   Procedure Laterality Date   • HERNIA REPAIR      states umbilical   • IR AV FISTULAGRAM/GRAFTOGRAM  08/16/2022   • IR AV FISTULAGRAM/GRAFTOGRAM  11/11/2022   • IR AV FISTULAGRAM/GRAFTOGRAM  3/15/2023   • IR AV FISTULAGRAM/GRAFTOGRAM  4/14/2023   • IR AV FISTULAGRAM/GRAFTOGRAM  7/28/2023   • IR BIOPSY BONE MARROW  07/13/2022   • IR PULMONARY ARTERY EMBOLIZATION  08/03/2022   • IR TUNNELED CENTRAL LINE REMOVAL  4/14/2023   • IR TUNNELED DIALYSIS CATHETER PLACEMENT  06/10/2022   • IR TUNNELED DIALYSIS CATHETER PLACEMENT  10/20/2022   • IR TUNNELED DIALYSIS CATHETER PLACEMENT     • IN ARTERIOVENOUS ANASTOMOSIS OPEN DIRECT Left 10/24/2022    Procedure: Creation of  L brachiobasilic fistula, ligation of L endoAVF; Surgeon: Linnette Otero MD;  Location: [facility];  Service: Vascular   • IN ARTERIOVENOUS ANASTOMOSIS OPEN DIRECT Right 1/6/2023    Procedure: Creation right brachiocephalic fistula; Surgeon: Linnette Otero MD;  Location:  MAIN OR;  Service: Vascular      Family History:     No family history on file.    Social History:     Social History     Socioeconomic History   • Marital status: Significant Other     Spouse name: None   • Number of children: None   • Years of education: None   • Highest education level: None   Occupational History   • None   Tobacco Use   • Smoking status: Every Day     Packs/day: 0.25     Years: 30.00     Total pack years: 7.50     Types: Cigarettes   • Smokeless tobacco: Never   • Tobacco comments:     Smokes 5 a day   Vaping Use   • Vaping Use: Never used   Substance and Sexual Activity   • Alcohol use: Yes     Alcohol/week: 4.0 standard drinks of alcohol     Types: 4 Cans of beer per week     Comment: has cut down to 4 per week   • Drug use: Yes     Frequency: 3.0 times per week     Types: Marijuana     Comment: occasionally, last used sunday   • Sexual activity: Not Currently   Other Topics Concern   • None   Social History Narrative   • None     Social Determinants of Health     Financial Resource Strain: Not on file   Food Insecurity: No Food Insecurity (6/12/2022)    Hunger Vital Sign    • Worried About Running Out of Food in the Last Year: Never true    • Ran Out of Food in the Last Year: Never true   Transportation Needs: No Transportation Needs (6/12/2022)    PRAPARE - Transportation    • Lack of Transportation (Medical): No    • Lack of Transportation (Non-Medical):  No   Physical Activity: Not on file   Stress: Not on file   Social Connections: Not on file   Intimate Partner Violence: Not on file   Housing Stability: Low Risk  (6/12/2022)    Housing Stability Vital Sign    • Unable to Pay for Housing in the Last Year: No    • Number of Places Lived in the Last Year: 1    • Unstable Housing in the Last Year: No      Medications and Allergies:     Current Outpatient Medications   Medication Sig Dispense Refill   • apixaban (Eliquis) 2.5 mg Take 1 tablet (2.5 mg total) by mouth 2 (two) times a day 180 tablet 4   • aspirin (ECOTRIN LOW STRENGTH) 81 mg EC tablet Take 1 tablet (81 mg total) by mouth daily 90 tablet 1   • B Complex-C-Folic Acid (Karlee-Melissa) TABS Take 1 tablet by mouth daily 90 tablet 4   • calcitriol (ROCALTROL) 0.25 mcg capsule Take 2 capsules (0.5 mcg total) by mouth 3 (three) times a week 45 capsule 4   • calcium acetate (PHOSLO) capsule TAKE 3 CAPSULES(2001 MG) BY MOUTH THREE TIMES DAILY WITH MEALS 810 capsule 3   • carvedilol (COREG) 25 mg tablet Take 1 tablet (25 mg total) by mouth 2 (two) times a day with meals 90 tablet 1   • cinacalcet (SENSIPAR) 30 mg tablet Take 1 tablet (30 mg total) by mouth once a week 12 tablet 4   • furosemide (LASIX) 80 mg tablet Take 2 tablets (160 mg total) by mouth daily 180 tablet 3   • pravastatin (PRAVACHOL) 80 mg tablet TAKE 1 TABLET(80 MG) BY MOUTH DAILY WITH DINNER 90 tablet 1   • sevelamer carbonate (RENVELA) 800 mg tablet Take 1 tablet (800 mg total) by mouth 3 (three) times a day with meals 270 tablet 4   • cholecalciferol (VITAMIN D3) 1,000 units tablet Take 4 tablets (4,000 Units total) by mouth daily 120 tablet 0   • EPINEPHrine (EPIPEN) 0.3 mg/0.3 mL SOAJ Inject 0.3 mL (0.3 mg total) into a muscle once for 1 dose 0.6 mL 0     No current facility-administered medications for this visit.      Allergies   Allergen Reactions   • Ibuprofen Other (See Comments)     ESRD - cannot have ibuprofen      Immunizations:     Immunization History   Administered Date(s) Administered   • Hep B, Adolescent or Pediatric 08/20/2022, 09/20/2022   • Hep B, adult 08/20/2022, 09/20/2022, 11/19/2022, 04/29/2023   • INFLUENZA 10/14/2022   • Influenza, recombinant, quadrivalent,injectable, preservative free 10/14/2022   • Influenza, seasonal, injectable, preservative free 11/11/2021   • Pneumococcal Conjugate Vaccine 20-valent (Pcv20), Polysace 10/14/2022, 10/14/2022   • Pneumococcal Polysaccharide PPV23 08/09/2021   • Tuberculin Skin Test-PPD Intradermal 06/21/2022, 07/07/2022, 01/09/2023      Health Maintenance:         Topic Date Due   • Colorectal Cancer Screening  05/02/2024 (Originally 11/20/2005)   • HIV Screening  Completed   • Hepatitis C Screening  Completed         Topic Date Due   • COVID-19 Vaccine (1) Never done   • Hepatitis A Vaccine (1 of 2 - Risk 2-dose series) Never done   • Influenza Vaccine (1) 09/01/2023      Medicare Screening Tests and Risk Assessments:     Mariaelena River is here for his Subsequent Wellness visit. Health Risk Assessment:   Patient rates overall health as good. Patient feels that their physical health rating is same. Patient is satisfied with their life. Eyesight was rated as same. Hearing was rated as same. Patient feels that their emotional and mental health rating is same. Patients states they are never, rarely angry. Patient states they are sometimes unusually tired/fatigued. Pain experienced in the last 7 days has been none. Patient states that he has experienced no weight loss or gain in last 6 months. Depression Screening:   PHQ-2 Score: 0      Fall Risk Screening: In the past year, patient has experienced: no history of falling in past year      Home Safety:  Patient has trouble with stairs inside or outside of their home. Patient has working smoke alarms and has working carbon monoxide detector. Home safety hazards include: none. Nutrition:   Current diet is Regular. Medications:   Patient is currently taking over-the-counter supplements. OTC medications include: see medication list. Patient is able to manage medications. Activities of Daily Living (ADLs)/Instrumental Activities of Daily Living (IADLs):   Walk and transfer into and out of bed and chair?: Yes  Dress and groom yourself?: Yes    Bathe or shower yourself?: Yes    Feed yourself? Yes  Do your laundry/housekeeping?: Yes  Manage your money, pay your bills and track your expenses?: Yes  Make your own meals?: Yes    Do your own shopping?: Yes    Previous Hospitalizations:   Any hospitalizations or ED visits within the last 12 months?: No      Advance Care Planning:   Living will: Yes    Durable POA for healthcare:  Yes    Advanced directive: Yes    Advanced directive counseling given: Yes      Cognitive Screening:   Provider or family/friend/caregiver concerned regarding cognition?: No    PREVENTIVE SCREENINGS      Cardiovascular Screening:    General: Screening Not Indicated and History Lipid Disorder      Diabetes Screening:     General: Screening Current      Colorectal Cancer Screening:     General: Risks and Benefits Discussed      Prostate Cancer Screening:    General: Risks and Benefits Discussed      Osteoporosis Screening:    General: Risks and Benefits Discussed      Abdominal Aortic Aneurysm (AAA) Screening:    Risk factors include: tobacco use        Lung Cancer Screening:     General: Screening Not Indicated      Hepatitis C Screening:    General: Screening Current    Screening, Brief Intervention, and Referral to Treatment (SBIRT)    Screening  Typical number of drinks in a day: 0  Typical number of drinks in a week: 1  Interpretation: Low risk drinking behavior. Single Item Drug Screening:  How often have you used an illegal drug (including marijuana) or a prescription medication for non-medical reasons in the past year? daily or almost daily    Single Item Drug Screen Score: 4  Interpretation: POSITIVE screen for possible drug use disorder    Drug Abuse Screening Test (DAST-10):  1) Have you used drugs other than those required for medical reasons? Yes  2) Do you abuse more than one drug at a time? No  3) Are you always able to stop using drugs when you want to? Yes  4) Have you had "blackouts" or "flashbacks" as a result of drug use? No  5) Do you ever feel bad or guilty about your drug use? No  6) Does your spouse (or parents) ever complain about your involvement with drugs? No  7) Have you neglected your family because of your use of drugs? No  8) Have you engaged in illegal activities in order to obtain drugs? No  9) Have you ever experienced withdrawal symptoms (felt sick) when you stopped taking drugs? No  10) Have you had medical problems as a result of your drug use (e.g., memory loss, hepatitis, convulsions, bleeding, etc.)?  No    DAST-10 Score: 1  Interpretation: Low level problems related to drug abuse    Other Counseling Topics:   Skin self-exam and sunscreen. No results found. Physical Exam:     /60 (BP Location: Left arm, Patient Position: Sitting, Cuff Size: Standard)   Pulse 69   Temp 97.5 °F (36.4 °C)   Resp 16   Ht 5' 8" (1.727 m)   Wt 90.3 kg (199 lb)   SpO2 100%   BMI 30.26 kg/m²     Physical Exam   GENERAL: NAD, Normal appearance. NEUROLOGIC:  Alert/oriented x3.   HEENT:  NC/AT, no scleral icterus  CARDIAC:  RRR, +S1/S2  PULMONARY:  non-labored breathing    Jemma Pugh MD

## 2023-08-11 NOTE — ASSESSMENT & PLAN NOTE
Last episode in more than 2 years  Admits to occasionally drink 1-2 drinks, denies heavy alcohol drinking as in the past

## 2023-08-11 NOTE — ASSESSMENT & PLAN NOTE
Wt Readings from Last 3 Encounters:   08/11/23 90.3 kg (199 lb)   07/28/23 88 kg (194 lb 0.1 oz)   03/15/23 88.5 kg (195 lb)     Stable- on beta-blocker and Lasix

## 2023-08-11 NOTE — PATIENT INSTRUCTIONS
Medicare Preventive Visit Patient Instructions  Thank you for completing your Welcome to Medicare Visit or Medicare Annual Wellness Visit today. Your next wellness visit will be due in one year (8/11/2024). The screening/preventive services that you may require over the next 5-10 years are detailed below. Some tests may not apply to you based off risk factors and/or age. Screening tests ordered at today's visit but not completed yet may show as past due. Also, please note that scanned in results may not display below. Preventive Screenings:  Service Recommendations Previous Testing/Comments   Colorectal Cancer Screening  · Colonoscopy    · Fecal Occult Blood Test (FOBT)/Fecal Immunochemical Test (FIT)  · Fecal DNA/Cologuard Test  · Flexible Sigmoidoscopy Age: 43-73 years old   Colonoscopy: every 10 years (May be performed more frequently if at higher risk)  OR  FOBT/FIT: every 1 year  OR  Cologuard: every 3 years  OR  Sigmoidoscopy: every 5 years  Screening may be recommended earlier than age 39 if at higher risk for colorectal cancer. Also, an individualized decision between you and your healthcare provider will decide whether screening between the ages of 77-80 would be appropriate.  Colonoscopy: Not on file  FOBT/FIT: Not on file  Cologuard: Not on file  Sigmoidoscopy: Not on file          Prostate Cancer Screening Individualized decision between patient and health care provider in men between ages of 53-66   Medicare will cover every 12 months beginning on the day after your 50th birthday PSA: No results in last 5 years           Hepatitis C Screening Once for adults born between 1945 and 1965  More frequently in patients at high risk for Hepatitis C Hep C Antibody: 06/10/2022    Screening Current   Diabetes Screening 1-2 times per year if you're at risk for diabetes or have pre-diabetes Fasting glucose: 83 mg/dL (10/24/2022)  A1C: No results in last 5 years (No results in last 5 years)  Screening Current Cholesterol Screening Once every 5 years if you don't have a lipid disorder. May order more often based on risk factors. Lipid panel: Not on file  Screening Not Indicated  History Lipid Disorder      Other Preventive Screenings Covered by Medicare:  1. Abdominal Aortic Aneurysm (AAA) Screening: covered once if your at risk. You're considered to be at risk if you have a family history of AAA or a male between the age of 70-76 who smoking at least 100 cigarettes in your lifetime. 2. Lung Cancer Screening: covers low dose CT scan once per year if you meet all of the following conditions: (1) Age 48-67; (2) No signs or symptoms of lung cancer; (3) Current smoker or have quit smoking within the last 15 years; (4) You have a tobacco smoking history of at least 20 pack years (packs per day x number of years you smoked); (5) You get a written order from a healthcare provider. 3. Glaucoma Screening: covered annually if you're considered high risk: (1) You have diabetes OR (2) Family history of glaucoma OR (3)  aged 48 and older OR (3)  American aged 72 and older  3. Osteoporosis Screening: covered every 2 years if you meet one of the following conditions: (1) Have a vertebral abnormality; (2) On glucocorticoid therapy for more than 3 months; (3) Have primary hyperparathyroidism; (4) On osteoporosis medications and need to assess response to drug therapy. 5. HIV Screening: covered annually if you're between the age of 14-79. Also covered annually if you are younger than 13 and older than 72 with risk factors for HIV infection. For pregnant patients, it is covered up to 3 times per pregnancy.     Immunizations:  Immunization Recommendations   Influenza Vaccine Annual influenza vaccination during flu season is recommended for all persons aged >= 6 months who do not have contraindications   Pneumococcal Vaccine   * Pneumococcal conjugate vaccine = PCV13 (Prevnar 13), PCV15 (Vaxneuvance), PCV20 (Prevnar 20)  * Pneumococcal polysaccharide vaccine = PPSV23 (Pneumovax) Adults 2364 years old: 1-3 doses may be recommended based on certain risk factors  Adults 72 years old: 1-2 doses may be recommended based off what pneumonia vaccine you previously received   Hepatitis B Vaccine 3 dose series if at intermediate or high risk (ex: diabetes, end stage renal disease, liver disease)   Tetanus (Td) Vaccine - COST NOT COVERED BY MEDICARE PART B Following completion of primary series, a booster dose should be given every 10 years to maintain immunity against tetanus. Td may also be given as tetanus wound prophylaxis. Tdap Vaccine - COST NOT COVERED BY MEDICARE PART B Recommended at least once for all adults. For pregnant patients, recommended with each pregnancy. Shingles Vaccine (Shingrix) - COST NOT COVERED BY MEDICARE PART B  2 shot series recommended in those aged 48 and above     Health Maintenance Due:      Topic Date Due   • Colorectal Cancer Screening  05/02/2024 (Originally 11/20/2006)   • HIV Screening  Completed   • Hepatitis C Screening  Completed     Immunizations Due:      Topic Date Due   • COVID-19 Vaccine (1) Never done   • Hepatitis A Vaccine (1 of 2 - Risk 2-dose series) Never done   • Influenza Vaccine (1) 09/01/2023     Advance Directives   What are advance directives? Advance directives are legal documents that state your wishes and plans for medical care. These plans are made ahead of time in case you lose your ability to make decisions for yourself. Advance directives can apply to any medical decision, such as the treatments you want, and if you want to donate organs. What are the types of advance directives? There are many types of advance directives, and each state has rules about how to use them. You may choose a combination of any of the following:  · Living will: This is a written record of the treatment you want.  You can also choose which treatments you do not want, which to limit, and which to stop at a certain time. This includes surgery, medicine, IV fluid, and tube feedings. · Durable power of  for [facility]): This is a written record that states who you want to make healthcare choices for you when you are unable to make them for yourself. This person, called a proxy, is usually a family member or a friend. You may choose more than 1 proxy. · Do not resuscitate (DNR) order:  A DNR order is used in case your heart stops beating or you stop breathing. It is a request not to have certain forms of treatment, such as CPR. A DNR order may be included in other types of advance directives. · Medical directive: This covers the care that you want if you are in a coma, near death, or unable to make decisions for yourself. You can list the treatments you want for each condition. Treatment may include pain medicine, surgery, blood transfusions, dialysis, IV or tube feedings, and a ventilator (breathing machine). · Values history: This document has questions about your views, beliefs, and how you feel and think about life. This information can help others choose the care that you would choose. Why are advance directives important? An advance directive helps you control your care. Although spoken wishes may be used, it is better to have your wishes written down. Spoken wishes can be misunderstood, or not followed. Treatments may be given even if you do not want them. An advance directive may make it easier for your family to make difficult choices about your care. Cigarette Smoking and Your Health   Risks to your health if you smoke:  Nicotine and other chemicals found in tobacco damage every cell in your body. Even if you are a light smoker, you have an increased risk for cancer, heart disease, and lung disease. If you are pregnant or have diabetes, smoking increases your risk for complications.    Benefits to your health if you stop smoking:   · You decrease respiratory symptoms such as coughing, wheezing, and shortness of breath. · You reduce your risk for cancers of the lung, mouth, throat, kidney, bladder, pancreas, stomach, and cervix. If you already have cancer, you increase the benefits of chemotherapy. You also reduce your risk for cancer returning or a second cancer from developing. · You reduce your risk for heart disease, blood clots, heart attack, and stroke. · You reduce your risk for lung infections, and diseases such as pneumonia, asthma, chronic bronchitis, and emphysema. · Your circulation improves. More oxygen can be delivered to your body. If you have diabetes, you lower your risk for complications, such as kidney, artery, and eye diseases. You also lower your risk for nerve damage. Nerve damage can lead to amputations, poor vision, and blindness. · You improve your body's ability to heal and to fight infections. For more information and support to stop smoking:   · [employer]  Phone: 3- 095 - 521-8679  Web Address: www.[employer]  Weight Management   Why it is important to manage your weight:  Being overweight increases your risk of health conditions such as heart disease, high blood pressure, type 2 diabetes, and certain types of cancer. It can also increase your risk for osteoarthritis, sleep apnea, and other respiratory problems. Aim for a slow, steady weight loss. Even a small amount of weight loss can lower your risk of health problems. How to lose weight safely:  A safe and healthy way to lose weight is to eat fewer calories and get regular exercise. You can lose up about 1 pound a week by decreasing the number of calories you eat by 500 calories each day. Healthy meal plan for weight management:  A healthy meal plan includes a variety of foods, contains fewer calories, and helps you stay healthy. A healthy meal plan includes the following:  · Eat whole-grain foods more often. A healthy meal plan should contain fiber.  Fiber is the part of grains, fruits, and vegetables that is not broken down by your body. Whole-grain foods are healthy and provide extra fiber in your diet. Some examples of whole-grain foods are whole-wheat breads and pastas, oatmeal, brown rice, and bulgur. · Eat a variety of vegetables every day. Include dark, leafy greens such as spinach, kale, sammy greens, and mustard greens. Eat yellow and orange vegetables such as carrots, sweet potatoes, and winter squash. · Eat a variety of fruits every day. Choose fresh or canned fruit (canned in its own juice or light syrup) instead of juice. Fruit juice has very little or no fiber. · Eat low-fat dairy foods. Drink fat-free (skim) milk or 1% milk. Eat fat-free yogurt and low-fat cottage cheese. Try low-fat cheeses such as mozzarella and other reduced-fat cheeses. · Choose meat and other protein foods that are low in fat. Choose beans or other legumes such as split peas or lentils. Choose fish, skinless poultry (chicken or turkey), or lean cuts of red meat (beef or pork). Before you cook meat or poultry, cut off any visible fat. · Use less fat and oil. Try baking foods instead of frying them. Add less fat, such as margarine, sour cream, regular salad dressing and mayonnaise to foods. Eat fewer high-fat foods. Some examples of high-fat foods include french fries, doughnuts, ice cream, and cakes. · Eat fewer sweets. Limit foods and drinks that are high in sugar. This includes candy, cookies, regular soda, and sweetened drinks. Exercise:  Exercise at least 30 minutes per day on most days of the week. Some examples of exercise include walking, biking, dancing, and swimming. You can also fit in more physical activity by taking the stairs instead of the elevator or parking farther away from stores. Ask your healthcare provider about the best exercise plan for you.       © Copyright [address] 2018 Information is for End User's use only and may not be sold, redistributed or otherwise used for commercial purposes.  All illustrations and images included in CareNotes® are the copyrighted property of [employer] or [address] [place] [address]

## 2023-08-11 NOTE — ASSESSMENT & PLAN NOTE
Lab Results   Component Value Date    EGFR 12 10/24/2022    EGFR 10 10/20/2022    EGFR 6 06/13/2022    CREATININE 4.68 (H) 10/24/2022    CREATININE 5.46 (H) 10/20/2022    CREATININE 7.96 (H) 06/13/2022     Hemodialysis

## 2023-08-11 NOTE — ASSESSMENT & PLAN NOTE
Onychodystrophy noted on exam without evidence of wounds/ulcers  -We will refer to podiatry for further evaluation

## 2023-08-29 ENCOUNTER — PREP FOR PROCEDURE (OUTPATIENT)
Dept: INTERVENTIONAL RADIOLOGY/VASCULAR | Facility: CLINIC | Age: 63
End: 2023-08-29

## 2023-08-29 DIAGNOSIS — N18.6 ESRD (END STAGE RENAL DISEASE) (HCC): Primary | ICD-10-CM

## 2023-08-31 DIAGNOSIS — N25.81 SECONDARY HYPERPARATHYROIDISM OF RENAL ORIGIN (HCC): ICD-10-CM

## 2023-08-31 RX ORDER — SEVELAMER CARBONATE 800 MG/1
1600 TABLET, FILM COATED ORAL
Qty: 270 TABLET | Refills: 4
Start: 2023-08-31

## 2023-09-06 ENCOUNTER — TELEPHONE (OUTPATIENT)
Dept: INTERVENTIONAL RADIOLOGY/VASCULAR | Facility: HOSPITAL | Age: 63
End: 2023-09-06

## 2023-09-13 ENCOUNTER — HOSPITAL ENCOUNTER (OUTPATIENT)
Dept: INTERVENTIONAL RADIOLOGY/VASCULAR | Facility: HOSPITAL | Age: 63
Discharge: HOME | End: 2023-09-13
Attending: STUDENT IN AN ORGANIZED HEALTH CARE EDUCATION/TRAINING PROGRAM | Admitting: STUDENT IN AN ORGANIZED HEALTH CARE EDUCATION/TRAINING PROGRAM
Payer: COMMERCIAL

## 2023-09-13 VITALS
OXYGEN SATURATION: 95 % | TEMPERATURE: 97.1 F | RESPIRATION RATE: 18 BRPM | BODY MASS INDEX: 30.16 KG/M2 | WEIGHT: 199 LBS | HEART RATE: 72 BPM | SYSTOLIC BLOOD PRESSURE: 130 MMHG | HEIGHT: 68 IN | DIASTOLIC BLOOD PRESSURE: 66 MMHG

## 2023-09-13 DIAGNOSIS — N18.6 ESRD (END STAGE RENAL DISEASE) (HCC): ICD-10-CM

## 2023-09-13 PROCEDURE — C1894 INTRO/SHEATH, NON-LASER: HCPCS

## 2023-09-13 PROCEDURE — C1725 CATH, TRANSLUMIN NON-LASER: HCPCS

## 2023-09-13 PROCEDURE — 76937 US GUIDE VASCULAR ACCESS: CPT | Performed by: STUDENT IN AN ORGANIZED HEALTH CARE EDUCATION/TRAINING PROGRAM

## 2023-09-13 PROCEDURE — C1887 CATHETER, GUIDING: HCPCS

## 2023-09-13 PROCEDURE — 36902 INTRO CATH DIALYSIS CIRCUIT: CPT

## 2023-09-13 PROCEDURE — 99153 MOD SED SAME PHYS/QHP EA: CPT

## 2023-09-13 PROCEDURE — 36902 INTRO CATH DIALYSIS CIRCUIT: CPT | Performed by: STUDENT IN AN ORGANIZED HEALTH CARE EDUCATION/TRAINING PROGRAM

## 2023-09-13 PROCEDURE — C1769 GUIDE WIRE: HCPCS

## 2023-09-13 PROCEDURE — 99152 MOD SED SAME PHYS/QHP 5/>YRS: CPT

## 2023-09-13 PROCEDURE — 99152 MOD SED SAME PHYS/QHP 5/>YRS: CPT | Performed by: STUDENT IN AN ORGANIZED HEALTH CARE EDUCATION/TRAINING PROGRAM

## 2023-09-13 RX ORDER — FENTANYL CITRATE 50 UG/ML
INJECTION, SOLUTION INTRAMUSCULAR; INTRAVENOUS AS NEEDED
Status: COMPLETED | OUTPATIENT
Start: 2023-09-13 | End: 2023-09-13

## 2023-09-13 RX ORDER — LIDOCAINE WITH 8.4% SOD BICARB 0.9%(10ML)
SYRINGE (ML) INJECTION AS NEEDED
Status: COMPLETED | OUTPATIENT
Start: 2023-09-13 | End: 2023-09-13

## 2023-09-13 RX ORDER — MIDAZOLAM HYDROCHLORIDE 2 MG/2ML
INJECTION, SOLUTION INTRAMUSCULAR; INTRAVENOUS AS NEEDED
Status: COMPLETED | OUTPATIENT
Start: 2023-09-13 | End: 2023-09-13

## 2023-09-13 RX ADMIN — MIDAZOLAM 1 MG: 1 INJECTION INTRAMUSCULAR; INTRAVENOUS at 13:15

## 2023-09-13 RX ADMIN — FENTANYL CITRATE 50 MCG: 50 INJECTION, SOLUTION INTRAMUSCULAR; INTRAVENOUS at 13:15

## 2023-09-13 RX ADMIN — Medication 3 ML: at 13:12

## 2023-09-13 RX ADMIN — IOHEXOL 36 ML: 350 INJECTION, SOLUTION INTRAVENOUS at 13:39

## 2023-09-13 NOTE — BRIEF OP NOTE (RAD/CATH)
INTERVENTIONAL RADIOLOGY PROCEDURE NOTE    Date: 9/13/2023    Procedure:   Procedure Summary     Date: 09/13/23 Room / Location: [facility] Interventional Radiology    Anesthesia Start:  Anesthesia Stop:     Procedure: IR AV FISTULAGRAM/GRAFTOGRAM Diagnosis:       ESRD (end stage renal disease) ([address])      (HAP/HVP)    Scheduled Providers:  Responsible Provider:     Anesthesia Type: Not recorded ASA Status: Not recorded          Preoperative diagnosis:   1. ESRD (end stage renal disease) ([address])         Postoperative diagnosis: Same. Surgeon: Nhi Khanna MD     Assistant: None. No qualified resident was available. Blood loss: 5 ml    Specimens: none. Findings:   fistulagram showed recurrent cephalic arch stenosis, treated with 8 mm HPB POBA. Complications: None immediate.     Anesthesia: conscious sedation

## 2023-09-13 NOTE — DISCHARGE INSTRUCTIONS
Fistulagram   WHAT YOU NEED TO KNOW:   Your arm or leg my  be sore, swollen, and bruised after the procedure. This is normal and should get better in a few days. DISCHARGE INSTRUCTIONS:     Contact Interventional Radiology at [phone] and follow prompts if you experience any:    You have a fever or chills. Your puncture site is red, swollen, or draining pus. You have nausea or are vomiting. Your skin is itchy, swollen, or you have a rash. You cannot feel a thrill over your graft or fistula. You have questions or concerns about your condition or care. Seek care immediately if:     You have bleeding that does not stop after 10 minutes of holding firm, direct pressure over the puncture site. Blood soaks through your bandage. Your hand or foot closest to the graft or fistula feels cold, painful, or numb. Your hand or foot closest to the graft or fistula is pale or blue. You have trouble moving your arm or leg closest to the graft or fistula. Your bruise suddenly gets bigger. Care for your wound as directed:  Remove the bandage in 4 to 6 hours or as         directed. Wash the area once a day with soap and water. Gently pat the area dry. Apply firm, steady pressure to the puncture site if it bleeds. Use a clean gauze or towel to hold pressure for 10 to 15 minutes. Call 911 if you cannot stop the bleeding or the bleeding gets heavier. Feel for a thrill once a day or as directed. Place your index and second finger over your fistula or graft as directed. You should feel a vibration. The vibration means that blood is flowing through your graft or fistula correctly. Rest your arm or leg as directed. Do not lift anything heavier than 5 pounds or do strenuous activity for 24 hours. Prevent damage to your graft or fistula. Do not wear tight-fitting clothing over your graft or fistula. Do not wear tight jewelry on the arm or leg with the graft or fistula.  Tell healthcare providers not to do, IVs, blood draws, and blood pressure readings in the arm with your graft or fistula. Do not allow flu shots or vaccinations in your arm with your graft or fistula. Follow up with your healthcare provider as directed Procedural Sedation   WHAT YOU NEED TO KNOW:   Procedural sedation is medicine used during procedures to help you feel relaxed and calm. You will remember little to none of the procedure. After sedation you may feel tired, weak, or unsteady on your feet. You may also have trouble concentrating or short-term memory loss. These symptoms should go away in 24 hours or less. DISCHARGE INSTRUCTIONS:     Call 911 or have someone else call for any of the following: You have sudden trouble breathing. You cannot be woken. Contact Interventional Radiology at [phone]   Elio PATIENTS: Contact Interventional Radiology at [phone]) Jada Baxter PATIENTS: Contact Interventional Radiology at [phone]) if any of the following occur: You have a severe headache or dizziness. Your heart is beating faster than usual.    You have a fever or chills. Your skin is itchy, swollen, or you have a rash. You have nausea or are vomiting for more than 8 hours after the procedure. You have questions or concerns about your condition or care. Self-care:   Have someone stay with you for 24 hours. This person can drive you to errands and help you do things around the house. This person can also watch for problems. Rest and do quiet activities for 24 hours. Do not exercise, ride a bike, or play sports. Stand up slowly to prevent dizziness and falls. Take short walks around the house with another person. Slowly return to your usual activities the next day. Do not drive or use dangerous machines or tools for 24 hours. You may injure yourself or others. Examples include a lawnmower, saw, or drill.  Do not return to work for 24 hours if you use dangerous machines or tools for work. Do not make important decisions for 24 hours. For example, do not sign important papers or invest money. Drink liquids as directed. Liquids help flush the sedation medicine out of your body. Ask how much liquid to drink each day and which liquids are best for you. Eat small, frequent meals to prevent nausea and vomiting. Start with clear liquids such as juice or broth. If you do not vomit after clear liquids, you can eat your usual foods. Do not drink alcohol or take medicines that make you drowsy. This includes medicines that help you sleep and anxiety medicines. Ask your healthcare provider if it is safe for you to take pain medicine. Follow up with your healthcare provider as directed: Write down your questions so you remember to ask them during your visits. Procedural Sedation   WHAT YOU NEED TO KNOW:   Procedural sedation is medicine used during procedures to help you feel relaxed and calm. You will remember little to none of the procedure. After sedation you may feel tired, weak, or unsteady on your feet. You may also have trouble concentrating or short-term memory loss. These symptoms should go away in 24 hours or less. DISCHARGE INSTRUCTIONS:     Call 911 or have someone else call for any of the following: You have sudden trouble breathing. You cannot be woken. Contact Interventional Radiology at [phone]   [facility] PATIENTS: Contact Interventional Radiology at [phone]) Sana Stone PATIENTS: Contact Interventional Radiology at [phone]) if any of the following occur: You have a severe headache or dizziness. Your heart is beating faster than usual.    You have a fever or chills. Your skin is itchy, swollen, or you have a rash. You have nausea or are vomiting for more than 8 hours after the procedure. You have questions or concerns about your condition or care. Self-care:   Have someone stay with you for 24 hours.  This person can drive you to errands and help you do things around the house. This person can also watch for problems. Rest and do quiet activities for 24 hours. Do not exercise, ride a bike, or play sports. Stand up slowly to prevent dizziness and falls. Take short walks around the house with another person. Slowly return to your usual activities the next day. Do not drive or use dangerous machines or tools for 24 hours. You may injure yourself or others. Examples include a lawnmower, saw, or drill. Do not return to work for 24 hours if you use dangerous machines or tools for work. Do not make important decisions for 24 hours. For example, do not sign important papers or invest money. Drink liquids as directed. Liquids help flush the sedation medicine out of your body. Ask how much liquid to drink each day and which liquids are best for you. Eat small, frequent meals to prevent nausea and vomiting. Start with clear liquids such as juice or broth. If you do not vomit after clear liquids, you can eat your usual foods. Do not drink alcohol or take medicines that make you drowsy. This includes medicines that help you sleep and anxiety medicines. Ask your healthcare provider if it is safe for you to take pain medicine. Follow up with your healthcare provider as directed: Write down your questions so you remember to ask them during your visits.

## 2023-09-13 NOTE — H&P
Interventional Radiology Preprocedure Note    History/Indication for procedure:   Eileen Xavier is a 65 y.o. male with a PMH of ESRD on HD dialyzed through a [facility] who presents for fistulagraphy for the indication of HAP and HVP. The last intervention was 2 months ago. Relevant past medical history:    Past Medical History:   Diagnosis Date   • CHF (congestive heart failure) ([address])    • Chronic kidney disease    • ESRD (end stage renal disease) on dialysis ([address])     dialysis tues-thurs-sat   • Hyperkalemia 02/01/2022   • Hyperlipidemia    • Hypertension    • Personal history of COVID-19 07/2021    mild symptoms - recovered at home   • Renal disorder    • Withdrawal seizures ([address])     approximately 2018 when reducing alcohol     Patient Active Problem List   Diagnosis   • Renovascular hypertension   • CHF (congestive heart failure) (HCC)   • ETOH abuse   • ESRD (end stage renal disease) on dialysis ([address])   • Marijuana use   • Hyperlipidemia   • Tobacco abuse   • Seizure due to alcohol withdrawal, uncomplicated (HCC)   • Pharyngeal edema   • Hypomagnesemia   • Anemia due to chronic kidney disease, on chronic dialysis (HCC)   • Chronic hyponatremia   • MGUS (monoclonal gammopathy of unknown significance)   • Secondary hyperparathyroidism of renal origin ([address])   • Smoking greater than 20 pack years   • AV fistula occlusion (HCC)   • Pre-op examination   • S/P arteriovenous (AV) fistula creation   • Arteriovenous fistula, acquired (HCC)   • Great toe pain, left       /72   Pulse 72   Temp 98 °F (36.7 °C) (Temporal)   Resp 16   Ht 5' 8" (1.727 m)   Wt 90.3 kg (199 lb)   SpO2 100%   BMI 30.26 kg/m²     Medications:    Inpatient Medications:     Scheduled Medications:      Infusions:  No current facility-administered medications for this encounter.       PRN:      Outpatient Medications:  Current Outpatient Medications on File Prior to Encounter   Medication Sig Dispense Refill   • aspirin (ECOTRIN LOW STRENGTH) 81 mg EC tablet Take 1 tablet (81 mg total) by mouth daily 90 tablet 1   • B Complex-C-Folic Acid (Karlee-Melissa) TABS Take 1 tablet by mouth daily 90 tablet 4   • calcitriol (ROCALTROL) 0.25 mcg capsule Take 2 capsules (0.5 mcg total) by mouth 3 (three) times a week 45 capsule 4   • calcium acetate (PHOSLO) capsule TAKE 3 CAPSULES(2001 MG) BY MOUTH THREE TIMES DAILY WITH MEALS 810 capsule 3   • carvedilol (COREG) 25 mg tablet Take 1 tablet (25 mg total) by mouth 2 (two) times a day with meals 90 tablet 1   • cholecalciferol (VITAMIN D3) 1,000 units tablet Take 4 tablets (4,000 Units total) by mouth daily 120 tablet 0   • furosemide (LASIX) 80 mg tablet Take 2 tablets (160 mg total) by mouth daily 180 tablet 3   • pravastatin (PRAVACHOL) 80 mg tablet TAKE 1 TABLET(80 MG) BY MOUTH DAILY WITH DINNER 90 tablet 1   • sevelamer carbonate (RENVELA) 800 mg tablet Take 2 tablets (1,600 mg total) by mouth 3 (three) times a day with meals 270 tablet 4   • apixaban (Eliquis) 2.5 mg Take 1 tablet (2.5 mg total) by mouth 2 (two) times a day 180 tablet 1   • cinacalcet (SENSIPAR) 30 mg tablet Take 1 tablet (30 mg total) by mouth once a week 12 tablet 4   • EPINEPHrine (EPIPEN) 0.3 mg/0.3 mL SOAJ Inject 0.3 mL (0.3 mg total) into a muscle once for 1 dose 0.6 mL 0   • [DISCONTINUED] amLODIPine (NORVASC) 5 mg tablet Take 1 tablet (5 mg total) by mouth daily 90 tablet 1     No current facility-administered medications on file prior to encounter. Allergies   Allergen Reactions   • Ibuprofen Other (See Comments)     ESRD - cannot have ibuprofen       Anticoagulants: ASA    ASA classification: ASA 3 - Patient with moderate systemic disease with functional limitations    Airway Assessment: II (hard and soft palate, upper portion of tonsils anduvula visible)    Relevant family history: None    Relevant review of systems: None    Prior sedation/anesthesia: yes    Can the patient lie flat?  Yes     NPO Status: yes    Labs:   CBC with diff:   Lab Results   Component Value Date    WBC 8.81 10/24/2022    HGB 10.4 (L) 10/24/2022    HCT 30.8 (L) 10/24/2022     (H) 10/24/2022     10/24/2022    RBC 3.09 (L) 10/24/2022    MCH 33.7 10/24/2022    MCHC 33.8 10/24/2022    RDW 15.4 (H) 10/24/2022    MPV 8.3 (L) 10/24/2022    NRBC 0 10/24/2022     BMP/CMP:  Lab Results   Component Value Date    K 3.7 10/24/2022    CL 98 10/24/2022    CO2 26 10/24/2022    BUN 23 10/24/2022    CREATININE 4.68 (H) 10/24/2022    CALCIUM 9.3 10/24/2022    AST 19 10/20/2022    ALT 17 10/20/2022    ALKPHOS 65 10/20/2022    EGFR 12 10/24/2022   ,     Coags:   Lab Results   Component Value Date    PTT 26 02/18/2018    INR 0.95 10/24/2022   ,          Relevant imaging studies:   Reviewed. Directed physical examination:  CONSTITUTIONAL: The patient appeared well in no acute distress. NEUROLOGICAL: alert, awake, answering questions appropriately. PSYCHIATRIC: Affect normal.  PULMONARY: No respiratory distress. CARDIAC: normal sinus rhythm on monitor, without tachycardia. GASTROINTESTINAL: abdomen was soft, round, nontender. EXTREMITIES: No cyanosis. [facility] has harshly pulsatile thrill. Assessment/Plan: For diagnostic fistulagram and possible treatment. Sedation/Anesthesia plan: Moderate sedation will be used as needed for procedure. Consent with alternatives to the procedure, risks and benefits have been explained and discussed with the patient/patient's family: yes.     During the informed consent process, the following was discussed, and the patient was educated concerning paclitaxel coated balloons and stents, which may be appropriate for the patient's up-coming fistulagram procedure: Analysis of randomized trials suggest a possible increased death rate after two years in patients treated with paclitaxel-coated balloons and paclitaxel-eluting stents compared to patients treated with control devices (non-coated balloons or bare metal stents) specifically in patients with lower extremity claudication. This has not been corroborated for dialysis access circuits. The specific cause for this observation is yet to be determined. Currently, the FDA believes that the benefits continue to outweigh the risks for approved paclitaxel-coated balloons and paclitaxel-eluting stents when used in accordance with their indications for use. The patient gave informed consent for the use of these devices if appropriately indicated for treatment.

## 2023-09-28 ENCOUNTER — APPOINTMENT (EMERGENCY)
Dept: RADIOLOGY | Facility: HOSPITAL | Age: 63
End: 2023-09-28
Payer: COMMERCIAL

## 2023-09-28 ENCOUNTER — HOSPITAL ENCOUNTER (EMERGENCY)
Facility: HOSPITAL | Age: 63
Discharge: HOME | End: 2023-09-28
Attending: EMERGENCY MEDICINE
Payer: COMMERCIAL

## 2023-09-28 VITALS
HEART RATE: 76 BPM | RESPIRATION RATE: 18 BRPM | TEMPERATURE: 97.5 F | OXYGEN SATURATION: 100 % | SYSTOLIC BLOOD PRESSURE: 123 MMHG | DIASTOLIC BLOOD PRESSURE: 80 MMHG

## 2023-09-28 DIAGNOSIS — R55 NEAR SYNCOPE: Primary | ICD-10-CM

## 2023-09-28 LAB
ANION GAP SERPL CALCULATED.3IONS-SCNC: 12 MMOL/L
ATRIAL RATE: 74 BPM
BASOPHILS # BLD AUTO: 0.09 THOUSANDS/ÂΜL (ref 0–0.1)
BASOPHILS NFR BLD AUTO: 2 % (ref 0–1)
BUN SERPL-MCNC: 22 MG/DL (ref 5–25)
CALCIUM SERPL-MCNC: 9.4 MG/DL (ref 8.4–10.2)
CARDIAC TROPONIN I PNL SERPL HS: 8 NG/L
CHLORIDE SERPL-SCNC: 91 MMOL/L (ref 96–108)
CO2 SERPL-SCNC: 29 MMOL/L (ref 21–32)
CREAT SERPL-MCNC: 5.26 MG/DL (ref 0.6–1.3)
EOSINOPHIL # BLD AUTO: 0.32 THOUSAND/ÂΜL (ref 0–0.61)
EOSINOPHIL NFR BLD AUTO: 6 % (ref 0–6)
ERYTHROCYTE [DISTWIDTH] IN BLOOD BY AUTOMATED COUNT: 14.3 % (ref 11.6–15.1)
GFR SERPL CREATININE-BSD FRML MDRD: 10 ML/MIN/1.73SQ M
GLUCOSE SERPL-MCNC: 122 MG/DL (ref 65–140)
HCT VFR BLD AUTO: 36.2 % (ref 36.5–49.3)
HGB BLD-MCNC: 12.5 G/DL (ref 12–17)
IMM GRANULOCYTES # BLD AUTO: 0.03 THOUSAND/UL (ref 0–0.2)
IMM GRANULOCYTES NFR BLD AUTO: 1 % (ref 0–2)
LYMPHOCYTES # BLD AUTO: 1.57 THOUSANDS/ÂΜL (ref 0.6–4.47)
LYMPHOCYTES NFR BLD AUTO: 28 % (ref 14–44)
MCH RBC QN AUTO: 34.2 PG (ref 26.8–34.3)
MCHC RBC AUTO-ENTMCNC: 34.5 G/DL (ref 31.4–37.4)
MCV RBC AUTO: 99 FL (ref 82–98)
MONOCYTES # BLD AUTO: 0.54 THOUSAND/ÂΜL (ref 0.17–1.22)
MONOCYTES NFR BLD AUTO: 10 % (ref 4–12)
NEUTROPHILS # BLD AUTO: 3.16 THOUSANDS/ÂΜL (ref 1.85–7.62)
NEUTS SEG NFR BLD AUTO: 53 % (ref 43–75)
NRBC BLD AUTO-RTO: 0 /100 WBCS
P AXIS: 51 DEGREES
PLATELET # BLD AUTO: 290 THOUSANDS/UL (ref 149–390)
PMV BLD AUTO: 8.8 FL (ref 8.9–12.7)
POTASSIUM SERPL-SCNC: 3.7 MMOL/L (ref 3.5–5.3)
PR INTERVAL: 188 MS
QRS AXIS: 37 DEGREES
QRSD INTERVAL: 80 MS
QT INTERVAL: 410 MS
QTC INTERVAL: 455 MS
RBC # BLD AUTO: 3.65 MILLION/UL (ref 3.88–5.62)
SODIUM SERPL-SCNC: 132 MMOL/L (ref 135–147)
T WAVE AXIS: 49 DEGREES
VENTRICULAR RATE: 74 BPM
WBC # BLD AUTO: 5.71 THOUSAND/UL (ref 4.31–10.16)

## 2023-09-28 PROCEDURE — 85025 COMPLETE CBC W/AUTO DIFF WBC: CPT | Performed by: EMERGENCY MEDICINE

## 2023-09-28 PROCEDURE — 71046 X-RAY EXAM CHEST 2 VIEWS: CPT

## 2023-09-28 PROCEDURE — 99284 EMERGENCY DEPT VISIT MOD MDM: CPT

## 2023-09-28 PROCEDURE — 84484 ASSAY OF TROPONIN QUANT: CPT | Performed by: EMERGENCY MEDICINE

## 2023-09-28 PROCEDURE — 80048 BASIC METABOLIC PNL TOTAL CA: CPT | Performed by: EMERGENCY MEDICINE

## 2023-09-28 PROCEDURE — 93005 ELECTROCARDIOGRAM TRACING: CPT

## 2023-09-28 PROCEDURE — 36415 COLL VENOUS BLD VENIPUNCTURE: CPT | Performed by: EMERGENCY MEDICINE

## 2023-09-28 PROCEDURE — 99285 EMERGENCY DEPT VISIT HI MDM: CPT | Performed by: EMERGENCY MEDICINE

## 2023-09-28 PROCEDURE — 93010 ELECTROCARDIOGRAM REPORT: CPT

## 2023-09-28 NOTE — ED PROVIDER NOTES
History  Chief Complaint   Patient presents with   • Syncope     Patient was at dialysis when he had a syncopal episode. Patient states he frequency gets episodes where he gets sweaty and feels as if he needs to lay down. Patient states this happened to him today at dialysis. The dialsyis staff found him to be unresponsive and began chest compressions. Patient states he remembers the entire event and heard him yelling his name. 66 yo M presenting for evaluation of syncopal event. Pt was at dialysis, states he only had about 30 minutes left and he had a near syncope/syncopal event. States he felt it coming on and became diaphoretic. Reports similar episodes in the past- 1 other time at dialysis, other times at home, state all started around June when he started HD. States his PCP thought it was 2/2 hypotension and decreased/changed BP meds/lasix. He states he heard staff and was awake for them yelling his name and "punching his chest". Has not eaten anything yet today, but states that is typical for him  He denies HA, CP, SOB, cough, N/V/D/C, calf pain/swelling, peripheral edema                         Prior to Admission Medications   Prescriptions Last Dose Informant Patient Reported? Taking?    B Complex-C-Folic Acid (Karlee-Melissa) TABS   No No   Sig: Take 1 tablet by mouth daily   EPINEPHrine (EPIPEN) 0.3 mg/0.3 mL SOAJ   No No   Sig: Inject 0.3 mL (0.3 mg total) into a muscle once for 1 dose   apixaban (Eliquis) 2.5 mg   No No   Sig: Take 1 tablet (2.5 mg total) by mouth 2 (two) times a day   aspirin (ECOTRIN LOW STRENGTH) 81 mg EC tablet  Self No No   Sig: Take 1 tablet (81 mg total) by mouth daily   calcitriol (ROCALTROL) 0.25 mcg capsule   No No   Sig: Take 2 capsules (0.5 mcg total) by mouth 3 (three) times a week   calcium acetate (PHOSLO) capsule   No No   Sig: TAKE 3 CAPSULES(2001 MG) BY MOUTH THREE TIMES DAILY WITH MEALS   carvedilol (COREG) 25 mg tablet   No No   Sig: Take 1 tablet (25 mg total) by mouth 2 (two) times a day with meals   cholecalciferol (VITAMIN D3) 1,000 units tablet   No No   Sig: Take 4 tablets (4,000 Units total) by mouth daily   cinacalcet (SENSIPAR) 30 mg tablet   No No   Sig: Take 1 tablet (30 mg total) by mouth once a week   furosemide (LASIX) 80 mg tablet   No No   Sig: Take 2 tablets (160 mg total) by mouth daily   pravastatin (PRAVACHOL) 80 mg tablet   No No   Sig: TAKE 1 TABLET(80 MG) BY MOUTH DAILY WITH DINNER   sevelamer carbonate (RENVELA) 800 mg tablet   No No   Sig: Take 2 tablets (1,600 mg total) by mouth 3 (three) times a day with meals      Facility-Administered Medications: None       Past Medical History:   Diagnosis Date   • CHF (congestive heart failure) ([facility])    • Chronic kidney disease    • ESRD (end stage renal disease) on dialysis ([address])     [facility]   • Hyperkalemia 02/01/2022   • Hyperlipidemia    • Hypertension    • Personal history of COVID-19 07/2021    mild symptoms - recovered at home   • Renal disorder    • Withdrawal seizures ([address])     approximately 2018 when reducing alcohol       Past Surgical History:   Procedure Laterality Date   • HERNIA REPAIR      states umbilical   • IR AV FISTULAGRAM/GRAFTOGRAM  08/16/2022   • IR AV FISTULAGRAM/GRAFTOGRAM  11/11/2022   • IR AV FISTULAGRAM/GRAFTOGRAM  3/15/2023   • IR AV FISTULAGRAM/GRAFTOGRAM  4/14/2023   • IR AV FISTULAGRAM/GRAFTOGRAM  7/28/2023   • IR AV FISTULAGRAM/GRAFTOGRAM  9/13/2023   • IR BIOPSY BONE MARROW  07/13/2022   • IR PULMONARY ARTERY EMBOLIZATION  08/03/2022   • IR TUNNELED CENTRAL LINE REMOVAL  4/14/2023   • IR TUNNELED DIALYSIS CATHETER PLACEMENT  06/10/2022   • IR TUNNELED DIALYSIS CATHETER PLACEMENT  10/20/2022   • IR TUNNELED DIALYSIS CATHETER PLACEMENT     • OK ARTERIOVENOUS ANASTOMOSIS OPEN DIRECT Left 10/24/2022    Procedure: Creation of  L brachiobasilic fistula, ligation of L endoAVF;   Surgeon: Birgit Otero MD;  Location: [facility];  Service: Vascular   • OK ARTERIOVENOUS ANASTOMOSIS OPEN DIRECT Right 1/6/2023    Procedure: Creation right brachiocephalic fistula; Surgeon: Carolyn Otero MD;  Location: [facility];  Service: Vascular       History reviewed. No pertinent family history. I have reviewed and agree with the history as documented. E-Cigarette/Vaping   • E-Cigarette Use Never User      E-Cigarette/Vaping Substances   • Nicotine No    • THC No    • CBD No    • Flavoring No    • Other No    • Unknown No      Social History     Tobacco Use   • Smoking status: Every Day     Packs/day: 0.25     Years: 30.00     Total pack years: 7.50     Types: Cigarettes   • Smokeless tobacco: Never   • Tobacco comments:     Smokes 5 a day   Vaping Use   • Vaping Use: Never used   Substance Use Topics   • Alcohol use: Yes     Alcohol/week: 4.0 standard drinks of alcohol     Types: 4 Cans of beer per week     Comment: has cut down to 4 per week   • Drug use: Yes     Frequency: 3.0 times per week     Types: Marijuana     Comment: occasionally, last used monday       Review of Systems    Physical Exam  Physical Exam  Vitals and nursing note reviewed. Constitutional:       General: He is not in acute distress. Appearance: He is well-developed. HENT:      Head: Normocephalic and atraumatic. Nose: Nose normal.   Eyes:      Conjunctiva/sclera: Conjunctivae normal.   Cardiovascular:      Rate and Rhythm: Normal rate and regular rhythm. Heart sounds: Normal heart sounds. Comments: Chest wall: no skin changes, no crepitus, no ttp  Pulmonary:      Effort: Pulmonary effort is normal. No respiratory distress. Breath sounds: Normal breath sounds. No stridor. No wheezing or rales. Chest:      Chest wall: No tenderness. Abdominal:      General: There is no distension. Palpations: Abdomen is soft. Tenderness: There is no abdominal tenderness. There is no guarding or rebound. Musculoskeletal:         General: No deformity.       Cervical back: Normal range of motion and neck supple. Comments: No calf swelling/ttp, no peripheral edema   Skin:     General: Skin is warm and dry. Findings: No rash. Neurological:      General: No focal deficit present. Mental Status: He is alert and oriented to person, place, and time. Motor: No abnormal muscle tone. Coordination: Coordination normal.   Psychiatric:         Thought Content:  Thought content normal.         Judgment: Judgment normal.         Vital Signs  ED Triage Vitals   Temperature Pulse Respirations Blood Pressure SpO2   09/28/23 1231 09/28/23 1224 09/28/23 1224 09/28/23 1224 09/28/23 1224   97.5 °F (36.4 °C) 76 18 123/80 100 %      Temp Source Heart Rate Source Patient Position - Orthostatic VS BP Location FiO2 (%)   09/28/23 1231 09/28/23 1224 09/28/23 1224 09/28/23 1224 --   Oral Monitor Lying Left arm       Pain Score       09/28/23 1224       No Pain           Vitals:    09/28/23 1224   BP: 123/80   Pulse: 76   Patient Position - Orthostatic VS: Lying         Visual Acuity  Visual Acuity    Flowsheet Row Most Recent Value   L Pupil Size (mm) 3   R Pupil Size (mm) 3          ED Medications  Medications - No data to display    Diagnostic Studies  Results Reviewed     Procedure Component Value Units Date/Time    HS Troponin 0hr (reflex protocol) [[id number]]  (Normal) Collected: 09/28/23 1229    Lab Status: Final result Specimen: Blood from Arm, Left Updated: 09/28/23 1314     hs TnI 0hr 8 ng/L     Basic metabolic panel [[id number]]  (Abnormal) Collected: 09/28/23 1229    Lab Status: Final result Specimen: Blood from Arm, Left Updated: 09/28/23 1307     Sodium 132 mmol/L      Potassium 3.7 mmol/L      Chloride 91 mmol/L      CO2 29 mmol/L      ANION GAP 12 mmol/L      BUN 22 mg/dL      Creatinine 5.26 mg/dL      Glucose 122 mg/dL      Calcium 9.4 mg/dL      eGFR 10 ml/min/1.73sq m     Narrative:      [facility] (CKD):   •  Stage 1 with normal or high GFR (GFR > 90 mL/min/1.73 square meters)  •  Stage 2 Mild CKD (GFR = 60-89 mL/min/1.73 square meters)  •  Stage 3A Moderate CKD (GFR = 45-59 mL/min/1.73 square meters)  •  Stage 3B Moderate CKD (GFR = 30-44 mL/min/1.73 square meters)  •  Stage 4 Severe CKD (GFR = 15-29 mL/min/1.73 square meters)  •  Stage 5 End Stage CKD (GFR <15 mL/min/1.73 square meters)  Note: GFR calculation is accurate only with a steady state creatinine    CBC and differential [[id number]]  (Abnormal) Collected: 09/28/23 1229    Lab Status: Final result Specimen: Blood from Arm, Left Updated: 09/28/23 1254     WBC 5.71 Thousand/uL      RBC 3.65 Million/uL      Hemoglobin 12.5 g/dL      Hematocrit 36.2 %      MCV 99 fL      MCH 34.2 pg      MCHC 34.5 g/dL      RDW 14.3 %      MPV 8.8 fL      Platelets 712 Thousands/uL      nRBC 0 /100 WBCs      Neutrophils Relative 53 %      Immat GRANS % 1 %      Lymphocytes Relative 28 %      Monocytes Relative 10 %      Eosinophils Relative 6 %      Basophils Relative 2 %      Neutrophils Absolute 3.16 Thousands/µL      Immature Grans Absolute 0.03 Thousand/uL      Lymphocytes Absolute 1.57 Thousands/µL      Monocytes Absolute 0.54 Thousand/µL      Eosinophils Absolute 0.32 Thousand/µL      Basophils Absolute 0.09 Thousands/µL                  XR chest 2 views   Final Result by Enoch Jenkins MD (09/28 1408)      No acute cardiopulmonary disease. Workstation performed: [id number]                    Procedures  Procedures         ED Course  ED Course as of 09/28/23 1646   Thu Sep 28, 2023   1241 EKG independently interpreted by myself:  NSR @ 74 bpm, normal axis, normal intervals qtc 455, no sig st/t wave changes   1309 Sodium(!): 132  Chronic, similar to priors   1309 Potassium: 3.7   1325 hs TnI 0hr: 8   1342 Pt requesting to leave. Labs unrevealing, discussed continual obs and delta trop, he would prefer to go home and understands risks.  Reviewed return precautions, pcp/cards f/u                               SBIRT 22yo+    Flowsheet Row Most Recent Value   Initial Alcohol Screen: US AUDIT-C     1. How often do you have a drink containing alcohol? 0 Filed at: 09/28/2023 1226   2. How many drinks containing alcohol do you have on a typical day you are drinking? 0 Filed at: 09/28/2023 1226   3a. Male UNDER 65: How often do you have five or more drinks on one occasion? 0 Filed at: 09/28/2023 1226   3b. FEMALE Any Age, or MALE 65+: How often do you have 4 or more drinks on one occassion? 0 Filed at: 09/28/2023 1226   Audit-C Score 0 Filed at: 09/28/2023 1226   EDDY: How many times in the past year have you. .. Used an illegal drug or used a prescription medication for non-medical reasons? Never Filed at: 09/28/2023 1226                    Medical Decision Making  64 yo M presenting for evaluation of near syncope/syncopal event, he has no complaints and would like to leave, but is agreeable to initial workup- EKG to r/o STEMI/dysrhythmia/abn intervals/ischemic changes, troponin to eval for ischemia, CBC to assess for leukocytosis/anemia, CMP to assess for elevated LFTs/ALEKS/electrolyte derangements, CXR to r/o PTX/PNA/effusion/CHF    ED workup unrevealing  Given recurrent episodes of syncope, referral placed to f/u with cardiology    Close return precautions discussed and pt expressed comfort and understanding with plan    Near syncope: acute illness or injury  Amount and/or Complexity of Data Reviewed  External Data Reviewed: labs, radiology, ECG and notes. Labs: ordered. Decision-making details documented in ED Course. Radiology: ordered and independent interpretation performed. Decision-making details documented in ED Course. ECG/medicine tests: ordered and independent interpretation performed. Decision-making details documented in ED Course.           Disposition  Final diagnoses:   Near syncope     Time reflects when diagnosis was documented in both MDM as applicable and the Disposition within this note     Time User Action Codes Description Comment    9/28/2023  1:45 PM Blair Curran Add [R55] Near syncope       ED Disposition     ED Disposition   Discharge    Condition   Stable    Date/Time   Thu Sep 28, 2023  1:45 PM    Comment   Alannah Chen discharge to home/self care.                Follow-up Information     Follow up With Specialties Details Why [address] MD Internal Medicine   [address] [facility] [address]  [facility] [id number] [phone]       [facility]iology   [address] [place] [id number]  [facility], [address] [address] [place] [address] [address], [place], [place], [address] [phone]          Discharge Medication List as of 9/28/2023  1:47 PM      CONTINUE these medications which have NOT CHANGED    Details   apixaban (Eliquis) 2.5 mg Take 1 tablet (2.5 mg total) by mouth 2 (two) times a day, Starting Fri 8/11/2023, Until Thu 11/9/2023, Normal      aspirin (ECOTRIN LOW STRENGTH) 81 mg EC tablet Take 1 tablet (81 mg total) by mouth daily, Starting Fri 6/24/2022, Normal      B Complex-C-Folic Acid (Karlee-Melissa) TABS Take 1 tablet by mouth daily, Starting Tue 5/9/2023, Normal      calcitriol (ROCALTROL) 0.25 mcg capsule Take 2 capsules (0.5 mcg total) by mouth 3 (three) times a week, Starting Fri 1/27/2023, No Print      calcium acetate (PHOSLO) capsule TAKE 3 CAPSULES(2001 MG) BY MOUTH THREE TIMES DAILY WITH MEALS, Normal      carvedilol (COREG) 25 mg tablet Take 1 tablet (25 mg total) by mouth 2 (two) times a day with meals, Starting Fri 6/24/2022, No Print      cholecalciferol (VITAMIN D3) 1,000 units tablet Take 4 tablets (4,000 Units total) by mouth daily, Starting Wed 6/15/2022, Until Wed 9/13/2023, Normal      cinacalcet (SENSIPAR) 30 mg tablet Take 1 tablet (30 mg total) by mouth once a week, Starting Tue 3/7/2023, Normal      EPINEPHrine (EPIPEN) 0.3 mg/0.3 mL SOAJ Inject 0.3 mL (0.3 mg total) into a muscle once for 1 dose, Starting Fri 2/4/2022, Print      furosemide (LASIX) 80 mg tablet Take 2 tablets (160 mg total) by mouth daily, Starting Tue 5/16/2023, Normal      pravastatin (PRAVACHOL) 80 mg tablet TAKE 1 TABLET(80 MG) BY MOUTH DAILY WITH DINNER, Normal      sevelamer carbonate (RENVELA) 800 mg tablet Take 2 tablets (1,600 mg total) by mouth 3 (three) times a day with meals, Starting u 8/31/2023, No Print                 PDMP Review     None          ED Provider  Electronically Signed by           Radha Eaton DO  09/28/23 7822

## 2023-09-28 NOTE — DISCHARGE INSTRUCTIONS
Follow up with family doctor and cardiologist    Return to the ER if recurrent syncope, chest pain, difficulty breathing, etc.

## 2023-09-29 ENCOUNTER — VBI (OUTPATIENT)
Dept: INTERNAL MEDICINE CLINIC | Facility: CLINIC | Age: 63
End: 2023-09-29

## 2023-09-29 NOTE — TELEPHONE ENCOUNTER
09/29/23 12:28 PM    Patient contacted post ED visit, [facility] department spoke with patient/caregiver and outreach was successful. Thank you.   Saul Preciado  [facility]

## 2023-10-19 DIAGNOSIS — I10 BENIGN ESSENTIAL HTN: ICD-10-CM

## 2023-10-19 RX ORDER — AMLODIPINE BESYLATE 5 MG/1
5 TABLET ORAL DAILY
Qty: 90 TABLET | Refills: 4 | Status: SHIPPED | OUTPATIENT
Start: 2023-10-19

## 2023-11-01 ENCOUNTER — TELEPHONE (OUTPATIENT)
Dept: ADMINISTRATIVE | Facility: OTHER | Age: 63
End: 2023-11-01

## 2023-11-02 NOTE — TELEPHONE ENCOUNTER
11/02/23 8:31 AM    Patient contacted (left message) to bring Advance Directive, POLST, or Living Will document to next scheduled pcp visit. Thank you.   Roby Rosas MA  [facility]

## 2023-11-03 ENCOUNTER — OFFICE VISIT (OUTPATIENT)
Dept: INTERNAL MEDICINE CLINIC | Facility: CLINIC | Age: 63
End: 2023-11-03
Payer: COMMERCIAL

## 2023-11-03 VITALS
SYSTOLIC BLOOD PRESSURE: 138 MMHG | DIASTOLIC BLOOD PRESSURE: 74 MMHG | TEMPERATURE: 97.7 F | WEIGHT: 195 LBS | OXYGEN SATURATION: 98 % | HEART RATE: 84 BPM | HEIGHT: 68 IN | BODY MASS INDEX: 29.55 KG/M2

## 2023-11-03 DIAGNOSIS — L29.9 ITCH OF SKIN: ICD-10-CM

## 2023-11-03 DIAGNOSIS — D63.1 ANEMIA DUE TO CHRONIC KIDNEY DISEASE, ON CHRONIC DIALYSIS (HCC): ICD-10-CM

## 2023-11-03 DIAGNOSIS — Z99.2 ANEMIA DUE TO CHRONIC KIDNEY DISEASE, ON CHRONIC DIALYSIS (HCC): ICD-10-CM

## 2023-11-03 DIAGNOSIS — N25.81 SECONDARY HYPERPARATHYROIDISM OF RENAL ORIGIN (HCC): ICD-10-CM

## 2023-11-03 DIAGNOSIS — N52.9 ERECTILE DYSFUNCTION, UNSPECIFIED ERECTILE DYSFUNCTION TYPE: ICD-10-CM

## 2023-11-03 DIAGNOSIS — N18.6 ANEMIA DUE TO CHRONIC KIDNEY DISEASE, ON CHRONIC DIALYSIS (HCC): ICD-10-CM

## 2023-11-03 DIAGNOSIS — N63.42 SUBAREOLAR MASS OF LEFT BREAST: Primary | ICD-10-CM

## 2023-11-03 DIAGNOSIS — I77.0 ARTERIOVENOUS FISTULA, ACQUIRED (HCC): ICD-10-CM

## 2023-11-03 PROBLEM — F10.11 ALCOHOL ABUSE, IN REMISSION: Status: ACTIVE | Noted: 2018-02-19

## 2023-11-03 PROBLEM — Z01.818 PRE-OP EXAMINATION: Status: RESOLVED | Noted: 2022-12-07 | Resolved: 2023-11-03

## 2023-11-03 PROCEDURE — 99214 OFFICE O/P EST MOD 30 MIN: CPT | Performed by: STUDENT IN AN ORGANIZED HEALTH CARE EDUCATION/TRAINING PROGRAM

## 2023-11-03 RX ORDER — SILDENAFIL 50 MG/1
50 TABLET, FILM COATED ORAL DAILY PRN
Qty: 10 TABLET | Refills: 0 | Status: SHIPPED | OUTPATIENT
Start: 2023-11-03

## 2023-11-03 RX ORDER — HYDROXYZINE HYDROCHLORIDE 10 MG/1
10 TABLET, FILM COATED ORAL EVERY 6 HOURS PRN
Qty: 30 TABLET | Refills: 0 | Status: SHIPPED | OUTPATIENT
Start: 2023-11-03

## 2023-11-03 NOTE — ASSESSMENT & PLAN NOTE
Reports noticing a small lump in the subareolar area of his left chest   -Denies family history of breast cancer in males and females  -We will send for diagnostic mammogram

## 2023-11-03 NOTE — ASSESSMENT & PLAN NOTE
Lab Results   Component Value Date    EGFR 10 09/28/2023    EGFR 12 10/24/2022    EGFR 10 10/20/2022    CREATININE 5.26 (H) 09/28/2023    CREATININE 4.68 (H) 10/24/2022    CREATININE 5.46 (H) 10/20/2022     Most recent hemoglobin was within normal limits

## 2023-11-03 NOTE — PROGRESS NOTES
INTERNAL MEDICINE OFFICE VISIT NOTE  [facility] [place]    NAME: Moise Fothergill  AGE: 65 y.o. SEX: male    DATE OF ENCOUNTER: 11/3/2023       Chief Complaint   Patient presents with    Follow-up     3 month follow up      F/u on chronic conditions  C/o episodes of severe itchiness of the plant of his left foot. Reports compliance with his medications  Reports presyncopal episode a few weeks ago during hemodialysis which require evaluation in emergency room    Review of Systems  10 point ROS negative except per HPI    OBJECTIVE:  Vitals:    11/03/23 1041   BP: 138/74   BP Location: Left arm   Patient Position: Sitting   Cuff Size: Standard   Pulse: 84   Temp: 97.7 °F (36.5 °C)   SpO2: 98%   Weight: 88.5 kg (195 lb)   Height: 5' 8" (1.727 m)       Physical Exam:   GENERAL: NAD, Normal appearance. NEUROLOGIC:  Alert/oriented x3. HEENT:  NC/AT, no scleral icterus  CARDIAC:  RRR, +S1/S2  PULMONARY:  non-labored breathing      ASSESSMENT/PLAN:    1. Secondary hyperparathyroidism of renal origin [facility])  Assessment & Plan:  Following with nephrology  Currently on Sensipar, calcitriol    Orders:  -     PTH, intact; Future  -     Comprehensive metabolic panel; Future  -     Phosphorus; Future    2. Arteriovenous fistula, acquired [facility])  Assessment & Plan:  Right upper extremity -functional      3. Subareolar mass of left breast  Assessment & Plan:  Reports noticing a small lump in the subareolar area of his left chest   -Denies family history of breast cancer in males and females  -We will send for diagnostic mammogram    Orders:  -     Mammo diagnostic left w cad; Future; Expected date: 11/03/2023    4. Itch of skin  Assessment & Plan:  C/o episodes of severe itchiness of the plant of his left foot  - Start trial of Atarax     Orders:  -     hydrOXYzine HCL (ATARAX) 10 mg tablet; Take 1 tablet (10 mg total) by mouth every 6 (six) hours as needed for itching    5.  Erectile dysfunction, unspecified erectile dysfunction type  Assessment & Plan:  Trial of sildenafil     Orders:  -     sildenafil (VIAGRA) 50 MG tablet; Take 1 tablet (50 mg total) by mouth daily as needed for erectile dysfunction    6.  Anemia due to chronic kidney disease, on chronic dialysis [facility])  Assessment & Plan:  Lab Results   Component Value Date    EGFR 10 09/28/2023    EGFR 12 10/24/2022    EGFR 10 10/20/2022    CREATININE 5.26 (H) 09/28/2023    CREATININE 4.68 (H) 10/24/2022    CREATININE 5.46 (H) 10/20/2022     Most recent hemoglobin was within normal limits            Current Outpatient Medications:     amLODIPine (NORVASC) 5 mg tablet, Take 1 tablet (5 mg total) by mouth daily, Disp: 90 tablet, Rfl: 4    apixaban (Eliquis) 2.5 mg, Take 1 tablet (2.5 mg total) by mouth 2 (two) times a day, Disp: 180 tablet, Rfl: 1    aspirin (ECOTRIN LOW STRENGTH) 81 mg EC tablet, Take 1 tablet (81 mg total) by mouth daily, Disp: 90 tablet, Rfl: 1    B Complex-C-Folic Acid (Karlee-Melissa) TABS, Take 1 tablet by mouth daily, Disp: 90 tablet, Rfl: 4    calcitriol (ROCALTROL) 0.25 mcg capsule, Take 2 capsules (0.5 mcg total) by mouth 3 (three) times a week, Disp: 45 capsule, Rfl: 4    calcium acetate (PHOSLO) capsule, TAKE 3 CAPSULES(2001 MG) BY MOUTH THREE TIMES DAILY WITH MEALS, Disp: 810 capsule, Rfl: 3    carvedilol (COREG) 25 mg tablet, Take 1 tablet (25 mg total) by mouth 2 (two) times a day with meals, Disp: 90 tablet, Rfl: 1    cholecalciferol (VITAMIN D3) 1,000 units tablet, Take 4 tablets (4,000 Units total) by mouth daily, Disp: 120 tablet, Rfl: 0    cinacalcet (SENSIPAR) 30 mg tablet, Take 1 tablet (30 mg total) by mouth once a week, Disp: 12 tablet, Rfl: 4    EPINEPHrine (EPIPEN) 0.3 mg/0.3 mL SOAJ, Inject 0.3 mL (0.3 mg total) into a muscle once for 1 dose, Disp: 0.6 mL, Rfl: 0    furosemide (LASIX) 80 mg tablet, Take 2 tablets (160 mg total) by mouth daily, Disp: 180 tablet, Rfl: 3    hydrOXYzine HCL (ATARAX) 10 mg tablet, Take 1 tablet (10 mg total) by mouth every 6 (six) hours as needed for itching, Disp: 30 tablet, Rfl: 0    pravastatin (PRAVACHOL) 80 mg tablet, TAKE 1 TABLET(80 MG) BY MOUTH DAILY WITH DINNER, Disp: 90 tablet, Rfl: 1    sevelamer carbonate (RENVELA) 800 mg tablet, Take 2 tablets (1,600 mg total) by mouth 3 (three) times a day with meals, Disp: 270 tablet, Rfl: 4    sildenafil (VIAGRA) 50 MG tablet, Take 1 tablet (50 mg total) by mouth daily as needed for erectile dysfunction, Disp: 10 tablet, Rfl: 0    TCM Call       Date and time call was made  6/16/2022  1:59 PM    Hospital care reviewed  Records reviewed    Patient was hospitialized at  [facility] [place] [facility] [place]    Date of Admission  06/10/22    Date of discharge  06/14/22    Diagnosis  Acute renal failure ([address])    Disposition  Home    Were the patients medications reviewed and updated  No    Current Symptoms  Fatigue          TCM Call       Post hospital issues  None    Should patient be enrolled in anticoag monitoring? No    Scheduled for follow up? Yes    Patients specialists  Nephrologist    Did you obtain your prescribed medications  Yes    Do you need help managing your prescriptions or medications  No    Is transportation to your appointment needed  No    I have advised the patient to call PCP with any new or worsening symptoms  Myron Hurley, [address] members    Are you recieving any outpatient services  No    Are you recieving home care services  No    Are you using any community resources  No    Current waiver services  No    Have you fallen in the last 12 months  No    Interperter language line needed  No    Counseling  Patient               Doreen Guardado MD  [facility] Internal [facility]    * Please Note: This note was completed in part utilizing a dictation software.   Grammatical errors, random word insertions, spelling mistakes, and incomplete sentences may be an occasional consequence of this system secondary to software limitations, ambient noise, and hardware issues. If you have any questions or concerns about the content, text, or information contained within the body of this dictation, please contact the provider for clarification. **        BMI Counseling: Body mass index is 29.65 kg/m². The BMI is above normal. Nutrition recommendations include reducing portion sizes, decreasing overall calorie intake, 3-5 servings of fruits/vegetables daily, reducing fast food intake, consuming healthier snacks, and decreasing soda and/or juice intake. Exercise recommendations include moderate aerobic physical activity for 150 minutes/week.

## 2023-11-16 ENCOUNTER — VBI (OUTPATIENT)
Dept: ADMINISTRATIVE | Facility: OTHER | Age: 63
End: 2023-11-16

## 2023-11-29 ENCOUNTER — CONSULT (OUTPATIENT)
Dept: CARDIOLOGY CLINIC | Facility: CLINIC | Age: 63
End: 2023-11-29
Payer: COMMERCIAL

## 2023-11-29 VITALS
BODY MASS INDEX: 31.95 KG/M2 | DIASTOLIC BLOOD PRESSURE: 80 MMHG | HEIGHT: 68 IN | WEIGHT: 210.8 LBS | SYSTOLIC BLOOD PRESSURE: 148 MMHG | HEART RATE: 77 BPM

## 2023-11-29 DIAGNOSIS — N18.6 ESRD (END STAGE RENAL DISEASE) ON DIALYSIS (HCC): ICD-10-CM

## 2023-11-29 DIAGNOSIS — I15.0 RENOVASCULAR HYPERTENSION: ICD-10-CM

## 2023-11-29 DIAGNOSIS — R55 NEAR SYNCOPE: Primary | ICD-10-CM

## 2023-11-29 DIAGNOSIS — E78.5 HYPERLIPIDEMIA, UNSPECIFIED HYPERLIPIDEMIA TYPE: ICD-10-CM

## 2023-11-29 DIAGNOSIS — F12.90 MARIJUANA USE: ICD-10-CM

## 2023-11-29 DIAGNOSIS — Z99.2 ESRD (END STAGE RENAL DISEASE) ON DIALYSIS (HCC): ICD-10-CM

## 2023-11-29 DIAGNOSIS — Z72.0 TOBACCO ABUSE: ICD-10-CM

## 2023-11-29 PROCEDURE — 93000 ELECTROCARDIOGRAM COMPLETE: CPT | Performed by: INTERNAL MEDICINE

## 2023-11-29 PROCEDURE — 99204 OFFICE O/P NEW MOD 45 MIN: CPT | Performed by: INTERNAL MEDICINE

## 2023-11-29 NOTE — ASSESSMENT & PLAN NOTE
Mr. Vickie Sauceda has been experiencing intermittent near syncopal events. He did have 1 to syncope event and had mildly low blood pressures at that time. His description of symptoms suggest possible vasovagal near syncope.   today's blood pressure is noted to be elevated but he says he did not take his medications. I suspect he has a component of all to static hypotension due to autonomic dysfunction from CKD. In addition I suspect fluid removal at hemodialysis may be contributing factor. On physical examination I am not able to appreciate any evidence of pulmonary or peripheral vascular congestion or significant valvular heart disease today. His ECG is normal.  His echocardiogram in 2022 demonstrated preserved systolic function grade 1 diastolic dysfunction no significant chamber hypertrophy enlargement and some valvular abnormalities with aortic and mitral valve regurgitations. -- At this time I am advising him to continue his current medications. -- I am providing him with a blood pressure tracking sheet with instructions to report random blood pressures before and after dialysis. -- I am going to arrange for an echocardiogram to assess cardiac structure and function and reassess his valves. -- I am advising him to avoid sudden turning or bending down or getting up too quickly from sitting down or lying position. -- I will plan to see him back in 2 months time. If he has recurrence of syncopal event he is advised to call us and let us know. -- I would advise that his fluid status should be very closely monitored at the dialysis and over fluid removal should be avoided. -- Dietary and medical compliance are reinforced. -- He is advised  to report any concerning symptoms such as chest pain, shortness of breath, decline in exercise tolerance or presyncope/syncope.

## 2023-11-29 NOTE — PROGRESS NOTES
[facility]  [address] [address]  Phone#  [phone]. Fax#  [phone]  *-*-*-*-*-*-*-*-*-*-*-*-*-*-*-*-*-*-*-*-*-*-*-*-*-*-*-*-*-*-*-*-*-*-*-*-*-*-*-*-*-*-*-*-*-*-*-*-*-*-*-*-*-*  Marina Simental DATE: 11/29/23 3:22 PM  PATIENT NAME: Elier Hannah   1960    [id number]  Age: 59 y.o. Sex: male  AUTHOR: Linda Gamboa MD  PRIMARYCARE PHYSICIAN: Elvis Estrada MD  REFERRING PHYSICIAN: [facility], DO  [address] [facility],  [address] [facility], DO   *-*-*-*-*-*-*-*-*-*-*-*-*-*-*-*-*-*-*-*-*-*-*-*-*-*-*-*-*-*-*-*-*-*-*-*-*-*-*-*-*-*-*-*-*-*-*-*-*-*-*-*-*-*-  REASON FOR REFERRAL: Evaluation of syncope that occurred in September 2023    *-*-*-*-*-*-*-*-*-*-*-*-*-*-*-*-*-*-*-*-*-*-*-*-*-*-*-*-*-*-*-*-*-*-*-*-*-*-*-*-*-*-*-*-*-*-*-*-*-*-*-*-*-*-  CARDIOLOGY ASSESSMENT & PLAN:   Diagnosis ICD-10-CM Associated Orders   1. Near syncope  R55 Ambulatory Referral to Cardiology     Ambulatory Referral to Cardiology     POCT ECG      2. ESRD (end stage renal disease) on dialysis ([address])  N18.6     Z99.2       3. Tobacco abuse  Z72.0       4. Renovascular hypertension  I15.0       5. Marijuana use  F12.90       6. Hyperlipidemia, unspecified hyperlipidemia type  E78.5         Near syncope  Mr. Elier Hannah has been experiencing intermittent near syncopal events. He did have 1 to syncope event and had mildly low blood pressures at that time. His description of symptoms suggest possible vasovagal near syncope.   today's blood pressure is noted to be elevated but he says he did not take his medications. I suspect he has a component of all to static hypotension due to autonomic dysfunction from CKD. In addition I suspect fluid removal at hemodialysis may be contributing factor. On physical examination I am not able to appreciate any evidence of pulmonary or peripheral vascular congestion or significant valvular heart disease today.   His ECG is normal.  His echocardiogram in 2022 demonstrated preserved systolic function grade 1 diastolic dysfunction no significant chamber hypertrophy enlargement and some valvular abnormalities with aortic and mitral valve regurgitations. -- At this time I am advising him to continue his current medications. -- I am providing him with a blood pressure tracking sheet with instructions to report random blood pressures before and after dialysis. -- I am going to arrange for an echocardiogram to assess cardiac structure and function and reassess his valves. -- I am advising him to avoid sudden turning or bending down or getting up too quickly from sitting down or lying position. -- I will plan to see him back in 2 months time. If he has recurrence of syncopal event he is advised to call us and let us know. -- I would advise that his fluid status should be very closely monitored at the dialysis and over fluid removal should be avoided. -- Dietary and medical compliance are reinforced. -- He is advised  to report any concerning symptoms such as chest pain, shortness of breath, decline in exercise tolerance or presyncope/syncope. *-*-*-*-*-*-*-*-*-*-*-*-*-*-*-*-*-*-*-*-*-*-*-*-*-*-*-*-*-*-*-*-*-*-*-*-*-*-*-*-*-*-*-*-*-*-*-*-*-*-*-*-*-*-  CURRENT ECG:  Results for orders placed or performed in visit on 11/29/23   POCT ECG    Narrative    Sinus rhythm with no significant ST-T wave abnormalities. No evidence of prior infarction, or chamber enlargement or hypertrophy. HR 77 bpm.  Normal axis and intervals. *-*-*-*-*-*-*-*-*-*-*-*-*-*-*-*-*-*-*-*-*-*-*-*-*-*-*-*-*-*-*-*-*-*-*-*-*-*-*-*-*-*-*-*-*-*-*-*-*-*-*-*-*-*-  HISTORY OF PRESENT ILLNESS:  Patient is a 52-year-old -American gentleman with medical history significant for:  1. End-stage renal disease, on hemodialysis since 2020  2. Chronic diastolic heart failure due to fluid retention  3. Anemia secondary to CKD  4. Primary hypertension  5.   History of chronic tobacco dependence  6. Secondary hyperparathyroidism  7. Dyslipidemia  8. History of alcohol abuse, no intervention  9. Erectile dysfunction. He has no prior history of coronary artery disease, diabetes mellitus or arrhythmias or TIA/CVA or history of cancer. He reports that when he used to live in [place] 6 years back he was hospitalized with congestive heart failure. He had extensive testing including stress test which was overall normal.  He is here because he presented to the emergency room on 28 September after having a syncopal event at the dialysis center. Symptom was preceded by symptoms of visual changes and dryness in mouth. In the emergency room his vital signs were stable. He was noted to have hyponatremia. His EKG and enzymes were unremarkable blood work showed anemia. He was eventually discharged to home. Since that time he has had a couple of near syncopal events but no true syncope. He describes the feeling as feeling of tingling and paresthesias in his face and head and he feels fatigued. He usually lays down and the symptom passes. He denies experiencing accompanying palpitation with the symptom. Denies any chest pain. Other than that he reports that he has fatigue and at times low blood pressure at the dialysis. He has been consistent with dialysis. He mentions that his nephrologist has advised him not to take amlodipine when he goes for the dialysis. He has tender mass in the left breast under the areolar region and he scheduled to have a mammogram on January 1, 2024. Other than that he denies any orthopnea PND or worsening pedal edema. He reports being compliant with medications. His family history significant for cancer in his sister and his mother. There is no family history of premature coronary artery disease or sudden cardiac death. He is a chronic smoker but says that he has cut down significantly and is now smoking about 6 cigarettes a day.   He used to drink heavily in the past but has cut down  significantly and now has maybe a beer to 2 beers a week. He does occasionally smoke marijuana denies any history of illicit drug use. 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 Current cardiac meds: Apixaban 2.5 g twice daily amlodipine 5 mg daily carvedilol 25 mg twice daily aspirin 81 mg daily furosemide 160 mg daily pravastatin 80 mg daily, sildenafil, Renvela. He is on Eliquis to maintain patency of the dialysis fistula. Previous blood work:   Routine chemistry blood work 9/28/2023 sodium 132 potassium 3.7 491 bicarb 29 BUN 22 creatinine 5.26 GFR 10 hemoglobin 12.5 hematocrit 36.2 platelet count 267  Last TSH 0.663 in 2018 hemoglobin A1c 5.5  PTH 1000 103 in June 2022    Previous cardiac studies:   Echocardiogram 2/1/2022:  Normal left ventricle size, wall thickness, normal left LV systolic function, grade 1 diastolic dysfunction, EF 84%  Normal right ventricle size and function. Normal left and right atrial cavity size. Mild to moderate aortic valve regurgitation no aortic valve stenosis. Mild mitral valve regurgitation. No significant tricuspid or pulmonic valve regurgitation. No obvious pulmonary hypertension  No pericardial effusion. Reports of having nuclear stress test also in the past in [place].     *-*-*-*-*-*-*-*-*-*-*-*-*-*-*-*-*-*-*-*-*-*-*-*-*-*-*-*-*-*-*-*-*-*-*-*-*-*-*-*-*-*-*-*-*-*-*-*-*-*-*-*-*-*  PAST MEDICAL HISTORY:  Past Medical History:   Diagnosis Date    CHF (congestive heart failure) (HCC)     Chronic kidney disease     ESRD (end stage renal disease) on dialysis ([address])     dialysis tues-thurs-sat    Hyperkalemia 02/01/2022    Hyperlipidemia     Hypertension     Personal history of COVID-19 07/2021    mild symptoms - recovered at home    Renal disorder     Withdrawal seizures ([address])     approximately 2018 when reducing alcohol    PAST SURGICAL HISTORY:   Past Surgical History:   Procedure Laterality Date HERNIA REPAIR      states umbilical    IR AV FISTULAGRAM/GRAFTOGRAM  08/16/2022    IR AV FISTULAGRAM/GRAFTOGRAM  11/11/2022    IR AV FISTULAGRAM/GRAFTOGRAM  3/15/2023    IR AV FISTULAGRAM/GRAFTOGRAM  4/14/2023    IR AV FISTULAGRAM/GRAFTOGRAM  7/28/2023    IR AV FISTULAGRAM/GRAFTOGRAM  9/13/2023    IR BIOPSY BONE MARROW  07/13/2022    IR PULMONARY ARTERY EMBOLIZATION  08/03/2022    IR TUNNELED CENTRAL LINE REMOVAL  4/14/2023    IR TUNNELED DIALYSIS CATHETER PLACEMENT  06/10/2022    IR TUNNELED DIALYSIS CATHETER PLACEMENT  10/20/2022    IR TUNNELED DIALYSIS CATHETER PLACEMENT      LA ARTERIOVENOUS ANASTOMOSIS OPEN DIRECT Left 10/24/2022    Procedure: Creation of  L brachiobasilic fistula, ligation of L endoAVF; Surgeon: Colonel Mike Otero MD;  Location: [facility];  Service: Vascular    LA ARTERIOVENOUS ANASTOMOSIS OPEN DIRECT Right 1/6/2023    Procedure: Creation right brachiocephalic fistula; Surgeon: Colonel Mike Otero MD;  Location: [facility];  Service: Vascular         FAMILY HISTORY:  No family history on file.  SOCIAL HISTORY:  Social History     Tobacco Use   Smoking Status Every Day    Packs/day: 0.25    Years: 30.00    Total pack years: 7.50    Types: Cigarettes   Smokeless Tobacco Never   Tobacco Comments    Smokes 5 a day      Social History     Substance and Sexual Activity   Alcohol Use Yes    Alcohol/week: 4.0 standard drinks of alcohol    Types: 4 Cans of beer per week    Comment: has cut down to 4 per week     Social History     Substance and Sexual Activity   Drug Use Yes    Frequency: 3.0 times per week    Types: Marijuana    Comment: occasionally, last used LUIS DANIELOHDON    [unfilled]     *-*-*-*-*-*-*-*-*-*-*-*-*-*-*-*-*-*-*-*-*-*-*-*-*-*-*-*-*-*-*-*-*-*-*-*-*-*-*-*-*-*-*-*-*-*-*-*-*-*-*-*-*-*  ALLERGIES:  Allergies   Allergen Reactions    Ibuprofen Other (See Comments)     ESRD - cannot have ibuprofen    CURRENT SCHEDULED MEDICATIONS:    Current Outpatient Medications:     amLODIPine (NORVASC) 5 mg tablet, Take 1 tablet (5 mg total) by mouth daily, Disp: 90 tablet, Rfl: 4    apixaban (Eliquis) 2.5 mg, Take 1 tablet (2.5 mg total) by mouth 2 (two) times a day, Disp: 180 tablet, Rfl: 1    aspirin (ECOTRIN LOW STRENGTH) 81 mg EC tablet, Take 1 tablet (81 mg total) by mouth daily, Disp: 90 tablet, Rfl: 1    B Complex-C-Folic Acid (Karlee-Melissa) TABS, Take 1 tablet by mouth daily, Disp: 90 tablet, Rfl: 4    calcitriol (ROCALTROL) 0.25 mcg capsule, Take 2 capsules (0.5 mcg total) by mouth 3 (three) times a week, Disp: 45 capsule, Rfl: 4    calcium acetate (PHOSLO) capsule, TAKE 3 CAPSULES(2001 MG) BY MOUTH THREE TIMES DAILY WITH MEALS, Disp: 810 capsule, Rfl: 3    carvedilol (COREG) 25 mg tablet, Take 1 tablet (25 mg total) by mouth 2 (two) times a day with meals, Disp: 90 tablet, Rfl: 1    cholecalciferol (VITAMIN D3) 1,000 units tablet, Take 4 tablets (4,000 Units total) by mouth daily, Disp: 120 tablet, Rfl: 0    ciclopirox (PENLAC) 8 % solution, APPLY EXTERNALLY TO AFFECTED TOENAILS DAILY AS DIRECTED, Disp: , Rfl:     cinacalcet (SENSIPAR) 30 mg tablet, Take 1 tablet (30 mg total) by mouth once a week, Disp: 12 tablet, Rfl: 4    EPINEPHrine (EPIPEN) 0.3 mg/0.3 mL SOAJ, Inject 0.3 mL (0.3 mg total) into a muscle once for 1 dose, Disp: 0.6 mL, Rfl: 0    furosemide (LASIX) 80 mg tablet, Take 2 tablets (160 mg total) by mouth daily, Disp: 180 tablet, Rfl: 3    hydrOXYzine HCL (ATARAX) 10 mg tablet, Take 1 tablet (10 mg total) by mouth every 6 (six) hours as needed for itching, Disp: 30 tablet, Rfl: 0    pravastatin (PRAVACHOL) 80 mg tablet, TAKE 1 TABLET(80 MG) BY MOUTH DAILY WITH DINNER, Disp: 90 tablet, Rfl: 1    sevelamer carbonate (RENVELA) 800 mg tablet, Take 2 tablets (1,600 mg total) by mouth 3 (three) times a day with meals, Disp: 270 tablet, Rfl: 4    sildenafil (VIAGRA) 50 MG tablet, Take 1 tablet (50 mg total) by mouth daily as needed for erectile dysfunction, Disp: 10 tablet, Rfl: 0 *-*-*-*-*-*-*-*-*-*-*-*-*-*-*-*-*-*-*-*-*-*-*-*-*-*-*-*-*-*-*-*-*-*-*-*-*-*-*-*-*-*-*-*-*-*-*-*-*-*-*-*-*-*  REVIEW OF SYMPTOMS:    Positive for: As noted above in HPI  Negative for: All remaining as reviewed below and in HPI.  SYSTEM SYMPTOMS REVIEWED:  General--weight change, fever, night sweats  Respiratoryl-- Wheezing, shortness of breath, cough, URI symptoms, sputum, blood  Cardiovascular--chest pain, syncope, dyspnea on exertion, edema, decline in exercise tolerance, claudication   Gastrointestinal--persistent vomiting, diarrhea, abdominal distention, blood in stool   Muscular or skeletal--joint pain or swelling   Neurologic--headaches, syncope, abnormal movement  Hematologic--history of easy bruising and bleeding   Endocrine--thyroid enlargement, heat or cold intolerance, polyuria   Psychiatric--anxiety, depression      *-*-*-*-*-*-*-*-*-*-*-*-*-*-*-*-*-*-*-*-*-*-*-*-*-*-*-*-*-*-*-*-*-*-*-*-*-*-*-*-*-*-*-*-*-*-*-*-*-*-*-*-*-*  CURRENT OUTPATIENT MEDICATIONS:     Current Outpatient Medications:     amLODIPine (NORVASC) 5 mg tablet, Take 1 tablet (5 mg total) by mouth daily, Disp: 90 tablet, Rfl: 4    apixaban (Eliquis) 2.5 mg, Take 1 tablet (2.5 mg total) by mouth 2 (two) times a day, Disp: 180 tablet, Rfl: 1    aspirin (ECOTRIN LOW STRENGTH) 81 mg EC tablet, Take 1 tablet (81 mg total) by mouth daily, Disp: 90 tablet, Rfl: 1    B Complex-C-Folic Acid (Karlee-Melissa) TABS, Take 1 tablet by mouth daily, Disp: 90 tablet, Rfl: 4    calcitriol (ROCALTROL) 0.25 mcg capsule, Take 2 capsules (0.5 mcg total) by mouth 3 (three) times a week, Disp: 45 capsule, Rfl: 4    calcium acetate (PHOSLO) capsule, TAKE 3 CAPSULES(2001 MG) BY MOUTH THREE TIMES DAILY WITH MEALS, Disp: 810 capsule, Rfl: 3    carvedilol (COREG) 25 mg tablet, Take 1 tablet (25 mg total) by mouth 2 (two) times a day with meals, Disp: 90 tablet, Rfl: 1    cholecalciferol (VITAMIN D3) 1,000 units tablet, Take 4 tablets (4,000 Units total) by mouth daily, Disp: 120 tablet, Rfl: 0    ciclopirox (PENLAC) 8 % solution, APPLY EXTERNALLY TO AFFECTED TOENAILS DAILY AS DIRECTED, Disp: , Rfl:     cinacalcet (SENSIPAR) 30 mg tablet, Take 1 tablet (30 mg total) by mouth once a week, Disp: 12 tablet, Rfl: 4    EPINEPHrine (EPIPEN) 0.3 mg/0.3 mL SOAJ, Inject 0.3 mL (0.3 mg total) into a muscle once for 1 dose, Disp: 0.6 mL, Rfl: 0    furosemide (LASIX) 80 mg tablet, Take 2 tablets (160 mg total) by mouth daily, Disp: 180 tablet, Rfl: 3    hydrOXYzine HCL (ATARAX) 10 mg tablet, Take 1 tablet (10 mg total) by mouth every 6 (six) hours as needed for itching, Disp: 30 tablet, Rfl: 0    pravastatin (PRAVACHOL) 80 mg tablet, TAKE 1 TABLET(80 MG) BY MOUTH DAILY WITH DINNER, Disp: 90 tablet, Rfl: 1    sevelamer carbonate (RENVELA) 800 mg tablet, Take 2 tablets (1,600 mg total) by mouth 3 (three) times a day with meals, Disp: 270 tablet, Rfl: 4    sildenafil (VIAGRA) 50 MG tablet, Take 1 tablet (50 mg total) by mouth daily as needed for erectile dysfunction, Disp: 10 tablet, Rfl: 0    *-*-*-*-*-*-*-*-*-*-*-*-*-*-*-*-*-*-*-*-*-*-*-*-*-*-*-*-*-*-*-*-*-*-*-*-*-*-*-*-*-*-*-*-*-*-*-*-*-*-*-*-*-*  VITAL SIGNS:  Vitals:    11/29/23 1444   BP: 148/80   Pulse: 77   Weight: 95.6 kg (210 lb 12.8 oz)   Height: 5' 8" (1.727 m)       BMI: Body mass index is 32.05 kg/m².     WEIGHTS:   Wt Readings from Last 25 Encounters:   11/29/23 95.6 kg (210 lb 12.8 oz)   11/03/23 88.5 kg (195 lb)   09/13/23 90.3 kg (199 lb)   08/11/23 90.3 kg (199 lb)   07/28/23 88 kg (194 lb 0.1 oz)   03/15/23 88.5 kg (195 lb)   02/15/23 91.2 kg (201 lb)   02/10/23 93.9 kg (207 lb)   01/25/23 89.8 kg (198 lb)   12/14/22 88.5 kg (195 lb)   12/07/22 92.5 kg (204 lb)   11/11/22 90.3 kg (199 lb 1.2 oz)   11/10/22 89.9 kg (198 lb 3.2 oz)   11/09/22 91.9 kg (202 lb 9.6 oz)   10/24/22 93.3 kg (205 lb 11 oz)   10/14/22 88.9 kg (196 lb)   10/05/22 91.2 kg (201 lb)   08/16/22 88.5 kg (195 lb 1.7 oz)   07/29/22 88.9 kg (196 lb)   07/13/22 87.5 kg (192 lb 14.4 oz)   06/27/22 93.2 kg (205 lb 6.4 oz)   06/24/22 94.3 kg (208 lb)   06/10/22 97 kg (213 lb 13.5 oz)   06/10/22 96.2 kg (212 lb 1.6 oz)   02/04/22 99.2 kg (218 lb 11.1 oz)      *-*-*-*-*-*-*-*-*-*-*-*-*-*-*-*-*-*-*-*-*-*-*-*-*-*-*-*-*-*-*-*-*-*-*-*-*-*-*-*-*-*-*-*-*-*-*-*-*-*-*-*-*-*-  PHYSICAL EXAM:  General Appearance:    Alert, cooperative, no distress, appears stated age, slightly obese   Head, Eyes, ENT:    No obvious abnormality, moist mucous mebranes. Neck:   Supple, no carotid bruit or JVD   Back:     Symmetric, no curvature. Lungs:     Respirations unlabored. Clear to auscultation bilaterally,    Chest wall:    No tenderness or deformity   Heart:    Regular rate and rhythm, S1 and S2 normal, no murmur, rub  or gallop. Abdomen:     Soft, non-tender, No obvious masses, or organomegaly   Extremities:   Extremities warm, no cyanosis or edema    Skin:   No venostatic changes in lower extremities. Normal skin color, texture, and turgor. No rashes or lesions     *-*-*-*-*-*-*-*-*-*-*-*-*-*-*-*-*-*-*-*-*-*-*-*-*-*-*-*-*-*-*-*-*-*-*-*-*-*-*-*-*-*-*-*-*-*-*-*-*-*-*-*-*-*-  LABORATORY DATA: I have personally reviewed the available laboratory data.         Potassium   Date Value Ref Range Status   09/28/2023 3.7 3.5 - 5.3 mmol/L Final   10/24/2022 3.7 3.5 - 5.3 mmol/L Final   10/20/2022 4.3 3.5 - 5.3 mmol/L Final     Chloride   Date Value Ref Range Status   09/28/2023 91 (L) 96 - 108 mmol/L Final   10/24/2022 98 96 - 108 mmol/L Final   10/20/2022 93 (L) 96 - 108 mmol/L Final     CO2   Date Value Ref Range Status   09/28/2023 29 21 - 32 mmol/L Final   10/24/2022 26 21 - 32 mmol/L Final   10/20/2022 25 21 - 32 mmol/L Final     BUN   Date Value Ref Range Status   09/28/2023 22 5 - 25 mg/dL Final   10/24/2022 23 5 - 25 mg/dL Final   10/20/2022 24 5 - 25 mg/dL Final     Creatinine   Date Value Ref Range Status   09/28/2023 5.26 (H) 0.60 - 1.30 mg/dL Final     Comment:     Standardized to IDMS reference method   10/24/2022 4.68 (H) 0.60 - 1.30 mg/dL Final     Comment:     Standardized to IDMS reference method   10/20/2022 5.46 (H) 0.60 - 1.30 mg/dL Final     Comment:     Standardized to IDMS reference method     eGFR   Date Value Ref Range Status   09/28/2023 10 ml/min/1.73sq m Final   10/24/2022 12 ml/min/1.73sq m Final   10/20/2022 10 ml/min/1.73sq m Final     Calcium   Date Value Ref Range Status   09/28/2023 9.4 8.4 - 10.2 mg/dL Final   10/24/2022 9.3 8.3 - 10.1 mg/dL Final   10/20/2022 8.9 8.3 - 10.1 mg/dL Final     AST   Date Value Ref Range Status   10/20/2022 19 5 - 45 U/L Final     Comment:     Specimen collection should occur prior to Sulfasalazine administration due to the potential for falsely depressed results. 06/13/2022 19 5 - 45 U/L Final     Comment:     Specimen collection should occur prior to Sulfasalazine administration due to the potential for falsely depressed results. 06/11/2022 18 5 - 45 U/L Final     Comment:     Specimen collection should occur prior to Sulfasalazine administration due to the potential for falsely depressed results. ALT   Date Value Ref Range Status   10/20/2022 17 12 - 78 U/L Final     Comment:     Specimen collection should occur prior to Sulfasalazine administration due to the potential for falsely depressed results. 06/13/2022 7 (L) 12 - 78 U/L Final     Comment:     Specimen collection should occur prior to Sulfasalazine administration due to the potential for falsely depressed results. 06/11/2022 10 (L) 12 - 78 U/L Final     Comment:     Specimen collection should occur prior to Sulfasalazine administration due to the potential for falsely depressed results.       Alkaline Phosphatase   Date Value Ref Range Status   10/20/2022 65 46 - 116 U/L Final   06/13/2022 68 46 - 116 U/L Final   06/11/2022 83 46 - 116 U/L Final     Magnesium   Date Value Ref Range Status   06/13/2022 2.0 1.6 - 2.6 mg/dL Final   06/12/2022 1.7 1.6 - 2.6 mg/dL Final 02/04/2022 1.9 1.6 - 2.6 mg/dL Final     WBC   Date Value Ref Range Status   09/28/2023 5.71 4.31 - 10.16 Thousand/uL Final   10/24/2022 8.81 4.31 - 10.16 Thousand/uL Final   10/20/2022 9.98 4.31 - 10.16 Thousand/uL Final     Hemoglobin   Date Value Ref Range Status   09/28/2023 12.5 12.0 - 17.0 g/dL Final   10/24/2022 10.4 (L) 12.0 - 17.0 g/dL Final   10/20/2022 10.7 (L) 12.0 - 17.0 g/dL Final     Platelets   Date Value Ref Range Status   09/28/2023 290 149 - 390 Thousands/uL Final   10/24/2022 231 149 - 390 Thousands/uL Final   10/20/2022 301 149 - 390 Thousands/uL Final     PTT   Date Value Ref Range Status   02/18/2018 26 23 - 35 seconds Final     INR   Date Value Ref Range Status   10/24/2022 0.95 0.84 - 1.19 Final   10/20/2022 0.91 0.84 - 1.19 Final     CK-MB   Date Value Ref Range Status   02/19/2018 1.0 0.0 - 5.0 ng/mL Final     No results found for: "TSH"  HDL, Direct   Date Value Ref Range Status   02/20/2018 41 40 - 60 mg/dL Final     Comment:       HDL Cholesterol:       High    >59 mg/dL       Low     <41 mg/dL  HDL Cholesterol:       High    >59 mg/dL       Low     <41 mg/dL     Triglycerides   Date Value Ref Range Status   02/20/2018 106 <=150 mg/dL Final     Comment:     Specimen collection should occur prior to N-Acetylcysteine or Metamizole administration due to the potential for falsely depressed results. Hemoglobin A1C   Date Value Ref Range Status   02/20/2018 5.5 4.2 - 6.3 % Final     No results found for: "Hector Riley", "SPUTUMCULTUR", "Jerris Curet", "[employer]", "Elidia Gander", "BODYFLUIDCUL", "Delmas Chough", "INFLUAPCR", "INFLUBPCR", "RSVPCR", "Shauna Renetta", "CDIFFTOXINB"    *-*-*-*-*-*-*-*-*-*-*-*-*-*-*-*-*-*-*-*-*-*-*-*-*-*-*-*-*-*-*-*-*-*-*-*-*-*-*-*-*-*-*-*-*-*-*-*-*-*-*-*-*-*-  RADIOLOGY RESULTS:  XR chest 2 views    Result Date: 9/28/2023  Impression: No acute cardiopulmonary disease.  Workstation performed: [id number] *-*-*-*-*-*-*-*-*-*-*-*-*-*-*-*-*-*-*-*-*-*-*-*-*-*-*-*-*-*-*-*-*-*-*-*-*-*-*-*-*-*-*-*-*-*-*-*-*-*-*-*-*-*-  LAST ECHOCARDIOGRAM AND OTHER CARDIOLOGY RESULTS:  No results found for this or any previous visit. No results found for this or any previous visit. No results found for this or any previous visit. No results found for this or any previous visit.        *-*-*-*-*-*-*-*-*-*-*-*-*-*-*-*-*-*-*-*-*-*-*-*-*-*-*-*-*-*-*-*-*-*-*-*-*-*-*-*-*-*-*-*-*-*-*-*-*-*-*-*-*-*-  SIGNATURES:   @CTW@   Phillip Metcalf MD     CC:   MD Isacc Lopez [facility], DO

## 2023-11-29 NOTE — PATIENT INSTRUCTIONS
CARDIOLOGY ASSESSMENT & PLAN:   Diagnosis ICD-10-CM Associated Orders   1. Near syncope  R55 Ambulatory Referral to Cardiology     Ambulatory Referral to Cardiology     POCT ECG      2. ESRD (end stage renal disease) on dialysis ([address])  N18.6     Z99.2       3. Tobacco abuse  Z72.0       4. Renovascular hypertension  I15.0       5. Marijuana use  F12.90       6. Hyperlipidemia, unspecified hyperlipidemia type  E78.5         Near syncope  Mr. Mayra Adams has been experiencing intermittent near syncopal events. He did have 1 to syncope event and had mildly low blood pressures at that time. His description of symptoms suggest possible vasovagal near syncope.   today's blood pressure is noted to be elevated but he says he did not take his medications. I suspect he has a component of all to static hypotension due to autonomic dysfunction from CKD. In addition I suspect fluid removal at hemodialysis may be contributing factor. On physical examination I am not able to appreciate any evidence of pulmonary or peripheral vascular congestion or significant valvular heart disease today. His ECG is normal.  His echocardiogram in 2022 demonstrated preserved systolic function grade 1 diastolic dysfunction no significant chamber hypertrophy enlargement and some valvular abnormalities with aortic and mitral valve regurgitations. -- At this time I am advising him to continue his current medications. -- I am providing him with a blood pressure tracking sheet with instructions to report random blood pressures before and after dialysis. -- I am going to arrange for an echocardiogram to assess cardiac structure and function and reassess his valves. -- I am advising him to avoid sudden turning or bending down or getting up too quickly from sitting down or lying position. -- I will plan to see him back in 2 months time. If he has recurrence of syncopal event he is advised to call us and let us know.   -- I would advise that his fluid status should be very closely monitored at the dialysis and over fluid removal should be avoided. -- Dietary and medical compliance are reinforced. -- He is advised  to report any concerning symptoms such as chest pain, shortness of breath, decline in exercise tolerance or presyncope/syncope.

## 2023-12-01 ENCOUNTER — TELEPHONE (OUTPATIENT)
Dept: GASTROENTEROLOGY | Facility: MEDICAL CENTER | Age: 63
End: 2023-12-01

## 2023-12-01 ENCOUNTER — CONSULT (OUTPATIENT)
Dept: GASTROENTEROLOGY | Facility: MEDICAL CENTER | Age: 63
End: 2023-12-01
Payer: COMMERCIAL

## 2023-12-01 VITALS
HEIGHT: 68 IN | SYSTOLIC BLOOD PRESSURE: 128 MMHG | DIASTOLIC BLOOD PRESSURE: 80 MMHG | HEART RATE: 75 BPM | BODY MASS INDEX: 31.07 KG/M2 | WEIGHT: 205 LBS | TEMPERATURE: 98.2 F

## 2023-12-01 DIAGNOSIS — Z12.11 COLON CANCER SCREENING: ICD-10-CM

## 2023-12-01 PROCEDURE — 99203 OFFICE O/P NEW LOW 30 MIN: CPT | Performed by: NURSE PRACTITIONER

## 2023-12-01 RX ORDER — POLYETHYLENE GLYCOL 3350, SODIUM SULFATE ANHYDROUS, SODIUM BICARBONATE, SODIUM CHLORIDE, POTASSIUM CHLORIDE 236; 22.74; 6.74; 5.86; 2.97 G/4L; G/4L; G/4L; G/4L; G/4L
4000 POWDER, FOR SOLUTION ORAL ONCE
Qty: 4000 ML | Refills: 0 | Status: SHIPPED | OUTPATIENT
Start: 2023-12-01 | End: 2023-12-01

## 2023-12-01 NOTE — PROGRESS NOTES
[facility] - Outpatient Consultation  Allan Wright 59 y.o. male MRN: [id number]  Encounter: [id number]          ASSESSMENT AND PLAN:    Allan Wright is a 59 y.o. male who presents for colon cancer screening    1. Colon cancer screening     He is seen today to set up screening colonoscopy. Never had a colonoscopy before. BMs are brown and formed 1-2 times per day. Occasionally soft after dialysis. Denies any melena hematochezia. No abdominal pain or weight loss. Denies any heartburn or reflux. No family history of any colon cancers or polyps. He is on hemodialysis and is on Eliquis. He will need to be done in the hospital setting.    -Colonoscopy with GoLytely/Dulcolax prep on a nondialysis day  -We will need to check about holding his Eliquis prior to colonoscopy    I reviewed with patient/family potential risks of endoscopic evaluation including possible infection, bleeding or perforation. Patient/family verbalized understanding of potential risks and agreed to procedure(s). _________________________________________________________________    HPI:    Allan Wright is a 59 y.o. male who presents for colon cancer screening. He has a past medical history of ESRD on dialysis, renovascular hypertension, HLD, CHF, anemia due to CKD, alcohol abuse. He is seen today to set up screening colonoscopy. Never had a colonoscopy before. BMs are brown and formed 1-2 times per day. Occasionally soft after dialysis. Denies any melena hematochezia. No abdominal pain or weight loss. Denies any heartburn or reflux. No family history of any colon cancers or polyps. Labs 9/23-BMP normal other than creatinine 5.26, sodium 132 and chloride 91 GFR 10, hemoglobin 12.5, MCV 99    He gets hemodialysis Tuesday, Thursdays and Saturdays. He was diagnosed with CHF many years ago but reports that has been stable overall.   He is on Eliquis secondary to his dialysis and AV fistula occlusion in the past.      REVIEW OF SYSTEMS:    CONSTITUTIONAL: Denies any fever, chills, rigors, and weight loss. HEENT: No earache or tinnitus. Denies hearing loss or visual disturbances. CARDIOVASCULAR: No chest pain or palpitations. RESPIRATORY: Denies any cough, hemoptysis, shortness of breath or dyspnea on exertion. GASTROINTESTINAL: As noted in the History of Present Illness. GENITOURINARY: No problems with urination. Denies any hematuria or dysuria. NEUROLOGIC: No dizziness or vertigo, denies headaches. MUSCULOSKELETAL: Denies any muscle or joint pain. SKIN: Denies skin rashes or itching. ENDOCRINE: Denies excessive thirst. Denies intolerance to heat or cold. PSYCHOSOCIAL: Denies depression or anxiety. Denies any recent memory loss.        Historical Information   Past Medical History:   Diagnosis Date   • CHF (congestive heart failure) ([address])    • Chronic kidney disease    • ESRD (end stage renal disease) on dialysis ([address])     dialysis tues-thurs-sat   • Hyperkalemia 02/01/2022   • Hyperlipidemia    • Hypertension    • Personal history of COVID-19 07/2021    mild symptoms - recovered at home   • Renal disorder    • Withdrawal seizures ([address])     approximately 2018 when reducing alcohol     Past Surgical History:   Procedure Laterality Date   • HERNIA REPAIR      states umbilical   • IR AV FISTULAGRAM/GRAFTOGRAM  08/16/2022   • IR AV FISTULAGRAM/GRAFTOGRAM  11/11/2022   • IR AV FISTULAGRAM/GRAFTOGRAM  3/15/2023   • IR AV FISTULAGRAM/GRAFTOGRAM  4/14/2023   • IR AV FISTULAGRAM/GRAFTOGRAM  7/28/2023   • IR AV FISTULAGRAM/GRAFTOGRAM  9/13/2023   • IR BIOPSY BONE MARROW  07/13/2022   • IR PULMONARY ARTERY EMBOLIZATION  08/03/2022   • IR TUNNELED CENTRAL LINE REMOVAL  4/14/2023   • IR TUNNELED DIALYSIS CATHETER PLACEMENT  06/10/2022   • IR TUNNELED DIALYSIS CATHETER PLACEMENT  10/20/2022   • IR TUNNELED DIALYSIS CATHETER PLACEMENT     • [facility] ARTERIOVENOUS ANASTOMOSIS OPEN DIRECT Left 10/24/2022 Procedure: Creation of  L brachiobasilic fistula, ligation of L endoAVF; Surgeon: Jeffrey Otero MD;  Location: [facility];  Service: Vascular   • [place] ARTERIOVENOUS ANASTOMOSIS OPEN DIRECT Right 1/6/2023    Procedure: Creation right brachiocephalic fistula;   Surgeon: Jeffrey Otero MD;  Location: [facility];  Service: Vascular     Social History   Social History     Substance and Sexual Activity   Alcohol Use Yes   • Alcohol/week: 4.0 standard drinks of alcohol   • Types: 4 Cans of beer per week    Comment: has cut down to 4 per week     Social History     Substance and Sexual Activity   Drug Use Yes   • Frequency: 3.0 times per week   • Types: Marijuana    Comment: occasionally, last used monday     Social History     Tobacco Use   Smoking Status Every Day   • Packs/day: 0.25   • Years: 30.00   • Total pack years: 7.50   • Types: Cigarettes   Smokeless Tobacco Never   Tobacco Comments    Smokes 6 a day     Family History   Problem Relation Age of Onset   • Pancreatic cancer Mother    • Cervical cancer Sister        Meds/Allergies       Current Outpatient Medications:   •  amLODIPine (NORVASC) 5 mg tablet  •  aspirin (ECOTRIN LOW STRENGTH) 81 mg EC tablet  •  B Complex-C-Folic Acid (Karlee-Melissa) TABS  •  calcitriol (ROCALTROL) 0.25 mcg capsule  •  calcium acetate (PHOSLO) capsule  •  carvedilol (COREG) 25 mg tablet  •  ciclopirox (PENLAC) 8 % solution  •  cinacalcet (SENSIPAR) 30 mg tablet  •  furosemide (LASIX) 80 mg tablet  •  hydrOXYzine HCL (ATARAX) 10 mg tablet  •  polyethylene glycol (Golytely) 4000 mL solution  •  pravastatin (PRAVACHOL) 80 mg tablet  •  sevelamer carbonate (RENVELA) 800 mg tablet  •  sildenafil (VIAGRA) 50 MG tablet  •  apixaban (Eliquis) 2.5 mg  •  cholecalciferol (VITAMIN D3) 1,000 units tablet  •  EPINEPHrine (EPIPEN) 0.3 mg/0.3 mL SOAJ    Allergies   Allergen Reactions   • Ibuprofen Other (See Comments)     ESRD - cannot have ibuprofen           Objective     Blood pressure 128/80, pulse 75, temperature 98.2 °F (36.8 °C), temperature source Tympanic, height 5' 8" (1.727 m), weight 93 kg (205 lb). Body mass index is 31.17 kg/m². PHYSICAL EXAM:      General Appearance:   Alert, cooperative, no distress   HEENT:   Normocephalic, atraumatic, anicteric. Neck:  Supple, symmetrical, trachea midline   Lungs:   Clear to auscultation bilaterally; no rales, rhonchi or wheezing; respirations unlabored    Heart[de-identified]   Regular rate and rhythm; no murmur, rub, or gallop. Abdomen:   Soft, non-tender, non-distended; normal bowel sounds; no masses, no organomegaly    Genitalia:   Deferred    Rectal:   Deferred    Extremities:  No cyanosis, clubbing or edema    Pulses:  2+ and symmetric    Skin:  No jaundice, rashes, or lesions    Lymph nodes:  No palpable cervical lymphadenopathy        Lab Results:   No visits with results within 1 Day(s) from this visit.    Latest known visit with results is:   Admission on 09/28/2023, Discharged on 09/28/2023   Component Date Value   • Ventricular Rate 09/28/2023 74    • Atrial Rate 09/28/2023 74    • WY Interval 09/28/2023 188    • QRSD Interval 09/28/2023 80    • QT Interval 09/28/2023 410    • QTC Interval 09/28/2023 455    • P Axis 09/28/2023 51    • QRS Axis 09/28/2023 37    • T Wave Axis 09/28/2023 49    • WBC 09/28/2023 5.71    • RBC 09/28/2023 3.65 (L)    • Hemoglobin 09/28/2023 12.5    • Hematocrit 09/28/2023 36.2 (L)    • MCV 09/28/2023 99 (H)    • MCH 09/28/2023 34.2    • MCHC 09/28/2023 34.5    • RDW 09/28/2023 14.3    • MPV 09/28/2023 8.8 (L)    • Platelets 04/91/0486 290    • nRBC 09/28/2023 0    • Neutrophils Relative 09/28/2023 53    • Immat GRANS % 09/28/2023 1    • Lymphocytes Relative 09/28/2023 28    • Monocytes Relative 09/28/2023 10    • Eosinophils Relative 09/28/2023 6    • Basophils Relative 09/28/2023 2 (H)    • Neutrophils Absolute 09/28/2023 3.16    • Immature Grans Absolute 09/28/2023 0.03    • Lymphocytes Absolute 09/28/2023 1.57    • Monocytes Absolute 09/28/2023 0.54    • Eosinophils Absolute 09/28/2023 0.32    • Basophils Absolute 09/28/2023 0.09    • Sodium 09/28/2023 132 (L)    • Potassium 09/28/2023 3.7    • Chloride 09/28/2023 91 (L)    • CO2 09/28/2023 29    • ANION GAP 09/28/2023 12    • BUN 09/28/2023 22    • Creatinine 09/28/2023 5.26 (H)    • Glucose 09/28/2023 122    • Calcium 09/28/2023 9.4    • eGFR 09/28/2023 10    • hs TnI 0hr 09/28/2023 8        Lab Results   Component Value Date    WBC 5.71 09/28/2023    HGB 12.5 09/28/2023    HCT 36.2 (L) 09/28/2023    MCV 99 (H) 09/28/2023     09/28/2023       Lab Results   Component Value Date    SODIUM 132 (L) 09/28/2023    K 3.7 09/28/2023    CL 91 (L) 09/28/2023    CO2 29 09/28/2023    AGAP 12 09/28/2023    BUN 22 09/28/2023    CREATININE 5.26 (H) 09/28/2023    GLUC 122 09/28/2023    GLUF 83 10/24/2022    CALCIUM 9.4 09/28/2023    AST 19 10/20/2022    ALT 17 10/20/2022    ALKPHOS 65 10/20/2022    TP 7.2 10/20/2022    TBILI 0.42 10/20/2022    EGFR 10 09/28/2023       No results found for: "CRP"    Lab Results   Component Value Date    JVM7BUDPYHMN 0.663 02/19/2018       Lab Results   Component Value Date    IRON 76 06/11/2022    TIBC 128 (L) 06/11/2022    FERRITIN 619 (H) 06/11/2022       Radiology Results:   No results found.

## 2023-12-01 NOTE — TELEPHONE ENCOUNTER
BLOOD THINNER CLEARANCE     Our mutual patient is scheduled for procedure: Colon     On: 1/22/2023      With: Dr. Amber Wellington     He is taking the following blood thinner: Eliquis                                              Can this be stopped 2 days prior to the procedure?             Physician Approving clearance: ________________________

## 2023-12-06 ENCOUNTER — TELEPHONE (OUTPATIENT)
Dept: VASCULAR SURGERY | Facility: CLINIC | Age: 63
End: 2023-12-06

## 2023-12-06 DIAGNOSIS — T82.898D ARTERIOVENOUS FISTULA OCCLUSION, SUBSEQUENT ENCOUNTER: ICD-10-CM

## 2023-12-06 DIAGNOSIS — I77.0 ARTERIOVENOUS FISTULA, ACQUIRED (HCC): Primary | ICD-10-CM

## 2023-12-06 DIAGNOSIS — Z98.890 S/P ARTERIOVENOUS (AV) FISTULA CREATION: ICD-10-CM

## 2023-12-06 NOTE — TELEPHONE ENCOUNTER
Pt called the office. He is scheduled for a colonoscopy 1/22/23 and they are requesting to hold eliquis for 2 days prior to procedure. Please advise.

## 2023-12-12 NOTE — TELEPHONE ENCOUNTER
Gregorio Wray Doctor, MD Abhinav Goldman, [id number] hours ago (4:49 PM)     LD  Fine to hold anticoagulation for colonoscopy  L

## 2024-01-04 ENCOUNTER — HOSPITAL ENCOUNTER (OUTPATIENT)
Dept: MAMMOGRAPHY | Facility: CLINIC | Age: 64
End: 2024-01-04
Payer: COMMERCIAL

## 2024-01-04 VITALS — WEIGHT: 205 LBS | HEIGHT: 68 IN | BODY MASS INDEX: 31.07 KG/M2

## 2024-01-04 DIAGNOSIS — N63.42 SUBAREOLAR MASS OF LEFT BREAST: ICD-10-CM

## 2024-01-04 DIAGNOSIS — N63.20 MASS OF LEFT BREAST: ICD-10-CM

## 2024-01-04 PROCEDURE — 76642 ULTRASOUND BREAST LIMITED: CPT

## 2024-01-04 PROCEDURE — G0279 TOMOSYNTHESIS, MAMMO: HCPCS

## 2024-01-04 PROCEDURE — 77066 DX MAMMO INCL CAD BI: CPT

## 2024-01-06 ENCOUNTER — TELEPHONE (OUTPATIENT)
Dept: RADIOLOGY | Facility: HOSPITAL | Age: 64
End: 2024-01-06

## 2024-01-06 ENCOUNTER — PREP FOR PROCEDURE (OUTPATIENT)
Dept: INTERVENTIONAL RADIOLOGY/VASCULAR | Facility: CLINIC | Age: 64
End: 2024-01-06

## 2024-01-06 ENCOUNTER — HOSPITAL ENCOUNTER (EMERGENCY)
Facility: HOSPITAL | Age: 64
Discharge: HOME | End: 2024-01-06
Attending: EMERGENCY MEDICINE | Admitting: EMERGENCY MEDICINE
Payer: COMMERCIAL

## 2024-01-06 VITALS
BODY MASS INDEX: 30.6 KG/M2 | WEIGHT: 201.28 LBS | TEMPERATURE: 97.7 F | SYSTOLIC BLOOD PRESSURE: 137 MMHG | OXYGEN SATURATION: 100 % | DIASTOLIC BLOOD PRESSURE: 66 MMHG | HEART RATE: 70 BPM | RESPIRATION RATE: 16 BRPM

## 2024-01-06 DIAGNOSIS — N18.6 ESRD (END STAGE RENAL DISEASE) ON DIALYSIS (HCC): ICD-10-CM

## 2024-01-06 DIAGNOSIS — T82.898A ARTERIOVENOUS FISTULA OCCLUSION, INITIAL ENCOUNTER (HCC): Primary | ICD-10-CM

## 2024-01-06 DIAGNOSIS — Z99.2 ESRD (END STAGE RENAL DISEASE) ON DIALYSIS (HCC): Primary | ICD-10-CM

## 2024-01-06 DIAGNOSIS — Z99.2 ESRD (END STAGE RENAL DISEASE) ON DIALYSIS (HCC): ICD-10-CM

## 2024-01-06 DIAGNOSIS — N18.6 ESRD (END STAGE RENAL DISEASE) ON DIALYSIS (HCC): Primary | ICD-10-CM

## 2024-01-06 LAB
ANION GAP SERPL CALCULATED.3IONS-SCNC: 15 MMOL/L
BASOPHILS # BLD AUTO: 0.06 THOUSANDS/ÂΜL (ref 0–0.1)
BASOPHILS NFR BLD AUTO: 1 % (ref 0–1)
BUN SERPL-MCNC: 78 MG/DL (ref 5–25)
CALCIUM SERPL-MCNC: 9.1 MG/DL (ref 8.4–10.2)
CHLORIDE SERPL-SCNC: 90 MMOL/L (ref 96–108)
CO2 SERPL-SCNC: 25 MMOL/L (ref 21–32)
CREAT SERPL-MCNC: 13.7 MG/DL (ref 0.6–1.3)
EOSINOPHIL # BLD AUTO: 0.24 THOUSAND/ÂΜL (ref 0–0.61)
EOSINOPHIL NFR BLD AUTO: 3 % (ref 0–6)
ERYTHROCYTE [DISTWIDTH] IN BLOOD BY AUTOMATED COUNT: 13.9 % (ref 11.6–15.1)
GFR SERPL CREATININE-BSD FRML MDRD: 3 ML/MIN/1.73SQ M
GLUCOSE SERPL-MCNC: 92 MG/DL (ref 65–140)
HCT VFR BLD AUTO: 29.9 % (ref 36.5–49.3)
HGB BLD-MCNC: 10 G/DL (ref 12–17)
IMM GRANULOCYTES # BLD AUTO: 0.05 THOUSAND/UL (ref 0–0.2)
IMM GRANULOCYTES NFR BLD AUTO: 1 % (ref 0–2)
LYMPHOCYTES # BLD AUTO: 2.56 THOUSANDS/ÂΜL (ref 0.6–4.47)
LYMPHOCYTES NFR BLD AUTO: 27 % (ref 14–44)
MCH RBC QN AUTO: 33.1 PG (ref 26.8–34.3)
MCHC RBC AUTO-ENTMCNC: 33.4 G/DL (ref 31.4–37.4)
MCV RBC AUTO: 99 FL (ref 82–98)
MONOCYTES # BLD AUTO: 0.98 THOUSAND/ÂΜL (ref 0.17–1.22)
MONOCYTES NFR BLD AUTO: 11 % (ref 4–12)
NEUTROPHILS # BLD AUTO: 5.45 THOUSANDS/ÂΜL (ref 1.85–7.62)
NEUTS SEG NFR BLD AUTO: 57 % (ref 43–75)
NRBC BLD AUTO-RTO: 0 /100 WBCS
PLATELET # BLD AUTO: 236 THOUSANDS/UL (ref 149–390)
PMV BLD AUTO: 8.8 FL (ref 8.9–12.7)
POTASSIUM SERPL-SCNC: 4.6 MMOL/L (ref 3.5–5.3)
RBC # BLD AUTO: 3.02 MILLION/UL (ref 3.88–5.62)
SODIUM SERPL-SCNC: 130 MMOL/L (ref 135–147)
WBC # BLD AUTO: 9.34 THOUSAND/UL (ref 4.31–10.16)

## 2024-01-06 PROCEDURE — 36415 COLL VENOUS BLD VENIPUNCTURE: CPT | Performed by: EMERGENCY MEDICINE

## 2024-01-06 PROCEDURE — 85025 COMPLETE CBC W/AUTO DIFF WBC: CPT | Performed by: EMERGENCY MEDICINE

## 2024-01-06 PROCEDURE — 99284 EMERGENCY DEPT VISIT MOD MDM: CPT | Performed by: EMERGENCY MEDICINE

## 2024-01-06 PROCEDURE — 80048 BASIC METABOLIC PNL TOTAL CA: CPT | Performed by: EMERGENCY MEDICINE

## 2024-01-06 PROCEDURE — 99283 EMERGENCY DEPT VISIT LOW MDM: CPT

## 2024-01-06 RX ORDER — SODIUM CHLORIDE 9 MG/ML
50 INJECTION, SOLUTION INTRAVENOUS CONTINUOUS
OUTPATIENT
Start: 2024-01-06

## 2024-01-06 NOTE — ED PROVIDER NOTES
History  Chief Complaint   Patient presents with    Medical Problem     Pt reports he went dialysis today and was told his fistula is not working. Denies pain/complaints.      A 62 yo male with pmhx of ESRD on HD (Tues, Thurs, Sat), RUE fistula (with prior occlusions, on eliquis), CHF, HLD and HTN; presents from dialysis with concerns that his fistula is occluded.  Patient last received dialysis on Thursday.  He is currently asymptomatic.  He denies fever, chills, chest pain, shortness of breath, abdominal pain, nausea, vomiting, diarrhea, peripheral edema and rashes.      History provided by:  Patient and medical records  Medical Problem    Prior to Admission Medications   Prescriptions Last Dose Informant Patient Reported? Taking?   B Complex-C-Folic Acid (Karlee-Melissa) TABS  Self No No   Sig: Take 1 tablet by mouth daily   EPINEPHrine (EPIPEN) 0.3 mg/0.3 mL SOAJ  Self No No   Sig: Inject 0.3 mL (0.3 mg total) into a muscle once for 1 dose   amLODIPine (NORVASC) 5 mg tablet  Self No No   Sig: Take 1 tablet (5 mg total) by mouth daily   apixaban (Eliquis) 2.5 mg  Self No No   Sig: Take 1 tablet (2.5 mg total) by mouth 2 (two) times a day   aspirin (ECOTRIN LOW STRENGTH) 81 mg EC tablet  Self No No   Sig: Take 1 tablet (81 mg total) by mouth daily   calcitriol (ROCALTROL) 0.25 mcg capsule  Self No No   Sig: Take 2 capsules (0.5 mcg total) by mouth 3 (three) times a week   calcium acetate (PHOSLO) capsule  Self No No   Sig: TAKE 3 CAPSULES(2001 MG) BY MOUTH THREE TIMES DAILY WITH MEALS   carvedilol (COREG) 25 mg tablet  Self No No   Sig: Take 1 tablet (25 mg total) by mouth 2 (two) times a day with meals   cholecalciferol (VITAMIN D3) 1,000 units tablet  Self No No   Sig: Take 4 tablets (4,000 Units total) by mouth daily   ciclopirox (PENLAC) 8 % solution  Self Yes No   Sig: APPLY EXTERNALLY TO AFFECTED TOENAILS DAILY AS DIRECTED   cinacalcet (SENSIPAR) 30 mg tablet  Self No No   Sig: Take 1 tablet (30 mg total) by  mouth once a week   furosemide (LASIX) 80 mg tablet  Self No No   Sig: Take 2 tablets (160 mg total) by mouth daily   hydrOXYzine HCL (ATARAX) 10 mg tablet  Self No No   Sig: Take 1 tablet (10 mg total) by mouth every 6 (six) hours as needed for itching   polyethylene glycol (Golytely) 4000 mL solution   No No   Sig: Take 4,000 mL by mouth once for 1 dose Take 4000 mL by mouth once for 1 dose. Use as directed   pravastatin (PRAVACHOL) 80 mg tablet  Self No No   Sig: TAKE 1 TABLET(80 MG) BY MOUTH DAILY WITH DINNER   sevelamer carbonate (RENVELA) 800 mg tablet  Self No No   Sig: Take 2 tablets (1,600 mg total) by mouth 3 (three) times a day with meals   sildenafil (VIAGRA) 50 MG tablet  Self No No   Sig: Take 1 tablet (50 mg total) by mouth daily as needed for erectile dysfunction      Facility-Administered Medications: None       Past Medical History:   Diagnosis Date    CHF (congestive heart failure) (Formerly [facility])     Chronic kidney disease     ESRD (end stage renal disease) on dialysis (Formerly [facility])     dialysis [facility]-sat    Hyperkalemia 02/01/2022    Hyperlipidemia     Hypertension     Personal history of COVID-19 07/2021    mild symptoms - recovered at home    Renal disorder     Withdrawal seizures (HCC)     approximately 2018 when reducing alcohol       Past Surgical History:   Procedure Laterality Date    HERNIA REPAIR      states umbilical    IR AV FISTULAGRAM/GRAFTOGRAM  08/16/2022    IR AV FISTULAGRAM/GRAFTOGRAM  11/11/2022    IR AV FISTULAGRAM/GRAFTOGRAM  3/15/2023    IR AV FISTULAGRAM/GRAFTOGRAM  4/14/2023    IR AV FISTULAGRAM/GRAFTOGRAM  7/28/2023    IR AV FISTULAGRAM/GRAFTOGRAM  9/13/2023    IR BIOPSY BONE MARROW  07/13/2022    IR PULMONARY ARTERY EMBOLIZATION  08/03/2022    IR TUNNELED CENTRAL LINE REMOVAL  4/14/2023    IR TUNNELED DIALYSIS CATHETER PLACEMENT  06/10/2022    IR TUNNELED DIALYSIS CATHETER PLACEMENT  10/20/2022    IR TUNNELED DIALYSIS CATHETER PLACEMENT      NM ARTERIOVENOUS ANASTOMOSIS OPEN DIRECT  Left 10/24/2022    Procedure: Creation of  L brachiobasilic fistula, ligation of L endoAVF;  Surgeon: Dot Otero MD;  Location: [facility];  Service: Vascular    [place] ARTERIOVENOUS ANASTOMOSIS OPEN DIRECT Right 1/6/2023    Procedure: Creation right brachiocephalic fistula;  Surgeon: Dot Otero MD;  Location: [facility];  Service: Vascular       Family History   Problem Relation Age of Onset    Pancreatic cancer Mother     No Known Problems Father     Cervical cancer Sister     No Known Problems Daughter     Breast cancer Maternal Grandmother 62    No Known Problems Maternal Grandfather     No Known Problems Paternal Grandmother     No Known Problems Paternal Grandfather      I have reviewed and agree with the history as documented.    E-Cigarette/Vaping    E-Cigarette Use Never User      E-Cigarette/Vaping Substances    Nicotine No     THC No     CBD No     Flavoring No     Other No     Unknown No      Social History     Tobacco Use    Smoking status: Every Day     Current packs/day: 0.25     Average packs/day: 0.3 packs/day for 30.0 years (7.5 ttl pk-yrs)     Types: Cigarettes    Smokeless tobacco: Never    Tobacco comments:     Smokes 6 a day   Vaping Use    Vaping status: Never Used   Substance Use Topics    Alcohol use: Yes     Alcohol/week: 4.0 standard drinks of alcohol     Types: 4 Cans of beer per week     Comment: has cut down to 4 per week    Drug use: Yes     Frequency: 3.0 times per week     Types: Marijuana     Comment: occasionally, last used monday       Review of Systems   All other systems reviewed and are negative.      Physical Exam  Physical Exam  General Appearance: alert and oriented, nad, non toxic appearing  Skin:  Warm, dry, intact.  No cyanosis  HEENT: Atraumatic, normocephalic.  No eye drainage.  Normal hearing.  Moist mucous membranes.    Neck: Supple, trachea midline  Cardiac: RRR; no murmurs, rub, gallops.  No pedal edema, 2+ pulses  Pulmonary: lungs CTAB; no wheezes, rales,  rhonchi  Gastrointestinal: abdomen soft, nontender, nondistended; no guarding or rebound tenderness; good bowel sounds, no mass or bruits  Extremities:  RUE fistula with palpable pulse, no thrill appreciated.  No deformities.  No calf tenderness, no clubbing  Neuro:  no focal motor or sensory deficits, CN 2-12 grossly intact  Psych:  Normal mood and affect, normal judgement and insight      Vital Signs  ED Triage Vitals   Temperature Pulse Respirations Blood Pressure SpO2   01/06/24 0800 01/06/24 0801 01/06/24 0800 01/06/24 0801 01/06/24 0800   97.7 °F (36.5 °C) 75 18 159/80 97 %      Temp Source Heart Rate Source Patient Position - Orthostatic VS BP Location FiO2 (%)   01/06/24 0800 01/06/24 0800 01/06/24 0800 01/06/24 0800 --   Oral Monitor Sitting Right arm       Pain Score       01/06/24 0800       No Pain           Vitals:    01/06/24 0800 01/06/24 0801 01/06/24 0907   BP:  159/80 137/66   Pulse:  75 70   Patient Position - Orthostatic VS: Sitting  Lying         Visual Acuity      ED Medications  Medications - No data to display    Diagnostic Studies  Results Reviewed       Procedure Component Value Units Date/Time    Basic metabolic panel [[id number]]  (Abnormal) Collected: 01/06/24 0902    Lab Status: Final result Specimen: Blood from Arm, Left Updated: 01/06/24 0927     Sodium 130 mmol/L      Potassium 4.6 mmol/L      Chloride 90 mmol/L      CO2 25 mmol/L      ANION GAP 15 mmol/L      BUN 78 mg/dL      Creatinine 13.70 mg/dL      Glucose 92 mg/dL      Calcium 9.1 mg/dL      eGFR 3 ml/min/1.73sq m     Narrative:      National Kidney Disease Foundation guidelines for Chronic Kidney Disease (CKD):     Stage 1 with normal or high GFR (GFR > 90 mL/min/1.73 square meters)    Stage 2 Mild CKD (GFR = 60-89 mL/min/1.73 square meters)    Stage 3A Moderate CKD (GFR = 45-59 mL/min/1.73 square meters)    Stage 3B Moderate CKD (GFR = 30-44 mL/min/1.73 square meters)    Stage 4 Severe CKD (GFR = 15-29 mL/min/1.73 square  meters)    Stage 5 End Stage CKD (GFR <15 mL/min/1.73 square meters)  Note: GFR calculation is accurate only with a steady state creatinine    CBC and differential [[id number]]  (Abnormal) Collected: 01/06/24 0902    Lab Status: Final result Specimen: Blood from Arm, Left Updated: 01/06/24 0909     WBC 9.34 Thousand/uL      RBC 3.02 Million/uL      Hemoglobin 10.0 g/dL      Hematocrit 29.9 %      MCV 99 fL      MCH 33.1 pg      MCHC 33.4 g/dL      RDW 13.9 %      MPV 8.8 fL      Platelets 236 Thousands/uL      nRBC 0 /100 WBCs      Neutrophils Relative 57 %      Immat GRANS % 1 %      Lymphocytes Relative 27 %      Monocytes Relative 11 %      Eosinophils Relative 3 %      Basophils Relative 1 %      Neutrophils Absolute 5.45 Thousands/µL      Immature Grans Absolute 0.05 Thousand/uL      Lymphocytes Absolute 2.56 Thousands/µL      Monocytes Absolute 0.98 Thousand/µL      Eosinophils Absolute 0.24 Thousand/µL      Basophils Absolute 0.06 Thousands/µL                    IR AV fistulagram/graftogram    (Results Pending)              Procedures  Procedures         ED Course  ED Course as of 01/06/24 1415   Sat Jan 06, 2024   0831 Spoke with Dr. Huston, IR, reviewing pt's history, presentation and work up.  Unsure if fistulogram could be done today, may need to be scheduled as OP.  Will check labs now.   0834 Spoke with lane Rizo, reviewing pt's history, presentation and work up.  If pt remains asymptomatic and labs are stable, pt can be scheduled for OP fistulogram with IR on Monday.  Resume dialysis on Tuesday.   0913 Hemoglobin(!): 10.0  Stable    0934 Spoke with lane Rizo, in regards to labs.  Pt can be scheduled for IR evaluation on Monday, in order for dialysis on Tuesday.   0948 Spoke with Dr. Huston, IR - plan for OP evaluation on Monday.  Pt to arrive at 11 am and remain NPO.                                SBIRT 22yo+      Flowsheet Row Most Recent Value   Initial Alcohol Screen: US AUDIT-C      1. How often do you have a drink containing alcohol? 0 Filed at: 01/06/2024 0907   2. How many drinks containing alcohol do you have on a typical day you are drinking?  0 Filed at: 01/06/2024 0907   3a. Male UNDER 65: How often do you have five or more drinks on one occasion? 0 Filed at: 01/06/2024 0907   3b. FEMALE Any Age, or MALE 65+: How often do you have 4 or more drinks on one occassion? 0 Filed at: 01/06/2024 0907   Audit-C Score 0 Filed at: 01/06/2024 0907   EDDY: How many times in the past year have you...    Used an illegal drug or used a prescription medication for non-medical reasons? Never Filed at: 01/06/2024 0907                      Medical Decision Making  A 63-year-old male presents with concern for an occluded right upper extremity dialysis fistula.  He currently offers no complaints, last received dialysis on Thursday.  Will discuss with IR for further management.    Labs stable, pt remains asymptomatic.  Pt scheduled for OP IR fistulogram on Monday.  Pt in agreement.    Amount and/or Complexity of Data Reviewed  Labs: ordered. Decision-making details documented in ED Course.             Disposition  Final diagnoses:   Arteriovenous fistula occlusion, initial encounter (HCC)     Time reflects when diagnosis was documented in both MDM as applicable and the Disposition within this note       Time User Action Codes Description Comment    1/6/2024  8:26 AM Helen Walton Add [N18.6,  Z99.2] ESRD (end stage renal disease) on dialysis (HCC)     1/6/2024  9:44 AM Angela Young Add [T82.898A] Arteriovenous fistula occlusion, initial encounter (HCC)           ED Disposition       ED Disposition   Discharge    Condition   Stable    Date/Time   Sat Jan 6, 2024 0948    Comment   Jeffery Phillips discharge to home/self care.                   Follow-up Information       Follow up With Specialties Details Why Contact Info Additional Information    [facility] Interventional Radiology  Radiology Go on 1/8/2024 arrive at 11 am [address] [place] [id number]  [phone] Formerly [facility] Interventional Radiology, [address] [place] [address], [id number]    [phone]  Your appointment is located at [facility], [address] [place] [address], [facility]. Please park in the parking deck located behind the hospital. From [facility] turn at [address] [place] [address] toward the hospital and make the next right onto [facility]. The entrance for the parking deck will be straight ahead. Once inside the parking deck proceed to the elevators and take the elevators to the 1st floor where you will be greeted by a  who will help you find your location.   Appointments after 8 PM will require you to park in the Emergency Department parking lot which is located at [address] [place] [address]. Please enter the Emergency Department entrance and a  will help you find your location.     [facility] Emergency Department Emergency Medicine Go to  If symptoms worsen [address] [place] [id number]  [phone] [facility] Emergency Department, [address] [place] [address], [id number]            Discharge Medication List as of 1/6/2024  9:49 AM        CONTINUE these medications which have NOT CHANGED    Details   amLODIPine (NORVASC) 5 mg tablet Take 1 tablet (5 mg total) by mouth daily, Starting Thu 10/19/2023, Normal      apixaban (Eliquis) 2.5 mg Take 1 tablet (2.5 mg total) by mouth 2 (two) times a day, Starting Fri 8/11/2023, Until Wed 11/29/2023, Normal      aspirin (ECOTRIN LOW STRENGTH) 81 mg EC tablet Take 1 tablet (81 mg total) by mouth daily, Starting Fri 6/24/2022, Normal      B Complex-C-Folic Acid (Karlee-Melissa) TABS Take 1 tablet by mouth daily, Starting Tue 5/9/2023, Normal      calcitriol (ROCALTROL) 0.25 mcg capsule Take 2 capsules (0.5 mcg total) by mouth 3 (three) times a week,  Starting Fri 1/27/2023, No Print      calcium acetate (PHOSLO) capsule TAKE 3 CAPSULES(2001 MG) BY MOUTH THREE TIMES DAILY WITH MEALS, Normal      carvedilol (COREG) 25 mg tablet Take 1 tablet (25 mg total) by mouth 2 (two) times a day with meals, Starting Fri 6/24/2022, No Print      cholecalciferol (VITAMIN D3) 1,000 units tablet Take 4 tablets (4,000 Units total) by mouth daily, Starting Wed 6/15/2022, Until Wed 11/29/2023, Normal      ciclopirox (PENLAC) 8 % solution APPLY EXTERNALLY TO AFFECTED TOENAILS DAILY AS DIRECTED, Historical Med      cinacalcet (SENSIPAR) 30 mg tablet Take 1 tablet (30 mg total) by mouth once a week, Starting Tue 3/7/2023, Normal      EPINEPHrine (EPIPEN) 0.3 mg/0.3 mL SOAJ Inject 0.3 mL (0.3 mg total) into a muscle once for 1 dose, Starting Fri 2/4/2022, Print      furosemide (LASIX) 80 mg tablet Take 2 tablets (160 mg total) by mouth daily, Starting Tue 5/16/2023, Normal      hydrOXYzine HCL (ATARAX) 10 mg tablet Take 1 tablet (10 mg total) by mouth every 6 (six) hours as needed for itching, Starting Fri 11/3/2023, Normal      polyethylene glycol (Golytely) 4000 mL solution Take 4,000 mL by mouth once for 1 dose Take 4000 mL by mouth once for 1 dose. Use as directed, Starting Fri 12/1/2023, Normal      pravastatin (PRAVACHOL) 80 mg tablet TAKE 1 TABLET(80 MG) BY MOUTH DAILY WITH DINNER, Normal      sevelamer carbonate (RENVELA) 800 mg tablet Take 2 tablets (1,600 mg total) by mouth 3 (three) times a day with meals, Starting Thu 8/31/2023, No Print      sildenafil (VIAGRA) 50 MG tablet Take 1 tablet (50 mg total) by mouth daily as needed for erectile dysfunction, Starting Fri 11/3/2023, Print             Outpatient Discharge Orders   IR AV fistulagram/graftogram   Standing Status: Future Standing Exp. Date: 01/06/28       PDMP Review       None            ED Provider  Electronically Signed by             Angela Young DO  01/06/24 3883

## 2024-01-06 NOTE — DISCHARGE INSTRUCTIONS
You are scheduled with Interventional Radiology at [facility] on Monday.  You will need to arrive at 11 am.    You will need to fast, NOTHING TO EAT OR DRINK AFTER MIDNIGHT!    Return to the ED, if you develop shortness of breath, chest pain, fatigue or nausea/vomiting.

## 2024-01-08 ENCOUNTER — HOSPITAL ENCOUNTER (OUTPATIENT)
Dept: RADIOLOGY | Facility: HOSPITAL | Age: 64
Discharge: HOME | End: 2024-01-08
Attending: RADIOLOGY
Payer: COMMERCIAL

## 2024-01-08 VITALS
HEART RATE: 64 BPM | OXYGEN SATURATION: 100 % | SYSTOLIC BLOOD PRESSURE: 192 MMHG | DIASTOLIC BLOOD PRESSURE: 90 MMHG | RESPIRATION RATE: 17 BRPM

## 2024-01-08 VITALS
DIASTOLIC BLOOD PRESSURE: 80 MMHG | HEART RATE: 65 BPM | SYSTOLIC BLOOD PRESSURE: 146 MMHG | RESPIRATION RATE: 16 BRPM | TEMPERATURE: 97 F | HEIGHT: 68 IN | BODY MASS INDEX: 31.17 KG/M2 | OXYGEN SATURATION: 100 % | WEIGHT: 205.69 LBS

## 2024-01-08 DIAGNOSIS — N18.6 ESRD (END STAGE RENAL DISEASE) ON DIALYSIS (HCC): ICD-10-CM

## 2024-01-08 DIAGNOSIS — Z99.2 ESRD (END STAGE RENAL DISEASE) ON DIALYSIS (HCC): ICD-10-CM

## 2024-01-08 PROCEDURE — C1757 CATH, THROMBECTOMY/EMBOLECT: HCPCS

## 2024-01-08 PROCEDURE — C1894 INTRO/SHEATH, NON-LASER: HCPCS

## 2024-01-08 PROCEDURE — 99153 MOD SED SAME PHYS/QHP EA: CPT

## 2024-01-08 PROCEDURE — 77001 FLUOROGUIDE FOR VEIN DEVICE: CPT | Performed by: RADIOLOGY

## 2024-01-08 PROCEDURE — C1769 GUIDE WIRE: HCPCS

## 2024-01-08 PROCEDURE — 36905 THRMBC/NFS DIALYSIS CIRCUIT: CPT | Performed by: RADIOLOGY

## 2024-01-08 PROCEDURE — 99152 MOD SED SAME PHYS/QHP 5/>YRS: CPT

## 2024-01-08 PROCEDURE — 36905 THRMBC/NFS DIALYSIS CIRCUIT: CPT

## 2024-01-08 PROCEDURE — C1725 CATH, TRANSLUMIN NON-LASER: HCPCS

## 2024-01-08 PROCEDURE — 76937 US GUIDE VASCULAR ACCESS: CPT

## 2024-01-08 PROCEDURE — 76937 US GUIDE VASCULAR ACCESS: CPT | Performed by: RADIOLOGY

## 2024-01-08 PROCEDURE — 36558 INSERT TUNNELED CV CATH: CPT | Performed by: RADIOLOGY

## 2024-01-08 PROCEDURE — 99152 MOD SED SAME PHYS/QHP 5/>YRS: CPT | Performed by: RADIOLOGY

## 2024-01-08 PROCEDURE — 36558 INSERT TUNNELED CV CATH: CPT

## 2024-01-08 RX ORDER — CEFAZOLIN SODIUM 2 G/50ML
SOLUTION INTRAVENOUS
Status: COMPLETED | OUTPATIENT
Start: 2024-01-08 | End: 2024-01-08

## 2024-01-08 RX ORDER — SODIUM CHLORIDE 9 MG/ML
50 INJECTION, SOLUTION INTRAVENOUS CONTINUOUS
Status: DISCONTINUED | OUTPATIENT
Start: 2024-01-08 | End: 2024-01-09 | Stop reason: HOSPADM

## 2024-01-08 RX ORDER — HEPARIN SODIUM 1000 [USP'U]/ML
INJECTION, SOLUTION INTRAVENOUS; SUBCUTANEOUS AS NEEDED
Status: COMPLETED | OUTPATIENT
Start: 2024-01-08 | End: 2024-01-08

## 2024-01-08 RX ORDER — FENTANYL CITRATE 50 UG/ML
INJECTION, SOLUTION INTRAMUSCULAR; INTRAVENOUS AS NEEDED
Status: COMPLETED | OUTPATIENT
Start: 2024-01-08 | End: 2024-01-08

## 2024-01-08 RX ORDER — MIDAZOLAM HYDROCHLORIDE 2 MG/2ML
INJECTION, SOLUTION INTRAMUSCULAR; INTRAVENOUS AS NEEDED
Status: COMPLETED | OUTPATIENT
Start: 2024-01-08 | End: 2024-01-08

## 2024-01-08 RX ADMIN — MIDAZOLAM 0.5 MG: 1 INJECTION INTRAMUSCULAR; INTRAVENOUS at 14:52

## 2024-01-08 RX ADMIN — HEPARIN SODIUM 3000 UNITS: 1000 INJECTION INTRAVENOUS; SUBCUTANEOUS at 14:48

## 2024-01-08 RX ADMIN — IOHEXOL 75 ML: 350 INJECTION, SOLUTION INTRAVENOUS at 16:22

## 2024-01-08 RX ADMIN — FENTANYL CITRATE 25 MCG: 50 INJECTION INTRAMUSCULAR; INTRAVENOUS at 14:30

## 2024-01-08 RX ADMIN — MIDAZOLAM 0.5 MG: 1 INJECTION INTRAMUSCULAR; INTRAVENOUS at 14:30

## 2024-01-08 RX ADMIN — FENTANYL CITRATE 25 MCG: 50 INJECTION INTRAMUSCULAR; INTRAVENOUS at 14:11

## 2024-01-08 RX ADMIN — FENTANYL CITRATE 25 MCG: 50 INJECTION INTRAMUSCULAR; INTRAVENOUS at 16:45

## 2024-01-08 RX ADMIN — FENTANYL CITRATE 50 MCG: 50 INJECTION INTRAMUSCULAR; INTRAVENOUS at 15:34

## 2024-01-08 RX ADMIN — SODIUM CHLORIDE 50 ML/HR: 0.9 INJECTION, SOLUTION INTRAVENOUS at 12:00

## 2024-01-08 RX ADMIN — MIDAZOLAM 1 MG: 1 INJECTION INTRAMUSCULAR; INTRAVENOUS at 16:03

## 2024-01-08 RX ADMIN — FENTANYL CITRATE 25 MCG: 50 INJECTION INTRAMUSCULAR; INTRAVENOUS at 14:56

## 2024-01-08 RX ADMIN — FENTANYL CITRATE 25 MCG: 50 INJECTION INTRAMUSCULAR; INTRAVENOUS at 15:00

## 2024-01-08 RX ADMIN — MIDAZOLAM 0.5 MG: 1 INJECTION INTRAMUSCULAR; INTRAVENOUS at 14:10

## 2024-01-08 RX ADMIN — Medication 75 MCG: at 15:16

## 2024-01-08 RX ADMIN — FENTANYL CITRATE 25 MCG: 50 INJECTION INTRAMUSCULAR; INTRAVENOUS at 14:52

## 2024-01-08 RX ADMIN — MIDAZOLAM 1 MG: 1 INJECTION INTRAMUSCULAR; INTRAVENOUS at 15:25

## 2024-01-08 RX ADMIN — Medication 20 ML: at 16:46

## 2024-01-08 RX ADMIN — HEPARIN SODIUM 1000 UNITS: 1000 INJECTION INTRAVENOUS; SUBCUTANEOUS at 15:59

## 2024-01-08 RX ADMIN — CEFAZOLIN SODIUM 2000 MG: 2 SOLUTION INTRAVENOUS at 14:29

## 2024-01-08 RX ADMIN — HEPARIN SODIUM 2000 UNITS: 1000 INJECTION INTRAVENOUS; SUBCUTANEOUS at 15:38

## 2024-01-08 RX ADMIN — MIDAZOLAM 0.5 MG: 1 INJECTION INTRAMUSCULAR; INTRAVENOUS at 14:56

## 2024-01-08 NOTE — PROCEDURES
Central Line Insertion    Date/Time: 1/8/2024 5:18 PM    Performed by: Nataliya Mckeon MD  Authorized by: Nataliya Mckeon MD    Patient location:  IR  Other Assisting Provider: No    Consent:     Consent given by:  Patient    Risks discussed:  Arterial puncture, bleeding and infection    Alternatives discussed:  No treatment and delayed treatment  Universal protocol:     Procedure explained and questions answered to patient or proxy's satisfaction: yes      Relevant documents present and verified: yes      Test results available and properly labeled: yes      Radiology Images displayed and confirmed.  If images not available, report reviewed: yes      Required blood products, implants, devices, and special equipment available: yes      Site/side marked: yes      Immediately prior to procedure, a time out was called: yes      Patient identity confirmed:  Verbally with patient and arm band  Pre-procedure details:     Hand hygiene: Hand hygiene performed prior to insertion      Sterile barrier technique: All elements of maximal sterile technique followed      Skin preparation:  2% chlorhexidine    Skin preparation agent: Skin preparation agent completely dried prior to procedure    Indications:     Central line indications: dialysis    Sedation:     Sedation type:  Moderate (conscious) sedation  Procedure details:     Location:  Right internal jugular    Vessel type: vein      Laterality:  Right and left    Approach: percutaneous technique used      Patient position:  Flat    Catheter type:  Double lumen    Catheter size:  15.5 Fr    Landmarks identified: yes      Ultrasound guidance: yes      Sterile ultrasound techniques: Sterile gel and sterile probe covers were used      Number of attempts:  1    Successful placement: yes    Post-procedure details:     Post-procedure:  Dressing applied and line sutured    Assessment:  Placement verified by x-ray and blood return through all ports    Post-procedure complications: none       Patient tolerance of procedure:  Tolerated well, no immediate complications  Comments:      Left internal jugular tunneled dialysis catheter placement

## 2024-01-08 NOTE — SEDATION DOCUMENTATION
This procedure is carried over from the attempted AVF declot.  He has already been administered 4mg of versed and 175mcg of fentanyl

## 2024-01-08 NOTE — DISCHARGE INSTRUCTIONS
Fistulagram   WHAT YOU NEED TO KNOW:   Your arm or leg my  be sore, swollen, and bruised after the procedure. This is normal and should get better in a few days.     DISCHARGE INSTRUCTIONS:     Contact Interventional Radiology at [phone] and follow prompts if you experience any:    You have a fever or chills.    Your puncture site is red, swollen, or draining pus.    You have nausea or are vomiting.    Your skin is itchy, swollen, or you have a rash.    You cannot feel a thrill over your graft or fistula.     You have questions or concerns about your condition or care.    Seek care immediately if:     You have bleeding that does not stop after 10 minutes of holding firm, direct pressure over the puncture site.    Blood soaks through your bandage.    Your hand or foot closest to the graft or fistula feels cold, painful, or numb.     Your hand or foot closest to the graft or fistula is pale or blue.     You have trouble moving your arm or leg closest to the graft or fistula.     Your bruise suddenly gets bigger.    Care for your wound as directed:  Remove the bandage in 4 to 6 hours or as         directed. Wash the area once a day with soap and water. Gently pat the area dry.     Apply firm, steady pressure to the puncture site if it bleeds.  Use a clean gauze or towel to hold pressure for 10 to 15 minutes. Call 911 if you cannot stop the bleeding or the bleeding gets heavier.     Feel for a thrill once a day or as directed.  Place your index and second finger over your fistula or graft as directed. You should feel a vibration. The vibration means that blood is flowing through your graft or fistula correctly.     Rest your arm or leg as directed.  Do not lift anything heavier than 5 pounds or do strenuous activity for 24 hours.     Prevent damage to your graft or fistula.  Do not wear tight-fitting clothing over your graft or fistula. Do not wear tight jewelry on the arm or leg with the graft or fistula. Tell  healthcare providers not to do, IVs, blood draws, and blood pressure readings in the arm with your graft or fistula. Do not allow flu shots or vaccinations in your arm with your graft or fistula.    Follow up with your healthcare provider as directed       Moderate Sedation   WHAT YOU NEED TO KNOW:   Moderate sedation, or conscious sedation, is medicine used during procedures to help you feel relaxed and calm. You will be awake and able to follow directions without anxiety or pain. You will remember little to none of the procedure. You may feel tired, weak, or unsteady on your feet after you get sedation. You may also have trouble concentrating or short-term memory loss. These symptoms should go away in 24 hours or less.   DISCHARGE INSTRUCTIONS:   Call 911 or have someone else call for any of the following:   You have sudden trouble breathing.     You cannot be woken.  Seek care immediately if:   You have a severe headache or dizziness.     Your heart is beating faster than usual.  Contact your healthcare provider if:   You have a fever.     You have nausea or are vomiting for more than 8 hours after the procedure.      Your skin is itchy, swollen, or you have a rash.     You have questions or concerns about your condition or care.  Self-care:   Have someone stay with you for 24 hours. This person can drive you to errands and help you do things around the house. This person can also watch for problems.      Rest and do quiet activities for 24 hours. Do not exercise, ride a bike, or play sports. Stand up slowly to prevent dizziness and falls. Take short walks around the house with another person. Slowly return to your usual activities the next day.      Do not drive or use dangerous machines or tools for 24 hours. You may injure yourself or others. Examples include a lawnmower, saw, or drill. Do not return to work for 24 hours if you use dangerous machines or tools for work.      Do not make important decisions for  24 hours. For example, do not sign important papers or invest money.      Drink liquids as directed. Liquids help flush the sedation medicine out of your body. Ask how much liquid to drink each day and which liquids are best for you.      Eat small, frequent meals to prevent nausea and vomiting. Start with clear liquids such as juice or broth. If you do not vomit after clear liquids, you can eat your usual foods.      Do not drink alcohol or take medicines that make you drowsy. This includes medicines that help you sleep and anxiety medicines. Ask your healthcare provider if it is safe for you to take pain medicine.  Follow up with your healthcare provider as directed: Write down your questions so you remember to ask them during your visits.   © 2017 [employer] Information is for End User's use only and may not be sold, redistributed or otherwise used for commercial purposes. All illustrations and images included in CareNotes® are the copyrighted property of [employer] or [employer].  The above information is an  only. It is not intended as medical advice for individual conditions or treatments. Talk to your doctor, nurse or pharmacist before following any medical regimen to see if it is safe and effective for you.

## 2024-01-08 NOTE — BRIEF OP NOTE (RAD/CATH)
INTERVENTIONAL RADIOLOGY PROCEDURE NOTE    Date: 1/8/2024    Procedure:   Procedure Summary       Date: 01/08/24 Room / Location: [facility] Interventional Radiology    Anesthesia Start:  Anesthesia Stop:     Procedure: IR AV FISTULA/GRAFT DECLOT Diagnosis:       ESRD (end stage renal disease) on dialysis (HCC)      (clotted fistula)    Scheduled Providers:  Responsible Provider:     Anesthesia Type: Not recorded ASA Status: Not recorded            Preoperative diagnosis:   1. ESRD (end stage renal disease) on dialysis (HCC)         Postoperative diagnosis: Same.    Surgeon: Nataliya Mckeon MD     Assistant: None. No qualified resident was available.    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    Anesthesia: conscious sedation

## 2024-01-09 NOTE — QUICK NOTE
Purse string removed by IR team    Some recurrent bleeding - stopped with TIP STOP    On my exam, no bleeding    I offered a purse string to be removed in a few days  Pt declined    If any bleeding he was instructed to HOLD PRESSURE on puncture site and go to ED

## 2024-01-18 ENCOUNTER — TELEPHONE (OUTPATIENT)
Dept: GASTROENTEROLOGY | Facility: HOSPITAL | Age: 64
End: 2024-01-18

## 2024-01-19 ENCOUNTER — TELEPHONE (OUTPATIENT)
Dept: VASCULAR SURGERY | Facility: CLINIC | Age: 64
End: 2024-01-19

## 2024-01-19 NOTE — TELEPHONE ENCOUNTER
----- Message from Alex Franco sent at 2024  7:24 AM EST -----  Regarding: RE: Ly[employer] Ride  SCHEDULED - LYFT @ 12:50 PM  ----- Message -----  From: Leticia Gomez  Sent: 2024   8:24 AM EST  To: Patient Yash  Subject: Lyft Ride                                        Patients Name: Jeffery Phillips  : 1960  Phone Number: [phone]  Appointment Date: 24  Appointment time: 1:30 PM   Address: [address] [place] [address]  Drop off Facility/Office Name: [facility] [place]   Drop off  Address: [address] [place] [address]  Cost Center: [phone]  Special notes/directions for   Note for scheduling team

## 2024-01-22 ENCOUNTER — ANESTHESIA EVENT (OUTPATIENT)
Dept: GASTROENTEROLOGY | Facility: HOSPITAL | Age: 64
End: 2024-01-22

## 2024-01-22 ENCOUNTER — ANESTHESIA (OUTPATIENT)
Dept: GASTROENTEROLOGY | Facility: HOSPITAL | Age: 64
End: 2024-01-22

## 2024-01-22 ENCOUNTER — HOSPITAL ENCOUNTER (OUTPATIENT)
Dept: GASTROENTEROLOGY | Facility: HOSPITAL | Age: 64
Setting detail: OUTPATIENT SURGERY
Discharge: HOME | End: 2024-01-22
Payer: COMMERCIAL

## 2024-01-22 VITALS
WEIGHT: 218.26 LBS | RESPIRATION RATE: 17 BRPM | SYSTOLIC BLOOD PRESSURE: 109 MMHG | DIASTOLIC BLOOD PRESSURE: 66 MMHG | HEART RATE: 69 BPM | OXYGEN SATURATION: 100 % | TEMPERATURE: 97 F | BODY MASS INDEX: 33.19 KG/M2

## 2024-01-22 DIAGNOSIS — Z12.11 COLON CANCER SCREENING: ICD-10-CM

## 2024-01-22 PROCEDURE — 88305 TISSUE EXAM BY PATHOLOGIST: CPT | Performed by: PATHOLOGY

## 2024-01-22 PROCEDURE — 45385 COLONOSCOPY W/LESION REMOVAL: CPT | Performed by: INTERNAL MEDICINE

## 2024-01-22 RX ORDER — PROPOFOL 10 MG/ML
INJECTION, EMULSION INTRAVENOUS CONTINUOUS PRN
Status: DISCONTINUED | OUTPATIENT
Start: 2024-01-22 | End: 2024-01-22

## 2024-01-22 RX ORDER — SODIUM CHLORIDE, SODIUM LACTATE, POTASSIUM CHLORIDE, CALCIUM CHLORIDE 600; 310; 30; 20 MG/100ML; MG/100ML; MG/100ML; MG/100ML
125 INJECTION, SOLUTION INTRAVENOUS CONTINUOUS
Status: CANCELLED | OUTPATIENT
Start: 2024-01-22

## 2024-01-22 RX ORDER — SODIUM CHLORIDE, SODIUM LACTATE, POTASSIUM CHLORIDE, CALCIUM CHLORIDE 600; 310; 30; 20 MG/100ML; MG/100ML; MG/100ML; MG/100ML
125 INJECTION, SOLUTION INTRAVENOUS CONTINUOUS
Status: DISCONTINUED | OUTPATIENT
Start: 2024-01-22 | End: 2024-01-26 | Stop reason: HOSPADM

## 2024-01-22 RX ORDER — PROPOFOL 10 MG/ML
INJECTION, EMULSION INTRAVENOUS AS NEEDED
Status: DISCONTINUED | OUTPATIENT
Start: 2024-01-22 | End: 2024-01-22

## 2024-01-22 RX ORDER — SODIUM CHLORIDE 9 MG/ML
INJECTION, SOLUTION INTRAVENOUS CONTINUOUS PRN
Status: DISCONTINUED | OUTPATIENT
Start: 2024-01-22 | End: 2024-01-22

## 2024-01-22 RX ORDER — LIDOCAINE HYDROCHLORIDE 10 MG/ML
INJECTION, SOLUTION EPIDURAL; INFILTRATION; INTRACAUDAL; PERINEURAL AS NEEDED
Status: DISCONTINUED | OUTPATIENT
Start: 2024-01-22 | End: 2024-01-22

## 2024-01-22 RX ADMIN — LIDOCAINE HYDROCHLORIDE 40 MG: 10 INJECTION, SOLUTION EPIDURAL; INFILTRATION; INTRACAUDAL; PERINEURAL at 09:45

## 2024-01-22 RX ADMIN — PROPOFOL 150 MG: 10 INJECTION, EMULSION INTRAVENOUS at 09:45

## 2024-01-22 RX ADMIN — SODIUM CHLORIDE: 0.9 INJECTION, SOLUTION INTRAVENOUS at 09:39

## 2024-01-22 RX ADMIN — PROPOFOL 100 MCG/KG/MIN: 10 INJECTION, EMULSION INTRAVENOUS at 09:45

## 2024-01-22 RX ADMIN — SODIUM CHLORIDE, SODIUM LACTATE, POTASSIUM CHLORIDE, AND CALCIUM CHLORIDE 125 ML/HR: .6; .31; .03; .02 INJECTION, SOLUTION INTRAVENOUS at 09:38

## 2024-01-22 NOTE — ANESTHESIA PREPROCEDURE EVALUATION
Procedure:  COLONOSCOPY    Relevant Problems   ANESTHESIA (within normal limits)      CARDIO   (+) CHF (congestive heart failure) (HCC)   (+) Hyperlipidemia   (+) Renovascular hypertension   (-) Angina at rest   (-) Angina of effort   (-) Chest pain   (-) OVERTON (dyspnea on exertion)      ENDO   (+) Secondary hyperparathyroidism of renal origin (HCC)   (-) Diabetes mellitus type 1 (HCC)   (-) Diabetes mellitus, type 2 (HCC)      GI/HEPATIC   (-) Chronic liver disease      /RENAL   (+) Anemia due to chronic kidney disease, on chronic dialysis (HCC)   (+) ESRD (end stage renal disease) on dialysis (HCC)      HEMATOLOGY   (+) Anemia due to chronic kidney disease, on chronic dialysis (HCC)      NEURO/PSYCH   (+) Seizure due to alcohol withdrawal, uncomplicated (HCC)   (-) CVA (cerebral vascular accident) (HCC)      PULMONARY   (+) Smoking greater than 20 pack years   (-) Asthma   (-) Chronic obstructive pulmonary disease (HCC)        Physical Exam    Airway    Mallampati score: I  TM Distance: >3 FB  Neck ROM: full     Dental       Cardiovascular      Pulmonary      Other Findings        Anesthesia Plan  ASA Score- 3     Anesthesia Type- IV sedation with anesthesia with ASA Monitors.         Additional Monitors:     Airway Plan:            Plan Factors-    Chart reviewed.    Patient summary reviewed.                  Induction- intravenous.    Postoperative Plan-     Informed Consent- Anesthetic plan and risks discussed with patient.  I personally reviewed this patient with the CRNA. Discussed and agreed on the Anesthesia Plan with the CRNA..

## 2024-01-22 NOTE — ANESTHESIA POSTPROCEDURE EVALUATION
Post-Op Assessment Note    CV Status:  Stable  Pain Score: 0    Pain management: adequate       Mental Status:  Awake and sleepy   Hydration Status:  Euvolemic   PONV Controlled:  Controlled   Airway Patency:  Patent     Post Op Vitals Reviewed: Yes      Staff: CRNA           BP   101/59   Temp      Pulse  72   Resp   12   SpO2   97

## 2024-01-22 NOTE — H&P
History and Physical - [facility]  Jeffery Phillips 63 y.o. male MRN: [id number]                  HPI: Jeffery Phillips is a 63 y.o. year old male who presents for colonoscopy for colon cancer screening.      REVIEW OF SYSTEMS: Per the HPI, and otherwise unremarkable.    Historical Information   Past Medical History:   Diagnosis Date    AV fistula occlusion (HCC)     IR unocclusion   R  arm   today 1/8/2024    CHF (congestive heart failure) (HCC)     Chronic kidney disease     ESRD (end stage renal disease) on dialysis (HCC)     dialysis tues-thurs-sat    Hyperkalemia 02/01/2022    Hyperlipidemia     Hypertension     Personal history of COVID-19 07/2021    mild symptoms - recovered at home    Renal disorder     Withdrawal seizures (HCC)     approximately 2018 when reducing alcohol     Past Surgical History:   Procedure Laterality Date    HERNIA REPAIR      states umbilical    IR AV FISTULA/GRAFT DECLOT  1/8/2024    IR AV FISTULAGRAM/GRAFTOGRAM  08/16/2022    IR AV FISTULAGRAM/GRAFTOGRAM  11/11/2022    IR AV FISTULAGRAM/GRAFTOGRAM  3/15/2023    IR AV FISTULAGRAM/GRAFTOGRAM  4/14/2023    IR AV FISTULAGRAM/GRAFTOGRAM  7/28/2023    IR AV FISTULAGRAM/GRAFTOGRAM  9/13/2023    IR BIOPSY BONE MARROW  07/13/2022    IR PULMONARY ARTERY EMBOLIZATION  08/03/2022    IR TUNNELED CENTRAL LINE REMOVAL  4/14/2023    IR TUNNELED DIALYSIS CATHETER PLACEMENT  06/10/2022    IR TUNNELED DIALYSIS CATHETER PLACEMENT  10/20/2022    IR TUNNELED DIALYSIS CATHETER PLACEMENT      IR TUNNELED DIALYSIS CATHETER PLACEMENT  1/8/2024    UT ARTERIOVENOUS ANASTOMOSIS OPEN DIRECT Left 10/24/2022    Procedure: Creation of  L brachiobasilic fistula, ligation of L endoAVF;  Surgeon: Dot Otero MD;  [facility];  Service: Vascular    UT ARTERIOVENOUS ANASTOMOSIS OPEN DIRECT Right 1/6/2023    Procedure: Creation right brachiocephalic fistula;  Surgeon: Dot Otero MD;  [facility];  Service: Vascular      Social History   Social History     Substance and Sexual Activity   Alcohol Use Yes    Alcohol/week: 1.0 standard drink of alcohol    Types: 1 Cans of beer per week    Comment: 2 x weekly     Social History     Substance and Sexual Activity   Drug Use Yes    Frequency: 3.0 times per week    Types: Marijuana    Comment: occasionally, last used  sunday 1/7/2024     Social History     Tobacco Use   Smoking Status Every Day    Current packs/day: 0.25    Average packs/day: 0.3 packs/day for 37.0 years (9.3 ttl pk-yrs)    Types: Cigarettes   Smokeless Tobacco Never   Tobacco Comments    Smokes 6 a day     Family History   Problem Relation Age of Onset    Pancreatic cancer Mother     No Known Problems Father     Cervical cancer Sister     No Known Problems Daughter     Breast cancer Maternal Grandmother 62    No Known Problems Maternal Grandfather     No Known Problems Paternal Grandmother     No Known Problems Paternal Grandfather        Meds/Allergies     (Not in a hospital admission)      Allergies   Allergen Reactions    Ibuprofen Other (See Comments)     ESRD - cannot have ibuprofen       Objective     There were no vitals taken for this visit.      PHYSICAL EXAM    Gen: NAD  CV: RRR  CHEST: Clear  ABD: soft, NT/ND  EXT: no edema      ASSESSMENT/PLAN:   Jeffery Phillips is a 63 y.o. year old male who presents for colonoscopy for colon cancer screening. The patient is stable and optimized for the procedure, we reviewed risk and benefits. Risk include but not limited to infection, bleeding, perforation and missing a lesion.

## 2024-01-24 ENCOUNTER — TELEPHONE (OUTPATIENT)
Dept: ADMINISTRATIVE | Facility: HOSPITAL | Age: 64
End: 2024-01-24

## 2024-01-24 ENCOUNTER — OFFICE VISIT (OUTPATIENT)
Dept: VASCULAR SURGERY | Facility: CLINIC | Age: 64
End: 2024-01-24
Payer: COMMERCIAL

## 2024-01-24 VITALS
BODY MASS INDEX: 30.28 KG/M2 | HEIGHT: 68 IN | DIASTOLIC BLOOD PRESSURE: 74 MMHG | WEIGHT: 199.8 LBS | HEART RATE: 82 BPM | SYSTOLIC BLOOD PRESSURE: 110 MMHG | OXYGEN SATURATION: 97 %

## 2024-01-24 DIAGNOSIS — E78.5 HYPERLIPIDEMIA, UNSPECIFIED HYPERLIPIDEMIA TYPE: ICD-10-CM

## 2024-01-24 DIAGNOSIS — Z72.0 TOBACCO ABUSE: ICD-10-CM

## 2024-01-24 DIAGNOSIS — Z99.2 ESRD (END STAGE RENAL DISEASE) ON DIALYSIS (HCC): Primary | ICD-10-CM

## 2024-01-24 DIAGNOSIS — N18.6 ESRD (END STAGE RENAL DISEASE) ON DIALYSIS (HCC): Primary | ICD-10-CM

## 2024-01-24 DIAGNOSIS — T82.898S ARTERIOVENOUS FISTULA OCCLUSION, SEQUELA: ICD-10-CM

## 2024-01-24 PROCEDURE — 88305 TISSUE EXAM BY PATHOLOGIST: CPT | Performed by: PATHOLOGY

## 2024-01-24 PROCEDURE — 99215 OFFICE O/P EST HI 40 MIN: CPT | Performed by: SURGERY

## 2024-01-24 RX ORDER — CHLORHEXIDINE GLUCONATE ORAL RINSE 1.2 MG/ML
15 SOLUTION DENTAL EVERY 12 HOURS SCHEDULED
Status: CANCELLED | OUTPATIENT
Start: 2024-01-24

## 2024-01-24 RX ORDER — CHLORHEXIDINE GLUCONATE ORAL RINSE 1.2 MG/ML
15 SOLUTION DENTAL ONCE
Status: CANCELLED | OUTPATIENT
Start: 2024-01-24 | End: 2024-01-24

## 2024-01-24 RX ORDER — CEFAZOLIN SODIUM 2 G/50ML
2000 SOLUTION INTRAVENOUS ONCE
Status: CANCELLED | OUTPATIENT
Start: 2024-02-02 | End: 2024-01-24

## 2024-01-24 NOTE — TELEPHONE ENCOUNTER
Verified patient's insurance   CONFIRMED - Patient's insurance is [facility]   Is patient requesting a call when authorization has been obtained? Patient did not request a call.    Surgery Date: 2/2/24  Primary Surgeon: JESSICA // Dot Otero (NPI: [id number])  Assisting Surgeon: Not Applicable (N/A)  Facility: [facility] (Tax: [id number] / NPI: [id number])  Inpatient / Outpatient: Outpatient  Level: 2    Clearance Received: No clearance ordered.  Consent Received: Yes, scanned into Epic on 1/24/24.  Medication Hold / Last Dose:  hold Eliquis (LMD advised to continue hold today 1/24/24) No other med hold  IR Notified: Not Applicable (N/A)  Rep. Notified: Not Applicable (N/A)  Equipment Needs: Not Applicable (N/A)  Vas Lab Requested: Not Applicable (N/A)  Patient Contacted: 1/24/24 during patient hours with LMD 1/24/24    Diagnosis: , Z99.2  Procedure/ CPT Code(s): (AV) Arteriovenous Fistula // CPT: 25980    For varicose vein related procedures:   Last LEVDR: Not Applicable (N/A)  CEAP Classification: Not Applicable (N/A)  VCSS: Not Applicable (N/A)    Post Operative Date/ Time: 2/19/24 , 1:15pm [facility] with [facility] (NPI: [id number])     *Please review medication hold(s), PATs, and check H&P with patient.*  PATIENT WAS MAILED SURGERY/SHOWERING/DISCHARGE/COVID INSTRUCTIONS AFTER REVIEWING WITH THEM VIA PHONE CALL. requested labs from [facility] 1/24/24

## 2024-01-24 NOTE — H&P (VIEW-ONLY)
Assessment/Plan:    Pt is a 62 yo M w/ CHF, HTN, hx EtOH abuse w/ sz during withdrawal, current tobacco abuse, HLD, anemia, ESRD, s/p endoAVF 8/3/22 c/b cephalic occlusion, s/p ligation endoAVF and creation single stage brachiobasilic fistula 10/24/22 c/b occlusion s/p creation brachiocephalic fistula 1/6/23 c/b occlusion and unsuccessful thrombectomy.    Arteriovenous fistula occlusion, sequela  ESRD (end stage renal disease) on dialysis (HCC)  -s/p L ulnar-ulnar endoAVF creation 8/3/22 [facility]  -c/b cephalic vein spasm and eventual occlusion; no basilic outflow from this fistula; currently with brachial outflow only >1L  -s/p ligation of endoAVF and creation single stage L brachiobasilic fistula 10/24/22 c/b early thrombosis  -s/p creation R brachiocephalic fistula 1/6/23 now thrombosed  -reviewed fistulogram with unsuccessful attempt at declot; there is a stnosis at the cephalic arch and another at the origin of the R brachiocephalic vein  -discussed access options at this point; patient adamantly against PD; options include R B/B? Would need to remap, and treatment of BC vein stenosis vs L brach-ax graft; OFE venous central outflow looks patent on last imging of this area and I think this is the best option to get him access quickly; did discuss increased risk of thrombosis and infection with prosthetic; patient in agreement with care plan; all questions answered  -plan for placement L brachial-axillary graft  -labs  -restart Eliquis after graft placement    -cardiac: TTE 2/22 with EF: 60%, grade I diastolic, mod AoV regurg, mild MV regurg. Asymptomatic     Medications  Hyperlipidemia, unspecified hyperlipidemia type  -continue AAA and statin  -will hold eliquis until new access is placed and then restart    Tobacco abuse    -previously 1PPD.  Trying to quit, now 10cigs over 2 weeks; discussed nicotine replacement and appropriate use; recommended lozenges; encouraged complete cessation    Operative Scheduling  Information:    Hospital:  [facility]    Physician:  Me    Surgery: placement L brachial-axillary graft    Urgency:  Standard    Level:  Level 3: Outpatients to be scheduled for elective surgery than can be delayed up to 4 weeks without reasonable expectation of detriment to patient    Case Length:  Normal    Post-op Bed:  Outpatient    OR Table:  Standard    Equipment Needs:  None    Medication Instructions:  Aspirin:   Continue (do not hold)  Eliquis being held    Hydration:  No    Contrast Allergy:  no      Subjective:      Patient ID: Jeffery Phillips is a 63 y.o. male.    Patient presents to discuss access planning. S/p IR AV fistula/graft declot which was unsuccessful and then an IR tunneled dialysis cath 01/08/2024. Patient denies numbness and tingling and patient has good ROM. Patient gets HD on T, TH and Sat at [facility]. Patient is taking ASA 81 MG and Pravastatin. He smokes 1/4 ppd.    HPI:    Patient presents to discuss new access.  R-handed.    Patient is s/p creation endo AVF ulnar-ulnar 8/3/22 [place].  He had cephalic vein spasm/stenosis at the index procedure and repeat fistulogram 8/16/22 which showed this to be occluded.  The basilic is not in continuity with the fistula outflow.  He developed brachial vein outflow and arm swelling.  He then had ligation of the endoAVF and single stage basilic vein transposition, which unfortunately was occluded at his first postop visit.  He is s/p creation R brachiocephalic fistula 1/6/23.  He has been suign this access (requiring fgrams) until about 2.5wks ago when he had fistula thrombosis, and unsuccessful thrombectomy attempt. Returns to discuss new access.    Currently dialyzing via L chest permacath.  T/T/S.  Started HD in June '22.    Smoking less and trying to quit, last cigarette was 2 days ago (down from 1PPD).  Going to try nicotine replacement.    Prior EtOH abuse, now occasional beer. Hx of sz w/ withdrawal    On Eliquis (for fistula patency given  "early thrombosis x 2); off this currently for colonoscopy.  Continues ASA        The following portions of the patient's history were reviewed and updated as appropriate: allergies, current medications, past family history, past medical history, past social history, past surgical history, and problem list.    Review of Systems   Constitutional: Negative.    HENT: Negative.     Eyes: Negative.    Respiratory: Negative.     Cardiovascular: Negative.    Gastrointestinal: Negative.    Endocrine: Negative.    Genitourinary: Negative.    Musculoskeletal: Negative.    Skin: Negative.    Allergic/Immunologic: Negative.    Neurological: Negative.    Hematological: Negative.    Psychiatric/Behavioral: Negative.           Objective:      /74 (BP Location: Left arm, Patient Position: Sitting, Cuff Size: Standard)   Pulse 82   Ht 5' 8\" (1.727 m)   Wt 90.6 kg (199 lb 12.8 oz)   SpO2 97%   BMI 30.38 kg/m²          Physical Exam  Vitals and nursing note reviewed.   Constitutional:       Appearance: He is well-developed.   HENT:      Head: Normocephalic and atraumatic.   Eyes:      Conjunctiva/sclera: Conjunctivae normal.   Cardiovascular:      Rate and Rhythm: Normal rate and regular rhythm.      Pulses:           Radial pulses are 0 on the right side and 2+ on the left side.        Dorsalis pedis pulses are 0 on the right side and 0 on the left side.        Posterior tibial pulses are 0 on the right side and 0 on the left side.      Heart sounds: Murmur (?subtle?) heard.      Comments: nonpalp R ulnar    No thrill/bruit BUE  Pulmonary:      Effort: Pulmonary effort is normal. No respiratory distress.      Breath sounds: Normal breath sounds. No wheezing or rales.   Abdominal:      General: There is no distension.      Palpations: Abdomen is soft.      Tenderness: There is no abdominal tenderness. There is no rebound.   Musculoskeletal:         General: Normal range of motion.      Cervical back: Normal range of motion " "and neck supple.      Right lower leg: No edema.      Left lower leg: No edema.   Skin:     General: Skin is warm and dry.      Comments: L antecub incisions and L upper arm incision well healed  R antecub incision well healed   Neurological:      Mental Status: He is alert and oriented to person, place, and time.   Psychiatric:         Behavior: Behavior normal.           I have reviewed and made appropriate changes to the review of systems input by the medical assistant.    Vitals:    01/24/24 1329   BP: 110/74   BP Location: Left arm   Patient Position: Sitting   Cuff Size: Standard   Pulse: 82   SpO2: 97%   Weight: 90.6 kg (199 lb 12.8 oz)   Height: 5' 8\" (1.727 m)       Patient Active Problem List   Diagnosis   • Renovascular hypertension   • CHF (congestive heart failure) ([facility])   • Alcohol abuse, in remission   • ESRD (end stage renal disease) on dialysis ([facility])   • Marijuana use   • Hyperlipidemia   • Tobacco abuse   • Seizure due to alcohol withdrawal, uncomplicated ([facility])   • Pharyngeal edema   • Hypomagnesemia   • Anemia due to chronic kidney disease, on chronic dialysis ([facility])   • Chronic hyponatremia   • MGUS (monoclonal gammopathy of unknown significance)   • Secondary hyperparathyroidism of renal origin ([facility])   • Smoking greater than 20 pack years   • AV fistula occlusion ([facility])   • S/P arteriovenous (AV) fistula creation   • Arteriovenous fistula, acquired ([facility])   • Great toe pain, left   • Erectile dysfunction   • Itch of skin   • Subareolar mass of left breast   • Near syncope       Past Surgical History:   Procedure Laterality Date   • HERNIA REPAIR      states umbilical   • IR AV FISTULA/GRAFT DECLOT  1/8/2024   • IR AV FISTULAGRAM/GRAFTOGRAM  08/16/2022   • IR AV FISTULAGRAM/GRAFTOGRAM  11/11/2022   • IR AV FISTULAGRAM/GRAFTOGRAM  3/15/2023   • IR AV FISTULAGRAM/GRAFTOGRAM  4/14/2023   • IR AV FISTULAGRAM/GRAFTOGRAM  7/28/2023   • IR AV FISTULAGRAM/GRAFTOGRAM  9/13/2023   • IR BIOPSY BONE MARROW  " 07/13/2022   • IR PULMONARY ARTERY EMBOLIZATION  08/03/2022   • IR TUNNELED CENTRAL LINE REMOVAL  4/14/2023   • IR TUNNELED DIALYSIS CATHETER PLACEMENT  06/10/2022   • IR TUNNELED DIALYSIS CATHETER PLACEMENT  10/20/2022   • IR TUNNELED DIALYSIS CATHETER PLACEMENT     • IR TUNNELED DIALYSIS CATHETER PLACEMENT  1/8/2024   • ME ARTERIOVENOUS ANASTOMOSIS OPEN DIRECT Left 10/24/2022    Procedure: Creation of  L brachiobasilic fistula, ligation of L endoAVF;  Surgeon: Dot Otero MD;  [facility];  Service: Vascular   • ME ARTERIOVENOUS ANASTOMOSIS OPEN DIRECT Right 1/6/2023    Procedure: Creation right brachiocephalic fistula;  Surgeon: Dot Otero MD;  [facility];  Service: Vascular       Family History   Problem Relation Age of Onset   • Pancreatic cancer Mother    • No Known Problems Father    • Cervical cancer Sister    • No Known Problems Daughter    • Breast cancer Maternal Grandmother 62   • No Known Problems Maternal Grandfather    • No Known Problems Paternal Grandmother    • No Known Problems Paternal Grandfather        Social History     Socioeconomic History   • Marital status: Significant Other     Spouse name: Not on file   • Number of children: Not on file   • Years of education: Not on file   • Highest education level: Not on file   Occupational History   • Not on file   Tobacco Use   • Smoking status: Every Day     Current packs/day: 0.25     Average packs/day: 0.3 packs/day for 37.0 years (9.3 ttl pk-yrs)     Types: Cigarettes   • Smokeless tobacco: Never   • Tobacco comments:     Smokes 6 a day   Vaping Use   • Vaping status: Never Used   Substance and Sexual Activity   • Alcohol use: Yes     Alcohol/week: 1.0 standard drink of alcohol     Types: 1 Cans of beer per week     Comment: 2 x weekly   • Drug use: Yes     Frequency: 3.0 times per week     Types: Marijuana     Comment: occasionally, last used  sunday 1/7/2024   • Sexual activity: Not Currently   Other Topics Concern    • Not on file   Social History Narrative   • Not on file     Social Determinants of Health     Financial Resource Strain: Medium Risk (8/11/2023)    Overall Financial Resource Strain (CARDIA)    • Difficulty of Paying Living Expenses: Somewhat hard   Food Insecurity: No Food Insecurity (6/12/2022)    Hunger Vital Sign    • Worried About Running Out of Food in the Last Year: Never true    • Ran Out of Food in the Last Year: Never true   Transportation Needs: No Transportation Needs (8/11/2023)    PRAPARE - Transportation    • Lack of Transportation (Medical): No    • Lack of Transportation (Non-Medical): No   Physical Activity: Not on file   Stress: Not on file   Social Connections: Not on file   Intimate Partner Violence: Not on file   Housing Stability: Low Risk  (6/12/2022)    Housing Stability Vital Sign    • Unable to Pay for Housing in the Last Year: No    • Number of Places Lived in the Last Year: 1    • Unstable Housing in the Last Year: No       Allergies   Allergen Reactions   • Ibuprofen Other (See Comments)     ESRD - cannot have ibuprofen         Current Outpatient Medications:   •  amLODIPine (NORVASC) 5 mg tablet, Take 1 tablet (5 mg total) by mouth daily, Disp: 90 tablet, Rfl: 4  •  apixaban (Eliquis) 2.5 mg, Take 1 tablet (2.5 mg total) by mouth 2 (two) times a day, Disp: 180 tablet, Rfl: 1  •  aspirin (ECOTRIN LOW STRENGTH) 81 mg EC tablet, Take 1 tablet (81 mg total) by mouth daily, Disp: 90 tablet, Rfl: 1  •  B Complex-C-Folic Acid (Karlee-Melissa) TABS, Take 1 tablet by mouth daily, Disp: 90 tablet, Rfl: 4  •  calcitriol (ROCALTROL) 0.25 mcg capsule, Take 2 capsules (0.5 mcg total) by mouth 3 (three) times a week, Disp: 45 capsule, Rfl: 4  •  calcium acetate (PHOSLO) capsule, TAKE 3 CAPSULES(2001 MG) BY MOUTH THREE TIMES DAILY WITH MEALS, Disp: 810 capsule, Rfl: 3  •  carvedilol (COREG) 25 mg tablet, Take 1 tablet (25 mg total) by mouth 2 (two) times a day with meals, Disp: 90 tablet, Rfl: 1  •   cholecalciferol (VITAMIN D3) 1,000 units tablet, Take 4 tablets (4,000 Units total) by mouth daily, Disp: 120 tablet, Rfl: 0  •  ciclopirox (PENLAC) 8 % solution, APPLY EXTERNALLY TO AFFECTED TOENAILS DAILY AS DIRECTED, Disp: , Rfl:   •  cinacalcet (SENSIPAR) 30 mg tablet, Take 1 tablet (30 mg total) by mouth once a week, Disp: 12 tablet, Rfl: 4  •  furosemide (LASIX) 80 mg tablet, Take 2 tablets (160 mg total) by mouth daily, Disp: 180 tablet, Rfl: 3  •  hydrOXYzine HCL (ATARAX) 10 mg tablet, Take 1 tablet (10 mg total) by mouth every 6 (six) hours as needed for itching, Disp: 30 tablet, Rfl: 0  •  pravastatin (PRAVACHOL) 80 mg tablet, TAKE 1 TABLET(80 MG) BY MOUTH DAILY WITH DINNER, Disp: 90 tablet, Rfl: 1  •  sevelamer carbonate (RENVELA) 800 mg tablet, Take 2 tablets (1,600 mg total) by mouth 3 (three) times a day with meals, Disp: 270 tablet, Rfl: 4  •  sildenafil (VIAGRA) 50 MG tablet, Take 1 tablet (50 mg total) by mouth daily as needed for erectile dysfunction, Disp: 10 tablet, Rfl: 0  •  EPINEPHrine (EPIPEN) 0.3 mg/0.3 mL SOAJ, Inject 0.3 mL (0.3 mg total) into a muscle once for 1 dose, Disp: 0.6 mL, Rfl: 0  •  polyethylene glycol (Golytely) 4000 mL solution, Take 4,000 mL by mouth once for 1 dose Take 4000 mL by mouth once for 1 dose. Use as directed, Disp: 4000 mL, Rfl: 0  No current facility-administered medications for this visit.    Facility-Administered Medications Ordered in Other Visits:   •  lactated ringers infusion, 125 mL/hr, Intravenous, Continuous, Reuben Lopez MD, Last Rate: 125 mL/hr at 01/22/24 0938, 125 mL/hr at 01/22/24 0938

## 2024-01-24 NOTE — PROGRESS NOTES
Assessment/Plan:    Pt is a 62 yo M w/ CHF, HTN, hx EtOH abuse w/ sz during withdrawal, current tobacco abuse, HLD, anemia, ESRD, s/p endoAVF 8/3/22 c/b cephalic occlusion, s/p ligation endoAVF and creation single stage brachiobasilic fistula 10/24/22 c/b occlusion s/p creation brachiocephalic fistula 1/6/23 c/b occlusion and unsuccessful thrombectomy.    Arteriovenous fistula occlusion, sequela  ESRD (end stage renal disease) on dialysis (HCC)  -s/p L ulnar-ulnar endoAVF creation 8/3/22 [place]  -c/b cephalic vein spasm and eventual occlusion; no basilic outflow from this fistula; currently with brachial outflow only >1L  -s/p ligation of endoAVF and creation single stage L brachiobasilic fistula 10/24/22 c/b early thrombosis  -s/p creation R brachiocephalic fistula 1/6/23 now thrombosed  -reviewed fistulogram with unsuccessful attempt at declot; there is a stnosis at the cephalic arch and another at the origin of the R brachiocephalic vein  -discussed access options at this point; patient adamantly against PD; options include R B/B? Would need to remap, and treatment of BC vein stenosis vs L brach-ax graft; OFE venous central outflow looks patent on last imging of this area and I think this is the best option to get him access quickly; did discuss increased risk of thrombosis and infection with prosthetic; patient in agreement with care plan; all questions answered  -plan for placement L brachial-axillary graft  -labs  -restart Eliquis after graft placement    -cardiac: TTE 2/22 with EF: 60%, grade I diastolic, mod AoV regurg, mild MV regurg. Asymptomatic     Medications  Hyperlipidemia, unspecified hyperlipidemia type  -continue AAA and statin  -will hold eliquis until new access is placed and then restart    Tobacco abuse    -previously 1PPD.  Trying to quit, now 10cigs over 2 weeks; discussed nicotine replacement and appropriate use; recommended lozenges; encouraged complete cessation    Operative Scheduling  Information:    Hospital:  [facility]    Physician:  Me    Surgery: placement L brachial-axillary graft    Urgency:  Standard    Level:  Level 3: Outpatients to be scheduled for elective surgery than can be delayed up to 4 weeks without reasonable expectation of detriment to patient    Case Length:  Normal    Post-op Bed:  Outpatient    OR Table:  Standard    Equipment Needs:  None    Medication Instructions:  Aspirin:   Continue (do not hold)  Eliquis being held    Hydration:  No    Contrast Allergy:  no      Subjective:      Patient ID: Jeffery Phillips is a 63 y.o. male.    Patient presents to discuss access planning. S/p IR AV fistula/graft declot which was unsuccessful and then an IR tunneled dialysis cath 01/08/2024. Patient denies numbness and tingling and patient has good ROM. Patient gets HD on T, TH and Sat at [facility]. Patient is taking ASA 81 MG and Pravastatin. He smokes 1/4 ppd.    HPI:    Patient presents to discuss new access.  R-handed.    Patient is s/p creation endo AVF ulnar-ulnar 8/3/22 [place].  He had cephalic vein spasm/stenosis at the index procedure and repeat fistulogram 8/16/22 which showed this to be occluded.  The basilic is not in continuity with the fistula outflow.  He developed brachial vein outflow and arm swelling.  He then had ligation of the endoAVF and single stage basilic vein transposition, which unfortunately was occluded at his first postop visit.  He is s/p creation R brachiocephalic fistula 1/6/23.  He has been suign this access (requiring fgrams) until about 2.5wks ago when he had fistula thrombosis, and unsuccessful thrombectomy attempt. Returns to discuss new access.    Currently dialyzing via L chest permacath.  T/T/S.  Started HD in June '22.    Smoking less and trying to quit, last cigarette was 2 days ago (down from 1PPD).  Going to try nicotine replacement.    Prior EtOH abuse, now occasional beer. Hx of sz w/ withdrawal    On Eliquis (for fistula patency given  "early thrombosis x 2); off this currently for colonoscopy.  Continues ASA        The following portions of the patient's history were reviewed and updated as appropriate: allergies, current medications, past family history, past medical history, past social history, past surgical history, and problem list.    Review of Systems   Constitutional: Negative.    HENT: Negative.     Eyes: Negative.    Respiratory: Negative.     Cardiovascular: Negative.    Gastrointestinal: Negative.    Endocrine: Negative.    Genitourinary: Negative.    Musculoskeletal: Negative.    Skin: Negative.    Allergic/Immunologic: Negative.    Neurological: Negative.    Hematological: Negative.    Psychiatric/Behavioral: Negative.           Objective:      /74 (BP Location: Left arm, Patient Position: Sitting, Cuff Size: Standard)   Pulse 82   Ht 5' 8\" (1.727 m)   Wt 90.6 kg (199 lb 12.8 oz)   SpO2 97%   BMI 30.38 kg/m²          Physical Exam  Vitals and nursing note reviewed.   Constitutional:       Appearance: He is well-developed.   HENT:      Head: Normocephalic and atraumatic.   Eyes:      Conjunctiva/sclera: Conjunctivae normal.   Cardiovascular:      Rate and Rhythm: Normal rate and regular rhythm.      Pulses:           Radial pulses are 0 on the right side and 2+ on the left side.        Dorsalis pedis pulses are 0 on the right side and 0 on the left side.        Posterior tibial pulses are 0 on the right side and 0 on the left side.      Heart sounds: Murmur (?subtle?) heard.      Comments: nonpalp R ulnar    No thrill/bruit BUE  Pulmonary:      Effort: Pulmonary effort is normal. No respiratory distress.      Breath sounds: Normal breath sounds. No wheezing or rales.   Abdominal:      General: There is no distension.      Palpations: Abdomen is soft.      Tenderness: There is no abdominal tenderness. There is no rebound.   Musculoskeletal:         General: Normal range of motion.      Cervical back: Normal range of motion " "and neck supple.      Right lower leg: No edema.      Left lower leg: No edema.   Skin:     General: Skin is warm and dry.      Comments: L antecub incisions and L upper arm incision well healed  R antecub incision well healed   Neurological:      Mental Status: He is alert and oriented to person, place, and time.   Psychiatric:         Behavior: Behavior normal.           I have reviewed and made appropriate changes to the review of systems input by the medical assistant.    Vitals:    01/24/24 1329   BP: 110/74   BP Location: Left arm   Patient Position: Sitting   Cuff Size: Standard   Pulse: 82   SpO2: 97%   Weight: 90.6 kg (199 lb 12.8 oz)   Height: 5' 8\" (1.727 m)       Patient Active Problem List   Diagnosis   • Renovascular hypertension   • CHF (congestive heart failure) ([facility])   • Alcohol abuse, in remission   • ESRD (end stage renal disease) on dialysis ([facility])   • Marijuana use   • Hyperlipidemia   • Tobacco abuse   • Seizure due to alcohol withdrawal, uncomplicated ([facility])   • Pharyngeal edema   • Hypomagnesemia   • Anemia due to chronic kidney disease, on chronic dialysis ([facility])   • Chronic hyponatremia   • MGUS (monoclonal gammopathy of unknown significance)   • Secondary hyperparathyroidism of renal origin ([facility])   • Smoking greater than 20 pack years   • AV fistula occlusion ([facility])   • S/P arteriovenous (AV) fistula creation   • Arteriovenous fistula, acquired ([facility])   • Great toe pain, left   • Erectile dysfunction   • Itch of skin   • Subareolar mass of left breast   • Near syncope       Past Surgical History:   Procedure Laterality Date   • HERNIA REPAIR      states umbilical   • IR AV FISTULA/GRAFT DECLOT  1/8/2024   • IR AV FISTULAGRAM/GRAFTOGRAM  08/16/2022   • IR AV FISTULAGRAM/GRAFTOGRAM  11/11/2022   • IR AV FISTULAGRAM/GRAFTOGRAM  3/15/2023   • IR AV FISTULAGRAM/GRAFTOGRAM  4/14/2023   • IR AV FISTULAGRAM/GRAFTOGRAM  7/28/2023   • IR AV FISTULAGRAM/GRAFTOGRAM  9/13/2023   • IR BIOPSY BONE MARROW  " 07/13/2022   • IR PULMONARY ARTERY EMBOLIZATION  08/03/2022   • IR TUNNELED CENTRAL LINE REMOVAL  4/14/2023   • IR TUNNELED DIALYSIS CATHETER PLACEMENT  06/10/2022   • IR TUNNELED DIALYSIS CATHETER PLACEMENT  10/20/2022   • IR TUNNELED DIALYSIS CATHETER PLACEMENT     • IR TUNNELED DIALYSIS CATHETER PLACEMENT  1/8/2024   • AK ARTERIOVENOUS ANASTOMOSIS OPEN DIRECT Left 10/24/2022    Procedure: Creation of  L brachiobasilic fistula, ligation of L endoAVF;  Surgeon: Dot Otero MD;  Location: [facility];  Service: Vascular   • AK ARTERIOVENOUS ANASTOMOSIS OPEN DIRECT Right 1/6/2023    Procedure: Creation right brachiocephalic fistula;  Surgeon: Dot Otero MD;  Location: [facility];  Service: Vascular       Family History   Problem Relation Age of Onset   • Pancreatic cancer Mother    • No Known Problems Father    • Cervical cancer Sister    • No Known Problems Daughter    • Breast cancer Maternal Grandmother 62   • No Known Problems Maternal Grandfather    • No Known Problems Paternal Grandmother    • No Known Problems Paternal Grandfather        Social History     Socioeconomic History   • Marital status: Significant Other     Spouse name: Not on file   • Number of children: Not on file   • Years of education: Not on file   • Highest education level: Not on file   Occupational History   • Not on file   Tobacco Use   • Smoking status: Every Day     Current packs/day: 0.25     Average packs/day: 0.3 packs/day for 37.0 years (9.3 ttl pk-yrs)     Types: Cigarettes   • Smokeless tobacco: Never   • Tobacco comments:     Smokes 6 a day   Vaping Use   • Vaping status: Never Used   Substance and Sexual Activity   • Alcohol use: Yes     Alcohol/week: 1.0 standard drink of alcohol     Types: 1 Cans of beer per week     Comment: 2 x weekly   • Drug use: Yes     Frequency: 3.0 times per week     Types: Marijuana     Comment: occasionally, last used  sunday 1/7/2024   • Sexual activity: Not Currently   Other Topics Concern    • Not on file   Social History Narrative   • Not on file     Social Determinants of Health     Financial Resource Strain: Medium Risk (8/11/2023)    Overall Financial Resource Strain (CARDIA)    • Difficulty of Paying Living Expenses: Somewhat hard   Food Insecurity: No Food Insecurity (6/12/2022)    Hunger Vital Sign    • Worried About Running Out of Food in the Last Year: Never true    • Ran Out of Food in the Last Year: Never true   Transportation Needs: No Transportation Needs (8/11/2023)    PRAPARE - Transportation    • Lack of Transportation (Medical): No    • Lack of Transportation (Non-Medical): No   Physical Activity: Not on file   Stress: Not on file   Social Connections: Not on file   Intimate Partner Violence: Not on file   Housing Stability: Low Risk  (6/12/2022)    Housing Stability Vital Sign    • Unable to Pay for Housing in the Last Year: No    • Number of Places Lived in the Last Year: 1    • Unstable Housing in the Last Year: No       Allergies   Allergen Reactions   • Ibuprofen Other (See Comments)     ESRD - cannot have ibuprofen         Current Outpatient Medications:   •  amLODIPine (NORVASC) 5 mg tablet, Take 1 tablet (5 mg total) by mouth daily, Disp: 90 tablet, Rfl: 4  •  apixaban (Eliquis) 2.5 mg, Take 1 tablet (2.5 mg total) by mouth 2 (two) times a day, Disp: 180 tablet, Rfl: 1  •  aspirin (ECOTRIN LOW STRENGTH) 81 mg EC tablet, Take 1 tablet (81 mg total) by mouth daily, Disp: 90 tablet, Rfl: 1  •  B Complex-C-Folic Acid (Karlee-Melissa) TABS, Take 1 tablet by mouth daily, Disp: 90 tablet, Rfl: 4  •  calcitriol (ROCALTROL) 0.25 mcg capsule, Take 2 capsules (0.5 mcg total) by mouth 3 (three) times a week, Disp: 45 capsule, Rfl: 4  •  calcium acetate (PHOSLO) capsule, TAKE 3 CAPSULES(2001 MG) BY MOUTH THREE TIMES DAILY WITH MEALS, Disp: 810 capsule, Rfl: 3  •  carvedilol (COREG) 25 mg tablet, Take 1 tablet (25 mg total) by mouth 2 (two) times a day with meals, Disp: 90 tablet, Rfl: 1  •   cholecalciferol (VITAMIN D3) 1,000 units tablet, Take 4 tablets (4,000 Units total) by mouth daily, Disp: 120 tablet, Rfl: 0  •  ciclopirox (PENLAC) 8 % solution, APPLY EXTERNALLY TO AFFECTED TOENAILS DAILY AS DIRECTED, Disp: , Rfl:   •  cinacalcet (SENSIPAR) 30 mg tablet, Take 1 tablet (30 mg total) by mouth once a week, Disp: 12 tablet, Rfl: 4  •  furosemide (LASIX) 80 mg tablet, Take 2 tablets (160 mg total) by mouth daily, Disp: 180 tablet, Rfl: 3  •  hydrOXYzine HCL (ATARAX) 10 mg tablet, Take 1 tablet (10 mg total) by mouth every 6 (six) hours as needed for itching, Disp: 30 tablet, Rfl: 0  •  pravastatin (PRAVACHOL) 80 mg tablet, TAKE 1 TABLET(80 MG) BY MOUTH DAILY WITH DINNER, Disp: 90 tablet, Rfl: 1  •  sevelamer carbonate (RENVELA) 800 mg tablet, Take 2 tablets (1,600 mg total) by mouth 3 (three) times a day with meals, Disp: 270 tablet, Rfl: 4  •  sildenafil (VIAGRA) 50 MG tablet, Take 1 tablet (50 mg total) by mouth daily as needed for erectile dysfunction, Disp: 10 tablet, Rfl: 0  •  EPINEPHrine (EPIPEN) 0.3 mg/0.3 mL SOAJ, Inject 0.3 mL (0.3 mg total) into a muscle once for 1 dose, Disp: 0.6 mL, Rfl: 0  •  polyethylene glycol (Golytely) 4000 mL solution, Take 4,000 mL by mouth once for 1 dose Take 4000 mL by mouth once for 1 dose. Use as directed, Disp: 4000 mL, Rfl: 0  No current facility-administered medications for this visit.    Facility-Administered Medications Ordered in Other Visits:   •  lactated ringers infusion, 125 mL/hr, Intravenous, Continuous, Reuben Lopez MD, Last Rate: 125 mL/hr at 01/22/24 0938, 125 mL/hr at 01/22/24 0938

## 2024-01-25 ENCOUNTER — ANESTHESIA EVENT (OUTPATIENT)
Dept: PERIOP | Facility: HOSPITAL | Age: 64
End: 2024-01-25
Payer: COMMERCIAL

## 2024-01-25 RX ORDER — POLYETHYLENE GLYCOL-3350 AND ELECTROLYTES WITH FLAVOR PACK 240; 5.84; 2.98; 6.72; 22.72 G/278.26G; G/278.26G; G/278.26G; G/278.26G; G/278.26G
4000 POWDER, FOR SOLUTION ORAL ONCE
COMMUNITY
Start: 2023-12-12 | End: 2024-01-25

## 2024-01-25 RX ORDER — AMMONIUM LACTATE 12 G/100G
LOTION TOPICAL DAILY
COMMUNITY
Start: 2023-12-22

## 2024-01-25 NOTE — PRE-PROCEDURE INSTRUCTIONS
Pre-Surgery Instructions:   Medication Instructions    amLODIPine (NORVASC) 5 mg tablet Take day of surgery.    ammonium lactate (LAC-HYDRIN) 12 % lotion Hold day of surgery.    aspirin (ECOTRIN LOW STRENGTH) 81 mg EC tablet Instructions provided by MD do not hold    B Complex-C-Folic Acid (Karlee-Melissa) TABS Stop taking 7 days prior to surgery.    calcitriol (ROCALTROL) 0.25 mcg capsule Hold day of surgery.    calcium acetate (PHOSLO) capsule Hold day of surgery.    carvedilol (COREG) 25 mg tablet Take day of surgery.    cholecalciferol (VITAMIN D3) 1,000 units tablet Stop taking 7 days prior to surgery.    ciclopirox (PENLAC) 8 % solution Hold day of surgery.    cinacalcet (SENSIPAR) 30 mg tablet Hold day of surgery.    furosemide (LASIX) 80 mg tablet Hold day of surgery.    hydrOXYzine HCL (ATARAX) 10 mg tablet Uses PRN- OK to take day of surgery    pravastatin (PRAVACHOL) 80 mg tablet Take night before surgery    sevelamer carbonate (RENVELA) 800 mg tablet Hold day of surgery.   Medication instructions for day surgery reviewed. Please use only a sip of water to take your instructed medications. Avoid all over the counter vitamins, supplements and NSAIDS for one week prior to surgery per anesthesia guidelines. Tylenol is ok to take as needed.     You will receive a call one business day prior to surgery with an arrival time and hospital directions. If your surgery is scheduled on a Monday, the hospital will be calling you on the Friday prior to your surgery. If you have not heard from anyone by 8pm, please call the hospital supervisor through the hospital  at [phone]. ( [phone]).    Do not eat or drink anything after midnight the night before your surgery, including candy, mints, lifesavers, or chewing gum. Do not drink alcohol 24hrs before your surgery. Try not to smoke at least 24hrs before your surgery.       Follow the pre surgery showering instructions as listed in the “My Surgical  Experience Booklet” or otherwise provided by your surgeon's office. Do not use a blade to shave the surgical area 1 week before surgery. It is okay to use a clean electric clippers up to 24 hours before surgery. Do not apply any lotions, creams, including makeup, cologne, deodorant, or perfumes after showering on the day of your surgery. Do not use dry shampoo, hair spray, hair gel, or any type of hair products.     No contact lenses, eye make-up, or artificial eyelashes. Remove nail polish, including gel polish, and any artificial, gel, or acrylic nails if possible. Remove all jewelry including rings and body piercing jewelry.     Wear causal clothing that is easy to take on and off. Consider your type of surgery.    Keep any valuables, jewelry, piercings at home. Please bring any specially ordered equipment (sling, braces) if indicated.    Arrange for a responsible person to drive you to and from the hospital on the day of your surgery. Visitor Guidelines discussed.     Call the surgeon's office with any new illnesses, exposures, or additional questions prior to surgery.    Please reference your “My Surgical Experience Booklet” for additional information to prepare for your upcoming surgery.

## 2024-01-25 NOTE — TELEPHONE ENCOUNTER
Authorization requirements reviewed.  Please refer to Auth / Cert number [id number]  for case updates.    No authorization required

## 2024-01-31 ENCOUNTER — TELEPHONE (OUTPATIENT)
Age: 64
End: 2024-01-31

## 2024-01-31 DIAGNOSIS — N25.81 SECONDARY HYPERPARATHYROIDISM OF RENAL ORIGIN (HCC): ICD-10-CM

## 2024-01-31 RX ORDER — SEVELAMER CARBONATE 800 MG/1
1600 TABLET, FILM COATED ORAL
Qty: 270 TABLET | Refills: 4 | Status: SHIPPED | OUTPATIENT
Start: 2024-01-31 | End: 2024-01-31

## 2024-01-31 RX ORDER — SEVELAMER CARBONATE 800 MG/1
TABLET, FILM COATED ORAL
Qty: 540 TABLET | Refills: 4 | Status: SHIPPED | OUTPATIENT
Start: 2024-01-31

## 2024-01-31 NOTE — TELEPHONE ENCOUNTER
Patient is currently at dentist and needs to know if there is any pre-admit medication needed. He had a list of his medications but was not sure if there was anything he specifically needed to take for the appointment.

## 2024-02-01 ENCOUNTER — TELEPHONE (OUTPATIENT)
Dept: ADMINISTRATIVE | Facility: OTHER | Age: 64
End: 2024-02-01

## 2024-02-01 PROBLEM — E83.39 HYPERPHOSPHATEMIA: Status: ACTIVE | Noted: 2024-02-01

## 2024-02-01 NOTE — TELEPHONE ENCOUNTER
02/01/24 1:12 PM    Patient contacted (left message) to bring Advance Directive, POLST, or Living Will document to next scheduled pcp visit.    Thank you.  Cally Marinelli  [facility]

## 2024-02-01 NOTE — ANESTHESIA PREPROCEDURE EVALUATION
Procedure:  Placement left brachial-axillary graft (Left: Arm Upper)    Relevant Problems   CARDIO   (+) CHF (congestive heart failure) (HCC)   (+) Hyperlipidemia   (+) Renovascular hypertension      ENDO   (+) Secondary hyperparathyroidism of renal origin (HCC)      /RENAL   (+) Anemia due to chronic kidney disease, on chronic dialysis (HCC)   (+) ESRD (end stage renal disease) on dialysis (HCC)      HEMATOLOGY   (+) Anemia due to chronic kidney disease, on chronic dialysis ([facility])      NEURO/PSYCH   (+) Seizure due to alcohol withdrawal, uncomplicated (HCC)      PULMONARY   (+) Smoking greater than 20 pack years        Physical Exam    Airway    Mallampati score: II  TM Distance: >3 FB  Neck ROM: full     Dental       Cardiovascular      Pulmonary      Other Findings        Anesthesia Plan  ASA Score- 3     Anesthesia Type- general with ASA Monitors.         Additional Monitors:     Airway Plan: LMA.           Plan Factors-    Chart reviewed.              Obstructive sleep apnea risk education given perioperatively.        Induction- intravenous.    Postoperative Plan-     Informed Consent- Anesthetic plan and risks discussed with patient.  I personally reviewed this patient with the CRNA. Discussed and agreed on the Anesthesia Plan with the CRNA..

## 2024-02-02 ENCOUNTER — HOSPITAL ENCOUNTER (OUTPATIENT)
Facility: HOSPITAL | Age: 64
Setting detail: OUTPATIENT SURGERY
Discharge: HOME | End: 2024-02-02
Attending: SURGERY | Admitting: SURGERY
Payer: COMMERCIAL

## 2024-02-02 ENCOUNTER — ANESTHESIA (OUTPATIENT)
Dept: PERIOP | Facility: HOSPITAL | Age: 64
End: 2024-02-02
Payer: COMMERCIAL

## 2024-02-02 VITALS
HEIGHT: 68 IN | DIASTOLIC BLOOD PRESSURE: 72 MMHG | BODY MASS INDEX: 29.74 KG/M2 | HEART RATE: 83 BPM | OXYGEN SATURATION: 99 % | RESPIRATION RATE: 17 BRPM | WEIGHT: 196.21 LBS | TEMPERATURE: 97.4 F | SYSTOLIC BLOOD PRESSURE: 119 MMHG

## 2024-02-02 DIAGNOSIS — Z99.2 ESRD (END STAGE RENAL DISEASE) ON DIALYSIS (HCC): Primary | ICD-10-CM

## 2024-02-02 DIAGNOSIS — N18.6 ESRD (END STAGE RENAL DISEASE) ON DIALYSIS (HCC): Primary | ICD-10-CM

## 2024-02-02 LAB
ABO GROUP BLD: NORMAL
BLD GP AB SCN SERPL QL: NEGATIVE
POTASSIUM SERPL-SCNC: 3.3 MMOL/L (ref 3.5–5.3)
RH BLD: POSITIVE
SPECIMEN EXPIRATION DATE: NORMAL

## 2024-02-02 PROCEDURE — 84132 ASSAY OF SERUM POTASSIUM: CPT | Performed by: ANESTHESIOLOGY

## 2024-02-02 PROCEDURE — 36830 ARTERY-VEIN NONAUTOGRAFT: CPT | Performed by: SURGERY

## 2024-02-02 PROCEDURE — 86900 BLOOD TYPING SEROLOGIC ABO: CPT | Performed by: ANESTHESIOLOGY

## 2024-02-02 PROCEDURE — 86901 BLOOD TYPING SEROLOGIC RH(D): CPT | Performed by: ANESTHESIOLOGY

## 2024-02-02 PROCEDURE — 86850 RBC ANTIBODY SCREEN: CPT | Performed by: ANESTHESIOLOGY

## 2024-02-02 PROCEDURE — C1768 GRAFT, VASCULAR: HCPCS | Performed by: SURGERY

## 2024-02-02 DEVICE — PROPATEN VASCULAR GRAFT SW 4-7MMX45CM TAPERED HEPARIN
Type: IMPLANTABLE DEVICE | Site: ARM | Status: FUNCTIONAL
Brand: GORE PROPATEN VASCULAR GRAFT

## 2024-02-02 RX ORDER — BUPIVACAINE HYDROCHLORIDE 5 MG/ML
INJECTION, SOLUTION EPIDURAL; INTRACAUDAL AS NEEDED
Status: DISCONTINUED | OUTPATIENT
Start: 2024-02-02 | End: 2024-02-02 | Stop reason: HOSPADM

## 2024-02-02 RX ORDER — PROPOFOL 10 MG/ML
INJECTION, EMULSION INTRAVENOUS AS NEEDED
Status: DISCONTINUED | OUTPATIENT
Start: 2024-02-02 | End: 2024-02-02

## 2024-02-02 RX ORDER — OXYCODONE HYDROCHLORIDE 5 MG/1
5 TABLET ORAL EVERY 8 HOURS PRN
Status: DISCONTINUED | OUTPATIENT
Start: 2024-02-02 | End: 2024-02-02 | Stop reason: HOSPADM

## 2024-02-02 RX ORDER — SODIUM CHLORIDE 9 MG/ML
50 INJECTION, SOLUTION INTRAVENOUS CONTINUOUS
Status: DISCONTINUED | OUTPATIENT
Start: 2024-02-02 | End: 2024-02-02 | Stop reason: HOSPADM

## 2024-02-02 RX ORDER — OXYCODONE HYDROCHLORIDE AND ACETAMINOPHEN 5; 325 MG/1; MG/1
1 TABLET ORAL EVERY 6 HOURS PRN
Qty: 15 TABLET | Refills: 0 | Status: SHIPPED | OUTPATIENT
Start: 2024-02-02 | End: 2024-02-12

## 2024-02-02 RX ORDER — CHLORHEXIDINE GLUCONATE ORAL RINSE 1.2 MG/ML
15 SOLUTION DENTAL ONCE
Status: DISCONTINUED | OUTPATIENT
Start: 2024-02-02 | End: 2024-02-02 | Stop reason: HOSPADM

## 2024-02-02 RX ORDER — FENTANYL CITRATE 50 UG/ML
INJECTION, SOLUTION INTRAMUSCULAR; INTRAVENOUS AS NEEDED
Status: DISCONTINUED | OUTPATIENT
Start: 2024-02-02 | End: 2024-02-02

## 2024-02-02 RX ORDER — SODIUM CHLORIDE, SODIUM LACTATE, POTASSIUM CHLORIDE, CALCIUM CHLORIDE 600; 310; 30; 20 MG/100ML; MG/100ML; MG/100ML; MG/100ML
INJECTION, SOLUTION INTRAVENOUS CONTINUOUS PRN
Status: DISCONTINUED | OUTPATIENT
Start: 2024-02-02 | End: 2024-02-02

## 2024-02-02 RX ORDER — CEFAZOLIN SODIUM 2 G/50ML
2000 SOLUTION INTRAVENOUS ONCE
Status: COMPLETED | OUTPATIENT
Start: 2024-02-02 | End: 2024-02-02

## 2024-02-02 RX ORDER — HEPARIN SODIUM 1000 [USP'U]/ML
INJECTION, SOLUTION INTRAVENOUS; SUBCUTANEOUS AS NEEDED
Status: DISCONTINUED | OUTPATIENT
Start: 2024-02-02 | End: 2024-02-02

## 2024-02-02 RX ORDER — LIDOCAINE HYDROCHLORIDE 10 MG/ML
0.5 INJECTION, SOLUTION EPIDURAL; INFILTRATION; INTRACAUDAL; PERINEURAL ONCE AS NEEDED
Status: DISCONTINUED | OUTPATIENT
Start: 2024-02-02 | End: 2024-02-02 | Stop reason: HOSPADM

## 2024-02-02 RX ORDER — DEXAMETHASONE SODIUM PHOSPHATE 10 MG/ML
INJECTION, SOLUTION INTRAMUSCULAR; INTRAVENOUS AS NEEDED
Status: DISCONTINUED | OUTPATIENT
Start: 2024-02-02 | End: 2024-02-02

## 2024-02-02 RX ORDER — FENTANYL CITRATE/PF 50 MCG/ML
50 SYRINGE (ML) INJECTION
Status: CANCELLED | OUTPATIENT
Start: 2024-02-02

## 2024-02-02 RX ORDER — ONDANSETRON 2 MG/ML
INJECTION INTRAMUSCULAR; INTRAVENOUS AS NEEDED
Status: DISCONTINUED | OUTPATIENT
Start: 2024-02-02 | End: 2024-02-02

## 2024-02-02 RX ORDER — LIDOCAINE HYDROCHLORIDE 10 MG/ML
INJECTION, SOLUTION EPIDURAL; INFILTRATION; INTRACAUDAL; PERINEURAL AS NEEDED
Status: DISCONTINUED | OUTPATIENT
Start: 2024-02-02 | End: 2024-02-02

## 2024-02-02 RX ORDER — ONDANSETRON 2 MG/ML
4 INJECTION INTRAMUSCULAR; INTRAVENOUS ONCE AS NEEDED
Status: CANCELLED | OUTPATIENT
Start: 2024-02-02

## 2024-02-02 RX ORDER — CHLORHEXIDINE GLUCONATE ORAL RINSE 1.2 MG/ML
15 SOLUTION DENTAL EVERY 12 HOURS SCHEDULED
Status: DISCONTINUED | OUTPATIENT
Start: 2024-02-02 | End: 2024-02-02 | Stop reason: HOSPADM

## 2024-02-02 RX ORDER — EPHEDRINE SULFATE 50 MG/ML
INJECTION INTRAVENOUS AS NEEDED
Status: DISCONTINUED | OUTPATIENT
Start: 2024-02-02 | End: 2024-02-02

## 2024-02-02 RX ADMIN — LIDOCAINE HYDROCHLORIDE 50 MG: 10 INJECTION, SOLUTION EPIDURAL; INFILTRATION; INTRACAUDAL; PERINEURAL at 08:06

## 2024-02-02 RX ADMIN — EPHEDRINE SULFATE 10 MG: 50 INJECTION, SOLUTION INTRAVENOUS at 08:20

## 2024-02-02 RX ADMIN — ONDANSETRON 4 MG: 2 INJECTION INTRAMUSCULAR; INTRAVENOUS at 10:42

## 2024-02-02 RX ADMIN — DEXAMETHASONE SODIUM PHOSPHATE 10 MG: 10 INJECTION, SOLUTION INTRAMUSCULAR; INTRAVENOUS at 08:06

## 2024-02-02 RX ADMIN — PHENYLEPHRINE HYDROCHLORIDE 50 MCG/MIN: 10 INJECTION INTRAVENOUS at 08:14

## 2024-02-02 RX ADMIN — FENTANYL CITRATE 50 MCG: 50 INJECTION INTRAMUSCULAR; INTRAVENOUS at 08:13

## 2024-02-02 RX ADMIN — PROPOFOL 150 MG: 10 INJECTION, EMULSION INTRAVENOUS at 08:07

## 2024-02-02 RX ADMIN — EPHEDRINE SULFATE 10 MG: 50 INJECTION, SOLUTION INTRAVENOUS at 08:59

## 2024-02-02 RX ADMIN — CEFAZOLIN SODIUM 2000 MG: 2 SOLUTION INTRAVENOUS at 08:09

## 2024-02-02 RX ADMIN — HEPARIN SODIUM 3000 UNITS: 1000 INJECTION INTRAVENOUS; SUBCUTANEOUS at 09:41

## 2024-02-02 RX ADMIN — FENTANYL CITRATE 25 MCG: 50 INJECTION INTRAMUSCULAR; INTRAVENOUS at 08:36

## 2024-02-02 RX ADMIN — SODIUM CHLORIDE, SODIUM LACTATE, POTASSIUM CHLORIDE, AND CALCIUM CHLORIDE: .6; .31; .03; .02 INJECTION, SOLUTION INTRAVENOUS at 08:05

## 2024-02-02 RX ADMIN — FENTANYL CITRATE 25 MCG: 50 INJECTION INTRAMUSCULAR; INTRAVENOUS at 09:07

## 2024-02-02 NOTE — DISCHARGE INSTR - AVS FIRST PAGE
DISCHARGE INSTRUCTIONS  DIALYSIS GRAFT SURGERY    ACTIVITY:  Limit use of the operated arm to what is necessary for the first day after surgery. On the second day after surgery, you may start to increase use of your arm as tolerated.  Avoid heavy lifting (no more than 15 lbs) for the first one week.  You should start to exercise your hand on the side of the graft by squeezing a stress ball or a rolled-up sock. This increases blood flow in your graft and arm so your it will function better.    Feel for a thrill every day. The thrill is the vibration or pulse you feel over the graft that means the blood is flowing through it. If you cannot feel a thrill, call our office ([phone]).    DIET:   Resume your normal diet.  Good nutrition is important for healing of your incision.    DRESSING:   You may have surgical glue at your surgical site. There are stitches present under the skin which will absorb on their own.  The glue is used to cover the access, assist in closure, and prevent contamination. This adhesive will darken and peel away on its own within one to two weeks. Do not pick at it.  If you have a dressing over your surgical site, remove this on the second day after surgery.    INCISION:   If you do not have a dialysis catheter in place, you may shower and get your incision wet.  Wash incision daily with soap and water, but do not rub or scrub the incision; rinse thoroughly and pat dry.  You may have stitches or staples to close your incision and it is okay for these to get wet.  Do not bathe in a tub or swim for the first 4 week following surgery or if you have any open wounds.  It is normal to have mild swelling or discoloration around the incision.   If increasing redness or pain develops, call our office immediately.  Numbness in the region of the incision may occur following the surgery.  This normally improves over six to twelve months.  If you have numbness or pain in your hand, please call our office  immediately.  DO NOT put any powders, creams, ointments, or lotions on your incision.     ARM SWELLING:    Most patients have some noticeable arm swelling after surgery.  This usually disappears within a few weeks.  If swelling is present, elevate the arm whenever possible.      RESTRICTIONS:   Do NOT have blood draws, IV's, or blood pressures performed on the operated arm.    GRAFT USE:    Your graft will be used for dialysis after the pain and swelling has diminished. This takes at least two weeks after graft placement.  If you are using a catheter for dialysis, this will not be removed until after your graft is being used for dialysis without any issues.    FOLLOW UP APPOINTMENTS:  Making and keeping follow up appointments and ultrasound tests are important to your recovery.  If you have difficulty making it to or keeping your follow up appointments, call the office.    Resume your Eliquis starting tomorrow morning.  Continue Aspirin daily as well.      If you have increased pain, fever >101.5, increased drainage, redness or a bad smell at your surgery site, new coldness/numbness of your arm or leg, please call us immediately and GO directly to the ER.    PLEASE CALL THE OFFICE IF YOU HAVE ANY QUESTIONS  [phone]  [phone] [address], [address], [place] [address]  [address] [facility], [address], [place] [address]  [address] [place] [address]  [address] [facility], [address], [place] [address]  [address] [facility], [place] [address]  [address] [facility], 2nd Floor, [address], [place] [address]  [address] [facility], [address], [place] [address]  [address] [facility], 1st Floor, [address], [place] [address]  [address], [place] [address]  [address] [address]

## 2024-02-02 NOTE — ANESTHESIA POSTPROCEDURE EVALUATION
Post-Op Assessment Note    CV Status:  Stable  Pain Score: 0    Pain management: adequate       Mental Status:  Awake   Hydration Status:  Stable   PONV Controlled:  None   Airway Patency:  Patent     Post Op Vitals Reviewed: Yes    No anethesia notable event occurred.    Staff: Anesthesiologist, CRNA               BP   126/58   Temp 97.8 °F (36.6 °C) (02/02/24 1114)    Pulse 81 (02/02/24 1114)   Resp   16   SpO2   97

## 2024-02-02 NOTE — OP NOTE
OPERATIVE REPORT  PATIENT NAME: Jeffery Phillips    :  1960  MRN: [id number]  Pt Location: [facility]OM 02    SURGERY DATE: 2024    Surgeons and Role:     * Dot Otero MD - Primary     * Aryan Carter DO - Assisting    Preop Diagnosis:  ESRD (end stage renal disease) on dialysis (HCC) [N18.6, Z99.2]    Post-Op Diagnosis Codes:     * ESRD (end stage renal disease) on dialysis (HCC) [N18.6, Z99.2]    Procedure(s):  Placement left brachial-axillary graft with 4-7 propaten    Specimen(s):  none    Estimated Blood Loss:   200 mL    Drains:  none    Anesthesia Type:   General LMA    Operative Indications:  Patient is a 62 yo M w/ ESRD, s/p multiple access attempts c/b thrombosis.  Presents now for LUE graft.    Operative Findings:  Scarring as expected at the left antecubital fossa given prior surgery  Brachial artery is 3mm in size and adequate quality  Axillary vein about 4mm in size and good quality    Complications:   none    Procedure and Technique:  After informed consent was obtained, the patient was brought to the operating room and placed in the supine position.  He was given anesthesia and an LMA was placed.  He was given IV antibiotics with ancef.  Duplex ultrasound was used to examine the brachial artery and axillary vein and these were found to be of adequate size and quality.  He was prepped and draped in the usual sterile fashion, exposing the left arm circumferentially.  A timeout was performed.  A transverse incision was made at the antecubital fossa just proximal to his prior incision.  Cautery was used to dissect through the soft tissue. This was noted to be heavily scarred as expected given prior dissection.  The brachial artery was then identified and dissected free.  Vessel loops were placed proximally and distally.  Next, a longitudinal incision was made at the upper arm.  Cautery was used to dissect through the soft tissue.  The axillary vein was identified and dissected  free.  This was noted to be deeper than average and tucked up under the muscle.  A vessel loop was placed centrally and peripherally.  A curved tunneler was used to make a lazy tunnel anteriorly from one incision to the other.  A 4-7mm Propaten graft was selected and pulled through the tunnel making sure that it slides easily and that it was not kinked or twisted.  3000 units of IV heparin were given.  The vessel loops were pulled taught on the brachial artery and an 11-blade was used to make a longitudinal incision.  This was lengthened with Lake scissors.  The 4mm end of the graft was cut to length and beveled.  The anastomosis was created using 6-0 prolene suture tied at the heel and run circumferentially.  After the anastomosis was complete, the proximal vessel loop was released and the graft was bled and flushed with heparinized saline.  The graft was clamped with a profunda clamp near the anastomosis and the distal arterial vessel loop was released.  The vessel loop on the vein was tightened and an 11-blade was used to make a longitudinal incision, which was lengthened with Lake scissors.  The 7mm end of the graft was cut to length and beveled.  The anastomosis was created using 6-0 prolene suture tied at the heel and run circumferentially.  Prior to completion, the graft and vein were bled and flushed.  After completion, the loops were released and flow was restored.  An excellent thrill could be felt in the graft and a radial and ulnar signal were heard.  The wounds were checked for hemostasis and copiously irrigated with antibiotic saline solution.  They were then closed with 3-0 vicryl running suture for the subcutaneous tissue and 4-0 monocryl running suture in the subcuticular layer.  1% marcaine was injected under the skin.  The incisions were dressed with surgical glue.  The patient was allowed to awaken and the LMA was removed.  He was transferred to the PACU for postoperative care.     2+ L radial  pulse in PACU and thrill in graft     I was present for the entire procedure.    Patient Disposition:  PACU         SIGNATURE: Dot Otero MD  DATE: February 2, 2024  TIME: 11:26 AM    Vascular Quality Initiative - Hemodialysis Access Placement    Pre-admission Information   Functional status: Restricted in physically strenuous activity but ambulatory and able to carry out work of a light or sedentary nature.     ESRD: ESRD: Hemodialysis dependent.  Current Access Type.: Tunneled Catheter    Historical Information      Previous Access: bilateral   Access Type/Location: multiple; see my office note        Procedure Information      Status: Outpatient     Side:left      Anesthesia: General     Access Type: Access Type: AV Graft  Reason not autogenous: Suitable vein not available Conduit Type: Prosthetic Graft Inflow Artery is: Brachial, Antecubital Intraoperative Artery taget diameter is: 3mm.  Outflow Vein is: Axillary.  Intraoperative Vein target diameter is: 4mm.  Anastomotic Artery Length ?, Anastomotic Vein Length ?.  Anastomotic Material is: Monofilament non-absorbable suture.  Anastomosis type is: Continuous  Cocomitant Procedure performed-: None    Completion Fistulogram: no     Preop ARTERIAL evaluation and/or treatment: duplex    Preop VENOUS evaluation and/or treatment: ultrasound mapping    *Obtain Target Diameters from study          Post op Information     Discharge Status: Home    Post op Complications: None

## 2024-02-02 NOTE — INTERVAL H&P NOTE
H&P reviewed. After examining the patient I find no changes in the patients condition since the H&P had been written.    Vitals:    02/02/24 0620   BP: 112/68   Pulse: 75   Resp: 18   Temp: 97.8 °F (36.6 °C)   SpO2: 98%

## 2024-02-06 ENCOUNTER — TELEPHONE (OUTPATIENT)
Dept: VASCULAR SURGERY | Facility: CLINIC | Age: 64
End: 2024-02-06

## 2024-02-06 NOTE — TELEPHONE ENCOUNTER
Vascular Nurse Navigator Post Op Phone Call    Post-Discharge phone call attempted to assess patient recovery after vascular surgery I left a message on answering machine. Will attempt to contact at later date.        Pt's chart was reviewed prior to call and leaving message.    Procedure: Placement left brachial-axillary graft with 4-7 propaten     Date of Procedure: 2/2/24    Surgeon:   Jeremiah Otero MD - Primary     * Aryan Carter, DO - Assisting      Anticoagulation pt was discharged on post op?: Aspirin and Apixaban (Eliquis)    Statin pt was discharged on post op?:  Pravachol (pravastatin)    Dialysis Days and Location:  [facility] at [facility]    Reminded pt of the following in message:    NEXT SCHEDULED OFFICE VISIT:  2/19/24 at 1:15 pm with Dr. Dot Otero at [facility]    To contact [facility] with any concerns.

## 2024-02-07 ENCOUNTER — TELEPHONE (OUTPATIENT)
Dept: ADMINISTRATIVE | Facility: OTHER | Age: 64
End: 2024-02-07

## 2024-02-07 NOTE — TELEPHONE ENCOUNTER
02/07/24 12:20 PM     [employer] SmartForm used to document caregap status.  Was called on 2-1  Mary Maher

## 2024-02-09 ENCOUNTER — OFFICE VISIT (OUTPATIENT)
Dept: INTERNAL MEDICINE CLINIC | Facility: CLINIC | Age: 64
End: 2024-02-09
Payer: COMMERCIAL

## 2024-02-09 VITALS
HEART RATE: 91 BPM | BODY MASS INDEX: 30.62 KG/M2 | OXYGEN SATURATION: 96 % | WEIGHT: 202 LBS | HEIGHT: 68 IN | DIASTOLIC BLOOD PRESSURE: 81 MMHG | SYSTOLIC BLOOD PRESSURE: 184 MMHG | TEMPERATURE: 97 F

## 2024-02-09 DIAGNOSIS — I15.0 RENOVASCULAR HYPERTENSION: ICD-10-CM

## 2024-02-09 DIAGNOSIS — R56.9 SEIZURE DUE TO ALCOHOL WITHDRAWAL, UNCOMPLICATED (HCC): ICD-10-CM

## 2024-02-09 DIAGNOSIS — I77.0 ARTERIOVENOUS FISTULA, ACQUIRED (HCC): ICD-10-CM

## 2024-02-09 DIAGNOSIS — N25.81 SECONDARY HYPERPARATHYROIDISM OF RENAL ORIGIN (HCC): ICD-10-CM

## 2024-02-09 DIAGNOSIS — N62 GYNECOMASTIA, MALE: ICD-10-CM

## 2024-02-09 DIAGNOSIS — I50.9 CONGESTIVE HEART FAILURE (HCC): ICD-10-CM

## 2024-02-09 DIAGNOSIS — T82.898S ARTERIOVENOUS FISTULA OCCLUSION, SEQUELA: Primary | ICD-10-CM

## 2024-02-09 DIAGNOSIS — F10.930 SEIZURE DUE TO ALCOHOL WITHDRAWAL, UNCOMPLICATED (HCC): ICD-10-CM

## 2024-02-09 PROCEDURE — 99214 OFFICE O/P EST MOD 30 MIN: CPT | Performed by: STUDENT IN AN ORGANIZED HEALTH CARE EDUCATION/TRAINING PROGRAM

## 2024-02-09 RX ORDER — CARVEDILOL 25 MG/1
25 TABLET ORAL 2 TIMES DAILY WITH MEALS
Qty: 90 TABLET | Refills: 1 | Status: SHIPPED | OUTPATIENT
Start: 2024-02-09

## 2024-02-09 RX ORDER — CARVEDILOL 25 MG/1
25 TABLET ORAL 2 TIMES DAILY WITH MEALS
Qty: 90 TABLET | Refills: 1 | Status: SHIPPED | OUTPATIENT
Start: 2024-02-09 | End: 2024-02-09

## 2024-02-09 NOTE — ASSESSMENT & PLAN NOTE
Following with vascular surgery secondary to recent occlusion during hemodialysis  He is status post placement of left brachial-axillary graft  As per vascular surgery recommendations, he is on day 7 of holding his Eliquis status post procedure and recommended to restart tomorrow

## 2024-02-09 NOTE — PROGRESS NOTES
"INTERNAL MEDICINE OFFICE VISIT NOTE  [facility] [place]    NAME: Jeffery Phillips  AGE: 63 y.o. SEX: male    DATE OF ENCOUNTER: 2/9/2024       Chief Complaint   Patient presents with    Follow-up     3 month follow up   Review labs     Status post diagnostic bilateral mammogram  Additionally he is status post colonoscopy and status postplacement of left brachial-axillary graft secondary to occlusion of his AV fistula    Review of Systems  10 point ROS negative except per HPI    OBJECTIVE:  Vitals:    02/09/24 1009   BP: (!) 184/81   BP Location: Right arm   Patient Position: Sitting   Cuff Size: Standard   Pulse: 91   Temp: (!) 97 °F (36.1 °C)   SpO2: 96%   Weight: 91.6 kg (202 lb)   Height: 5' 8\" (1.727 m)       Physical Exam:   GENERAL: NAD, Normal appearance.    NEUROLOGIC:  Alert/oriented x3.  HEENT:  NC/AT, no scleral icterus  CARDIAC:  RRR, +S1/S2  PULMONARY:  non-labored breathing    ASSESSMENT/PLAN:    1. Arteriovenous fistula occlusion, sequela  Assessment & Plan:  Following with vascular surgery secondary to recent occlusion during hemodialysis  He is status post placement of left brachial-axillary graft  As per vascular surgery recommendations, he is on day 7 of holding his Eliquis status post procedure and recommended to restart tomorrow      2. Congestive heart failure (HCC)  -     carvedilol (COREG) 25 mg tablet; Take 1 tablet (25 mg total) by mouth 2 (two) times a day with meals    3. Arteriovenous fistula, acquired (HCC)  Assessment & Plan:  status postplacement of left brachial-axillary graft secondary to occlusion of his AV fistula      4. Seizure due to alcohol withdrawal, uncomplicated (HCC)  Assessment & Plan:  Last episode 2021  Admits to occasionally drink 1-2 drinks, denies heavy alcohol drinking as in the past       5. Secondary hyperparathyroidism of renal origin (HCC)  Assessment & Plan:  Following with nephrology  Currently on Sensipar, calcitriol         6. " Renovascular hypertension  Assessment & Plan:  Blood pressure elevated today at 184/81 however he reports he has not been taking his antihypertensive medication  Daily compliance was encouraged today   -Continue current regimen    Orders:  -     carvedilol (COREG) 25 mg tablet; Take 1 tablet (25 mg total) by mouth 2 (two) times a day with meals    7. Gynecomastia, male  Assessment & Plan:  Status post diagnostic bilateral mammogram that showed bilateral gynecomastia              Current Outpatient Medications:     amLODIPine (NORVASC) 5 mg tablet, Take 1 tablet (5 mg total) by mouth daily, Disp: 90 tablet, Rfl: 4    ammonium lactate (LAC-HYDRIN) 12 % lotion, Apply topically daily, Disp: , Rfl:     apixaban (Eliquis) 2.5 mg, Take 1 tablet (2.5 mg total) by mouth 2 (two) times a day, Disp: 180 tablet, Rfl: 1    aspirin (ECOTRIN LOW STRENGTH) 81 mg EC tablet, Take 1 tablet (81 mg total) by mouth daily, Disp: 90 tablet, Rfl: 1    B Complex-C-Folic Acid (Karlee-Melissa) TABS, Take 1 tablet by mouth daily, Disp: 90 tablet, Rfl: 4    calcitriol (ROCALTROL) 0.25 mcg capsule, Take 2 capsules (0.5 mcg total) by mouth 3 (three) times a week, Disp: 45 capsule, Rfl: 4    calcium acetate (PHOSLO) capsule, TAKE 3 CAPSULES(2001 MG) BY MOUTH THREE TIMES DAILY WITH MEALS, Disp: 810 capsule, Rfl: 3    carvedilol (COREG) 25 mg tablet, Take 1 tablet (25 mg total) by mouth 2 (two) times a day with meals, Disp: 90 tablet, Rfl: 1    cholecalciferol (VITAMIN D3) 1,000 units tablet, Take 4 tablets (4,000 Units total) by mouth daily, Disp: 120 tablet, Rfl: 0    ciclopirox (PENLAC) 8 % solution, if needed, Disp: , Rfl:     cinacalcet (SENSIPAR) 30 mg tablet, Take 1 tablet (30 mg total) by mouth once a week, Disp: 12 tablet, Rfl: 4    furosemide (LASIX) 80 mg tablet, Take 2 tablets (160 mg total) by mouth daily, Disp: 180 tablet, Rfl: 3    hydrOXYzine HCL (ATARAX) 10 mg tablet, Take 1 tablet (10 mg total) by mouth every 6 (six) hours as needed for  itching, Disp: 30 tablet, Rfl: 0    oxyCODONE-acetaminophen (Percocet) 5-325 mg per tablet, Take 1 tablet by mouth every 6 (six) hours as needed for moderate pain for up to 10 days Max Daily Amount: 4 tablets, Disp: 15 tablet, Rfl: 0    pravastatin (PRAVACHOL) 80 mg tablet, TAKE 1 TABLET(80 MG) BY MOUTH DAILY WITH DINNER, Disp: 90 tablet, Rfl: 1    sevelamer carbonate (RENVELA) 800 mg tablet, TAKE 2 TABLETS(1600 MG) BY MOUTH THREE TIMES DAILY WITH MEALS, Disp: 540 tablet, Rfl: 4    EPINEPHrine (EPIPEN) 0.3 mg/0.3 mL SOAJ, Inject 0.3 mL (0.3 mg total) into a muscle once for 1 dose (Patient not taking: Reported on 2/9/2024), Disp: 0.6 mL, Rfl: 0    TCM Call       Date and time call was made  6/16/2022  1:59 PM    Hospital care reviewed  Records reviewed    Patient was hospitialized at  [facility]    Date of Admission  06/10/22    Date of discharge  06/14/22    Diagnosis  Acute renal failure (HCC)    Disposition  Home    Were the patients medications reviewed and updated  No    Current Symptoms  Fatigue          TCM Call       Post hospital issues  None    Should patient be enrolled in anticoag monitoring?  No    Scheduled for follow up?  Yes    Patients specialists  Nephrologist    Did you obtain your prescribed medications  Yes    Do you need help managing your prescriptions or medications  No    Is transportation to your appointment needed  No    I have advised the patient to call PCP with any new or worsening symptoms  Yue Mcmahon, EMMA    Living Arrangements  Family members    Are you recieving any outpatient services  No    Are you recieving home care services  No    Are you using any community resources  No    Current waiver services  No    Have you fallen in the last 12 months  No    Interperter language line needed  No    Counseling  Patient               Jean Perkins MD  [facility] [place]    * Please Note: This note was completed in part utilizing a dictation software.   Grammatical errors, random word insertions, spelling mistakes, and incomplete sentences may be an occasional consequence of this system secondary to software limitations, ambient noise, and hardware issues.  If you have any questions or concerns about the content, text, or information contained within the body of this dictation, please contact the provider for clarification.**

## 2024-02-09 NOTE — ASSESSMENT & PLAN NOTE
Blood pressure elevated today at 184/81 however he reports he has not been taking his antihypertensive medication  Daily compliance was encouraged today   -Continue current regimen

## 2024-02-09 NOTE — ASSESSMENT & PLAN NOTE
Last episode 2021  Admits to occasionally drink 1-2 drinks, denies heavy alcohol drinking as in the past

## 2024-02-09 NOTE — TELEPHONE ENCOUNTER
Vascular Nurse Navigator Post Op Call    Procedure: Placement left brachial-axillary graft with 4-7 propaten      Date of Procedure: 2/2/24     Surgeon:   * Dot Otero MD - Primary     * Aryan Carter,  - Assisting      Painful tingling or numbness in your fingers?: No    Paleness/Coolness in hands/fingers?: No    Redness, swelling or pus from your wound?: No    Bleeding?: No    Thrill present?: Yes    Anticoagulation pt was discharged on post op?: Aspirin, Apixaban (Eliquis), and Dabigatran (Pradaxa)    Statin pt was discharged on post op?:  Pravachol (pravastatin)    Fever/chills?: No    Uncontrolled Pain?: No      Reviewed discharge instructions and incision care with patient.      Dialysis Days and Location:  [facility] at [facility]    NEXT SCHEDULED OFFICE VISIT:  2/19/24 at 1:15 pm with Dr. Dot Otero at [facility]     Transportation Confirmed?: Yes      Any Questions or Concerns?    Patient stated that he is doing good since procedure.  He stated that he had some numbness in his fingers, but this resolved.  He stated that he has some swelling in his hand and has been elevating it.  Reviewed incision care with him - wash daily with soap and water.  Reviewed discharge medications - Aspirin and Eliquis.  He stated that he was told to hold his Eliquis 7 days after his procedure.  Informed him per AVS, he was to restart his Eliquis the day after procedure and to continue his Aspirin.  He stated that he has been taking his Aspirin.  Advised him to restart his Eliquis.  All questions answered.  No concerns expressed at this time.

## 2024-02-12 ENCOUNTER — OFFICE VISIT (OUTPATIENT)
Dept: CARDIOLOGY CLINIC | Facility: CLINIC | Age: 64
End: 2024-02-12
Payer: COMMERCIAL

## 2024-02-12 VITALS
DIASTOLIC BLOOD PRESSURE: 84 MMHG | HEART RATE: 77 BPM | BODY MASS INDEX: 31.05 KG/M2 | WEIGHT: 204.2 LBS | SYSTOLIC BLOOD PRESSURE: 126 MMHG

## 2024-02-12 DIAGNOSIS — I50.32 HYPERTENSIVE HEART DISEASE WITH CHRONIC DIASTOLIC CONGESTIVE HEART FAILURE (HCC): ICD-10-CM

## 2024-02-12 DIAGNOSIS — D47.2 MGUS (MONOCLONAL GAMMOPATHY OF UNKNOWN SIGNIFICANCE): ICD-10-CM

## 2024-02-12 DIAGNOSIS — R55 NEAR SYNCOPE: Primary | ICD-10-CM

## 2024-02-12 DIAGNOSIS — N18.6 ESRD (END STAGE RENAL DISEASE) (HCC): ICD-10-CM

## 2024-02-12 DIAGNOSIS — F17.210 SMOKING GREATER THAN 20 PACK YEARS: ICD-10-CM

## 2024-02-12 DIAGNOSIS — E78.2 MIXED HYPERLIPIDEMIA: ICD-10-CM

## 2024-02-12 DIAGNOSIS — I11.0 HYPERTENSIVE HEART DISEASE WITH CHRONIC DIASTOLIC CONGESTIVE HEART FAILURE (HCC): ICD-10-CM

## 2024-02-12 DIAGNOSIS — I15.0 RENOVASCULAR HYPERTENSION: ICD-10-CM

## 2024-02-12 PROCEDURE — 93000 ELECTROCARDIOGRAM COMPLETE: CPT | Performed by: INTERNAL MEDICINE

## 2024-02-12 PROCEDURE — 99214 OFFICE O/P EST MOD 30 MIN: CPT | Performed by: INTERNAL MEDICINE

## 2024-02-12 NOTE — PATIENT INSTRUCTIONS
CARDIOLOGY ASSESSMENT & PLAN     1. Near syncope  POCT ECG    Echo complete w/ contrast if indicated      2. Hypertensive heart disease with chronic diastolic congestive heart failure (HCC)  Echo complete w/ contrast if indicated    TSH+Free T4      3. Renovascular hypertension  TSH+Free T4      4. Smoking greater than 20 pack years        5. Mixed hyperlipidemia  Lipid panel      6. ESRD (end stage renal disease) (HCC)  TSH+Free T4      7. MGUS (monoclonal gammopathy of unknown significance)          Near syncope  Mr. Jeffery Phillips is overall stable from cardiac perspective with no recent recurrence of near syncopal events.  Blood pressures have been stable but high at times.  On examination he appears to be euvolemic.  There is no evidence of pulmonary vascular congestion.  Blood pressure today in office is normal.  His ECG is benign.  He has not had lipid profile and thyroid function test checked in the recent years although he states he might of had it with his primary care physician.  I did not see any results in the [facility] as well.      -- Regarding his medications I am advising him to continue current therapies.  If he is noted to have elevated blood pressures consistently then amlodipine can be increased to 5 mg twice daily.  -- I am urging him to quit smoking  as this is his single major risk factor for future cardiovascular events.  He understands and he says he is trying his best.  -- Also advising him from avoiding alcohol as that can affect his electrolytes and having being on dialysis any fluctuation and electrolyte can cause fatal arrhythmias.  -- Since he has not had echocardiogram recently.  I am going to request an echocardiogram and this can be performed with his next visit.  We will plan a follow-up visit in 6 months time.  -- Advising to closely follow dietary instructions regarding his kidney disease and those should be good with his heart disease as well.  -- He is  advised  to report any concerning symptoms such as chest pain, shortness of breath, decline in exercise tolerance or presyncope/syncope.

## 2024-02-12 NOTE — ASSESSMENT & PLAN NOTE
Mr. Jeffery Phillips is overall stable from cardiac perspective with no recent recurrence of near syncopal events.  Blood pressures have been stable but high at times.  On examination he appears to be euvolemic.  There is no evidence of pulmonary vascular congestion.  Blood pressure today in office is normal.  His ECG is benign.  He has not had lipid profile and thyroid function test checked in the recent years although he states he might of had it with his primary care physician.  I did not see any results in the [facility] as well.      -- Regarding his medications I am advising him to continue current therapies.  If he is noted to have elevated blood pressures consistently then amlodipine can be increased to 5 mg twice daily.  -- I am urging him to quit smoking  as this is his single major risk factor for future cardiovascular events.  He understands and he says he is trying his best.  -- Also advising him from avoiding alcohol as that can affect his electrolytes and having being on dialysis any fluctuation and electrolyte can cause fatal arrhythmias.  -- Since he has not had echocardiogram recently.  I am going to request an echocardiogram and this can be performed with his next visit.  We will plan a follow-up visit in 6 months time.  -- Advising to closely follow dietary instructions regarding his kidney disease and those should be good with his heart disease as well.  -- He is advised  to report any concerning symptoms such as chest pain, shortness of breath, decline in exercise tolerance or presyncope/syncope.

## 2024-02-12 NOTE — PROGRESS NOTES
[facility]  [address] [place] [address]  Phone#  [phone]. Fax#  [phone]  *-*-*-*-*-*-*-*-*-*-*-*-*-*-*-*-*-*-*-*-*-*-*-*-*-*-*-*-*-*-*-*-*-*-*-*-*-*-*-*-*-*-*-*-*-*-*-*-*-*-*-*-*-*  ENCOUNTER DATE: 02/12/24 9:30 AM  PATIENT NAME: Jeffery Phillips   1960    [id number]  AGE:63 y.o.      SEX: male  ENCOUNTER PROVIDER:Linda Gamboa MD     PRIMARY CARE PHYSICIAN: Jean Perkins MD    DIAGNOSES:  1.  Near syncope  2.  End-stage renal disease, on hemodialysis  3.  Chronic tobacco dependence due to smoking  4.  Renovascular hypertension  5.  Chronic marijuana use  6.  Dyslipidemia  7.  Anemia secondary to chronic kidney disease  8.  Chronic diastolic heart failure due to fluid retention  9.  Secondary hyperparathyroidism  10.  History of alcohol abuse now in remission  11.  Erectile dysfunction    ECHOCARDIOGRAM 2/1/2022:  Normal left ventricle size, wall thickness, normal left LV systolic function, grade 1 diastolic dysfunction, EF 60%  Normal right ventricle size and function.  Normal left and right atrial cavity size.  Mild to moderate aortic valve regurgitation no aortic valve stenosis.  Mild mitral valve regurgitation.  No significant tricuspid or pulmonic valve regurgitation.  No obvious pulmonary hypertension  No pericardial effusion.     Reports of having nuclear stress test also in the past in [place].       CURRENT ECG     Results for orders placed or performed in visit on 02/12/24   POCT ECG    Narrative    Sinus rhythm, HR 77 bpm, normal axis and intervals, no significant chamber hypertrophy or enlargement, no evidence of prior infarction, no ischemia.  QTc is borderline increased at 452 ms.           CARDIOLOGY ASSESSMENT & PLAN     1. Near syncope  POCT ECG    Echo complete w/ contrast if indicated      2. Hypertensive heart disease with chronic diastolic congestive heart failure (HCC)  Echo complete w/ contrast if indicated    TSH+Free T4      3. Renovascular  hypertension  TSH+Free T4      4. Smoking greater than 20 pack years        5. Mixed hyperlipidemia  Lipid panel      6. ESRD (end stage renal disease) (HCC)  TSH+Free T4      7. MGUS (monoclonal gammopathy of unknown significance)          Near syncope  Mr. Jeffery Phillips is overall stable from cardiac perspective with no recent recurrence of near syncopal events.  Blood pressures have been stable but high at times.  On examination he appears to be euvolemic.  There is no evidence of pulmonary vascular congestion.  Blood pressure today in office is normal.  His ECG is benign.  He has not had lipid profile and thyroid function test checked in the recent years although he states he might of had it with his primary care physician.  I did not see any results in the [facility] as well.      -- Regarding his medications I am advising him to continue current therapies.  If he is noted to have elevated blood pressures consistently then amlodipine can be increased to 5 mg twice daily.  -- I am urging him to quit smoking  as this is his single major risk factor for future cardiovascular events.  He understands and he says he is trying his best.  -- Also advising him from avoiding alcohol as that can affect his electrolytes and having being on dialysis any fluctuation and electrolyte can cause fatal arrhythmias.  -- Since he has not had echocardiogram recently.  I am going to request an echocardiogram and this can be performed with his next visit.  We will plan a follow-up visit in 6 months time.  -- Advising to closely follow dietary instructions regarding his kidney disease and those should be good with his heart disease as well.  -- He is advised  to report any concerning symptoms such as chest pain, shortness of breath, decline in exercise tolerance or presyncope/syncope.      INTERVAL HISTORY, HISTORY OF PRESENT ILLNESS & COMPREHENSIVE VISIT INFORMATION     Jeffery Phillips is here for follow-up  regarding his cardiac comorbidities which include: History of near syncope, diastolic dysfunction with heart failure, end-stage renal disease, hypertension, and other comorbidities.  He was last seen by me in November 2023.  He was advised to monitor his blood pressures and get an echocardiogram however echocardiogram order was not placed so it was not done.  He is here for follow-up today.  He mentions that since last visit he has had no recurrence of near syncope/syncope events.  He reports overall feeling well.  Denies unusual chest pain or shortness of breath or palpitations or passing out or near passing out episodes.  Has been going to dialysis regularly.  Does have symptoms related to his fatigue and anemia from chronic kidney disease and chronic dialysis.  Denies orthopnea or PND or worsening pedal edema.  He has been monitoring his vital signs at home and has brought the record for us to review.        His heart rates have been in the 80s to 100s systolic blood pressures have been in the 120s to 130s occasionally and diastolic blood pressures have been in 70s to 80s.    Functional capacity status: Moderate to good   (Excellent- >10 METs; Good: (7-10 METs); Moderate (4-7 METs); Poor (<= 4 METs)    Any chronic stressors: None   (feeling of poor health, financial problems, and social isolation etc).    Tobacco or alcohol dependence: Reports that he is trying to quit smoking and is now smoking 4 to 6 cigarettes a day.  Reports that he is still drinking but not like before.  Drinks about 2 cans of beer a week.    Current cardiac medications: Apixaban 2.5 mg daily amlodipine 5 mg daily carvedilol 25 mg twice daily furosemide 160 mg daily pravastatin 80 mg daily vitamin D3    Last recent comprehensive blood work available:   Chemistry blood work from 1/6/2024 significant for sodium 130 potassium 4.6 chloride 90 bicarb 25 BUN 78 creatinine 13.7 GFR 3  Potassium checked on 2 February showed 3.3  He has not had lipid  profile in the recent past.  Last values are from 2018.    REVIEW OF SYSTEMS   Positive for: As noted above in HPI  Negative for: All remaining as reviewed below and in HPI.    SYSTEM SYMPTOMS REVIEWED:  General--weight change, fever, night sweats  Respiratory--cough, wheezing, shortness of breath, sputum production  Cardiovascular--chest pain, syncope, dyspnea on exertion, edema, decline in exercise tolerance, claudication   Gastrointestinal--persistent vomiting, diarrhea, abdominal distention, blood in stool   Muscular or skeletal--joint pain or swelling   Neurologic--headaches, syncope, abnormal movement  Hematologic--history of easy bruising and bleeding   Endocrine--thyroid enlargement, heat or cold intolerance, polyuria   Psychiatric--anxiety, depression     *-*-*-*-*-*-*-*-*-*-*-*-*-*-*-*-*-*-*-*-*-*-*-*-*-*-*-*-*-*-*-*-*-*-*-*-*-*-*-*-*-*-*-*-*-*-*-*-*-*-*-*-*-*-  VITAL SIGNS     CURRENT VITAL SIGNS:   Vitals:    02/12/24 0847   BP: 126/84   BP Location: Right arm   Patient Position: Sitting   Cuff Size: Large   Pulse: 77   Weight: 92.6 kg (204 lb 3.2 oz)       BMI: Body mass index is 31.05 kg/m².    WEIGHTS:   Wt Readings from Last 25 Encounters:   02/12/24 92.6 kg (204 lb 3.2 oz)   02/09/24 91.6 kg (202 lb)   02/02/24 89 kg (196 lb 3.4 oz)   01/24/24 90.6 kg (199 lb 12.8 oz)   01/22/24 99 kg (218 lb 4.1 oz)   01/08/24 93.3 kg (205 lb 11 oz)   01/06/24 91.3 kg (201 lb 4.5 oz)   01/04/24 93 kg (205 lb)   12/01/23 93 kg (205 lb)   11/29/23 95.6 kg (210 lb 12.8 oz)   11/03/23 88.5 kg (195 lb)   09/13/23 90.3 kg (199 lb)   08/11/23 90.3 kg (199 lb)   07/28/23 88 kg (194 lb 0.1 oz)   03/15/23 88.5 kg (195 lb)   02/15/23 91.2 kg (201 lb)   02/10/23 93.9 kg (207 lb)   01/25/23 89.8 kg (198 lb)   12/14/22 88.5 kg (195 lb)   12/07/22 92.5 kg (204 lb)   11/11/22 90.3 kg (199 lb 1.2 oz)   11/10/22 89.9 kg (198 lb 3.2 oz)   11/09/22 91.9 kg (202 lb 9.6 oz)   10/24/22 93.3 kg (205 lb 11 oz)   10/14/22 88.9 kg (196 lb)     *-*-*-*-*-*-*-*-*-*-*-*-*-*-*-*-*-*-*-*-*-*-*-*-*-*-*-*-*-*-*-*-*-*-*-*-*-*-*-*-*-*-*-*-*-*-*-*-*-*-*-*-*-*-  PHYSICAL EXAM     General Appearance:    Alert, cooperative, no distress, appears stated age   Head, Eyes, ENT:    No obvious abnormality, moist mucous mebranes.   Neck:   Supple, no carotid bruit or JVD   Back:     Symmetric, no curvature.   Lungs:     Respirations unlabored. Clear to auscultation bilaterally,    Chest wall:    No tenderness or deformity   Heart:    Regular rate and rhythm, S1 and S2 normal, no murmur, rub  or gallop.   Abdomen:     Soft, non-tender, No obvious masses, or organomegaly   Extremities:   Extremities warm, no cyanosis or edema.  Has AV fistula in right arm and a AV graft created recently in left upper arm.   Skin:   No venostatic changes in lower extremities. Normal skin color, texture, and turgor. No rashes or lesions     *-*-*-*-*-*-*-*-*-*-*-*-*-*-*-*-*-*-*-*-*-*-*-*-*-*-*-*-*-*-*-*-*-*-*-*-*-*-*-*-*-*-*-*-*-*-*-*-*-*-*-*-*-*-  CURRENT MEDICATIONS LIST     Current Outpatient Medications:     amLODIPine (NORVASC) 5 mg tablet, Take 1 tablet (5 mg total) by mouth daily, Disp: 90 tablet, Rfl: 4    ammonium lactate (LAC-HYDRIN) 12 % lotion, Apply topically daily, Disp: , Rfl:     apixaban (Eliquis) 2.5 mg, Take 1 tablet (2.5 mg total) by mouth 2 (two) times a day, Disp: 180 tablet, Rfl: 1    aspirin (ECOTRIN LOW STRENGTH) 81 mg EC tablet, Take 1 tablet (81 mg total) by mouth daily, Disp: 90 tablet, Rfl: 1    B Complex-C-Folic Acid (Karlee-Melissa) TABS, Take 1 tablet by mouth daily, Disp: 90 tablet, Rfl: 4    calcitriol (ROCALTROL) 0.25 mcg capsule, Take 2 capsules (0.5 mcg total) by mouth 3 (three) times a week, Disp: 45 capsule, Rfl: 4    calcium acetate (PHOSLO) capsule, TAKE 3 CAPSULES(2001 MG) BY MOUTH THREE TIMES DAILY WITH MEALS, Disp: 810 capsule, Rfl: 3    carvedilol (COREG) 25 mg tablet, Take 1 tablet (25 mg total) by mouth 2 (two) times a day with meals, Disp: 90 tablet,  "Rfl: 1    cholecalciferol (VITAMIN D3) 1,000 units tablet, Take 4 tablets (4,000 Units total) by mouth daily, Disp: 120 tablet, Rfl: 0    ciclopirox (PENLAC) 8 % solution, if needed, Disp: , Rfl:     cinacalcet (SENSIPAR) 30 mg tablet, Take 1 tablet (30 mg total) by mouth once a week, Disp: 12 tablet, Rfl: 4    furosemide (LASIX) 80 mg tablet, Take 2 tablets (160 mg total) by mouth daily, Disp: 180 tablet, Rfl: 3    hydrOXYzine HCL (ATARAX) 10 mg tablet, Take 1 tablet (10 mg total) by mouth every 6 (six) hours as needed for itching, Disp: 30 tablet, Rfl: 0    oxyCODONE-acetaminophen (Percocet) 5-325 mg per tablet, Take 1 tablet by mouth every 6 (six) hours as needed for moderate pain for up to 10 days Max Daily Amount: 4 tablets, Disp: 15 tablet, Rfl: 0    pravastatin (PRAVACHOL) 80 mg tablet, TAKE 1 TABLET(80 MG) BY MOUTH DAILY WITH DINNER, Disp: 90 tablet, Rfl: 1    sevelamer carbonate (RENVELA) 800 mg tablet, TAKE 2 TABLETS(1600 MG) BY MOUTH THREE TIMES DAILY WITH MEALS, Disp: 540 tablet, Rfl: 4    EPINEPHrine (EPIPEN) 0.3 mg/0.3 mL SOAJ, Inject 0.3 mL (0.3 mg total) into a muscle once for 1 dose (Patient not taking: Reported on 2/9/2024), Disp: 0.6 mL, Rfl: 0       ALLERGIES     Allergies   Allergen Reactions    Ibuprofen Other (See Comments)     ESRD - cannot have ibuprofen       *-*-*-*-*-*-*-*-*-*-*-*-*-*-*-*-*-*-*-*-*-*-*-*-*-*-*-*-*-*-*-*-*-*-*-*-*-*-*-*-*-*-*-*-*-*-*-*-*-*-*-*-*-*-  LABORATORY DATA   No results found for: \"NA\"  Potassium   Date Value Ref Range Status   02/02/2024 3.3 (L) 3.5 - 5.3 mmol/L Final   01/06/2024 4.6 3.5 - 5.3 mmol/L Final   09/28/2023 3.7 3.5 - 5.3 mmol/L Final     Chloride   Date Value Ref Range Status   01/06/2024 90 (L) 96 - 108 mmol/L Final   09/28/2023 91 (L) 96 - 108 mmol/L Final   10/24/2022 98 96 - 108 mmol/L Final     CO2   Date Value Ref Range Status   01/06/2024 25 21 - 32 mmol/L Final   09/28/2023 29 21 - 32 mmol/L Final   10/24/2022 26 21 - 32 mmol/L Final     BUN "   Date Value Ref Range Status   01/06/2024 78 (H) 5 - 25 mg/dL Final   09/28/2023 22 5 - 25 mg/dL Final   10/24/2022 23 5 - 25 mg/dL Final     Creatinine   Date Value Ref Range Status   01/06/2024 13.70 (H) 0.60 - 1.30 mg/dL Final     Comment:     Standardized to IDMS reference method   09/28/2023 5.26 (H) 0.60 - 1.30 mg/dL Final     Comment:     Standardized to IDMS reference method   10/24/2022 4.68 (H) 0.60 - 1.30 mg/dL Final     Comment:     Standardized to IDMS reference method     eGFR   Date Value Ref Range Status   01/06/2024 3 ml/min/1.73sq m Final   09/28/2023 10 ml/min/1.73sq m Final   10/24/2022 12 ml/min/1.73sq m Final     Calcium   Date Value Ref Range Status   01/06/2024 9.1 8.4 - 10.2 mg/dL Final   09/28/2023 9.4 8.4 - 10.2 mg/dL Final   10/24/2022 9.3 8.3 - 10.1 mg/dL Final     AST   Date Value Ref Range Status   10/20/2022 19 5 - 45 U/L Final     Comment:     Specimen collection should occur prior to Sulfasalazine administration due to the potential for falsely depressed results.    06/13/2022 19 5 - 45 U/L Final     Comment:     Specimen collection should occur prior to Sulfasalazine administration due to the potential for falsely depressed results.    06/11/2022 18 5 - 45 U/L Final     Comment:     Specimen collection should occur prior to Sulfasalazine administration due to the potential for falsely depressed results.      ALT   Date Value Ref Range Status   10/20/2022 17 12 - 78 U/L Final     Comment:     Specimen collection should occur prior to Sulfasalazine administration due to the potential for falsely depressed results.    06/13/2022 7 (L) 12 - 78 U/L Final     Comment:     Specimen collection should occur prior to Sulfasalazine administration due to the potential for falsely depressed results.    06/11/2022 10 (L) 12 - 78 U/L Final     Comment:     Specimen collection should occur prior to Sulfasalazine administration due to the potential for falsely depressed results.      Alkaline  "Phosphatase   Date Value Ref Range Status   10/20/2022 65 46 - 116 U/L Final   06/13/2022 68 46 - 116 U/L Final   06/11/2022 83 46 - 116 U/L Final     Magnesium   Date Value Ref Range Status   06/13/2022 2.0 1.6 - 2.6 mg/dL Final   06/12/2022 1.7 1.6 - 2.6 mg/dL Final   02/04/2022 1.9 1.6 - 2.6 mg/dL Final     WBC   Date Value Ref Range Status   01/06/2024 9.34 4.31 - 10.16 Thousand/uL Final   09/28/2023 5.71 4.31 - 10.16 Thousand/uL Final   10/24/2022 8.81 4.31 - 10.16 Thousand/uL Final     Hemoglobin   Date Value Ref Range Status   01/06/2024 10.0 (L) 12.0 - 17.0 g/dL Final   09/28/2023 12.5 12.0 - 17.0 g/dL Final   10/24/2022 10.4 (L) 12.0 - 17.0 g/dL Final     Platelets   Date Value Ref Range Status   01/06/2024 236 149 - 390 Thousands/uL Final   09/28/2023 290 149 - 390 Thousands/uL Final   10/24/2022 231 149 - 390 Thousands/uL Final     PTT   Date Value Ref Range Status   02/18/2018 26 23 - 35 seconds Final     INR   Date Value Ref Range Status   10/24/2022 0.95 0.84 - 1.19 Final   10/20/2022 0.91 0.84 - 1.19 Final     CK-MB   Date Value Ref Range Status   02/19/2018 1.0 0.0 - 5.0 ng/mL Final     No results found for: \"TSH\"  No results found for: \"CHOL\"   Hemoglobin A1C   Date Value Ref Range Status   02/20/2018 5.5 4.2 - 6.3 % Final     No results found for: \"BLOODCX\", \"SPUTUMCULTUR\", \"GRAMSTAIN\", \"URINECX\", \"WOUNDCULT\", \"BODYFLUIDCUL\", \"MRSACULTURE\", \"INFLUAPCR\", \"INFLUBPCR\", \"RSVPCR\", \"LEGIONELLAUR\", \"CDIFFTOXINB\"    *-*-*-*-*-*-*-*-*-*-*-*-*-*-*-*-*-*-*-*-*-*-*-*-*-*-*-*-*-*-*-*-*-*-*-*-*-*-*-*-*-*-*-*-*-*-*-*-*-*-*-*-*-*-  PREVIOUS CARDIOLOGY & RADIOLOGY TEST RESULTS   I have personally reviewed pertinent results of cardiovascular tests noted below.    No results found for this or any previous visit.    No results found for this or any previous visit.    No results found for this or any previous visit.    No results found for this or any previous visit.    Colonoscopy  Narrative: Table formatting from the " original result was not included.  [facility]  [address] [place] [address] [phone]    DATE OF SERVICE:  1/22/24    PHYSICIAN(S):  Attending:   Bayron Maki MD     Fellow:   Preston Barros MD     INDICATION:  Colon cancer screening    POST-OP DIAGNOSIS:  See the impression below.    HISTORY:  Prior colonoscopy: No prior colonoscopy.    BOWEL PREPARATION:  Miralax/Dulcolax    PREPROCEDURE:  Informed consent was obtained for the procedure, including sedation. Risks   including but not limited to bleeding, infection, perforation, adverse   drug reaction and aspiration were explained in detail. Also explained   about less than 100% sensitivity with the exam and other alternatives. The   patient was placed in the left lateral decubitus position.    Procedure: Colonoscopy     DETAILS OF PROCEDURE:   Patient was taken to the procedure room where a time out was performed to   confirm correct patient and correct procedure. The patient underwent   monitored anesthesia care, which was administered by an anesthesia   professional. The patient's blood pressure, heart rate, level of   consciousness, oxygen, respirations and ECG were monitored throughout the   procedure. A digital rectal exam was performed. The scope was introduced   through the anus and advanced to the cecum. Retroflexion was performed in   the rectum. The quality of bowel preparation was evaluated using the   [place] Bowel Preparation Scale with scores of: right colon = 2, transverse   colon = 2, left colon = 2. The total BBPS score was 6. Bowel prep was   adequate. The patient experienced no blood loss. The procedure was not   difficult. The patient tolerated the procedure well. There were no   apparent adverse events.     ANESTHESIA INFORMATION:  ASA: III  Anesthesia Type: IV Sedation with Anesthesia    MEDICATIONS:  No administrations occurring from 0939 to 1015 on 01/22/24     FINDINGS:  One 10 mm  polyp in the ascending colon; bleeding occurred after   intervention; performed cold snare with complete en bloc removal and   retrieved specimen; placed 3 clips successfully; hemostasis achieved. The   post polypectomy site was partially closed by clip placement.  Due to   difficult location, no further clips could be placed.  One polyp measuring smaller than 5 mm in the ascending colon; performed   cold snare with complete en bloc removal and retrieved specimen  External and internal small hemorrhoids    EVENTS:  Procedure Events   Event Event Time   ENDO CECUM REACHED 1/22/2024  9:54 AM   ENDO SCOPE OUT TIME 1/22/2024 10:11 AM     SPECIMENS:  ID Type Source Tests Collected by Time Destination   1 : cold snare, ascending polyp x1 Tissue Colon TISSUE EXAM Preston Barros MD   1/22/2024  9:57 AM    2 : cold snare, large ascending polyp x1 Tissue Colon TISSUE EXAM Preston Barros MD 1/22/2024 10:02 AM      EQUIPMENT:  Colonoscope -[id number]   ENDOCUFF VISION LRG GREEN ID [id number]  Impression: One 10 mm polyp in the ascending colon; bleeding occurred after   intervention; performed cold snare removal; placed 3 clips successfully;   hemostasis achieved  Subcentimeter polyp in the ascending colon was removed with cold snare  Small hemorrhoids    RECOMMENDATION:   Repeat colonoscopy in 3 years    Personal history of colon polyps      Await pathology results     Increase fiber in diet.  Discussed about timing of resumption of anticoagulation with primary care   physician Dr. Perkins. Quirino Cummins on 1/24/2024.         Bayron Maki MD         *-*-*-*-*-*-*-*-*-*-*-*-*-*-*-*-*-*-*-*-*-*-*-*-*-*-*-*-*-*-*-*-*-*-*-*-*-*-*-*-*-*-*-*-*-*-*-*-*-*-*-*-*-*-  SIGNATURES:   @SIG@   Linda Gamboa MD; [facility]    *-*-*-*-*-*-*-*-*-*-*-*-*-*-*-*-*-*-*-*-*-*-*-*-*-*-*-*-*-*-*-*-*-*-*-*-*-*-*-*-*-*-*-*-*-*-*-*-*-*-*-*-*-*-  PAST MEDICAL HISTORY:  Past Medical History:   Diagnosis Date    AV fistula occlusion (HCC)     IR unocclusion   R  arm    today 1/8/2024    CHF (congestive heart failure) (HCC)     Chronic kidney disease     ESRD (end stage renal disease) on dialysis (HCC)     dialysis tues-thurs-sat    Hyperkalemia 02/01/2022    Hyperlipidemia     Hypertension     Personal history of COVID-19 07/2021    mild symptoms - recovered at home    Renal disorder     Withdrawal seizures (HCC)     approximately 2018 when reducing alcohol    PAST SURGICAL HISTORY:  Past Surgical History:   Procedure Laterality Date    COLONOSCOPY      HERNIA REPAIR      states umbilical    IR AV FISTULA/GRAFT DECLOT  01/08/2024    IR AV FISTULAGRAM/GRAFTOGRAM  08/16/2022    IR AV FISTULAGRAM/GRAFTOGRAM  11/11/2022    IR AV FISTULAGRAM/GRAFTOGRAM  03/15/2023    IR AV FISTULAGRAM/GRAFTOGRAM  04/14/2023    IR AV FISTULAGRAM/GRAFTOGRAM  07/28/2023    IR AV FISTULAGRAM/GRAFTOGRAM  09/13/2023    IR BIOPSY BONE MARROW  07/13/2022    IR PULMONARY ARTERY EMBOLIZATION  08/03/2022    IR TUNNELED CENTRAL LINE REMOVAL  04/14/2023    IR TUNNELED DIALYSIS CATHETER PLACEMENT  06/10/2022    IR TUNNELED DIALYSIS CATHETER PLACEMENT  10/20/2022    IR TUNNELED DIALYSIS CATHETER PLACEMENT      IR TUNNELED DIALYSIS CATHETER PLACEMENT  01/08/2024    AL ARTERIOVENOUS ANASTOMOSIS OPEN DIRECT Left 10/24/2022    Procedure: Creation of  L brachiobasilic fistula, ligation of L endoAVF;  Surgeon: Dot Otero MD;  Location: [facility];  Service: Vascular    AL ARTERIOVENOUS ANASTOMOSIS OPEN DIRECT Right 01/06/2023    Procedure: Creation right brachiocephalic fistula;  Surgeon: Dot Otero MD;  Location: [facility];  Service: Vascular    AL ARTERIOVENOUS ANASTOMOSIS OPEN DIRECT Left 2/2/2024    Procedure: Placement left brachial-axillary graft;  Surgeon: Dot Otero MD;  Location: [facility];  Service: Vascular         FAMILY HISTORY:  Family History   Problem Relation Age of Onset    Pancreatic cancer Mother     No Known Problems Father     Cervical cancer Sister     No Known Problems Daughter      Breast cancer Maternal Grandmother 62    No Known Problems Maternal Grandfather     No Known Problems Paternal Grandmother     No Known Problems Paternal Grandfather     SOCIAL HISTORY:  Social History     Tobacco Use   Smoking Status Every Day    Current packs/day: 0.25    Average packs/day: 0.3 packs/day for 37.0 years (9.3 ttl pk-yrs)    Types: Cigarettes   Smokeless Tobacco Never   Tobacco Comments    Smokes 6 a day      Social History     Substance and Sexual Activity   Alcohol Use Yes    Alcohol/week: 1.0 standard drink of alcohol    Types: 1 Cans of beer per week    Comment: 2 x weekly     Social History     Substance and Sexual Activity   Drug Use Yes    Frequency: 3.0 times per week    Types: Marijuana    Comment: occasionally, last used  sunday 1/7/2024    [unfilled]     *-*-*-*-*-*-*-*-*-*-*-*-*-*-*-*-*-*-*-*-*-*-*-*-*-*-*-*-*-*-*-*-*-*-*-*-*-*-*-*-*-*-*-*-*-*-*-*-*-*-*-*-*-*  ALLERGIES:  Allergies   Allergen Reactions    Ibuprofen Other (See Comments)     ESRD - cannot have ibuprofen    CURRENT SCHEDULED MEDICATIONS:    Current Outpatient Medications:     amLODIPine (NORVASC) 5 mg tablet, Take 1 tablet (5 mg total) by mouth daily, Disp: 90 tablet, Rfl: 4    ammonium lactate (LAC-HYDRIN) 12 % lotion, Apply topically daily, Disp: , Rfl:     apixaban (Eliquis) 2.5 mg, Take 1 tablet (2.5 mg total) by mouth 2 (two) times a day, Disp: 180 tablet, Rfl: 1    aspirin (ECOTRIN LOW STRENGTH) 81 mg EC tablet, Take 1 tablet (81 mg total) by mouth daily, Disp: 90 tablet, Rfl: 1    B Complex-C-Folic Acid (Karlee-Melissa) TABS, Take 1 tablet by mouth daily, Disp: 90 tablet, Rfl: 4    calcitriol (ROCALTROL) 0.25 mcg capsule, Take 2 capsules (0.5 mcg total) by mouth 3 (three) times a week, Disp: 45 capsule, Rfl: 4    calcium acetate (PHOSLO) capsule, TAKE 3 CAPSULES(2001 MG) BY MOUTH THREE TIMES DAILY WITH MEALS, Disp: 810 capsule, Rfl: 3    carvedilol (COREG) 25 mg tablet, Take 1 tablet (25 mg total) by mouth 2 (two) times a day  with meals, Disp: 90 tablet, Rfl: 1    cholecalciferol (VITAMIN D3) 1,000 units tablet, Take 4 tablets (4,000 Units total) by mouth daily, Disp: 120 tablet, Rfl: 0    ciclopirox (PENLAC) 8 % solution, if needed, Disp: , Rfl:     cinacalcet (SENSIPAR) 30 mg tablet, Take 1 tablet (30 mg total) by mouth once a week, Disp: 12 tablet, Rfl: 4    furosemide (LASIX) 80 mg tablet, Take 2 tablets (160 mg total) by mouth daily, Disp: 180 tablet, Rfl: 3    hydrOXYzine HCL (ATARAX) 10 mg tablet, Take 1 tablet (10 mg total) by mouth every 6 (six) hours as needed for itching, Disp: 30 tablet, Rfl: 0    oxyCODONE-acetaminophen (Percocet) 5-325 mg per tablet, Take 1 tablet by mouth every 6 (six) hours as needed for moderate pain for up to 10 days Max Daily Amount: 4 tablets, Disp: 15 tablet, Rfl: 0    pravastatin (PRAVACHOL) 80 mg tablet, TAKE 1 TABLET(80 MG) BY MOUTH DAILY WITH DINNER, Disp: 90 tablet, Rfl: 1    sevelamer carbonate (RENVELA) 800 mg tablet, TAKE 2 TABLETS(1600 MG) BY MOUTH THREE TIMES DAILY WITH MEALS, Disp: 540 tablet, Rfl: 4    EPINEPHrine (EPIPEN) 0.3 mg/0.3 mL SOAJ, Inject 0.3 mL (0.3 mg total) into a muscle once for 1 dose (Patient not taking: Reported on 2/9/2024), Disp: 0.6 mL, Rfl: 0     *-*-*-*-*-*-*-*-*-*-*-*-*-*-*-*-*-*-*-*-*-*-*-*-*-*-*-*-*-*-*-*-*-*-*-*-*-*-*-*-*-*-*-*-*-*-*-*-*-*-*-*-*-*

## 2024-02-19 ENCOUNTER — OFFICE VISIT (OUTPATIENT)
Dept: VASCULAR SURGERY | Facility: CLINIC | Age: 64
End: 2024-02-19

## 2024-02-19 VITALS
DIASTOLIC BLOOD PRESSURE: 68 MMHG | SYSTOLIC BLOOD PRESSURE: 110 MMHG | HEART RATE: 88 BPM | HEIGHT: 68 IN | BODY MASS INDEX: 30.92 KG/M2 | WEIGHT: 204 LBS

## 2024-02-19 DIAGNOSIS — T82.898S ARTERIOVENOUS FISTULA OCCLUSION, SEQUELA: Primary | ICD-10-CM

## 2024-02-19 DIAGNOSIS — F10.11 ALCOHOL ABUSE, IN REMISSION: ICD-10-CM

## 2024-02-19 DIAGNOSIS — Z99.2 ESRD (END STAGE RENAL DISEASE) ON DIALYSIS (HCC): ICD-10-CM

## 2024-02-19 DIAGNOSIS — E78.5 HYPERLIPIDEMIA, UNSPECIFIED HYPERLIPIDEMIA TYPE: ICD-10-CM

## 2024-02-19 DIAGNOSIS — Z72.0 TOBACCO ABUSE: ICD-10-CM

## 2024-02-19 DIAGNOSIS — F12.90 MARIJUANA USE: ICD-10-CM

## 2024-02-19 DIAGNOSIS — N18.6 ESRD (END STAGE RENAL DISEASE) ON DIALYSIS (HCC): ICD-10-CM

## 2024-02-19 PROBLEM — Z98.890 S/P ARTERIOVENOUS (AV) FISTULA CREATION: Status: RESOLVED | Noted: 2023-02-10 | Resolved: 2024-02-19

## 2024-02-19 PROBLEM — T82.898A AV FISTULA OCCLUSION (HCC): Status: RESOLVED | Noted: 2022-11-10 | Resolved: 2024-02-19

## 2024-02-19 PROBLEM — I77.0 ARTERIOVENOUS FISTULA, ACQUIRED (HCC): Status: RESOLVED | Noted: 2023-08-11 | Resolved: 2024-02-19

## 2024-02-19 PROCEDURE — 99024 POSTOP FOLLOW-UP VISIT: CPT | Performed by: SURGERY

## 2024-02-19 NOTE — LETTER
February 19, 2024     [facility] [place] [address] [facility]  [address]  [facility] [address]    Patient: Jeffery Phillips   YOB: 1960   Date of Visit: 2/19/2024       Dear Artur Grajeda:      I assess Mr. Gil's dialysis graft today and it is ready to be accessed.    This is a left brachial-axillary graft.         If you have questions, please do not hesitate to call me. I look forward to following Jeffery along with you.         Sincerely,        Dot Otero MD        CC: No Recipients

## 2024-02-19 NOTE — PROGRESS NOTES
Assessment/Plan:    Pt is a 62 yo M w/ CHF, HTN, hx EtOH abuse w/ sz during withdrawal, current tobacco abuse, HLD, anemia, ESRD, s/p endoAVF 8/3/22 c/b cephalic occlusion, s/p ligation endoAVF and creation single stage brachiobasilic fistula 10/24/22 c/b occlusion s/p creation brachiocephalic fistula 1/6/23 c/b occlusion and unsuccessful thrombectomy, now s/p placement L brachial-axillary graft 2/2/24    ESRD (end stage renal disease) on dialysis (HCC)  Arteriovenous fistula occlusion, sequela  -s/p L ulnar-ulnar endoAVF creation 8/3/22 [place]  -c/b cephalic vein spasm and eventual occlusion; no basilic outflow from this fistula; currently with brachial outflow only >1L  -s/p ligation of endoAVF and creation single stage L brachiobasilic fistula 10/24/22 c/b early thrombosis  -s/p creation R brachiocephalic fistula 1/6/23 now thrombosed  -reviewed fistulogram with unsuccessful attempt at declot; there is a stnosis at the cephalic arch and another at the origin of the R brachiocephalic vein  -discussed access options at this point; patient adamantly against PD; options include R B/B? Would need to remap, and treatment of BC vein stenosis vs L brach-ax graft; LUE venous central outflow looks patent on last imging of this area and I think this is the best option to get him access quickly;   -now s/p placement L brachial-axillary graft 2/2/24  -+thrill/bruit; incisions well healed; initial finger tingling and axillary swelling now resolved  -graft is ready to access for dialysis; discussed with patient and note faxed to [facility]  -f/u with any issues/complications     -cardiac: TTE 2/22 with EF: 60%, grade I diastolic, mod AoV regurg, mild MV regurg. Asymptomatic     Marijuana use  Alcohol abuse, in remission  Tobacco abuse  -current 1/4PPD cigarette smoking; discussed role in thrombosis and need for complete cessation  -EtOH, not current abuse  -current marijuana use    Hyperlipidemia, unspecified hyperlipidemia type     Medications  -continue ASA and statin  -will continue Eliquis longterm for graft patency given hx of multiple access thromboses    Subjective:      Patient ID: Jeffery Phillips is a 63 y.o. male.    Patient presents s/p placement L brachial-axillary graft by Dr. Dot Otero on 2/2/24. Pt denies fever/chills. He reports a lump in L arm with some pain but has been applying warm compresses to the area to reduce symptoms. Incision is healed. Thrill and bruit are present. Pt receives dialysis [facility]S at [facility] via PermCath. He is taking Eliquis, ASA81 and Pravastatin. He smokes <1/4ppd.     HPI:    Patient presents for POC after L brachial-axillary graft.  R-handed.    Patient is s/p creation endo AVF ulnar-ulnar 8/3/22 [place].  He had cephalic vein spasm/stenosis at the index procedure and repeat fistulogram 8/16/22 which showed this to be occluded.  The basilic is not in continuity with the fistula outflow.  He developed brachial vein outflow and arm swelling.  He then had ligation of the endoAVF and single stage basilic vein transposition, which unfortunately was occluded at his first postop visit.  He is s/p creation R brachiocephalic fistula 1/6/23.  He has been suign this access (requiring fgrams) until about 2.5wks ago when he had fistula thrombosis, and unsuccessful thrombectomy attempt. Now s/p placement L brachial-axillary graft 2/2/24.    Notes swelling in the L axilla after surgery, now resolved.  Notes some early tingling in the fingertips but now resolved.  He is doing hand exercises.    Currently dialyzing via L chest permacath.  T/T/S.  Started HD in June '22.    Smoking less and trying to quit, down to 1/4PPD (down from 1PPD).    Prior EtOH abuse, now occasional beer. Hx of sz w/ withdrawal    On Eliquis (for graft patency given early thrombosis x 3); Continues ASA and statin.        The following portions of the patient's history were reviewed and updated as appropriate: allergies, current  "medications, past family history, past medical history, past social history, past surgical history, and problem list.    Review of Systems   Respiratory:  Negative for shortness of breath.    Cardiovascular:  Negative for chest pain.   Skin:  Negative for wound.   Neurological:  Negative for weakness and numbness.         Objective:      /68 (BP Location: Right arm, Patient Position: Sitting, Cuff Size: Standard)   Pulse 88   Ht 5' 8\" (1.727 m)   Wt 92.5 kg (204 lb)   BMI 31.02 kg/m²          Physical Exam  Vitals and nursing note reviewed.   Constitutional:       Appearance: He is well-developed.   Cardiovascular:      Rate and Rhythm: Normal rate and regular rhythm.      Pulses:           Radial pulses are 0 on the right side and 0 on the left side.        Dorsalis pedis pulses are 0 on the right side and 0 on the left side.        Posterior tibial pulses are 0 on the right side and 0 on the left side.      Heart sounds: Murmur (?subtle?) heard.      Comments: nonpalp R ulnar    New LUE graft w/ +thrill and bruit  Pulmonary:      Effort: No respiratory distress.      Breath sounds: No wheezing or rales.   Abdominal:      General: There is no distension.      Palpations: Abdomen is soft.      Tenderness: There is no abdominal tenderness. There is no rebound.   Musculoskeletal:      Right lower leg: No edema.      Left lower leg: No edema.   Skin:     Comments: L antecub incisions and L upper arm incision well healed  R antecub incision well healed  New L antecub and axillary incisions C/D/I, healing well   Neurological:      Mental Status: He is alert and oriented to person, place, and time.           I have reviewed and made appropriate changes to the review of systems input by the medical assistant.    Vitals:    02/19/24 1300   BP: 110/68   BP Location: Right arm   Patient Position: Sitting   Cuff Size: Standard   Pulse: 88   Weight: 92.5 kg (204 lb)   Height: 5' 8\" (1.727 m)       Patient Active " Problem List   Diagnosis   • Renovascular hypertension   • Hypertensive heart disease with chronic diastolic congestive heart failure (HCC)   • Alcohol abuse, in remission   • ESRD (end stage renal disease) on dialysis (HCC)   • Marijuana use   • Hyperlipidemia   • Tobacco abuse   • Seizure due to alcohol withdrawal, uncomplicated (HCC)   • Pharyngeal edema   • Hypomagnesemia   • Anemia due to chronic kidney disease, on chronic dialysis (HCC)   • Chronic hyponatremia   • MGUS (monoclonal gammopathy of unknown significance)   • Secondary hyperparathyroidism of renal origin (HCC)   • Smoking greater than 20 pack years   • Great toe pain, left   • Erectile dysfunction   • Itch of skin   • Gynecomastia, male   • Near syncope   • Hyperphosphatemia       Past Surgical History:   Procedure Laterality Date   • COLONOSCOPY     • HERNIA REPAIR      states umbilical   • IR AV FISTULA/GRAFT DECLOT  01/08/2024   • IR AV FISTULAGRAM/GRAFTOGRAM  08/16/2022   • IR AV FISTULAGRAM/GRAFTOGRAM  11/11/2022   • IR AV FISTULAGRAM/GRAFTOGRAM  03/15/2023   • IR AV FISTULAGRAM/GRAFTOGRAM  04/14/2023   • IR AV FISTULAGRAM/GRAFTOGRAM  07/28/2023   • IR AV FISTULAGRAM/GRAFTOGRAM  09/13/2023   • IR BIOPSY BONE MARROW  07/13/2022   • IR PULMONARY ARTERY EMBOLIZATION  08/03/2022   • IR TUNNELED CENTRAL LINE REMOVAL  04/14/2023   • IR TUNNELED DIALYSIS CATHETER PLACEMENT  06/10/2022   • IR TUNNELED DIALYSIS CATHETER PLACEMENT  10/20/2022   • IR TUNNELED DIALYSIS CATHETER PLACEMENT     • IR TUNNELED DIALYSIS CATHETER PLACEMENT  01/08/2024   • AR ARTERIOVENOUS ANASTOMOSIS OPEN DIRECT Left 10/24/2022    Procedure: Creation of  L brachiobasilic fistula, ligation of L endoAVF;  Surgeon: Dot Otero MD;  Location: [facility];  Service: Vascular   • AR ARTERIOVENOUS ANASTOMOSIS OPEN DIRECT Right 01/06/2023    Procedure: Creation right brachiocephalic fistula;  Surgeon: Dot Otero MD;  Location: [facility];  Service: Vascular   • AR ARTERIOVENOUS  ANASTOMOSIS OPEN DIRECT Left 2/2/2024    Procedure: Placement left brachial-axillary graft;  Surgeon: Dot Otero MD;  Location: [facility];  Service: Vascular       Family History   Problem Relation Age of Onset   • Pancreatic cancer Mother    • No Known Problems Father    • Cervical cancer Sister    • No Known Problems Daughter    • Breast cancer Maternal Grandmother 62   • No Known Problems Maternal Grandfather    • No Known Problems Paternal Grandmother    • No Known Problems Paternal Grandfather        Social History     Socioeconomic History   • Marital status: Significant Other     Spouse name: Not on file   • Number of children: Not on file   • Years of education: Not on file   • Highest education level: Not on file   Occupational History   • Not on file   Tobacco Use   • Smoking status: Every Day     Current packs/day: 0.25     Average packs/day: 0.3 packs/day for 37.0 years (9.3 ttl pk-yrs)     Types: Cigarettes   • Smokeless tobacco: Never   • Tobacco comments:     Smokes 6 a day   Vaping Use   • Vaping status: Never Used   Substance and Sexual Activity   • Alcohol use: Yes     Alcohol/week: 1.0 standard drink of alcohol     Types: 1 Cans of beer per week     Comment: 2 x weekly   • Drug use: Yes     Frequency: 3.0 times per week     Types: Marijuana     Comment: occasionally, last used  sunday 1/7/2024   • Sexual activity: Not Currently   Other Topics Concern   • Not on file   Social History Narrative   • Not on file     Social Determinants of Health     Financial Resource Strain: Medium Risk (8/11/2023)    Overall Financial Resource Strain (CARDIA)    • Difficulty of Paying Living Expenses: Somewhat hard   Food Insecurity: No Food Insecurity (6/12/2022)    Hunger Vital Sign    • Worried About Running Out of Food in the Last Year: Never true    • Ran Out of Food in the Last Year: Never true   Transportation Needs: No Transportation Needs (8/11/2023)    PRAPARE - Transportation    • Lack of  Transportation (Medical): No    • Lack of Transportation (Non-Medical): No   Physical Activity: Not on file   Stress: Not on file   Social Connections: Not on file   Intimate Partner Violence: Not on file   Housing Stability: Low Risk  (6/12/2022)    Housing Stability Vital Sign    • Unable to Pay for Housing in the Last Year: No    • Number of Places Lived in the Last Year: 1    • Unstable Housing in the Last Year: No       Allergies   Allergen Reactions   • Ibuprofen Other (See Comments)     ESRD - cannot have ibuprofen         Current Outpatient Medications:   •  apixaban (Eliquis) 2.5 mg, Take 1 tablet (2.5 mg total) by mouth 2 (two) times a day, Disp: 180 tablet, Rfl: 1  •  aspirin (ECOTRIN LOW STRENGTH) 81 mg EC tablet, Take 1 tablet (81 mg total) by mouth daily, Disp: 90 tablet, Rfl: 1  •  pravastatin (PRAVACHOL) 80 mg tablet, TAKE 1 TABLET(80 MG) BY MOUTH DAILY WITH DINNER, Disp: 90 tablet, Rfl: 1  •  amLODIPine (NORVASC) 5 mg tablet, Take 1 tablet (5 mg total) by mouth daily, Disp: 90 tablet, Rfl: 4  •  ammonium lactate (LAC-HYDRIN) 12 % lotion, Apply topically daily, Disp: , Rfl:   •  B Complex-C-Folic Acid (Karlee-Melissa) TABS, Take 1 tablet by mouth daily, Disp: 90 tablet, Rfl: 4  •  calcitriol (ROCALTROL) 0.25 mcg capsule, Take 2 capsules (0.5 mcg total) by mouth 3 (three) times a week, Disp: 45 capsule, Rfl: 4  •  calcium acetate (PHOSLO) capsule, TAKE 3 CAPSULES(2001 MG) BY MOUTH THREE TIMES DAILY WITH MEALS, Disp: 810 capsule, Rfl: 3  •  carvedilol (COREG) 25 mg tablet, Take 1 tablet (25 mg total) by mouth 2 (two) times a day with meals, Disp: 90 tablet, Rfl: 1  •  cholecalciferol (VITAMIN D3) 1,000 units tablet, Take 4 tablets (4,000 Units total) by mouth daily, Disp: 120 tablet, Rfl: 0  •  ciclopirox (PENLAC) 8 % solution, if needed, Disp: , Rfl:   •  cinacalcet (SENSIPAR) 30 mg tablet, Take 1 tablet (30 mg total) by mouth once a week, Disp: 12 tablet, Rfl: 4  •  EPINEPHrine (EPIPEN) 0.3 mg/0.3 mL  SOAJ, Inject 0.3 mL (0.3 mg total) into a muscle once for 1 dose (Patient not taking: Reported on 2/9/2024), Disp: 0.6 mL, Rfl: 0  •  furosemide (LASIX) 80 mg tablet, Take 2 tablets (160 mg total) by mouth daily, Disp: 180 tablet, Rfl: 3  •  hydrOXYzine HCL (ATARAX) 10 mg tablet, Take 1 tablet (10 mg total) by mouth every 6 (six) hours as needed for itching, Disp: 30 tablet, Rfl: 0  •  sevelamer carbonate (RENVELA) 800 mg tablet, TAKE 2 TABLETS(1600 MG) BY MOUTH THREE TIMES DAILY WITH MEALS, Disp: 540 tablet, Rfl: 4

## 2024-02-22 DIAGNOSIS — N18.6 ESRD (END STAGE RENAL DISEASE) ON DIALYSIS (HCC): ICD-10-CM

## 2024-02-22 DIAGNOSIS — T82.898S ARTERIOVENOUS FISTULA OCCLUSION, SEQUELA: ICD-10-CM

## 2024-02-22 DIAGNOSIS — Z99.2 ESRD (END STAGE RENAL DISEASE) ON DIALYSIS (HCC): ICD-10-CM

## 2024-02-22 RX ORDER — APIXABAN 2.5 MG/1
TABLET, FILM COATED ORAL
Qty: 180 TABLET | Refills: 1 | Status: SHIPPED | OUTPATIENT
Start: 2024-02-22

## 2024-03-04 ENCOUNTER — HOSPITAL ENCOUNTER (OUTPATIENT)
Dept: RADIOLOGY | Facility: HOSPITAL | Age: 64
Discharge: HOME | End: 2024-03-04
Attending: INTERNAL MEDICINE
Payer: COMMERCIAL

## 2024-03-04 DIAGNOSIS — N18.9 CHRONIC KIDNEY DISEASE, UNSPECIFIED CKD STAGE: ICD-10-CM

## 2024-03-04 PROCEDURE — 36589 REMOVAL TUNNELED CV CATH: CPT | Performed by: PHYSICIAN ASSISTANT

## 2024-03-04 PROCEDURE — 36589 REMOVAL TUNNELED CV CATH: CPT

## 2024-03-04 NOTE — BRIEF OP NOTE (RAD/CATH)
IR TUNNELED DIALYSIS CATHETER REMOVAL Procedure Note    PATIENT NAME: Jeffery Phillips  : 1960  MRN: [id number]    Pre-op Diagnosis:   1. Chronic kidney disease, unspecified CKD stage      Post-op Diagnosis:   1. Chronic kidney disease, unspecified CKD stage        Provider:   Florina Oliveros PA-C  Assistants:     No qualified resident was available, Resident is only observing    Estimated Blood Loss: None    Findings: Successful removal of tunneled left IJ dialysis catheter, intact.  Hemostasis noted.  Gauze and Tegaderm applied.    Specimens: None    Complications: None immediately    Anesthesia: local    Florina Oliveros PA-C     Date: 3/4/2024  Time: 2:49 PM

## 2024-03-04 NOTE — DISCHARGE INSTRUCTIONS
Perma-cath Removal   WHAT YOU NEED TO KNOW:   A perma-cath is a catheter placed through a vein into or near your right atrium. Your right atrium is the right upper chamber of your heart. A perma-cath is used for dialysis in an emergency or until a long-term device is ready to use. Or you no longer need dialysis.    DISCHARGE INSTRUCTIONS:   Call 911 for any of the following:   You feel lightheaded, short of breath, and have chest pain.    You start bleeding    Contact Interventional Radiology at [phone] ([facility] PATIENTS: Contact Interventional Radiology at [phone]) ([facility] PATIENTS: Contact Interventional Radiology at [phone]) if:  Blood soaks through your bandage.   You have new swelling in your arm, neck, face, or chest on your right side.  Your bruises or pain get worse.   You have a fever or chills.  Persistent nausea or vomiting.   Your incision is red, swollen, or draining pus.   You have questions or concerns about your condition or care.  Self-care:   Resume your normal diet. Small sips of flat soda will help with nausea.  Keep your dressings dry. Do not get your perma-cath site wet until the incision closes.  You may take a tub bath, but do not get your dressings wet. Water in your wound can cause bacteria to grow and cause an infection. If your dressing gets wet, remove the wet dressing and apply a dry bandaid. Keep it covered until the incision closes. This should only take a few days to heal. Do not use soaps or ointments.  Immediately after your catheter is removed, no strenuous activity for twenty four hours and stay in an upright sitting position for two hours.   Follow up with your healthcare provider as directed: Write down your questions so you remember to ask them during your visits.

## 2024-03-05 ENCOUNTER — HOSPITAL ENCOUNTER (OUTPATIENT)
Dept: RADIOLOGY | Facility: HOSPITAL | Age: 64
Discharge: HOME | End: 2024-03-05
Admitting: RADIOLOGY
Payer: COMMERCIAL

## 2024-03-05 VITALS
HEIGHT: 68 IN | OXYGEN SATURATION: 100 % | RESPIRATION RATE: 16 BRPM | BODY MASS INDEX: 30.74 KG/M2 | HEART RATE: 70 BPM | WEIGHT: 202.82 LBS | SYSTOLIC BLOOD PRESSURE: 172 MMHG | DIASTOLIC BLOOD PRESSURE: 83 MMHG | TEMPERATURE: 97.7 F

## 2024-03-05 DIAGNOSIS — N18.6 ESRD (END STAGE RENAL DISEASE) ON DIALYSIS (HCC): Primary | ICD-10-CM

## 2024-03-05 DIAGNOSIS — N18.6 ESRD (END STAGE RENAL DISEASE) ON DIALYSIS (HCC): ICD-10-CM

## 2024-03-05 DIAGNOSIS — T82.49XA CLOTTED DIALYSIS ACCESS, INITIAL ENCOUNTER (HCC): Primary | ICD-10-CM

## 2024-03-05 DIAGNOSIS — L29.9 ITCH OF SKIN: ICD-10-CM

## 2024-03-05 DIAGNOSIS — Z99.2 ESRD (END STAGE RENAL DISEASE) ON DIALYSIS (HCC): ICD-10-CM

## 2024-03-05 DIAGNOSIS — Z99.2 ESRD (END STAGE RENAL DISEASE) ON DIALYSIS (HCC): Primary | ICD-10-CM

## 2024-03-05 PROCEDURE — 36905 THRMBC/NFS DIALYSIS CIRCUIT: CPT | Performed by: RADIOLOGY

## 2024-03-05 PROCEDURE — C1894 INTRO/SHEATH, NON-LASER: HCPCS

## 2024-03-05 PROCEDURE — C1769 GUIDE WIRE: HCPCS

## 2024-03-05 PROCEDURE — 76937 US GUIDE VASCULAR ACCESS: CPT

## 2024-03-05 PROCEDURE — 76937 US GUIDE VASCULAR ACCESS: CPT | Performed by: RADIOLOGY

## 2024-03-05 PROCEDURE — C1757 CATH, THROMBECTOMY/EMBOLECT: HCPCS

## 2024-03-05 PROCEDURE — C1725 CATH, TRANSLUMIN NON-LASER: HCPCS

## 2024-03-05 PROCEDURE — 36905 THRMBC/NFS DIALYSIS CIRCUIT: CPT

## 2024-03-05 PROCEDURE — C1887 CATHETER, GUIDING: HCPCS

## 2024-03-05 RX ORDER — LIDOCAINE WITH 8.4% SOD BICARB 0.9%(10ML)
SYRINGE (ML) INJECTION AS NEEDED
Status: COMPLETED | OUTPATIENT
Start: 2024-03-05 | End: 2024-03-05

## 2024-03-05 RX ORDER — HYDROXYZINE HYDROCHLORIDE 10 MG/1
10 TABLET, FILM COATED ORAL EVERY 6 HOURS PRN
Qty: 30 TABLET | Refills: 2 | Status: SHIPPED | OUTPATIENT
Start: 2024-03-05

## 2024-03-05 RX ORDER — SODIUM CHLORIDE 9 MG/ML
75 INJECTION, SOLUTION INTRAVENOUS CONTINUOUS
Status: CANCELLED | OUTPATIENT
Start: 2024-03-05

## 2024-03-05 RX ORDER — SODIUM CHLORIDE 9 MG/ML
75 INJECTION, SOLUTION INTRAVENOUS CONTINUOUS
Status: DISCONTINUED | OUTPATIENT
Start: 2024-03-05 | End: 2024-03-06 | Stop reason: HOSPADM

## 2024-03-05 RX ADMIN — IOHEXOL 50 ML: 350 INJECTION, SOLUTION INTRAVENOUS at 13:02

## 2024-03-05 RX ADMIN — Medication 3 ML: at 12:06

## 2024-03-05 RX ADMIN — SODIUM CHLORIDE 75 ML/HR: 0.9 INJECTION, SOLUTION INTRAVENOUS at 11:19

## 2024-03-05 NOTE — BRIEF OP NOTE (RAD/CATH)
INTERVENTIONAL RADIOLOGY PROCEDURE NOTE    Date: 3/5/2024    Procedure:   Procedure Summary       Date: 03/05/24 Room / Location: [facility] Interventional Radiology    Anesthesia Start:  Anesthesia Stop:     Procedure: IR AV FISTULA/GRAFT DECLOT Diagnosis:       ESRD (end stage renal disease) on dialysis (HCC)      (non functioning Left graft)    Scheduled Providers:  Responsible Provider:     Anesthesia Type: Not recorded ASA Status: Not recorded            Preoperative diagnosis:   1. ESRD (end stage renal disease) on dialysis (HCC)         Postoperative diagnosis: Same.    Surgeon: Tyshawn Cruz MD     Assistant: None. No qualified resident was available.    Blood loss: minimal    Specimens: none     Findings: Declot of LUE graft with pta of brachial vein at anastamosis    Complications: None immediate.    Anesthesia: local

## 2024-03-05 NOTE — DISCHARGE INSTRUCTIONS
Fistulagram   WHAT YOU NEED TO KNOW:   Your arm or leg my  be sore, swollen, and bruised after the procedure. This is normal and should get better in a few days.     DISCHARGE INSTRUCTIONS:     Contact Interventional Radiology at [phone] and follow prompts if you experience any:    You have a fever or chills.    Your puncture site is red, swollen, or draining pus.    You have nausea or are vomiting.    Your skin is itchy, swollen, or you have a rash.    You cannot feel a thrill over your graft or fistula.     You have questions or concerns about your condition or care.    Seek care immediately if:     You have bleeding that does not stop after 10 minutes of holding firm, direct pressure over the puncture site.    Blood soaks through your bandage.    Your hand or foot closest to the graft or fistula feels cold, painful, or numb.     Your hand or foot closest to the graft or fistula is pale or blue.     You have trouble moving your arm or leg closest to the graft or fistula.     Your bruise suddenly gets bigger.    Care for your wound as directed:  Remove the bandage in 4 to 6 hours or as         directed. Wash the area once a day with soap and water. Gently pat the area dry.     Apply firm, steady pressure to the puncture site if it bleeds.  Use a clean gauze or towel to hold pressure for 10 to 15 minutes. Call 911 if you cannot stop the bleeding or the bleeding gets heavier.     Feel for a thrill once a day or as directed.  Place your index and second finger over your fistula or graft as directed. You should feel a vibration. The vibration means that blood is flowing through your graft or fistula correctly.     Rest your arm or leg as directed.  Do not lift anything heavier than 5 pounds or do strenuous activity for 24 hours.     Prevent damage to your graft or fistula.  Do not wear tight-fitting clothing over your graft or fistula. Do not wear tight jewelry on the arm or leg with the graft or fistula. Tell  healthcare providers not to do, IVs, blood draws, and blood pressure readings in the arm with your graft or fistula. Do not allow flu shots or vaccinations in your arm with your graft or fistula.    Follow up with your healthcare provider as directed

## 2024-03-20 ENCOUNTER — CONSULT (OUTPATIENT)
Dept: INTERNAL MEDICINE CLINIC | Facility: CLINIC | Age: 64
End: 2024-03-20
Payer: COMMERCIAL

## 2024-03-20 VITALS
DIASTOLIC BLOOD PRESSURE: 86 MMHG | HEIGHT: 68 IN | WEIGHT: 205 LBS | TEMPERATURE: 96.8 F | HEART RATE: 81 BPM | BODY MASS INDEX: 31.07 KG/M2 | SYSTOLIC BLOOD PRESSURE: 142 MMHG

## 2024-03-20 DIAGNOSIS — Z99.2 ESRD (END STAGE RENAL DISEASE) ON DIALYSIS (HCC): ICD-10-CM

## 2024-03-20 DIAGNOSIS — N18.6 ESRD (END STAGE RENAL DISEASE) ON DIALYSIS (HCC): ICD-10-CM

## 2024-03-20 DIAGNOSIS — D63.1 ANEMIA DUE TO CHRONIC KIDNEY DISEASE, ON CHRONIC DIALYSIS (HCC): ICD-10-CM

## 2024-03-20 DIAGNOSIS — N18.6 ANEMIA DUE TO CHRONIC KIDNEY DISEASE, ON CHRONIC DIALYSIS (HCC): ICD-10-CM

## 2024-03-20 DIAGNOSIS — Z01.818 PRE-OP EXAMINATION: Primary | ICD-10-CM

## 2024-03-20 DIAGNOSIS — F17.218 CIGARETTE NICOTINE DEPENDENCE WITH OTHER NICOTINE-INDUCED DISORDER: ICD-10-CM

## 2024-03-20 DIAGNOSIS — K05.30 PERIODONTITIS: ICD-10-CM

## 2024-03-20 DIAGNOSIS — Z99.2 ANEMIA DUE TO CHRONIC KIDNEY DISEASE, ON CHRONIC DIALYSIS (HCC): ICD-10-CM

## 2024-03-20 PROCEDURE — 99213 OFFICE O/P EST LOW 20 MIN: CPT | Performed by: STUDENT IN AN ORGANIZED HEALTH CARE EDUCATION/TRAINING PROGRAM

## 2024-03-20 RX ORDER — NICOTINE 21 MG/24HR
1 PATCH, TRANSDERMAL 24 HOURS TRANSDERMAL EVERY 24 HOURS
Qty: 28 PATCH | Refills: 0 | Status: SHIPPED | OUTPATIENT
Start: 2024-04-18

## 2024-03-20 RX ORDER — NICOTINE 21 MG/24HR
1 PATCH, TRANSDERMAL 24 HOURS TRANSDERMAL EVERY 24 HOURS
Qty: 28 PATCH | Refills: 0 | Status: SHIPPED | OUTPATIENT
Start: 2024-03-20

## 2024-03-20 NOTE — PROGRESS NOTES
"INTERNAL MEDICINE OFFICE VISIT NOTE  [facility] [place]    NAME: Jeffery Phillips  AGE: 63 y.o. SEX: male    DATE OF ENCOUNTER: 3/20/2024       Chief Complaint   Patient presents with    Pre-op Exam     Pre-Op [facility] 3/26     Pre-Op Risk Assessment     Risk Factor      Score Yes=1, No=0   Hx of TIA/CVA 0   Hx of prior ischemic heart disease (AMI, unstable angina, Q waves on EKG, CABG)             0   Hx of congestive heart failure             0   Serum Creatinine >2 mg/dl              1   Insulin dependent diabetes mellitus              0   Total Score              1     Mets >4   Denies current chest pain, shortness of breath, lightheadedness, dizziness  NO evidence of active infection  h/o HTN - controlled   NO h/o Asthma/COPD   NO H/o GRAYSON  NO H/o thyroid disease   No severe electrolyte abnormalities   NO H/o Liver disease   H/o ESRD on HD - TTS at [facility] 1/6/24   No h/o DM   History of alcohol abuse in remission. Does admit to occasional drinking.   EKG- 2/12/24 - \"Sinus rhythm, HR 77 bpm, normal axis and intervals, no significant chamber hypertrophy or enlargement, no evidence of prior infarction, no ischemia.  QTc is borderline increased at 452 ms.\"      Review of Systems  10 point ROS negative except per HPI    OBJECTIVE:  Vitals:    03/20/24 1132   BP: 142/86   BP Location: Right arm   Patient Position: Sitting   Cuff Size: Standard   Pulse: 81   Temp: (!) 96.8 °F (36 °C)   Weight: 93 kg (205 lb)   Height: 5' 8\" (1.727 m)       Physical Exam:   GENERAL: NAD, Normal appearance.  well-developed, well-nourished.   NEUROLOGIC:  Alert/oriented x3  HEENT:  NC/AT, PERRL, EOMI, no scleral icterus  CARDIAC:  RRR, +S1/S2, no m/g/r  PULMONARY:  non-labored breathing, CTA B/L  ABDOMEN:  Soft, NT/ND, +BS  Extremities:  No edema  SKIN:  No rashes or erythema      ASSESSMENT/PLAN:    1. Pre-op examination  Assessment & Plan:  MACE Risk score of 1 - with an estimated a " 0.9% of 30 day risk of death, mi, or cardiac arrest  Patient is low-mod risk for low risk procedure - Dental extraction, may proceed with surgery as recommended by surgical team  Advised on smoking cessation to minimize respiratory complications   Medication recommendations   Hold :  Hold Eliquis 3 days prior to procedure. Date still undetermined. Patient demonstrated good understanding of the necessary time frame to hold eliquis. Advised to contact our office if any questions emerge.   Continue all other medications unless otherwise recommended by surgical team.       2. Periodontitis  Assessment & Plan:  Scheduled for surgical extraction of multiple teeth and molar  Preop assessment completed today      3. ESRD (end stage renal disease) on dialysis ([facility])  Assessment & Plan:  H/o ESRD on HD - TTS at [facility]       4. Anemia due to chronic kidney disease, on chronic dialysis ([facility])  Assessment & Plan:  Lab Results   Component Value Date    HGB 10.0 (L) 01/06/2024    MCV 99 (H) 01/06/2024    FERRITIN 619 (H) 06/11/2022    IRON 76 06/11/2022    CONCFE 59 (H) 06/11/2022    TIBC 128 (L) 06/11/2022      Mild anemia. Stable   Monitor -       5. Cigarette nicotine dependence with other nicotine-induced disorder  -     nicotine (NICODERM CQ) 14 mg/24hr TD 24 hr patch; Place 1 patch on the skin over 24 hours every 24 hours Do not start before April 18, 2024.  -     nicotine (NICODERM CQ) 21 mg/24 hr TD 24 hr patch; Place 1 patch on the skin over 24 hours every 24 hours        No follow-ups on file.      Current Outpatient Medications:     amLODIPine (NORVASC) 5 mg tablet, Take 1 tablet (5 mg total) by mouth daily, Disp: 90 tablet, Rfl: 4    ammonium lactate (LAC-HYDRIN) 12 % lotion, Apply topically daily, Disp: , Rfl:     aspirin (ECOTRIN LOW STRENGTH) 81 mg EC tablet, Take 1 tablet (81 mg total) by mouth daily, Disp: 90 tablet, Rfl: 1    B Complex-C-Folic Acid (Karlee-Melissa) TABS, Take 1 tablet by mouth daily, Disp: 90 tablet,  Rfl: 4    calcitriol (ROCALTROL) 0.25 mcg capsule, Take 2 capsules (0.5 mcg total) by mouth 3 (three) times a week, Disp: 45 capsule, Rfl: 4    calcium acetate (PHOSLO) capsule, TAKE 3 CAPSULES(2001 MG) BY MOUTH THREE TIMES DAILY WITH MEALS, Disp: 810 capsule, Rfl: 3    carvedilol (COREG) 25 mg tablet, Take 1 tablet (25 mg total) by mouth 2 (two) times a day with meals, Disp: 90 tablet, Rfl: 1    cholecalciferol (VITAMIN D3) 1,000 units tablet, Take 4 tablets (4,000 Units total) by mouth daily, Disp: 120 tablet, Rfl: 0    ciclopirox (PENLAC) 8 % solution, if needed, Disp: , Rfl:     cinacalcet (SENSIPAR) 30 mg tablet, Take 1 tablet (30 mg total) by mouth once a week, Disp: 12 tablet, Rfl: 4    Eliquis 2.5 MG, TAKE 1 TABLET(2.5 MG) BY MOUTH TWICE DAILY, Disp: 180 tablet, Rfl: 1    EPINEPHrine (EPIPEN) 0.3 mg/0.3 mL SOAJ, Inject 0.3 mL (0.3 mg total) into a muscle once for 1 dose, Disp: 0.6 mL, Rfl: 0    furosemide (LASIX) 80 mg tablet, Take 2 tablets (160 mg total) by mouth daily, Disp: 180 tablet, Rfl: 3    hydrOXYzine HCL (ATARAX) 10 mg tablet, Take 1 tablet (10 mg total) by mouth every 6 (six) hours as needed for itching, Disp: 30 tablet, Rfl: 2    [START ON 4/18/2024] nicotine (NICODERM CQ) 14 mg/24hr TD 24 hr patch, Place 1 patch on the skin over 24 hours every 24 hours Do not start before April 18, 2024., Disp: 28 patch, Rfl: 0    nicotine (NICODERM CQ) 21 mg/24 hr TD 24 hr patch, Place 1 patch on the skin over 24 hours every 24 hours, Disp: 28 patch, Rfl: 0    pravastatin (PRAVACHOL) 80 mg tablet, TAKE 1 TABLET(80 MG) BY MOUTH DAILY WITH DINNER, Disp: 90 tablet, Rfl: 1    sevelamer carbonate (RENVELA) 800 mg tablet, TAKE 2 TABLETS(1600 MG) BY MOUTH THREE TIMES DAILY WITH MEALS, Disp: 540 tablet, Rfl: 4    TCM Call       Date and time call was made  6/16/2022  1:59 PM    Hospital care reviewed  Records reviewed    Patient was hospitialized at  [facility]    Date of Admission  06/10/22    Date of  discharge  06/14/22    Diagnosis  Acute renal failure (HCC)    Disposition  Home    Were the patients medications reviewed and updated  No    Current Symptoms  Fatigue          TCM Call       Post hospital issues  None    Should patient be enrolled in anticoag monitoring?  No    Scheduled for follow up?  Yes    Patients specialists  Nephrologist    Did you obtain your prescribed medications  Yes    Do you need help managing your prescriptions or medications  No    Is transportation to your appointment needed  No    I have advised the patient to call PCP with any new or worsening symptoms  Yue Mcmahon, EMMA    Living Arrangements  Family members    Are you recieving any outpatient services  No    Are you recieving home care services  No    Are you using any community resources  No    Current waiver services  No    Have you fallen in the last 12 months  No    Interperter language line needed  No    Counseling  Patient               Jean Perkins MD  [facility] [place]    * Please Note: This note was completed in part utilizing a dictation software.  Grammatical errors, random word insertions, spelling mistakes, and incomplete sentences may be an occasional consequence of this system secondary to software limitations, ambient noise, and hardware issues.  If you have any questions or concerns about the content, text, or information contained within the body of this dictation, please contact the provider for clarification.**

## 2024-03-20 NOTE — ASSESSMENT & PLAN NOTE
MACE Risk score of 1 - with an estimated a 0.9% of 30 day risk of death, mi, or cardiac arrest  Patient is low-mod risk for low risk procedure - Dental extraction, may proceed with surgery as recommended by surgical team  Advised on smoking cessation to minimize respiratory complications   Medication recommendations   Hold :  Hold Eliquis 3 days prior to procedure. Date still undetermined. Patient demonstrated good understanding of the necessary time frame to hold eliquis. Advised to contact our office if any questions emerge.   Continue all other medications unless otherwise recommended by surgical team.

## 2024-03-20 NOTE — ASSESSMENT & PLAN NOTE
Lab Results   Component Value Date    HGB 10.0 (L) 01/06/2024    MCV 99 (H) 01/06/2024    FERRITIN 619 (H) 06/11/2022    IRON 76 06/11/2022    CONCFE 59 (H) 06/11/2022    TIBC 128 (L) 06/11/2022      Mild anemia. Stable   Monitor -

## 2024-04-12 ENCOUNTER — HOSPITAL ENCOUNTER (OUTPATIENT)
Dept: NON INVASIVE DIAGNOSTICS | Facility: HOSPITAL | Age: 64
Discharge: HOME | End: 2024-04-12
Attending: INTERNAL MEDICINE
Payer: COMMERCIAL

## 2024-04-12 VITALS
WEIGHT: 205 LBS | SYSTOLIC BLOOD PRESSURE: 142 MMHG | DIASTOLIC BLOOD PRESSURE: 86 MMHG | HEART RATE: 81 BPM | BODY MASS INDEX: 31.07 KG/M2 | HEIGHT: 68 IN

## 2024-04-12 DIAGNOSIS — I11.0 HYPERTENSIVE HEART DISEASE WITH CHRONIC DIASTOLIC CONGESTIVE HEART FAILURE (HCC): ICD-10-CM

## 2024-04-12 DIAGNOSIS — I50.32 HYPERTENSIVE HEART DISEASE WITH CHRONIC DIASTOLIC CONGESTIVE HEART FAILURE (HCC): ICD-10-CM

## 2024-04-12 DIAGNOSIS — R55 NEAR SYNCOPE: ICD-10-CM

## 2024-04-12 PROCEDURE — 93306 TTE W/DOPPLER COMPLETE: CPT

## 2024-04-13 LAB
AORTIC ROOT: 3.5 CM
AORTIC VALVE MEAN VELOCITY: 12.2 M/S
APICAL FOUR CHAMBER EJECTION FRACTION: 68 %
ASCENDING AORTA: 3.7 CM
AV LVOT MEAN GRADIENT: 2 MMHG
AV LVOT PEAK GRADIENT: 4 MMHG
AV MEAN GRADIENT: 7 MMHG
AV PEAK GRADIENT: 15 MMHG
AV VELOCITY RATIO: 0.49
BSA FOR ECHO PROCEDURE: 2.07 M2
DOP CALC AO PEAK VEL: 1.92 M/S
DOP CALC AO VTI: 38.77 CM
DOP CALC LVOT PEAK VEL VTI: 21.37 CM
DOP CALC LVOT PEAK VEL: 0.95 M/S
E WAVE DECELERATION TIME: 186 MS
E/A RATIO: 0.6
FRACTIONAL SHORTENING: 39 (ref 28–44)
INTERVENTRICULAR SEPTUM IN DIASTOLE (PARASTERNAL SHORT AXIS VIEW): 1 CM
INTERVENTRICULAR SEPTUM: 1 CM (ref 0.6–1.1)
LAAS-AP2: 17.1 CM2
LAAS-AP4: 15.4 CM2
LEFT ATRIUM SIZE: 4.3 CM
LEFT ATRIUM VOLUME (MOD BIPLANE): 43 ML
LEFT ATRIUM VOLUME INDEX (MOD BIPLANE): 20.8 ML/M2
LEFT INTERNAL DIMENSION IN SYSTOLE: 2.8 CM (ref 2.1–4)
LEFT VENTRICULAR INTERNAL DIMENSION IN DIASTOLE: 4.6 CM (ref 3.5–6)
LEFT VENTRICULAR POSTERIOR WALL IN END DIASTOLE: 1.1 CM
LEFT VENTRICULAR STROKE VOLUME: 67 ML
LVSV (TEICH): 67 ML
MV E'TISSUE VEL-SEP: 8 CM/S
MV PEAK A VEL: 1.06 M/S
MV PEAK E VEL: 64 CM/S
MV STENOSIS PRESSURE HALF TIME: 54 MS
MV VALVE AREA P 1/2 METHOD: 4.07
RIGHT VENTRICLE ID DIMENSION: 3.7 CM
SL CV LEFT ATRIUM LENGTH A2C: 5.2 CM
SL CV PED ECHO LEFT VENTRICLE DIASTOLIC VOLUME (MOD BIPLANE) 2D: 98 ML
SL CV PED ECHO LEFT VENTRICLE SYSTOLIC VOLUME (MOD BIPLANE) 2D: 31 ML
TR MAX PG: 21 MMHG
TR PEAK VELOCITY: 2.3 M/S
TRICUSPID ANNULAR PLANE SYSTOLIC EXCURSION: 2.3 CM
TRICUSPID VALVE PEAK REGURGITATION VELOCITY: 2.27 M/S

## 2024-04-16 DIAGNOSIS — N19 RENAL FAILURE: ICD-10-CM

## 2024-04-16 RX ORDER — CALCIUM ACETATE 667 MG/1
2001 CAPSULE ORAL
Qty: 810 CAPSULE | Refills: 3 | Status: SHIPPED | OUTPATIENT
Start: 2024-04-16

## 2024-04-25 PROBLEM — N19: Status: ACTIVE | Noted: 2018-02-19

## 2024-04-25 PROBLEM — I13.2: Status: ACTIVE | Noted: 2018-02-19

## 2024-04-25 PROBLEM — Z01.818 PRE-OP EXAMINATION: Status: RESOLVED | Noted: 2022-12-07 | Resolved: 2024-04-25

## 2024-04-25 PROBLEM — N19: Status: ACTIVE | Noted: 2024-04-25

## 2024-04-25 PROBLEM — I13.2: Status: ACTIVE | Noted: 2024-04-25

## 2024-04-30 DIAGNOSIS — Z99.2 ESRD (END STAGE RENAL DISEASE) ON DIALYSIS (HCC): ICD-10-CM

## 2024-04-30 DIAGNOSIS — N18.6 ESRD (END STAGE RENAL DISEASE) ON DIALYSIS (HCC): ICD-10-CM

## 2024-05-01 RX ORDER — FUROSEMIDE 80 MG
TABLET ORAL
Qty: 180 TABLET | Refills: 1 | Status: SHIPPED | OUTPATIENT
Start: 2024-05-01

## 2024-05-08 ENCOUNTER — OFFICE VISIT (OUTPATIENT)
Dept: INTERNAL MEDICINE CLINIC | Facility: CLINIC | Age: 64
End: 2024-05-08
Payer: COMMERCIAL

## 2024-05-08 VITALS
HEART RATE: 74 BPM | WEIGHT: 199 LBS | BODY MASS INDEX: 30.16 KG/M2 | SYSTOLIC BLOOD PRESSURE: 152 MMHG | DIASTOLIC BLOOD PRESSURE: 84 MMHG | HEIGHT: 68 IN | TEMPERATURE: 97 F

## 2024-05-08 DIAGNOSIS — N19: Primary | ICD-10-CM

## 2024-05-08 DIAGNOSIS — L29.9 ITCH OF SKIN: ICD-10-CM

## 2024-05-08 DIAGNOSIS — E78.5 HYPERLIPIDEMIA, UNSPECIFIED HYPERLIPIDEMIA TYPE: ICD-10-CM

## 2024-05-08 DIAGNOSIS — Z99.2 ESRD (END STAGE RENAL DISEASE) ON DIALYSIS (HCC): ICD-10-CM

## 2024-05-08 DIAGNOSIS — I13.2: Primary | ICD-10-CM

## 2024-05-08 DIAGNOSIS — N18.6 ESRD (END STAGE RENAL DISEASE) ON DIALYSIS (HCC): ICD-10-CM

## 2024-05-08 DIAGNOSIS — T82.898S ARTERIOVENOUS FISTULA OCCLUSION, SEQUELA: ICD-10-CM

## 2024-05-08 PROCEDURE — 99214 OFFICE O/P EST MOD 30 MIN: CPT | Performed by: STUDENT IN AN ORGANIZED HEALTH CARE EDUCATION/TRAINING PROGRAM

## 2024-05-08 PROCEDURE — G2211 COMPLEX E/M VISIT ADD ON: HCPCS | Performed by: STUDENT IN AN ORGANIZED HEALTH CARE EDUCATION/TRAINING PROGRAM

## 2024-05-08 RX ORDER — HYDROXYZINE HYDROCHLORIDE 10 MG/1
10 TABLET, FILM COATED ORAL EVERY 6 HOURS PRN
Qty: 30 TABLET | Refills: 2 | Status: SHIPPED | OUTPATIENT
Start: 2024-05-08

## 2024-05-08 RX ORDER — PRAVASTATIN SODIUM 80 MG/1
80 TABLET ORAL DAILY
Qty: 90 TABLET | Refills: 1 | Status: SHIPPED | OUTPATIENT
Start: 2024-05-08

## 2024-05-09 PROBLEM — K08.109 EDENTULOUS: Status: ACTIVE | Noted: 2024-03-20

## 2024-05-09 NOTE — PROGRESS NOTES
"INTERNAL MEDICINE OFFICE VISIT NOTE  [facility] [place]    NAME: Jeffery Phillips  AGE: 63 y.o. SEX: male    DATE OF ENCOUNTER:       Chief Complaint   Patient presents with    Follow-up     3 month follow up         Review of Systems  10 point ROS negative except per HPI    OBJECTIVE:  Vitals:    05/08/24 1026   BP: 152/84   BP Location: Right arm   Patient Position: Sitting   Cuff Size: Standard   Pulse: 74   Temp: (!) 97 °F (36.1 °C)   Weight: 90.3 kg (199 lb)   Height: 5' 8\" (1.727 m)       Physical Exam:   GENERAL: NAD, Normal appearance.    NEUROLOGIC:  Alert/oriented x3.  HEENT:  NC/AT, no scleral icterus  CARDIAC:  RRR, +S1/S2  PULMONARY:  non-labored breathing    ASSESSMENT/PLAN:    1. Hypertensive heart and renal disease with congestive heart failure and renal failure  (HCC)  Assessment & Plan:  Pressure elevated today 152/84 however he has a history of low blood pressure during dialysis  He is currently on amlodipine 5 mg and Coreg 5 mg twice a day and furosemide 80 mg  Reports his nephrologist is keeping track of his blood pressures during dialysis and advised him to continue his current regimen pending new recommendations          2. Itch of skin  Assessment & Plan:  Continues to experience intermittent episodes of itchy skin that follow well to Atarax-continue as needed    Orders:  -     hydrOXYzine HCL (ATARAX) 10 mg tablet; Take 1 tablet (10 mg total) by mouth every 6 (six) hours as needed for itching    3. Hyperlipidemia, unspecified hyperlipidemia type  Assessment & Plan:  Lab Results   Component Value Date    LDLCALC 123 (H) 02/20/2018    LDLDIRECT 125 (H) 02/19/2018       Currently on pravastatin 80 mg-continue    Orders:  -     pravastatin (PRAVACHOL) 80 mg tablet; Take 1 tablet (80 mg total) by mouth daily    4. Arteriovenous fistula occlusion, sequela  Assessment & Plan:  Currently on Eliquis 2.5 mg -continue      5. ESRD (end stage renal disease) on dialysis " ([facility])  Assessment & Plan:  H/o ESRD on HD - TTS at [facility]            Return in about 17 weeks (around 9/4/2024).      Current Outpatient Medications:     amLODIPine (NORVASC) 5 mg tablet, Take 1 tablet (5 mg total) by mouth daily, Disp: 90 tablet, Rfl: 4    ammonium lactate (LAC-HYDRIN) 12 % lotion, Apply topically daily, Disp: , Rfl:     aspirin (ECOTRIN LOW STRENGTH) 81 mg EC tablet, Take 1 tablet (81 mg total) by mouth daily, Disp: 90 tablet, Rfl: 1    B Complex-C-Folic Acid (Karlee-Melissa) TABS, Take 1 tablet by mouth daily, Disp: 90 tablet, Rfl: 4    calcitriol (ROCALTROL) 0.25 mcg capsule, Take 2 capsules (0.5 mcg total) by mouth 3 (three) times a week, Disp: 45 capsule, Rfl: 4    calcium acetate (PHOSLO) capsule, Take 3 capsules (2,001 mg total) by mouth 3 (three) times a day with meals, Disp: 810 capsule, Rfl: 3    carvedilol (COREG) 25 mg tablet, Take 1 tablet (25 mg total) by mouth 2 (two) times a day with meals, Disp: 90 tablet, Rfl: 1    cholecalciferol (VITAMIN D3) 1,000 units tablet, Take 4 tablets (4,000 Units total) by mouth daily, Disp: 120 tablet, Rfl: 0    ciclopirox (PENLAC) 8 % solution, if needed, Disp: , Rfl:     cinacalcet (SENSIPAR) 30 mg tablet, Take 1 tablet (30 mg total) by mouth once a week, Disp: 12 tablet, Rfl: 4    Eliquis 2.5 MG, TAKE 1 TABLET(2.5 MG) BY MOUTH TWICE DAILY, Disp: 180 tablet, Rfl: 1    EPINEPHrine (EPIPEN) 0.3 mg/0.3 mL SOAJ, Inject 0.3 mL (0.3 mg total) into a muscle once for 1 dose, Disp: 0.6 mL, Rfl: 0    furosemide (LASIX) 80 mg tablet, TAKE 2 TABLETS(160 MG) BY MOUTH DAILY, Disp: 180 tablet, Rfl: 1    hydrOXYzine HCL (ATARAX) 10 mg tablet, Take 1 tablet (10 mg total) by mouth every 6 (six) hours as needed for itching, Disp: 30 tablet, Rfl: 2    nicotine (NICODERM CQ) 14 mg/24hr TD 24 hr patch, Place 1 patch on the skin over 24 hours every 24 hours Do not start before April 18, 2024., Disp: 28 patch, Rfl: 0    nicotine (NICODERM CQ) 21 mg/24 hr TD 24 hr patch, Place 1  patch on the skin over 24 hours every 24 hours, Disp: 28 patch, Rfl: 0    pravastatin (PRAVACHOL) 80 mg tablet, Take 1 tablet (80 mg total) by mouth daily, Disp: 90 tablet, Rfl: 1    sevelamer carbonate (RENVELA) 800 mg tablet, TAKE 2 TABLETS(1600 MG) BY MOUTH THREE TIMES DAILY WITH MEALS, Disp: 540 tablet, Rfl: 4    Patient Active Problem List   Diagnosis    Renovascular hypertension    Alcohol abuse, in remission    ESRD (end stage renal disease) on dialysis ([facility])    Marijuana use    Hyperlipidemia    Tobacco abuse    Seizure due to alcohol withdrawal, uncomplicated (HCC)    Pharyngeal edema    Hypomagnesemia    Anemia due to chronic kidney disease, on chronic dialysis ([facility])    Chronic hyponatremia    MGUS (monoclonal gammopathy of unknown significance)    Secondary hyperparathyroidism of renal origin ([facility])    Smoking greater than 20 pack years    Great toe pain, left    Erectile dysfunction    Itch of skin    Gynecomastia, male    Near syncope    Hyperphosphatemia    Periodontitis    Hypertensive heart and renal disease with congestive heart failure and renal failure  ([facility])           Jean Perkins MD  [facility] [place]    * Please Note: This note was completed in part utilizing a dictation software.  Grammatical errors, random word insertions, spelling mistakes, and incomplete sentences may be an occasional consequence of this system secondary to software limitations, ambient noise, and hardware issues.  If you have any questions or concerns about the content, text, or information contained within the body of this dictation, please contact the provider for clarification.**

## 2024-05-09 NOTE — ASSESSMENT & PLAN NOTE
Pressure elevated today 152/84 however he has a history of low blood pressure during dialysis  He is currently on amlodipine 5 mg and Coreg 5 mg twice a day and furosemide 80 mg  Reports his nephrologist is keeping track of his blood pressures during dialysis and advised him to continue his current regimen pending new recommendations

## 2024-05-09 NOTE — ASSESSMENT & PLAN NOTE
Lab Results   Component Value Date    LDLCALC 123 (H) 02/20/2018    LDLDIRECT 125 (H) 02/19/2018       Currently on pravastatin 80 mg-continue

## 2024-06-14 ENCOUNTER — PREP FOR PROCEDURE (OUTPATIENT)
Dept: INTERVENTIONAL RADIOLOGY/VASCULAR | Facility: CLINIC | Age: 64
End: 2024-06-14

## 2024-06-14 DIAGNOSIS — N18.6 ESRD (END STAGE RENAL DISEASE) ON DIALYSIS (HCC): Primary | ICD-10-CM

## 2024-06-14 DIAGNOSIS — Z99.2 ESRD (END STAGE RENAL DISEASE) ON DIALYSIS (HCC): Primary | ICD-10-CM

## 2024-07-03 ENCOUNTER — TELEPHONE (OUTPATIENT)
Dept: INTERVENTIONAL RADIOLOGY/VASCULAR | Facility: HOSPITAL | Age: 64
End: 2024-07-03

## 2024-07-10 ENCOUNTER — HOSPITAL ENCOUNTER (OUTPATIENT)
Dept: INTERVENTIONAL RADIOLOGY/VASCULAR | Facility: HOSPITAL | Age: 64
Discharge: HOME | End: 2024-07-10
Attending: RADIOLOGY
Payer: COMMERCIAL

## 2024-07-10 VITALS
BODY MASS INDEX: 29.35 KG/M2 | TEMPERATURE: 97.5 F | WEIGHT: 193 LBS | HEART RATE: 64 BPM | DIASTOLIC BLOOD PRESSURE: 88 MMHG | OXYGEN SATURATION: 97 % | SYSTOLIC BLOOD PRESSURE: 193 MMHG | RESPIRATION RATE: 16 BRPM

## 2024-07-10 DIAGNOSIS — N18.6 ESRD (END STAGE RENAL DISEASE) ON DIALYSIS (HCC): ICD-10-CM

## 2024-07-10 DIAGNOSIS — Z99.2 ESRD (END STAGE RENAL DISEASE) ON DIALYSIS (HCC): ICD-10-CM

## 2024-07-10 PROCEDURE — 76937 US GUIDE VASCULAR ACCESS: CPT | Performed by: RADIOLOGY

## 2024-07-10 PROCEDURE — C1874 STENT, COATED/COV W/DEL SYS: HCPCS

## 2024-07-10 PROCEDURE — C1887 CATHETER, GUIDING: HCPCS

## 2024-07-10 PROCEDURE — C1894 INTRO/SHEATH, NON-LASER: HCPCS

## 2024-07-10 PROCEDURE — 36903 INTRO CATH DIALYSIS CIRCUIT: CPT

## 2024-07-10 PROCEDURE — 99152 MOD SED SAME PHYS/QHP 5/>YRS: CPT

## 2024-07-10 PROCEDURE — C1769 GUIDE WIRE: HCPCS

## 2024-07-10 PROCEDURE — 36903 INTRO CATH DIALYSIS CIRCUIT: CPT | Performed by: RADIOLOGY

## 2024-07-10 PROCEDURE — 99152 MOD SED SAME PHYS/QHP 5/>YRS: CPT | Performed by: RADIOLOGY

## 2024-07-10 RX ORDER — MIDAZOLAM HYDROCHLORIDE 2 MG/2ML
INJECTION, SOLUTION INTRAMUSCULAR; INTRAVENOUS AS NEEDED
Status: COMPLETED | OUTPATIENT
Start: 2024-07-10 | End: 2024-07-10

## 2024-07-10 RX ORDER — FENTANYL CITRATE 50 UG/ML
INJECTION, SOLUTION INTRAMUSCULAR; INTRAVENOUS AS NEEDED
Status: COMPLETED | OUTPATIENT
Start: 2024-07-10 | End: 2024-07-10

## 2024-07-10 RX ORDER — LIDOCAINE WITH 8.4% SOD BICARB 0.9%(10ML)
SYRINGE (ML) INJECTION AS NEEDED
Status: COMPLETED | OUTPATIENT
Start: 2024-07-10 | End: 2024-07-10

## 2024-07-10 RX ADMIN — FENTANYL CITRATE 50 MCG: 50 INJECTION, SOLUTION INTRAMUSCULAR; INTRAVENOUS at 08:57

## 2024-07-10 RX ADMIN — Medication 5 ML: at 08:48

## 2024-07-10 RX ADMIN — IOHEXOL 28 ML: 350 INJECTION, SOLUTION INTRAVENOUS at 08:50

## 2024-07-10 RX ADMIN — MIDAZOLAM 1 MG: 1 INJECTION INTRAMUSCULAR; INTRAVENOUS at 08:51

## 2024-07-10 RX ADMIN — FENTANYL CITRATE 50 MCG: 50 INJECTION, SOLUTION INTRAMUSCULAR; INTRAVENOUS at 08:51

## 2024-07-10 RX ADMIN — MIDAZOLAM 1 MG: 1 INJECTION INTRAMUSCULAR; INTRAVENOUS at 08:57

## 2024-07-10 NOTE — H&P
Interventional Radiology  History and Physical 7/10/2024     Jeffery Phillips   1960   [id number]    Assessment/Plan:  L AVG malfunction, here for graftogram.    Problem List Items Addressed This Visit          Genitourinary    ESRD (end stage renal disease) on dialysis (HCC)    Relevant Orders    IR AV fistulagram/graftogram          Subjective:     Patient ID: Jeffery Phillips is a 63 y.o. male.    History of Present Illness  63 y M w/ ESRD and BERHANE AVG with dialysis inadequacy. No complaint.    Review of Systems   All other systems reviewed and are negative.        Past Medical History:   Diagnosis Date    AV fistula occlusion (HCC)     IR unocclusion   R  arm   today 1/8/2024    CHF (congestive heart failure) (HCC)     Chronic kidney disease     ESRD (end stage renal disease) on dialysis (HCC)     dialysis tues-thurs-sat    Hyperkalemia 02/01/2022    Hyperlipidemia     Hypertension     Personal history of COVID-19 07/2021    mild symptoms - recovered at home    Pre-op examination 12/07/2022    Renal disorder     Withdrawal seizures (HCC)     approximately 2018 when reducing alcohol        Past Surgical History:   Procedure Laterality Date    COLONOSCOPY      HERNIA REPAIR      states umbilical    IR AV FISTULA/GRAFT DECLOT  01/08/2024    IR AV FISTULA/GRAFT DECLOT  3/5/2024    IR AV FISTULAGRAM/GRAFTOGRAM  08/16/2022    IR AV FISTULAGRAM/GRAFTOGRAM  11/11/2022    IR AV FISTULAGRAM/GRAFTOGRAM  03/15/2023    IR AV FISTULAGRAM/GRAFTOGRAM  04/14/2023    IR AV FISTULAGRAM/GRAFTOGRAM  07/28/2023    IR AV FISTULAGRAM/GRAFTOGRAM  09/13/2023    IR BIOPSY BONE MARROW  07/13/2022    IR PULMONARY ARTERY EMBOLIZATION  08/03/2022    IR TUNNELED CENTRAL LINE REMOVAL  04/14/2023    IR TUNNELED DIALYSIS CATHETER PLACEMENT  06/10/2022    IR TUNNELED DIALYSIS CATHETER PLACEMENT  10/20/2022    IR TUNNELED DIALYSIS CATHETER PLACEMENT      IR TUNNELED DIALYSIS CATHETER PLACEMENT  01/08/2024    IR TUNNELED DIALYSIS CATHETER  REMOVAL  3/4/2024    OR ARTERIOVENOUS ANASTOMOSIS OPEN DIRECT Left 10/24/2022    Procedure: Creation of  L brachiobasilic fistula, ligation of L endoAVF;  Surgeon: Dot Otero MD;  Location: [facility];  Service: Vascular    OR ARTERIOVENOUS ANASTOMOSIS OPEN DIRECT Right 01/06/2023    Procedure: Creation right brachiocephalic fistula;  Surgeon: Dot Otero MD;  Location: [facility];  Service: Vascular    OR ARTERIOVENOUS ANASTOMOSIS OPEN DIRECT Left 2/2/2024    Procedure: Placement left brachial-axillary graft;  Surgeon: Dot Otero MD;  Location: [facility];  Service: Vascular        Social History     Tobacco Use   Smoking Status Every Day    Current packs/day: 0.25    Average packs/day: 0.3 packs/day for 37.0 years (9.3 ttl pk-yrs)    Types: Cigarettes   Smokeless Tobacco Never   Tobacco Comments    3 cigarettes on Juan A 3/3/24        Social History     Substance and Sexual Activity   Alcohol Use Yes    Alcohol/week: 1.0 standard drink of alcohol    Types: 1 Cans of beer per week    Comment: 2 x weekly        Social History     Substance and Sexual Activity   Drug Use Yes    Frequency: 3.0 times per week    Types: Marijuana    Comment: occasionally, last used  juan a 3/3/2024        Allergies   Allergen Reactions    Ibuprofen Other (See Comments)     ESRD - cannot have ibuprofen       Current Outpatient Medications   Medication Sig Dispense Refill    amLODIPine (NORVASC) 5 mg tablet Take 1 tablet (5 mg total) by mouth daily 90 tablet 4    ammonium lactate (LAC-HYDRIN) 12 % lotion Apply topically daily      aspirin (ECOTRIN LOW STRENGTH) 81 mg EC tablet Take 1 tablet (81 mg total) by mouth daily 90 tablet 1    B Complex-C-Folic Acid (Karlee-Melissa) TABS Take 1 tablet by mouth daily 90 tablet 4    calcitriol (ROCALTROL) 0.25 mcg capsule Take 2 capsules (0.5 mcg total) by mouth 3 (three) times a week 45 capsule 4    calcium acetate (PHOSLO) capsule Take 3 capsules (2,001 mg total) by mouth 3 (three) times a  day with meals 810 capsule 3    carvedilol (COREG) 25 mg tablet Take 1 tablet (25 mg total) by mouth 2 (two) times a day with meals 90 tablet 1    cholecalciferol (VITAMIN D3) 1,000 units tablet Take 4 tablets (4,000 Units total) by mouth daily 120 tablet 0    cinacalcet (SENSIPAR) 30 mg tablet Take 1 tablet (30 mg total) by mouth once a week 12 tablet 4    Eliquis 2.5 MG TAKE 1 TABLET(2.5 MG) BY MOUTH TWICE DAILY 180 tablet 1    furosemide (LASIX) 80 mg tablet TAKE 2 TABLETS(160 MG) BY MOUTH DAILY 180 tablet 1    hydrOXYzine HCL (ATARAX) 10 mg tablet Take 1 tablet (10 mg total) by mouth every 6 (six) hours as needed for itching 30 tablet 2    pravastatin (PRAVACHOL) 80 mg tablet Take 1 tablet (80 mg total) by mouth daily 90 tablet 1    sevelamer carbonate (RENVELA) 800 mg tablet TAKE 2 TABLETS(1600 MG) BY MOUTH THREE TIMES DAILY WITH MEALS 540 tablet 4    ciclopirox (PENLAC) 8 % solution if needed      EPINEPHrine (EPIPEN) 0.3 mg/0.3 mL SOAJ Inject 0.3 mL (0.3 mg total) into a muscle once for 1 dose 0.6 mL 0    nicotine (NICODERM CQ) 14 mg/24hr TD 24 hr patch Place 1 patch on the skin over 24 hours every 24 hours Do not start before April 18, 2024. 28 patch 0    nicotine (NICODERM CQ) 21 mg/24 hr TD 24 hr patch Place 1 patch on the skin over 24 hours every 24 hours 28 patch 0     No current facility-administered medications for this encounter.          Objective:    Vitals:    07/10/24 0727   BP: (!) 196/96   BP Location: Right arm   Pulse: 73   Resp: 18   Temp: 97.6 °F (36.4 °C)   TempSrc: Temporal   SpO2: 99%   Weight: 87.5 kg (193 lb)        Physical Exam  Constitutional:       Appearance: Normal appearance.   HENT:      Head: Normocephalic.   Eyes:      Pupils: Pupils are equal, round, and reactive to light.   Cardiovascular:      Rate and Rhythm: Normal rate.      Comments: Pulsatile BERHANE AVG thrill  Pulmonary:      Effort: Pulmonary effort is normal.   Abdominal:      General: Abdomen is flat.  "  Musculoskeletal:         General: Normal range of motion.   Skin:     General: Skin is warm.   Neurological:      Mental Status: He is alert and oriented to person, place, and time.   Psychiatric:         Mood and Affect: Mood normal.           No results found for: \"BNP\"   Lab Results   Component Value Date    WBC 9.34 01/06/2024    HGB 10.0 (L) 01/06/2024    HCT 29.9 (L) 01/06/2024    MCV 99 (H) 01/06/2024     01/06/2024     Lab Results   Component Value Date    INR 0.95 10/24/2022    INR 0.91 10/20/2022    INR 0.87 08/03/2022    PROTIME 12.7 10/24/2022    PROTIME 12.3 10/20/2022    PROTIME 12.1 08/03/2022     Lab Results   Component Value Date    PTT 26 02/18/2018         I have personally reviewed pertinent imaging and laboratory results.     Code Status: Prior  Advance Directive and Living Will:      Power of :    POLST:      This text is generated with voice recognition software. There may be translation, syntax,  or grammatical errors. If you have any questions, please contact the dictating provider.   "

## 2024-07-10 NOTE — BRIEF OP NOTE (RAD/CATH)
IR AV FISTULAGRAM/GRAFTOGRAM  Procedure Note    PATIENT NAME: Jeffery Phillips  : 1960  MRN: [id number]     Pre-op Diagnosis:   1. ESRD (end stage renal disease) on dialysis ([facility])      Post-op Diagnosis:   1. ESRD (end stage renal disease) on dialysis ([facility])        Surgeon:   Maynor Ledezma DO  Assistants:     No qualified resident was available.    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 Improved thrill. No JA stenosis. Small cannulation site aneurysmal lesion noted on US and imaging.     Specimens: none    Complications:  none    Anesthesia: conscious sedation and local    Maynor Ledezma DO     Date: 7/10/2024  Time: 9:18 AM

## 2024-07-10 NOTE — DISCHARGE INSTRUCTIONS
Moderate Sedation   WHAT YOU NEED TO KNOW:   Moderate sedation, or conscious sedation, is medicine used during procedures to help you feel relaxed and calm. You will be awake and able to follow directions without anxiety or pain. You will remember little to none of the procedure. You may feel tired, weak, or unsteady on your feet after you get sedation. You may also have trouble concentrating or short-term memory loss. These symptoms should go away in 24 hours or less.   DISCHARGE INSTRUCTIONS:   Call 911 or have someone else call for any of the following:   You have sudden trouble breathing.     You cannot be woken.  Seek care immediately if:   You have a severe headache or dizziness.     Your heart is beating faster than usual.  Contact your healthcare provider if:   You have a fever.     You have nausea or are vomiting for more than 8 hours after the procedure.      Your skin is itchy, swollen, or you have a rash.     You have questions or concerns about your condition or care.  Self-care:   Have someone stay with you for 24 hours. This person can drive you to errands and help you do things around the house. This person can also watch for problems.      Rest and do quiet activities for 24 hours. Do not exercise, ride a bike, or play sports. Stand up slowly to prevent dizziness and falls. Take short walks around the house with another person. Slowly return to your usual activities the next day.      Do not drive or use dangerous machines or tools for 24 hours. You may injure yourself or others. Examples include a lawnmower, saw, or drill. Do not return to work for 24 hours if you use dangerous machines or tools for work.      Do not make important decisions for 24 hours. For example, do not sign important papers or invest money.      Drink liquids as directed. Liquids help flush the sedation medicine out of your body. Ask how much liquid to drink each day and which liquids are best for you.      Eat small,  frequent meals to prevent nausea and vomiting. Start with clear liquids such as juice or broth. If you do not vomit after clear liquids, you can eat your usual foods.      Do not drink alcohol or take medicines that make you drowsy. This includes medicines that help you sleep and anxiety medicines. Ask your healthcare provider if it is safe for you to take pain medicine.  Follow up with your healthcare provider as directed: Write down your questions so you remember to ask them during your visits.   © 2017 [employer] Information is for End User's use only and may not be sold, redistributed or otherwise used for commercial purposes. All illustrations and images included in CareNotes® are the copyrighted property of [employer]. or [employer].  The above information is an  only. It is not intended as medical advice for individual conditions or treatments. Talk to your doctor, nurse or pharmacist before following any medical regimen to see if it is safe and effective for you.      Fistulagram   WHAT YOU NEED TO KNOW:   Your arm or leg my  be sore, swollen, and bruised after the procedure. This is normal and should get better in a few days.     DISCHARGE INSTRUCTIONS:     Contact Interventional Radiology at [phone] and follow prompts if you experience any:    You have a fever or chills.    Your puncture site is red, swollen, or draining pus.    You have nausea or are vomiting.    Your skin is itchy, swollen, or you have a rash.    You cannot feel a thrill over your graft or fistula.     You have questions or concerns about your condition or care.    Seek care immediately if:     You have bleeding that does not stop after 10 minutes of holding firm, direct pressure over the puncture site.    Blood soaks through your bandage.    Your hand or foot closest to the graft or fistula feels cold, painful, or numb.     Your hand or foot closest to the graft or fistula is pale or  blue.     You have trouble moving your arm or leg closest to the graft or fistula.     Your bruise suddenly gets bigger.    Care for your wound as directed:  Remove the bandage in 4 to 6 hours or as         directed. Wash the area once a day with soap and water. Gently pat the area dry.     Apply firm, steady pressure to the puncture site if it bleeds.  Use a clean gauze or towel to hold pressure for 10 to 15 minutes. Call 911 if you cannot stop the bleeding or the bleeding gets heavier.     Feel for a thrill once a day or as directed.  Place your index and second finger over your fistula or graft as directed. You should feel a vibration. The vibration means that blood is flowing through your graft or fistula correctly.     Rest your arm or leg as directed.  Do not lift anything heavier than 5 pounds or do strenuous activity for 24 hours.     Prevent damage to your graft or fistula.  Do not wear tight-fitting clothing over your graft or fistula. Do not wear tight jewelry on the arm or leg with the graft or fistula. Tell healthcare providers not to do, IVs, blood draws, and blood pressure readings in the arm with your graft or fistula. Do not allow flu shots or vaccinations in your arm with your graft or fistula.    Follow up with your healthcare provider as directed

## 2024-08-07 ENCOUNTER — TELEPHONE (OUTPATIENT)
Dept: ADMINISTRATIVE | Facility: OTHER | Age: 64
End: 2024-08-07

## 2024-08-07 NOTE — TELEPHONE ENCOUNTER
08/07/24 1:14 PM    Patient contacted to bring Advance Directive, POLST, or Living Will document to next scheduled pcp visit.[facility] Department left message.    Thank you.  Janice Lowe MA  [facility]

## 2024-08-12 ENCOUNTER — OFFICE VISIT (OUTPATIENT)
Dept: INTERNAL MEDICINE CLINIC | Facility: CLINIC | Age: 64
End: 2024-08-12
Payer: COMMERCIAL

## 2024-08-12 VITALS
DIASTOLIC BLOOD PRESSURE: 68 MMHG | BODY MASS INDEX: 29.4 KG/M2 | HEART RATE: 69 BPM | SYSTOLIC BLOOD PRESSURE: 141 MMHG | HEIGHT: 68 IN | WEIGHT: 194 LBS | TEMPERATURE: 97.8 F

## 2024-08-12 DIAGNOSIS — N19: ICD-10-CM

## 2024-08-12 DIAGNOSIS — Z99.2 ESRD (END STAGE RENAL DISEASE) ON DIALYSIS (HCC): ICD-10-CM

## 2024-08-12 DIAGNOSIS — T82.898S ARTERIOVENOUS FISTULA OCCLUSION, SEQUELA: Primary | ICD-10-CM

## 2024-08-12 DIAGNOSIS — I13.2: ICD-10-CM

## 2024-08-12 DIAGNOSIS — N18.6 ESRD (END STAGE RENAL DISEASE) ON DIALYSIS (HCC): ICD-10-CM

## 2024-08-12 PROCEDURE — 99214 OFFICE O/P EST MOD 30 MIN: CPT | Performed by: STUDENT IN AN ORGANIZED HEALTH CARE EDUCATION/TRAINING PROGRAM

## 2024-08-12 PROCEDURE — G0439 PPPS, SUBSEQ VISIT: HCPCS | Performed by: STUDENT IN AN ORGANIZED HEALTH CARE EDUCATION/TRAINING PROGRAM

## 2024-08-12 NOTE — PROGRESS NOTES
Ambulatory Visit  Name: Jeffery Phillips      : 1960      MRN: [id number]  Encounter Provider: Jean Perkins MD  Encounter Date: 2024   Encounter department: [facility] INTERNAL MEDICINE [place]    Assessment & Plan   1. Arteriovenous fistula occlusion, sequela  Assessment & Plan:  Currently on Eliquis 2.5 mg without bleeding complications-continue     2. Hypertensive heart and renal disease with congestive heart failure and renal failure  (HCC)  Assessment & Plan:  Blood pressure has improved, today 141/68  Reports his blood pressure in dialysis has been more stable since he was instructed by his nephrologist to take the amlodipine 5 mg daily, he also continues record 5 mg and furosemide 80 mg  Continue current regimen             Preventive health issues were discussed with patient, and age appropriate screening tests were ordered as noted in patient's After Visit Summary. Personalized health advice and appropriate referrals for health education or preventive services given if needed, as noted in patient's After Visit Summary.    History of Present Illness     HPI   Patient Care Team:  Jean Perkins MD as PCP - General (Internal Medicine)  Jennifer Abraham DO as PCP - [facility]CP-[facility] ([facility])    Review of Systems  Medical History Reviewed by provider this encounter:       Annual Wellness Visit Questionnaire   Jeffery is here for his Subsequent Wellness visit.     Health Risk Assessment:   Patient rates overall health as good. Patient feels that their physical health rating is same. Patient is satisfied with their life. Eyesight was rated as same. Hearing was rated as same. Patient feels that their emotional and mental health rating is same. Patients states they are never, rarely angry. Patient states they are never, rarely unusually tired/fatigued. Pain experienced in the last 7 days has been none. Patient states that he has experienced no weight loss or gain in last 6 months.      Depression Screening:   PHQ-2 Score: 0      Fall Risk Screening:   In the past year, patient has experienced: no history of falling in past year      Home Safety:  Patient has trouble with stairs inside or outside of their home. Patient has working smoke alarms and has working carbon monoxide detector. Home safety hazards include: none.     Nutrition:   Current diet is Regular.     Medications:   Patient is currently taking over-the-counter supplements. OTC medications include: see medication list. Patient is able to manage medications.     Activities of Daily Living (ADLs)/Instrumental Activities of Daily Living (IADLs):   Walk and transfer into and out of bed and chair?: Yes  Dress and groom yourself?: Yes    Bathe or shower yourself?: Yes    Feed yourself? Yes  Do your laundry/housekeeping?: Yes  Manage your money, pay your bills and track your expenses?: Yes  Make your own meals?: Yes    Do your own shopping?: Yes    Previous Hospitalizations:   Any hospitalizations or ED visits within the last 12 months?: No      Advance Care Planning:   Living will: No    Durable POA for healthcare: No    Advanced directive: No      Cognitive Screening:   Provider or family/friend/caregiver concerned regarding cognition?: No    PREVENTIVE SCREENINGS      Cardiovascular Screening:    General: Screening Not Indicated and History Lipid Disorder      Diabetes Screening:     General: Screening Current      Colorectal Cancer Screening:     General: Screening Current      Prostate Cancer Screening:    General: Risks and Benefits Discussed      Osteoporosis Screening:    General: Risks and Benefits Discussed      Abdominal Aortic Aneurysm (AAA) Screening:    Risk factors include: tobacco use        Lung Cancer Screening:     General: Screening Not Indicated      Hepatitis C Screening:    General: Screening Current    Screening, Brief Intervention, and Referral to Treatment (SBIRT)    Screening  Typical number of drinks in a day:  "0  Typical number of drinks in a week: 0  Interpretation: Low risk drinking behavior.    Single Item Drug Screening:  How often have you used an illegal drug (including marijuana) or a prescription medication for non-medical reasons in the past year? never    Single Item Drug Screen Score: 0  Interpretation: Negative screen for possible drug use disorder    Annual Depression Screening  Time spent screening and evaluating the patient for depression during today's encounter was 8 minutes.    Other Counseling Topics:   Skin self-exam and sunscreen.     Social Determinants of Health     Financial Resource Strain: Medium Risk (8/11/2023)    Overall Financial Resource Strain (CARDIA)     Difficulty of Paying Living Expenses: Somewhat hard   Food Insecurity: No Food Insecurity (8/12/2024)    Hunger Vital Sign     Worried About Running Out of Food in the Last Year: Never true     Ran Out of Food in the Last Year: Never true   Transportation Needs: No Transportation Needs (8/12/2024)    PRAPARE - Transportation     Lack of Transportation (Medical): No     Lack of Transportation (Non-Medical): No   Housing Stability: Unknown (8/12/2024)    Housing Stability Vital Sign     Unable to Pay for Housing in the Last Year: No     Homeless in the Last Year: No   Utilities: Not At Risk (8/12/2024)    [facility] Utilities     Threatened with loss of utilities: No     No results found.    Objective     /68 (BP Location: Right arm, Patient Position: Sitting, Cuff Size: Standard)   Pulse 69   Temp 97.8 °F (36.6 °C)   Ht 5' 8\" (1.727 m)   Wt 88 kg (194 lb)   BMI 29.50 kg/m²     Physical Exam      "

## 2024-08-12 NOTE — ASSESSMENT & PLAN NOTE
Blood pressure has improved, today 141/68  Reports his blood pressure in dialysis has been more stable since he was instructed by his nephrologist to take the amlodipine 5 mg daily, he also continues record 5 mg and furosemide 80 mg  Continue current regimen

## 2024-08-13 DIAGNOSIS — I13.2: Primary | ICD-10-CM

## 2024-08-13 DIAGNOSIS — N19: Primary | ICD-10-CM

## 2024-08-13 RX ORDER — FOLIC ACID/VIT B COMPLEX AND C 0.8 MG
1 TABLET ORAL DAILY
Qty: 90 TABLET | Refills: 4 | Status: SHIPPED | OUTPATIENT
Start: 2024-08-13

## 2024-08-13 RX ORDER — LOSARTAN POTASSIUM 25 MG/1
25 TABLET ORAL
Qty: 90 TABLET | Refills: 3 | Status: SHIPPED | OUTPATIENT
Start: 2024-08-13

## 2024-09-05 DIAGNOSIS — Z99.2 ESRD (END STAGE RENAL DISEASE) ON DIALYSIS (HCC): ICD-10-CM

## 2024-09-05 DIAGNOSIS — N18.6 ESRD (END STAGE RENAL DISEASE) ON DIALYSIS (HCC): ICD-10-CM

## 2024-09-05 RX ORDER — FOLIC ACID/VIT B COMPLEX AND C 0.8 MG
1 TABLET ORAL DAILY
Qty: 90 TABLET | Refills: 4 | Status: SHIPPED | OUTPATIENT
Start: 2024-09-05

## 2024-09-08 PROBLEM — Z87.898 HISTORY OF SYNCOPE: Status: ACTIVE | Noted: 2023-11-29

## 2024-10-27 DIAGNOSIS — Z99.2 ESRD (END STAGE RENAL DISEASE) ON DIALYSIS (HCC): ICD-10-CM

## 2024-10-27 DIAGNOSIS — N18.6 ESRD (END STAGE RENAL DISEASE) ON DIALYSIS (HCC): ICD-10-CM

## 2024-10-28 RX ORDER — FUROSEMIDE 80 MG/1
TABLET ORAL
Qty: 180 TABLET | Refills: 1 | Status: SHIPPED | OUTPATIENT
Start: 2024-10-28

## 2024-12-11 ENCOUNTER — OFFICE VISIT (OUTPATIENT)
Dept: INTERNAL MEDICINE CLINIC | Facility: CLINIC | Age: 64
End: 2024-12-11
Payer: COMMERCIAL

## 2024-12-11 VITALS
SYSTOLIC BLOOD PRESSURE: 140 MMHG | HEART RATE: 65 BPM | WEIGHT: 189 LBS | HEIGHT: 68 IN | BODY MASS INDEX: 28.64 KG/M2 | TEMPERATURE: 96 F | OXYGEN SATURATION: 97 % | RESPIRATION RATE: 18 BRPM | DIASTOLIC BLOOD PRESSURE: 60 MMHG

## 2024-12-11 DIAGNOSIS — T82.898S ARTERIOVENOUS FISTULA OCCLUSION, SEQUELA: ICD-10-CM

## 2024-12-11 DIAGNOSIS — N25.81 SECONDARY HYPERPARATHYROIDISM OF RENAL ORIGIN (HCC): ICD-10-CM

## 2024-12-11 DIAGNOSIS — I13.2: Primary | ICD-10-CM

## 2024-12-11 DIAGNOSIS — N19: Primary | ICD-10-CM

## 2024-12-11 PROCEDURE — 99214 OFFICE O/P EST MOD 30 MIN: CPT | Performed by: STUDENT IN AN ORGANIZED HEALTH CARE EDUCATION/TRAINING PROGRAM

## 2024-12-11 NOTE — H&P (VIEW-ONLY)
"INTERNAL MEDICINE OFFICE VISIT NOTE  [facility] Internal Medicine [place]    NAME: Jeffery Phillips  AGE: 64 y.o. SEX: male    DATE OF ENCOUNTER:       Chief Complaint   Patient presents with    Follow-up     4 month          Assessment & Plan  Hypertensive heart and renal disease with congestive heart failure and renal failure  (HCC)  Blood pressure is now stable, is 140/60.  He reports readings during dialysis days have also been within acceptable parameters  His nephrologist recently started him on losartan 25 mg in addition to amlodipine 5 mg, Coreg 25 mg twice a day and furosemide 80 mg daily  Advised to continue his current regimen      Arteriovenous fistula occlusion, sequela  Currently on Eliquis 2.5 mg without bleeding complications-continue     Secondary hyperparathyroidism of renal origin (HCC)  Following with nephrology  Currently on Sensipar, calcitriol       Return in about 4 months (around 4/11/2025).      OBJECTIVE:  Vitals:    12/11/24 1100   BP: 140/60   BP Location: Right arm   Patient Position: Sitting   Cuff Size: Standard   Pulse: 65   Resp: 18   Temp: (!) 96 °F (35.6 °C)   TempSrc: Tympanic   SpO2: 97%   Weight: 85.7 kg (189 lb)   Height: 5' 8\" (1.727 m)         Physical Exam:   GENERAL: NAD, Normal appearance.    NEUROLOGIC:  Alert/oriented x3.  HEENT:  NC/AT, no scleral icterus  CARDIAC:  RRR, +S1/S2  PULMONARY:  non-labored breathing        Current Outpatient Medications:     amLODIPine (NORVASC) 5 mg tablet, Take 1 tablet (5 mg total) by mouth daily, Disp: 90 tablet, Rfl: 4    ammonium lactate (LAC-HYDRIN) 12 % lotion, Apply topically daily, Disp: , Rfl:     aspirin (ECOTRIN LOW STRENGTH) 81 mg EC tablet, Take 1 tablet (81 mg total) by mouth daily, Disp: 90 tablet, Rfl: 1    B Complex-C-Folic Acid (Karlee-Melissa) TABS, Take 1 tablet by mouth daily, Disp: 90 tablet, Rfl: 4    calcitriol (ROCALTROL) 0.25 mcg capsule, Take 2 capsules (0.5 mcg total) by mouth 3 (three) times a week, Disp: 45 " capsule, Rfl: 4    calcium acetate (PHOSLO) capsule, Take 3 capsules (2,001 mg total) by mouth 3 (three) times a day with meals, Disp: 810 capsule, Rfl: 3    carvedilol (COREG) 25 mg tablet, Take 1 tablet (25 mg total) by mouth 2 (two) times a day with meals, Disp: 90 tablet, Rfl: 1    cholecalciferol (VITAMIN D3) 1,000 units tablet, Take 4 tablets (4,000 Units total) by mouth daily, Disp: 120 tablet, Rfl: 0    cinacalcet (SENSIPAR) 30 mg tablet, Take 1 tablet (30 mg total) by mouth once a week, Disp: 12 tablet, Rfl: 4    Eliquis 2.5 MG, TAKE 1 TABLET(2.5 MG) BY MOUTH TWICE DAILY, Disp: 180 tablet, Rfl: 1    EPINEPHrine (EPIPEN) 0.3 mg/0.3 mL SOAJ, Inject 0.3 mL (0.3 mg total) into a muscle once for 1 dose, Disp: 0.6 mL, Rfl: 0    furosemide (LASIX) 80 mg tablet, TAKE 2 TABLETS(160 MG) BY MOUTH DAILY, Disp: 180 tablet, Rfl: 1    hydrOXYzine HCL (ATARAX) 10 mg tablet, Take 1 tablet (10 mg total) by mouth every 6 (six) hours as needed for itching, Disp: 30 tablet, Rfl: 2    losartan (COZAAR) 25 mg tablet, Take 1 tablet (25 mg total) by mouth daily at bedtime, Disp: 90 tablet, Rfl: 3    pravastatin (PRAVACHOL) 80 mg tablet, Take 1 tablet (80 mg total) by mouth daily, Disp: 90 tablet, Rfl: 1    sevelamer carbonate (RENVELA) 800 mg tablet, TAKE 2 TABLETS(1600 MG) BY MOUTH THREE TIMES DAILY WITH MEALS, Disp: 540 tablet, Rfl: 4    ciclopirox (PENLAC) 8 % solution, if needed (Patient not taking: Reported on 12/11/2024), Disp: , Rfl:     nicotine (NICODERM CQ) 14 mg/24hr TD 24 hr patch, Place 1 patch on the skin over 24 hours every 24 hours Do not start before April 18, 2024. (Patient not taking: Reported on 12/11/2024), Disp: 28 patch, Rfl: 0    nicotine (NICODERM CQ) 21 mg/24 hr TD 24 hr patch, Place 1 patch on the skin over 24 hours every 24 hours (Patient not taking: Reported on 12/11/2024), Disp: 28 patch, Rfl: 0    Patient Active Problem List   Diagnosis    Alcohol abuse, in remission    ESRD (end stage renal disease)  on dialysis (HCC)    Marijuana use    Hyperlipidemia    Tobacco abuse    Seizure due to alcohol withdrawal, uncomplicated (HCC)    Pharyngeal edema    Hypomagnesemia    Anemia due to chronic kidney disease, on chronic dialysis (HCC)    Chronic hyponatremia    MGUS (monoclonal gammopathy of unknown significance)    Secondary hyperparathyroidism of renal origin (HCC)    Smoking greater than 20 pack years    Arteriovenous fistula occlusion (HCC)    Great toe pain, left    Erectile dysfunction    Itch of skin    Gynecomastia, male    History of syncope    Hyperphosphatemia    Edentulous    Hypertensive heart and renal disease with congestive heart failure and renal failure  ([facility])           Jean Perkins MD  [facility] Internal [facility] [place]    * Please Note: This note was completed in part utilizing a dictation software.  Grammatical errors, random word insertions, spelling mistakes, and incomplete sentences may be an occasional consequence of this system secondary to software limitations, ambient noise, and hardware issues.  If you have any questions or concerns about the content, text, or information contained within the body of this dictation, please contact the provider for clarification.**

## 2024-12-11 NOTE — PROGRESS NOTES
"INTERNAL MEDICINE OFFICE VISIT NOTE  [facility] Internal Medicine [place]    NAME: Jeffery Phillips  AGE: 64 y.o. SEX: male    DATE OF ENCOUNTER:       Chief Complaint   Patient presents with    Follow-up     4 month          Assessment & Plan  Hypertensive heart and renal disease with congestive heart failure and renal failure  (HCC)  Blood pressure is now stable, is 140/60.  He reports readings during dialysis days have also been within acceptable parameters  His nephrologist recently started him on losartan 25 mg in addition to amlodipine 5 mg, Coreg 25 mg twice a day and furosemide 80 mg daily  Advised to continue his current regimen      Arteriovenous fistula occlusion, sequela  Currently on Eliquis 2.5 mg without bleeding complications-continue     Secondary hyperparathyroidism of renal origin (HCC)  Following with nephrology  Currently on Sensipar, calcitriol       Return in about 4 months (around 4/11/2025).      OBJECTIVE:  Vitals:    12/11/24 1100   BP: 140/60   BP Location: Right arm   Patient Position: Sitting   Cuff Size: Standard   Pulse: 65   Resp: 18   Temp: (!) 96 °F (35.6 °C)   TempSrc: Tympanic   SpO2: 97%   Weight: 85.7 kg (189 lb)   Height: 5' 8\" (1.727 m)         Physical Exam:   GENERAL: NAD, Normal appearance.    NEUROLOGIC:  Alert/oriented x3.  HEENT:  NC/AT, no scleral icterus  CARDIAC:  RRR, +S1/S2  PULMONARY:  non-labored breathing        Current Outpatient Medications:     amLODIPine (NORVASC) 5 mg tablet, Take 1 tablet (5 mg total) by mouth daily, Disp: 90 tablet, Rfl: 4    ammonium lactate (LAC-HYDRIN) 12 % lotion, Apply topically daily, Disp: , Rfl:     aspirin (ECOTRIN LOW STRENGTH) 81 mg EC tablet, Take 1 tablet (81 mg total) by mouth daily, Disp: 90 tablet, Rfl: 1    B Complex-C-Folic Acid (Karlee-Melissa) TABS, Take 1 tablet by mouth daily, Disp: 90 tablet, Rfl: 4    calcitriol (ROCALTROL) 0.25 mcg capsule, Take 2 capsules (0.5 mcg total) by mouth 3 (three) times a week, Disp: 45 " capsule, Rfl: 4    calcium acetate (PHOSLO) capsule, Take 3 capsules (2,001 mg total) by mouth 3 (three) times a day with meals, Disp: 810 capsule, Rfl: 3    carvedilol (COREG) 25 mg tablet, Take 1 tablet (25 mg total) by mouth 2 (two) times a day with meals, Disp: 90 tablet, Rfl: 1    cholecalciferol (VITAMIN D3) 1,000 units tablet, Take 4 tablets (4,000 Units total) by mouth daily, Disp: 120 tablet, Rfl: 0    cinacalcet (SENSIPAR) 30 mg tablet, Take 1 tablet (30 mg total) by mouth once a week, Disp: 12 tablet, Rfl: 4    Eliquis 2.5 MG, TAKE 1 TABLET(2.5 MG) BY MOUTH TWICE DAILY, Disp: 180 tablet, Rfl: 1    EPINEPHrine (EPIPEN) 0.3 mg/0.3 mL SOAJ, Inject 0.3 mL (0.3 mg total) into a muscle once for 1 dose, Disp: 0.6 mL, Rfl: 0    furosemide (LASIX) 80 mg tablet, TAKE 2 TABLETS(160 MG) BY MOUTH DAILY, Disp: 180 tablet, Rfl: 1    hydrOXYzine HCL (ATARAX) 10 mg tablet, Take 1 tablet (10 mg total) by mouth every 6 (six) hours as needed for itching, Disp: 30 tablet, Rfl: 2    losartan (COZAAR) 25 mg tablet, Take 1 tablet (25 mg total) by mouth daily at bedtime, Disp: 90 tablet, Rfl: 3    pravastatin (PRAVACHOL) 80 mg tablet, Take 1 tablet (80 mg total) by mouth daily, Disp: 90 tablet, Rfl: 1    sevelamer carbonate (RENVELA) 800 mg tablet, TAKE 2 TABLETS(1600 MG) BY MOUTH THREE TIMES DAILY WITH MEALS, Disp: 540 tablet, Rfl: 4    ciclopirox (PENLAC) 8 % solution, if needed (Patient not taking: Reported on 12/11/2024), Disp: , Rfl:     nicotine (NICODERM CQ) 14 mg/24hr TD 24 hr patch, Place 1 patch on the skin over 24 hours every 24 hours Do not start before April 18, 2024. (Patient not taking: Reported on 12/11/2024), Disp: 28 patch, Rfl: 0    nicotine (NICODERM CQ) 21 mg/24 hr TD 24 hr patch, Place 1 patch on the skin over 24 hours every 24 hours (Patient not taking: Reported on 12/11/2024), Disp: 28 patch, Rfl: 0    Patient Active Problem List   Diagnosis    Alcohol abuse, in remission    ESRD (end stage renal disease)  on dialysis (HCC)    Marijuana use    Hyperlipidemia    Tobacco abuse    Seizure due to alcohol withdrawal, uncomplicated (HCC)    Pharyngeal edema    Hypomagnesemia    Anemia due to chronic kidney disease, on chronic dialysis (HCC)    Chronic hyponatremia    MGUS (monoclonal gammopathy of unknown significance)    Secondary hyperparathyroidism of renal origin (HCC)    Smoking greater than 20 pack years    Arteriovenous fistula occlusion (HCC)    Great toe pain, left    Erectile dysfunction    Itch of skin    Gynecomastia, male    History of syncope    Hyperphosphatemia    Edentulous    Hypertensive heart and renal disease with congestive heart failure and renal failure  ([facility])           Jean Perkins MD  [facility] [place]    * Please Note: This note was completed in part utilizing a dictation software.  Grammatical errors, random word insertions, spelling mistakes, and incomplete sentences may be an occasional consequence of this system secondary to software limitations, ambient noise, and hardware issues.  If you have any questions or concerns about the content, text, or information contained within the body of this dictation, please contact the provider for clarification.**

## 2024-12-11 NOTE — ASSESSMENT & PLAN NOTE
Blood pressure is now stable, is 140/60.  He reports readings during dialysis days have also been within acceptable parameters  His nephrologist recently started him on losartan 25 mg in addition to amlodipine 5 mg, Coreg 25 mg twice a day and furosemide 80 mg daily  Advised to continue his current regimen

## 2024-12-16 ENCOUNTER — TRANSCRIBE ORDERS (OUTPATIENT)
Dept: RADIOLOGY | Facility: HOSPITAL | Age: 64
End: 2024-12-16

## 2024-12-16 DIAGNOSIS — Z99.2 ESRD (END STAGE RENAL DISEASE) ON DIALYSIS (HCC): Primary | ICD-10-CM

## 2024-12-16 DIAGNOSIS — N18.6 ESRD (END STAGE RENAL DISEASE) ON DIALYSIS (HCC): Primary | ICD-10-CM

## 2024-12-16 DIAGNOSIS — N18.9 CHRONIC KIDNEY DISEASE, UNSPECIFIED CKD STAGE: Primary | ICD-10-CM

## 2024-12-16 RX ORDER — SODIUM CHLORIDE 9 MG/ML
75 INJECTION, SOLUTION INTRAVENOUS CONTINUOUS
Status: CANCELLED | OUTPATIENT
Start: 2024-12-16

## 2024-12-20 ENCOUNTER — TELEPHONE (OUTPATIENT)
Dept: RADIOLOGY | Facility: HOSPITAL | Age: 64
End: 2024-12-20

## 2024-12-20 NOTE — PRE-PROCEDURE INSTRUCTIONS
Pre-procedure Instructions for Interventional Radiology  [address] [address]  INTERVENTIONAL RADIOLOGY [phone]    You are scheduled for a/an Graftagram.    On Friday 12/27/24.    Your tentative arrival time is 12 Noon.  Short stay will notify you the day before your procedure with the exact arrival time and the location to arrive.    To prepare for your procedure:  Please arrange for someone to drive you home after the procedure and stay with you until the next morning if you are instructed to do so.  This is typically for patients receiving some type of sedative or anesthetic for the procedure.  DO NOT EAT OR DRINK ANYTHING after midnight on the evening before your procedure including candy & gum.  ONLY SIPS OF WATER with your medications are allowed on the morning of your procedure.  TAKE ALL OF YOUR REGULAR MEDICATIONS THE MORNING OF YOUR PROCEDURE with sips of water!  We may call you to stop some of your blood sugar, blood pressure and blood thinning medications depending on the procedure.  Please take all of these medications unless we instruct you to stop them.  If you have an allergy to x-ray dye, please contact Interventional Radiology for an x-ray dye preparation which usually consists of an oral steroid and Benadryl.    The day of your procedure:  Bring a list of the medications you take at home.  Bring medications you take for breathing problems (such as inhalers), medications for chest pain, or both.  Bring a case for your glasses or contacts.  Bring your insurance card and a form of photo ID.  Please leave all valuables such as credit cards and jewelry at home.  Report to the admitting office to the left of the registration desk in the main lobby at the [facility], Entrance B.  You will then be directed to the Short Stay Center.  While your procedure is being performed, your family may wait in the Radiology Waiting Room on the 1st floor in  Radiology.  if they need to leave, they may provide a number to be called following the procedure.   Be prepared to stay overnight just in case. Sometimes procedures will indicate the need for further observation or treatment.   If you are scheduled for a follow-up visit with the Interventional Radiologist after your procedure, you will be called with a date and time.    Special Instructions (Medications to stop taking before your procedure etc.):  Amlodipine and lasix hold AM 12/27/24.

## 2024-12-27 ENCOUNTER — HOSPITAL ENCOUNTER (OUTPATIENT)
Dept: RADIOLOGY | Facility: HOSPITAL | Age: 64
Discharge: HOME | End: 2024-12-27
Attending: RADIOLOGY
Payer: COMMERCIAL

## 2024-12-27 VITALS
HEART RATE: 74 BPM | DIASTOLIC BLOOD PRESSURE: 84 MMHG | BODY MASS INDEX: 28.57 KG/M2 | SYSTOLIC BLOOD PRESSURE: 185 MMHG | WEIGHT: 188.49 LBS | HEIGHT: 68 IN | OXYGEN SATURATION: 93 % | TEMPERATURE: 97.6 F | RESPIRATION RATE: 18 BRPM

## 2024-12-27 DIAGNOSIS — N18.6 ESRD (END STAGE RENAL DISEASE) ON DIALYSIS (HCC): ICD-10-CM

## 2024-12-27 DIAGNOSIS — Z99.2 ESRD (END STAGE RENAL DISEASE) ON DIALYSIS (HCC): ICD-10-CM

## 2024-12-27 PROCEDURE — 76937 US GUIDE VASCULAR ACCESS: CPT

## 2024-12-27 PROCEDURE — 36901 INTRO CATH DIALYSIS CIRCUIT: CPT

## 2024-12-27 PROCEDURE — C1894 INTRO/SHEATH, NON-LASER: HCPCS

## 2024-12-27 PROCEDURE — 99152 MOD SED SAME PHYS/QHP 5/>YRS: CPT

## 2024-12-27 PROCEDURE — 36902 INTRO CATH DIALYSIS CIRCUIT: CPT

## 2024-12-27 PROCEDURE — C1725 CATH, TRANSLUMIN NON-LASER: HCPCS

## 2024-12-27 PROCEDURE — C1769 GUIDE WIRE: HCPCS

## 2024-12-27 PROCEDURE — 99152 MOD SED SAME PHYS/QHP 5/>YRS: CPT | Performed by: RADIOLOGY

## 2024-12-27 PROCEDURE — 36902 INTRO CATH DIALYSIS CIRCUIT: CPT | Performed by: RADIOLOGY

## 2024-12-27 PROCEDURE — 99153 MOD SED SAME PHYS/QHP EA: CPT

## 2024-12-27 RX ORDER — MIDAZOLAM HYDROCHLORIDE 2 MG/2ML
INJECTION, SOLUTION INTRAMUSCULAR; INTRAVENOUS AS NEEDED
Status: COMPLETED | OUTPATIENT
Start: 2024-12-27 | End: 2024-12-27

## 2024-12-27 RX ORDER — LIDOCAINE WITH 8.4% SOD BICARB 0.9%(10ML)
SYRINGE (ML) INJECTION AS NEEDED
Status: COMPLETED | OUTPATIENT
Start: 2024-12-27 | End: 2024-12-27

## 2024-12-27 RX ORDER — FENTANYL CITRATE 50 UG/ML
INJECTION, SOLUTION INTRAMUSCULAR; INTRAVENOUS AS NEEDED
Status: COMPLETED | OUTPATIENT
Start: 2024-12-27 | End: 2024-12-27

## 2024-12-27 RX ORDER — SODIUM CHLORIDE 9 MG/ML
75 INJECTION, SOLUTION INTRAVENOUS CONTINUOUS
Status: DISCONTINUED | OUTPATIENT
Start: 2024-12-27 | End: 2024-12-28 | Stop reason: HOSPADM

## 2024-12-27 RX ADMIN — IOHEXOL 30 ML: 350 INJECTION, SOLUTION INTRAVENOUS at 14:34

## 2024-12-27 RX ADMIN — FENTANYL CITRATE 50 MCG: 50 INJECTION INTRAMUSCULAR; INTRAVENOUS at 13:58

## 2024-12-27 RX ADMIN — MIDAZOLAM 1 MG: 1 INJECTION INTRAMUSCULAR; INTRAVENOUS at 13:58

## 2024-12-27 RX ADMIN — Medication 10 ML: at 13:55

## 2024-12-27 NOTE — BRIEF OP NOTE (RAD/CATH)
INTERVENTIONAL RADIOLOGY PROCEDURE NOTE    Date: 12/27/2024    Procedure:   Procedure Summary       Date: 12/27/24 Room / Location: [facility] Interventional Radiology    Anesthesia Start:  Anesthesia Stop:     Procedure: IR AV FISTULAGRAM/GRAFTOGRAM Diagnosis:       ESRD (end stage renal disease) on dialysis (HCC)      (High arterial pressure, Prolonged bleeding, High venous pressure)    Scheduled Providers:  Responsible Provider:     Anesthesia Type: Not recorded ASA Status: Not recorded            Preoperative diagnosis:   1. ESRD (end stage renal disease) on dialysis (HCC)         Postoperative diagnosis: Same.    Surgeon: Reta Huston MD     Assistant: None. No qualified resident was available.    Blood loss: 3 mL    Specimens: None    Findings: No outflow stenosis identified.  Complex appearing stenosis of the inflow segment, and mild diffuse narrowing of the proximal graft.  6 mm high-pressure balloon angioplasty was performed of the inflow segment, and 5 mm angioplasty performed at the arterial anastomosis.  A good result was obtained with significant improvement in flow and a stronger thrill palpable after the interventions.    Complications: None immediate.    Anesthesia: conscious sedation

## 2024-12-27 NOTE — INTERVAL H&P NOTE
"The history and physical were reviewed, along with progress notes, and the patient was examined. There are no changes since it was written.    BP (!) 190/86   Pulse 71   Temp 98 °F (36.7 °C) (Temporal)   Resp 18   Ht 5' 8\" (1.727 m)   Wt 85.5 kg (188 lb 7.9 oz)   SpO2 100%   BMI 28.66 kg/m²        Reta Huston MD/December 27, 2024/12:54 PM  "

## 2024-12-27 NOTE — SEDATION DOCUMENTATION
Left upper arm graftogram/PTA completed in IR by Dr JR Huston.  Woggle intact  to puncture site.  (To be removed by IR staff prior to discharge home.)  Bedrest start time 1420.

## 2024-12-27 NOTE — DISCHARGE INSTRUCTIONS
Fistulagram   WHAT YOU NEED TO KNOW:   Your arm or leg my  be sore, swollen, and bruised after the procedure. This is normal and should get better in a few days.     DISCHARGE INSTRUCTIONS:     Contact Interventional Radiology at [phone] (MIKHAIL PATIENTS: Contact Interventional Radiology at [phone]) (EDUARDO PATIENTS: Contact Interventional Radiology at [phone]) if:    You have a fever or chills.    Your puncture site is red, swollen, or draining pus.    You have nausea or are vomiting.    Your skin is itchy, swollen, or you have a rash.    You cannot feel a thrill over your graft or fistula.     You have questions or concerns about your condition or care.    Seek care immediately if:     You have bleeding that does not stop after 10 minutes of holding firm, direct pressure over the puncture site.    Blood soaks through your bandage.    Your hand or foot closest to the graft or fistula feels cold, painful, or numb.     Your hand or foot closest to the graft or fistula is pale or blue.     You have trouble moving your arm or leg closest to the graft or fistula.     Your bruise suddenly gets bigger.    Care for your wound as directed:  Remove the bandage in 4 to 6 hours or as         directed. Wash the area once a day with soap and water. Gently pat the area dry.     Apply firm, steady pressure to the puncture site if it bleeds.  Use a clean gauze or towel to hold pressure for 10 to 15 minutes. Call 911 if you cannot stop the bleeding or the bleeding gets heavier.     Feel for a thrill once a day or as directed.  Place your index and second finger over your fistula or graft as directed. You should feel a vibration. The vibration means that blood is flowing through your graft or fistula correctly.     Rest your arm or leg as directed.  Do not lift anything heavier than 5 pounds or do strenuous activity for 24 hours.     Prevent damage to your graft or fistula.  Do not wear tight-fitting clothing over  your graft or fistula. Do not wear tight jewelry on the arm or leg with the graft or fistula. Tell healthcare providers not to do, IVs, blood draws, and blood pressure readings in the arm with your graft or fistula. Do not allow flu shots or vaccinations in your arm with your graft or fistula.    Follow up with your healthcare provider as directed       Moderate Sedation   WHAT YOU NEED TO KNOW:   Moderate sedation, or conscious sedation, is medicine used during procedures to help you feel relaxed and calm. You will be awake and able to follow directions without anxiety or pain. You will remember little to none of the procedure. You may feel tired, weak, or unsteady on your feet after you get sedation. You may also have trouble concentrating or short-term memory loss. These symptoms should go away in 24 hours or less.   DISCHARGE INSTRUCTIONS:   Call 911 or have someone else call for any of the following:   You have sudden trouble breathing.     You cannot be woken.  Seek care immediately if:   You have a severe headache or dizziness.     Your heart is beating faster than usual.  Contact your healthcare provider if:   You have a fever.     You have nausea or are vomiting for more than 8 hours after the procedure.      Your skin is itchy, swollen, or you have a rash.     You have questions or concerns about your condition or care.  Self-care:   Have someone stay with you for 24 hours. This person can drive you to errands and help you do things around the house. This person can also watch for problems.      Rest and do quiet activities for 24 hours. Do not exercise, ride a bike, or play sports. Stand up slowly to prevent dizziness and falls. Take short walks around the house with another person. Slowly return to your usual activities the next day.      Do not drive or use dangerous machines or tools for 24 hours. You may injure yourself or others. Examples include a lawnmower, saw, or drill. Do not return to work for  24 hours if you use dangerous machines or tools for work.      Do not make important decisions for 24 hours. For example, do not sign important papers or invest money.      Drink liquids as directed. Liquids help flush the sedation medicine out of your body. Ask how much liquid to drink each day and which liquids are best for you.      Eat small, frequent meals to prevent nausea and vomiting. Start with clear liquids such as juice or broth. If you do not vomit after clear liquids, you can eat your usual foods.      Do not drink alcohol or take medicines that make you drowsy. This includes medicines that help you sleep and anxiety medicines. Ask your healthcare provider if it is safe for you to take pain medicine.  Follow up with your healthcare provider as directed: Write down your questions so you remember to ask them during your visits.   © 2017 [employer] Information is for End User's use only and may not be sold, redistributed or otherwise used for commercial purposes. All illustrations and images included in CareNotes® are the copyrighted property of [employer]., Inc[employer] or [employer].  The above information is an  only. It is not intended as medical advice for individual conditions or treatments. Talk to your doctor, nurse or pharmacist before following any medical regimen to see if it is safe and effective for you.

## 2025-01-07 DIAGNOSIS — I10 BENIGN ESSENTIAL HTN: ICD-10-CM

## 2025-01-07 RX ORDER — AMLODIPINE BESYLATE 5 MG/1
5 TABLET ORAL DAILY
Qty: 90 TABLET | Refills: 4 | Status: SHIPPED | OUTPATIENT
Start: 2025-01-07

## 2025-03-06 DIAGNOSIS — L29.9 ITCH OF SKIN: ICD-10-CM

## 2025-03-06 RX ORDER — HYDROXYZINE HYDROCHLORIDE 10 MG/1
10 TABLET, FILM COATED ORAL EVERY 6 HOURS PRN
Qty: 30 TABLET | Refills: 2 | Status: SHIPPED | OUTPATIENT
Start: 2025-03-06

## 2025-03-07 LAB
CHOLEST SERPL-MCNC: 183 MG/DL
CHOLEST/HDLC SERPL: 2.3 {RATIO}
HDLC SERPL-MCNC: 79 MG/DL (ref 23–92)
LDLC SERPL CALC-MCNC: 89 MG/DL
NONHDLC SERPL-MCNC: 104 MG/DL
TRIGL SERPL-MCNC: 74 MG/DL
TSH SERPL-ACNC: 0.48 UIU/ML (ref 0.45–5.33)

## 2025-03-09 ENCOUNTER — RESULTS FOLLOW-UP (OUTPATIENT)
Dept: CARDIOLOGY CLINIC | Facility: CLINIC | Age: 65
End: 2025-03-09

## 2025-03-11 ENCOUNTER — OFFICE VISIT (OUTPATIENT)
Dept: CARDIOLOGY CLINIC | Facility: CLINIC | Age: 65
End: 2025-03-11
Payer: COMMERCIAL

## 2025-03-11 VITALS
WEIGHT: 186 LBS | DIASTOLIC BLOOD PRESSURE: 70 MMHG | HEART RATE: 72 BPM | SYSTOLIC BLOOD PRESSURE: 130 MMHG | BODY MASS INDEX: 28.19 KG/M2 | HEIGHT: 68 IN

## 2025-03-11 DIAGNOSIS — I50.32 CHRONIC DIASTOLIC HEART FAILURE DUE TO VALVULAR DISEASE  (HCC): ICD-10-CM

## 2025-03-11 DIAGNOSIS — F17.210 TOBACCO DEPENDENCE DUE TO CIGARETTES: ICD-10-CM

## 2025-03-11 DIAGNOSIS — N19: Primary | ICD-10-CM

## 2025-03-11 DIAGNOSIS — I38 CHRONIC DIASTOLIC HEART FAILURE DUE TO VALVULAR DISEASE  (HCC): ICD-10-CM

## 2025-03-11 DIAGNOSIS — I13.2: Primary | ICD-10-CM

## 2025-03-11 PROCEDURE — 99214 OFFICE O/P EST MOD 30 MIN: CPT | Performed by: INTERNAL MEDICINE

## 2025-03-11 PROCEDURE — 93000 ELECTROCARDIOGRAM COMPLETE: CPT | Performed by: INTERNAL MEDICINE

## 2025-03-11 PROCEDURE — G2211 COMPLEX E/M VISIT ADD ON: HCPCS | Performed by: INTERNAL MEDICINE

## 2025-03-11 NOTE — PATIENT INSTRUCTIONS
Assessment & Plan  Hypertensive heart and renal disease with congestive heart failure and renal failure  (HCC)  Wt Readings from Last 3 Encounters:   03/11/25 84.4 kg (186 lb)   12/27/24 85.5 kg (188 lb 7.9 oz)   12/11/24 85.7 kg (189 lb)     Current cardiac medications: Amlodipine 5 mg once daily + Eliquis 2.5 mg twice daily + aspirin 81 mg daily + carvedilol 25 mg twice daily + furosemide 80 mg daily except on days of dialysis + losartan 25 mg daily.    Has had no recent symptoms of decompensated heart failure.  Continues to smoke and drinks beer intermittently.  Blood pressure is in normal range.  On examination there is no evidence of pulmonary or peripheral vascular congestion.  Echocardiogram last year showed preserved systolic function, grade 1 diastolic dysfunction, mild to moderate AI, mild MR and no significant TR or PI.  Potassium was noted to be low at 3.3 on 2/2/2024.    Advising to continue current medications.  Advising to cut down on alcohol and smoking as these are his major risk factors for future cardiovascular disease.  Will do an echocardiogram prior to next visit in 6 months time.  Advised to monitor for symptoms of decompensated heart failure.  Advised to let us know if he has any more passing out or near passing out episodes or if he is experiencing any palpitations.  We may have to lower some of his antihypertensive medications in that case.  He should have potassium level checked during dialysis.  I will put in an order.  Orders:    POCT ECG    Tobacco dependence due to cigarettes  Urged him to quit smoking completely and cut down alcohol use as these are his major risk factors for future cardiovascular events.       Chronic diastolic heart failure due to valvular disease  (HCC)  Wt Readings from Last 3 Encounters:   03/11/25 84.4 kg (186 lb)   12/27/24 85.5 kg (188 lb 7.9 oz)   12/11/24 85.7 kg (189 lb)     Has multiple valvular abnormalities including aortic valve regurgitation mitral  valve regurgitation and tricuspid valve regurgitation.  I did not appreciate any significant murmur on exam.  Last echocardiogram was in April 2024.  Left ventricular systolic function was preserved.    Evaluation and plan as outlined above.  We will consider repeating echocardiogram next year.  Earlier if necessary.

## 2025-03-11 NOTE — ASSESSMENT & PLAN NOTE
Wt Readings from Last 3 Encounters:   03/11/25 84.4 kg (186 lb)   12/27/24 85.5 kg (188 lb 7.9 oz)   12/11/24 85.7 kg (189 lb)     Has multiple valvular abnormalities including aortic valve regurgitation mitral valve regurgitation and tricuspid valve regurgitation.  I did not appreciate any significant murmur on exam.  Last echocardiogram was in April 2024.  Left ventricular systolic function was preserved.    Evaluation and plan as outlined above.  We will consider repeating echocardiogram next year.  Earlier if necessary.

## 2025-03-11 NOTE — ASSESSMENT & PLAN NOTE
Urged him to quit smoking completely and cut down alcohol use as these are his major risk factors for future cardiovascular events.

## 2025-03-11 NOTE — PROGRESS NOTES
Name: Jeffery Phillips      : 1960      MRN: [id number]  Encounter Provider: Linda Gamboa MD  Encounter Date: 3/11/2025   Encounter department: [facility] CARDIOLOGY [place]    DIAGNOSES:  1.  Near syncope  2.  End-stage renal disease, on hemodialysis  3.  Chronic tobacco dependence due to smoking  4.  Renovascular hypertension  5.  Chronic marijuana use  6.  Dyslipidemia  7.  Anemia secondary to chronic kidney disease  8.  Chronic diastolic heart failure due to fluid retention  9.  Secondary hyperparathyroidism  10.  History of alcohol abuse now in remission  11.  Erectile dysfunction    ECHOCARDIOGRAM 2024:  1. Normal left ventricular size, normal left ventricular systolic function, early, compensated grade 1 diastolic dysfunction.  Left ventricular ejection fraction is estimated around 64%.  2. Normal right ventricle size and systolic function.  3. Normal left and right atrial cavity size.  4. Mild aortic valve leaflet sclerosis, mild aortic valve regurgitation.  5. Mild mitral valve leaflet thickening and sclerosis, trace mitral valve regurgitation.  6. Trace tricuspid and pulmonic valve regurgitation.  7. No obvious pulmonary hypertension.  8. No pericardial effusion.    ECHOCARDIOGRAM 2022:  Normal left ventricle size, wall thickness, normal left LV systolic function, grade 1 diastolic dysfunction, EF 60%  Normal right ventricle size and function.  Normal left and right atrial cavity size.  Mild to moderate aortic valve regurgitation no aortic valve stenosis.  Mild mitral valve regurgitation.  No significant tricuspid or pulmonic valve regurgitation.  No obvious pulmonary hypertension  No pericardial effusion.     Reports of having nuclear stress test also in the past in [place].    Assessment & Plan  Hypertensive heart and renal disease with congestive heart failure and renal failure  (HCC)  Wt Readings from Last 3 Encounters:   25 84.4 kg (186 lb)   24 85.5 kg (188 lb 7.9  oz)   12/11/24 85.7 kg (189 lb)     Current cardiac medications: Amlodipine 5 mg once daily + Eliquis 2.5 mg twice daily + aspirin 81 mg daily + carvedilol 25 mg twice daily + furosemide 80 mg daily except on days of dialysis + losartan 25 mg daily.    Has had no recent symptoms of decompensated heart failure.  Continues to smoke and drinks beer intermittently.  Blood pressure is in normal range.  On examination there is no evidence of pulmonary or peripheral vascular congestion.  Echocardiogram last year showed preserved systolic function, grade 1 diastolic dysfunction, mild to moderate AI, mild MR and no significant TR or PI.  Potassium was noted to be low at 3.3 on 2/2/2024.    Advising to continue current medications.  Advising to cut down on alcohol and smoking as these are his major risk factors for future cardiovascular disease.  Will do an echocardiogram prior to next visit in 6 months time.  Advised to monitor for symptoms of decompensated heart failure.  Advised to let us know if he has any more passing out or near passing out episodes or if he is experiencing any palpitations.  We may have to lower some of his antihypertensive medications in that case.  He should have potassium level checked during dialysis.  I will put in an order.  Orders:    POCT ECG    Tobacco dependence due to cigarettes  Urged him to quit smoking completely and cut down alcohol use as these are his major risk factors for future cardiovascular events.       Chronic diastolic heart failure due to valvular disease  (HCC)  Wt Readings from Last 3 Encounters:   03/11/25 84.4 kg (186 lb)   12/27/24 85.5 kg (188 lb 7.9 oz)   12/11/24 85.7 kg (189 lb)     Has multiple valvular abnormalities including aortic valve regurgitation mitral valve regurgitation and tricuspid valve regurgitation.  I did not appreciate any significant murmur on exam.  Last echocardiogram was in April 2024.  Left ventricular systolic function was  "preserved.    Evaluation and plan as outlined above.  We will consider repeating echocardiogram next year.  Earlier if necessary.           History of Present Illness   HPI  Jeffery Phillips is a 64 y.o. male who presents regarding follow-up of his cardiac comorbidities including hypertensive heart disease and renal disease with chronic heart failure, end-stage renal disease on hemodialysis, anemia secondary to CKD, chronic tobacco dependence, MGUS and other comorbidities.  He was last seen by me in February 2024.    History obtained from: patient    He mentions that since last visit he has had no new major diagnosis or hospitalizations or significant illnesses.  From symptom perspective he reports that he has had a couple of episodes of syncope.  Symptoms are often accompanied with prodromal symptom of lightheadedness and fatigue and he goes and lies down on a couch and he passes out for a few minutes.  Denies a having symptom while he is working or driving.  Denies any unusual chest pain or shortness of breath with normal activity or orthopnea or PND.  Denies any issues at hemodialysis which she has been going to regularly.  Denies any recent falls or bleeding or excessive bruising.    Reports he continues to smoke 4 to 6 cigarettes a day.  Smokes marijuana regularly.      Reports that he tried quitting but he could not.  Reports having beer maybe twice a week.    Has no family history of premature CAD or sudden cardiac death.    Lipid profile 3/7/2025  TG 74 HDL 79 Calc LDL 89 direct     Potassium was 3.3 on 2/2/2024 at dialysis.    Review of Systems   Constitutional: Negative.    HENT: Negative.     Cardiovascular:  Negative for chest pain, palpitations and leg swelling.   Musculoskeletal: Negative.    Skin: Negative.    Neurological:  Positive for syncope and light-headedness. Negative for dizziness.   Psychiatric/Behavioral: Negative.            Objective   /70   Pulse 72   Ht 5' 8\" (1.727 " m)   Wt 84.4 kg (186 lb)   BMI 28.28 kg/m²      Physical Exam  Constitutional:       Appearance: He is overweight.   HENT:      Mouth/Throat:      Mouth: Mucous membranes are moist.   Neck:      Vascular: No carotid bruit.   Cardiovascular:      Rate and Rhythm: Normal rate and regular rhythm.   Pulmonary:      Effort: Pulmonary effort is normal. No respiratory distress.      Breath sounds: Normal breath sounds. No wheezing, rhonchi or rales.   Abdominal:      Palpations: Abdomen is soft.   Musculoskeletal:      Cervical back: Neck supple.      Right lower leg: No edema.      Left lower leg: No edema.      Comments: Has left upper arm hemodialysis graft.   Skin:     General: Skin is warm and dry.   Psychiatric:         Behavior: Behavior is cooperative.

## 2025-03-11 NOTE — ASSESSMENT & PLAN NOTE
Wt Readings from Last 3 Encounters:   03/11/25 84.4 kg (186 lb)   12/27/24 85.5 kg (188 lb 7.9 oz)   12/11/24 85.7 kg (189 lb)     Current cardiac medications: Amlodipine 5 mg once daily + Eliquis 2.5 mg twice daily + aspirin 81 mg daily + carvedilol 25 mg twice daily + furosemide 80 mg daily except on days of dialysis + losartan 25 mg daily.    Has had no recent symptoms of decompensated heart failure.  Continues to smoke and drinks beer intermittently.  Blood pressure is in normal range.  On examination there is no evidence of pulmonary or peripheral vascular congestion.  Echocardiogram last year showed preserved systolic function, grade 1 diastolic dysfunction, mild to moderate AI, mild MR and no significant TR or PI.  Potassium was noted to be low at 3.3 on 2/2/2024.    Advising to continue current medications.  Advising to cut down on alcohol and smoking as these are his major risk factors for future cardiovascular disease.  Will do an echocardiogram prior to next visit in 6 months time.  Advised to monitor for symptoms of decompensated heart failure.  Advised to let us know if he has any more passing out or near passing out episodes or if he is experiencing any palpitations.  We may have to lower some of his antihypertensive medications in that case.  He should have potassium level checked during dialysis.  I will put in an order.  Orders:    POCT ECG

## 2025-03-25 DIAGNOSIS — N18.6 ESRD (END STAGE RENAL DISEASE) ON DIALYSIS (HCC): ICD-10-CM

## 2025-03-25 DIAGNOSIS — Z99.2 ESRD (END STAGE RENAL DISEASE) ON DIALYSIS (HCC): ICD-10-CM

## 2025-03-25 RX ORDER — FUROSEMIDE 80 MG/1
160 TABLET ORAL DAILY
Qty: 180 TABLET | Refills: 3 | Status: SHIPPED | OUTPATIENT
Start: 2025-03-25

## 2025-03-26 DIAGNOSIS — N18.6 ESRD (END STAGE RENAL DISEASE) ON DIALYSIS (HCC): ICD-10-CM

## 2025-03-26 DIAGNOSIS — T82.898S ARTERIOVENOUS FISTULA OCCLUSION, SEQUELA: ICD-10-CM

## 2025-03-26 DIAGNOSIS — Z99.2 ESRD (END STAGE RENAL DISEASE) ON DIALYSIS (HCC): ICD-10-CM

## 2025-03-27 RX ORDER — APIXABAN 2.5 MG/1
2.5 TABLET, FILM COATED ORAL 2 TIMES DAILY
Qty: 60 TABLET | Refills: 0 | Status: SHIPPED | OUTPATIENT
Start: 2025-03-27

## 2025-04-07 ENCOUNTER — RA CDI HCC (OUTPATIENT)
Dept: OTHER | Facility: HOSPITAL | Age: 65
End: 2025-04-07

## 2025-04-07 NOTE — PROGRESS NOTES
HCC coding opportunities       Chart reviewed, no opportunity found: CHART REVIEWED, NO OPPORTUNITY FOUND  N18.6, Z99.2        Patients Insurance     Medicare Insurance: Medicare / [facility]          
[employer]
English

## 2025-04-10 ENCOUNTER — TRANSCRIBE ORDERS (OUTPATIENT)
Dept: RADIOLOGY | Facility: HOSPITAL | Age: 65
End: 2025-04-10

## 2025-04-10 DIAGNOSIS — N18.6 ESRD (END STAGE RENAL DISEASE) (HCC): Primary | ICD-10-CM

## 2025-04-11 ENCOUNTER — PREP FOR PROCEDURE (OUTPATIENT)
Dept: INTERVENTIONAL RADIOLOGY/VASCULAR | Facility: CLINIC | Age: 65
End: 2025-04-11

## 2025-04-11 ENCOUNTER — TELEPHONE (OUTPATIENT)
Dept: RADIOLOGY | Facility: HOSPITAL | Age: 65
End: 2025-04-11

## 2025-04-11 DIAGNOSIS — N18.6 ESRD (END STAGE RENAL DISEASE) ON DIALYSIS (HCC): Primary | ICD-10-CM

## 2025-04-11 DIAGNOSIS — Z99.2 ESRD (END STAGE RENAL DISEASE) ON DIALYSIS (HCC): Primary | ICD-10-CM

## 2025-04-11 NOTE — PRE-PROCEDURE INSTRUCTIONS
Pre-procedure Instructions for Interventional Radiology  [address] [address]  INTERVENTIONAL RADIOLOGY [phone]    You are scheduled for a/an fistulagram.    On Monday 4-14-25.    Your tentative arrival time is 8:45am.  Short stay will notify you the day before your procedure with the exact arrival time and the location to arrive.    To prepare for your procedure:  Please arrange for someone to drive you home after the procedure and stay with you until the next morning if you are instructed to do so.  This is typically for patients receiving some type of sedative or anesthetic for the procedure.  DO NOT EAT OR DRINK ANYTHING after midnight on the evening before your procedure including candy & gum.  ONLY SIPS OF WATER with your medications are allowed on the morning of your procedure.  TAKE ALL OF YOUR REGULAR MEDICATIONS THE MORNING OF YOUR PROCEDURE with sips of water!  We may call you to stop some of your blood sugar, blood pressure and blood thinning medications depending on the procedure.  Please take all of these medications unless we instruct you to stop them.  If you have an allergy to x-ray dye, please contact Interventional Radiology for an x-ray dye preparation which usually consists of an oral steroid and Benadryl.  If you wear a Glucose Monitor, you may be asked to remove it for your procedure if we are using x-ray.  These devices need to be removed when we are imaging with x-ray near the device since the radiation can cause the unit to malfunction.  If possible and not too inconvenient, you may want to schedule your exam closer to day 14 of your 14 day device so your device is not wasted.    The day of your procedure:  Bring a list of the medications you take at home.  Bring medications you take for breathing problems (such as inhalers), medications for chest pain, or both.  Bring a case for your glasses or contacts.  Bring your insurance card and a  form of photo ID.  Please leave all valuables such as credit cards and jewelry at home.  Report to the admitting office to the left of the registration desk in the main lobby at the [facility], Entrance B.  You will then be directed to the Short Stay Center.  While your procedure is being performed, your family may wait in the Radiology Waiting Room on the 1st floor in Radiology.  if they need to leave, they may provide a number to be called following the procedure.   Be prepared to stay overnight just in case. Sometimes procedures will indicate the need for further observation or treatment.   If you are scheduled for a follow-up visit with the Interventional Radiologist after your procedure, you will be called with a date and time.    Special Instructions (Medications to stop taking before your procedure etc.):  Hold Lasix the morning of the procedure.

## 2025-04-14 ENCOUNTER — HOSPITAL ENCOUNTER (OUTPATIENT)
Dept: RADIOLOGY | Facility: HOSPITAL | Age: 65
Discharge: HOME | End: 2025-04-14
Attending: STUDENT IN AN ORGANIZED HEALTH CARE EDUCATION/TRAINING PROGRAM | Admitting: RADIOLOGY
Payer: COMMERCIAL

## 2025-04-14 VITALS
HEART RATE: 70 BPM | HEIGHT: 68 IN | OXYGEN SATURATION: 97 % | BODY MASS INDEX: 28.53 KG/M2 | SYSTOLIC BLOOD PRESSURE: 177 MMHG | TEMPERATURE: 97.8 F | DIASTOLIC BLOOD PRESSURE: 69 MMHG | RESPIRATION RATE: 16 BRPM | WEIGHT: 188.27 LBS

## 2025-04-14 DIAGNOSIS — N18.6 ESRD (END STAGE RENAL DISEASE) ON DIALYSIS (HCC): ICD-10-CM

## 2025-04-14 DIAGNOSIS — Z99.2 ESRD (END STAGE RENAL DISEASE) ON DIALYSIS (HCC): ICD-10-CM

## 2025-04-14 PROCEDURE — C1769 GUIDE WIRE: HCPCS

## 2025-04-14 PROCEDURE — 36901 INTRO CATH DIALYSIS CIRCUIT: CPT

## 2025-04-14 PROCEDURE — C1894 INTRO/SHEATH, NON-LASER: HCPCS

## 2025-04-14 PROCEDURE — 36901 INTRO CATH DIALYSIS CIRCUIT: CPT | Performed by: RADIOLOGY

## 2025-04-14 RX ORDER — LIDOCAINE WITH 8.4% SOD BICARB 0.9%(10ML)
SYRINGE (ML) INJECTION AS NEEDED
Status: COMPLETED | OUTPATIENT
Start: 2025-04-14 | End: 2025-04-14

## 2025-04-14 RX ADMIN — Medication 10 ML: at 09:18

## 2025-04-14 RX ADMIN — IOHEXOL 15 ML: 350 INJECTION, SOLUTION INTRAVENOUS at 09:37

## 2025-04-14 NOTE — DISCHARGE INSTRUCTIONS
Fistulagram   WHAT YOU NEED TO KNOW:   Your arm or leg my  be sore, swollen, and bruised after the procedure. This is normal and should get better in a few days.     DISCHARGE INSTRUCTIONS:     Contact Interventional Radiology at [phone] (MIK[facility]IL PATIENTS: Contact Interventional Radiology at [phone]) (EDUARDO PATIENTS: Contact Interventional Radiology at [phone]) if:    You have a fever or chills.    Your puncture site is red, swollen, or draining pus.    You have nausea or are vomiting.    Your skin is itchy, swollen, or you have a rash.    You cannot feel a thrill over your graft or fistula.     You have questions or concerns about your condition or care.    Seek care immediately if:     You have bleeding that does not stop after 10 minutes of holding firm, direct pressure over the puncture site.    Blood soaks through your bandage.    Your hand or foot closest to the graft or fistula feels cold, painful, or numb.     Your hand or foot closest to the graft or fistula is pale or blue.     You have trouble moving your arm or leg closest to the graft or fistula.     Your bruise suddenly gets bigger.    Care for your wound as directed:  Remove the bandage in 4 to 6 hours or as         directed. Wash the area once a day with soap and water. Gently pat the area dry.     Apply firm, steady pressure to the puncture site if it bleeds.  Use a clean gauze or towel to hold pressure for 10 to 15 minutes. Call 911 if you cannot stop the bleeding or the bleeding gets heavier.     Feel for a thrill once a day or as directed.  Place your index and second finger over your fistula or graft as directed. You should feel a vibration. The vibration means that blood is flowing through your graft or fistula correctly.     Rest your arm or leg as directed.  Do not lift anything heavier than 5 pounds or do strenuous activity for 24 hours.     Prevent damage to your graft or fistula.  Do not wear tight-fitting clothing over  your graft or fistula. Do not wear tight jewelry on the arm or leg with the graft or fistula. Tell healthcare providers not to do, IVs, blood draws, and blood pressure readings in the arm with your graft or fistula. Do not allow flu shots or vaccinations in your arm with your graft or fistula.    Follow up with your healthcare provider as directed

## 2025-04-14 NOTE — BRIEF OP NOTE (RAD/CATH)
INTERVENTIONAL RADIOLOGY PROCEDURE NOTE    Date: 4/14/2025    Procedure:   Procedure Summary       Date: 04/14/25 Room / Location: [facility] Interventional Radiology    Anesthesia Start:  Anesthesia Stop:     Procedure: IR AV FISTULAGRAM/GRAFTOGRAM Diagnosis:       ESRD (end stage renal disease) on dialysis (HCC)      (prolonged bleeding)    Scheduled Providers:  Responsible Provider:     Anesthesia Type: Not recorded ASA Status: Not recorded            Preoperative diagnosis:   1. ESRD (end stage renal disease) on dialysis (HCC)         Postoperative diagnosis: Same.    Surgeon: Andrew Matthews MD     Assistant: None. No qualified resident was available.    Blood loss: none    Specimens: none     Findings: Normal graft    Complications: None immediate.    Anesthesia: local

## 2025-04-14 NOTE — SEDATION DOCUMENTATION
Fistulargram  performed by Dr. Matthews. No intervention and no sedation needed. Acces site closed with a Tipstop. Will return to [facility].

## 2025-04-14 NOTE — DISCHARGE INSTR - LAB
Fistulagram   WHAT YOU NEED TO KNOW:   Your arm or leg my  be sore, swollen, and bruised after the procedure. This is normal and should get better in a few days.     DISCHARGE INSTRUCTIONS:     Contact Interventional Radiology at [phone] ([facility] PATIENTS: Contact Interventional Radiology at [phone]) (EDUARDO PATIENTS: Contact Interventional Radiology at [phone]) if:    You have a fever or chills.    Your puncture site is red, swollen, or draining pus.    You have nausea or are vomiting.    Your skin is itchy, swollen, or you have a rash.    You cannot feel a thrill over your graft or fistula.     You have questions or concerns about your condition or care.    Seek care immediately if:     You have bleeding that does not stop after 10 minutes of holding firm, direct pressure over the puncture site.    Blood soaks through your bandage.    Your hand or foot closest to the graft or fistula feels cold, painful, or numb.     Your hand or foot closest to the graft or fistula is pale or blue.     You have trouble moving your arm or leg closest to the graft or fistula.     Your bruise suddenly gets bigger.    Care for your wound as directed:  Remove the bandage in 4 to 6 hours or as         directed. Wash the area once a day with soap and water. Gently pat the area dry.     Apply firm, steady pressure to the puncture site if it bleeds.  Use a clean gauze or towel to hold pressure for 10 to 15 minutes. Call 911 if you cannot stop the bleeding or the bleeding gets heavier.     Feel for a thrill once a day or as directed.  Place your index and second finger over your fistula or graft as directed. You should feel a vibration. The vibration means that blood is flowing through your graft or fistula correctly.     Rest your arm or leg as directed.  Do not lift anything heavier than 5 pounds or do strenuous activity for 24 hours.     Prevent damage to your graft or fistula.  Do not wear tight-fitting clothing over  your graft or fistula. Do not wear tight jewelry on the arm or leg with the graft or fistula. Tell healthcare providers not to do, IVs, blood draws, and blood pressure readings in the arm with your graft or fistula. Do not allow flu shots or vaccinations in your arm with your graft or fistula.    Follow up with your healthcare provider as directed       Moderate Sedation   WHAT YOU NEED TO KNOW:   Moderate sedation, or conscious sedation, is medicine used during procedures to help you feel relaxed and calm. You will be awake and able to follow directions without anxiety or pain. You will remember little to none of the procedure. You may feel tired, weak, or unsteady on your feet after you get sedation. You may also have trouble concentrating or short-term memory loss. These symptoms should go away in 24 hours or less.   DISCHARGE INSTRUCTIONS:   Call 911 or have someone else call for any of the following:   You have sudden trouble breathing.     You cannot be woken.  Seek care immediately if:   You have a severe headache or dizziness.     Your heart is beating faster than usual.  Contact your healthcare provider if:   You have a fever.     You have nausea or are vomiting for more than 8 hours after the procedure.      Your skin is itchy, swollen, or you have a rash.     You have questions or concerns about your condition or care.  Self-care:   Have someone stay with you for 24 hours. This person can drive you to errands and help you do things around the house. This person can also watch for problems.      Rest and do quiet activities for 24 hours. Do not exercise, ride a bike, or play sports. Stand up slowly to prevent dizziness and falls. Take short walks around the house with another person. Slowly return to your usual activities the next day.      Do not drive or use dangerous machines or tools for 24 hours. You may injure yourself or others. Examples include a lawnmower, saw, or drill. Do not return to work for  24 hours if you use dangerous machines or tools for work.      Do not make important decisions for 24 hours. For example, do not sign important papers or invest money.      Drink liquids as directed. Liquids help flush the sedation medicine out of your body. Ask how much liquid to drink each day and which liquids are best for you.      Eat small, frequent meals to prevent nausea and vomiting. Start with clear liquids such as juice or broth. If you do not vomit after clear liquids, you can eat your usual foods.      Do not drink alcohol or take medicines that make you drowsy. This includes medicines that help you sleep and anxiety medicines. Ask your healthcare provider if it is safe for you to take pain medicine.  Follow up with your healthcare provider as directed: Write down your questions so you remember to ask them during your visits.   © 2017 [employer] Information is for End User's use only and may not be sold, redistributed or otherwise used for commercial purposes. All illustrations and images included in CareNotes® are the copyrighted property of [employer] or [employer].  The above information is an  only. It is not intended as medical advice for individual conditions or treatments. Talk to your doctor, nurse or pharmacist before following any medical regimen to see if it is safe and effective for you.

## 2025-04-16 ENCOUNTER — OFFICE VISIT (OUTPATIENT)
Dept: INTERNAL MEDICINE CLINIC | Facility: CLINIC | Age: 65
End: 2025-04-16
Payer: COMMERCIAL

## 2025-04-16 VITALS
DIASTOLIC BLOOD PRESSURE: 93 MMHG | BODY MASS INDEX: 29.55 KG/M2 | HEART RATE: 74 BPM | TEMPERATURE: 97.4 F | WEIGHT: 195 LBS | SYSTOLIC BLOOD PRESSURE: 147 MMHG | HEIGHT: 68 IN

## 2025-04-16 DIAGNOSIS — Z99.2 ESRD (END STAGE RENAL DISEASE) ON DIALYSIS (HCC): ICD-10-CM

## 2025-04-16 DIAGNOSIS — F10.11 ALCOHOL ABUSE, IN REMISSION: ICD-10-CM

## 2025-04-16 DIAGNOSIS — N18.6 ESRD (END STAGE RENAL DISEASE) ON DIALYSIS (HCC): ICD-10-CM

## 2025-04-16 DIAGNOSIS — D47.2 MGUS (MONOCLONAL GAMMOPATHY OF UNKNOWN SIGNIFICANCE): ICD-10-CM

## 2025-04-16 DIAGNOSIS — F17.210 TOBACCO DEPENDENCE DUE TO CIGARETTES: ICD-10-CM

## 2025-04-16 DIAGNOSIS — E55.9 VITAMIN D DEFICIENCY: ICD-10-CM

## 2025-04-16 DIAGNOSIS — N19: Primary | ICD-10-CM

## 2025-04-16 DIAGNOSIS — I13.2: Primary | ICD-10-CM

## 2025-04-16 PROCEDURE — G2211 COMPLEX E/M VISIT ADD ON: HCPCS | Performed by: STUDENT IN AN ORGANIZED HEALTH CARE EDUCATION/TRAINING PROGRAM

## 2025-04-16 PROCEDURE — 99214 OFFICE O/P EST MOD 30 MIN: CPT | Performed by: STUDENT IN AN ORGANIZED HEALTH CARE EDUCATION/TRAINING PROGRAM

## 2025-04-16 NOTE — ASSESSMENT & PLAN NOTE
IgG spike on prior SPEP, due for yearly recheck  Was seen by heme/onc but did not follow up after initial visit  Orders:    Protein electrophoresis, serum; Future    IgG, IgA, IgM; Future    Immunoglobulin free LT chains blood; Future

## 2025-04-16 NOTE — PROGRESS NOTES
Name: Jeffery Phillips      : 1960      MRN: [id number]  Encounter Provider: Jean Perkins MD  Encounter Date: 2025   Encounter department: [facility] INTERNAL MEDICINE [place]  :  Assessment & Plan  Hypertensive heart and renal disease with congestive heart failure and renal failure  (HCC)  Wt Readings from Last 3 Encounters:   25 88.5 kg (195 lb)   25 85.4 kg (188 lb 4.4 oz)   25 84.4 kg (186 lb)     BP today is 147/93, confirmed on manual recheck  Current regimen: Carvedilol 25 mg BID, amlodipine 5 mg, losartan 25 mg  With prior episodes of hypotension  Will continue with current regimen       ESRD (end stage renal disease) on dialysis ([facility])  Lab Results   Component Value Date    EGFR 3 2024    EGFR 10 2023    EGFR 12 10/24/2022    CREATININE 13.70 (H) 2024    CREATININE 5.26 (H) 2023    CREATININE 4.68 (H) 10/24/2022     Follows with nephrology, unable to view records  HD TTS  Recent AV fistulagram normal    Orders:  •  Vitamin D 25 hydroxy; Future    MGUS (monoclonal gammopathy of unknown significance)  IgG spike on prior SPEP, due for yearly recheck  Was seen by heme/onc but did not follow up after initial visit  Orders:  •  Protein electrophoresis, serum; Future  •  IgG, IgA, IgM; Future  •  Immunoglobulin free LT chains blood; Future    Tobacco dependence due to cigarettes  Smoking about 3-4 a day currently       Alcohol abuse, in remission  Reports drinking 1-2 drinks every 3 days or so       Vitamin D deficiency    Orders:  •  Vitamin D 25 hydroxy; Future          Depression Screening and Follow-up Plan: Patient was screened for depression during today's encounter. They screened negative with a PHQ-2 score of 0.    History of Present Illness   64 year old male w/ hx of HTN, ESRD, on HD TTS, tobacco use, alcohol use, MGUS, who presents for follow up.    Reports no acute complaints today. ROS negative as below. Taking BP at home usually is  "130-140  Review of Systems   Constitutional:  Negative for chills and fever.   HENT:  Negative for ear pain and sore throat.    Eyes:  Negative for pain and visual disturbance.   Respiratory:  Negative for cough and shortness of breath.    Cardiovascular:  Negative for chest pain and palpitations.   Gastrointestinal:  Negative for abdominal pain and vomiting.   Genitourinary:  Negative for dysuria and hematuria.   Musculoskeletal:  Negative for arthralgias and back pain.   Skin:  Negative for color change and rash.   Neurological:  Negative for seizures and syncope.   All other systems reviewed and are negative.    Objective   /93 (BP Location: Left arm, Patient Position: Sitting, Cuff Size: Standard)   Pulse 74   Temp (!) 97.4 °F (36.3 °C)   Ht 5' 8\" (1.727 m)   Wt 88.5 kg (195 lb)   BMI 29.65 kg/m²      Physical Exam  Vitals and nursing note reviewed.   Constitutional:       General: He is not in acute distress.     Appearance: He is well-developed.   HENT:      Head: Normocephalic and atraumatic.   Eyes:      Conjunctiva/sclera: Conjunctivae normal.   Cardiovascular:      Rate and Rhythm: Normal rate and regular rhythm.      Heart sounds: Murmur heard.   Pulmonary:      Effort: Pulmonary effort is normal. No respiratory distress.      Breath sounds: Normal breath sounds.   Abdominal:      Palpations: Abdomen is soft.      Tenderness: There is no abdominal tenderness.   Musculoskeletal:         General: No swelling.      Cervical back: Neck supple.      Right lower leg: No edema.      Left lower leg: No edema.   Skin:     General: Skin is warm and dry.      Capillary Refill: Capillary refill takes less than 2 seconds.   Neurological:      Mental Status: He is alert and oriented to person, place, and time.   Psychiatric:         Mood and Affect: Mood normal.     "

## 2025-04-16 NOTE — ASSESSMENT & PLAN NOTE
Lab Results   Component Value Date    EGFR 3 01/06/2024    EGFR 10 09/28/2023    EGFR 12 10/24/2022    CREATININE 13.70 (H) 01/06/2024    CREATININE 5.26 (H) 09/28/2023    CREATININE 4.68 (H) 10/24/2022     Follows with nephrology, unable to view records  HD TTS  Recent AV fistulagram normal    Orders:    Vitamin D 25 hydroxy; Future

## 2025-04-16 NOTE — ASSESSMENT & PLAN NOTE
Wt Readings from Last 3 Encounters:   04/16/25 88.5 kg (195 lb)   04/14/25 85.4 kg (188 lb 4.4 oz)   03/11/25 84.4 kg (186 lb)     BP today is 147/93, confirmed on manual recheck  Current regimen: Carvedilol 25 mg BID, amlodipine 5 mg, losartan 25 mg  With prior episodes of hypotension  Will continue with current regimen

## 2025-06-10 ENCOUNTER — TELEPHONE (OUTPATIENT)
Dept: INTERNAL MEDICINE CLINIC | Facility: CLINIC | Age: 65
End: 2025-06-10

## 2025-06-10 NOTE — TELEPHONE ENCOUNTER
Called patient and left message to reschedule appointment 08/18/2025, 9:30 AM. Doctor will not be in the office.

## 2025-06-26 DIAGNOSIS — N18.6 ESRD (END STAGE RENAL DISEASE) ON DIALYSIS (HCC): ICD-10-CM

## 2025-06-26 DIAGNOSIS — Z99.2 ESRD (END STAGE RENAL DISEASE) ON DIALYSIS (HCC): ICD-10-CM

## 2025-06-26 RX ORDER — FUROSEMIDE 80 MG/1
160 TABLET ORAL DAILY
Qty: 180 TABLET | Refills: 3 | Status: SHIPPED | OUTPATIENT
Start: 2025-06-26

## 2025-07-24 ENCOUNTER — PREP FOR PROCEDURE (OUTPATIENT)
Dept: INTERVENTIONAL RADIOLOGY/VASCULAR | Facility: CLINIC | Age: 65
End: 2025-07-24

## 2025-07-24 DIAGNOSIS — N18.6 ESRD (END STAGE RENAL DISEASE) ON DIALYSIS (HCC): Primary | ICD-10-CM

## 2025-07-24 DIAGNOSIS — Z99.2 ESRD (END STAGE RENAL DISEASE) ON DIALYSIS (HCC): Primary | ICD-10-CM

## 2025-08-08 ENCOUNTER — TELEPHONE (OUTPATIENT)
Dept: INTERVENTIONAL RADIOLOGY/VASCULAR | Facility: HOSPITAL | Age: 65
End: 2025-08-08

## 2025-08-13 ENCOUNTER — HOSPITAL ENCOUNTER (OUTPATIENT)
Dept: INTERVENTIONAL RADIOLOGY/VASCULAR | Facility: HOSPITAL | Age: 65
Discharge: HOME | End: 2025-08-13
Attending: STUDENT IN AN ORGANIZED HEALTH CARE EDUCATION/TRAINING PROGRAM
Payer: COMMERCIAL

## (undated) DEVICE — GLOVE INDICATOR PI UNDERGLOVE SZ 6.5 BLUE

## (undated) DEVICE — STRL BETHLEHEM A V FISTULA PK: Brand: CARDINAL HEALTH

## (undated) DEVICE — SUT VICRYL 3-0 SH 27 IN J416H

## (undated) DEVICE — LIGACLIP MCA MULTIPLE CLIP APPLIERS, 20 SMALL CLIPS: Brand: LIGACLIP

## (undated) DEVICE — PETRI DISH STERILE

## (undated) DEVICE — SUT VICRYL 2-0 CT-1 27 IN J259H

## (undated) DEVICE — SURGICEL 4 X 8

## (undated) DEVICE — SUT SILK 4-0 18 IN A183H

## (undated) DEVICE — SUT PROLENE 6-0 BV130 30 IN 8709H

## (undated) DEVICE — DRAPE EQUIPMENT RF WAND

## (undated) DEVICE — VESSEL LOOPS X-RAY DETECTABLE: Brand: DEROYAL

## (undated) DEVICE — 1/2 FORCE SURGICAL SPRING CLIP: Brand: STEALTH® SPRING CLIP

## (undated) DEVICE — ULTRASOUND GEL STERILE FOIL PK

## (undated) DEVICE — IV CATH INTROCAN 18G X 1 1/4 SAFETY

## (undated) DEVICE — SURGICEL 2 X 14

## (undated) DEVICE — SUT SILK 2-0 18 IN A185H

## (undated) DEVICE — SUT SILK 3-0 18 IN A184H

## (undated) DEVICE — NEEDLE 25G X 1 1/2

## (undated) DEVICE — SUT SILK 3-0 SH 30 IN K832H

## (undated) DEVICE — SURGICEL 4 X 8IN

## (undated) DEVICE — INSTRUMENT POUCH: Brand: CONVERTORS

## (undated) DEVICE — SUT SILK 2-0 30 IN A305H

## (undated) DEVICE — SCD SEQUENTIAL COMPRESSION COMFORT SLEEVE MEDIUM KNEE LENGTH: Brand: KENDALL SCD

## (undated) DEVICE — SUT VICRYL 3-0 PS-2 27 IN J427H

## (undated) DEVICE — BAG DECANTER

## (undated) DEVICE — MEDI-VAC YANK SUCT HNDL W/TPRD BULBOUS TIP: Brand: CARDINAL HEALTH

## (undated) DEVICE — GLOVE SRG BIOGEL ECLIPSE 6

## (undated) DEVICE — DECANTER: Brand: UNBRANDED

## (undated) DEVICE — ADHESIVE SKIN HIGH VISCOSITY EXOFIN 1ML

## (undated) DEVICE — SUT SILK 3-0 30 IN A304H

## (undated) DEVICE — PAD GROUNDING ADULT

## (undated) DEVICE — 2000CC GUARDIAN II: Brand: GUARDIAN

## (undated) DEVICE — SUT MONOCRYL 4-0 PS-2 27 IN Y426H

## (undated) DEVICE — SUT SILK 4-0 30 IN A303H

## (undated) DEVICE — TOWEL SURG XR DETECT GREEN STRL RFD

## (undated) DEVICE — INDICATED FOR SURGICAL CLAMPING DURING CARDIOVASCULAR PERIPHERAL VASCULAR, AND GENERAL SURGERY.: Brand: SOFT/FIBRA® SPRING CLIP  1/2 FORCE